# Patient Record
Sex: MALE | Race: BLACK OR AFRICAN AMERICAN | NOT HISPANIC OR LATINO | Employment: FULL TIME | URBAN - METROPOLITAN AREA
[De-identification: names, ages, dates, MRNs, and addresses within clinical notes are randomized per-mention and may not be internally consistent; named-entity substitution may affect disease eponyms.]

---

## 2017-03-29 ENCOUNTER — PATIENT MESSAGE (OUTPATIENT)
Dept: OPTOMETRY | Facility: CLINIC | Age: 42
End: 2017-03-29

## 2017-03-29 DIAGNOSIS — I15.0 RENOVASCULAR HYPERTENSION: ICD-10-CM

## 2017-03-29 DIAGNOSIS — R00.0 TACHYCARDIA: ICD-10-CM

## 2017-03-29 RX ORDER — INSULIN GLARGINE 100 [IU]/ML
45 INJECTION, SOLUTION SUBCUTANEOUS 2 TIMES DAILY
Qty: 45 SYRINGE | Refills: 9 | Status: SHIPPED | OUTPATIENT
Start: 2017-03-29 | End: 2017-06-06 | Stop reason: SDUPTHER

## 2017-03-29 RX ORDER — CARVEDILOL 25 MG/1
25 TABLET ORAL 2 TIMES DAILY WITH MEALS
Qty: 180 TABLET | Refills: 3 | Status: SHIPPED | OUTPATIENT
Start: 2017-03-29 | End: 2018-04-10 | Stop reason: SDUPTHER

## 2017-04-12 ENCOUNTER — HOSPITAL ENCOUNTER (EMERGENCY)
Facility: OTHER | Age: 42
Discharge: HOME | End: 2017-04-12
Attending: EMERGENCY MEDICINE
Payer: MEDICARE

## 2017-04-12 VITALS
SYSTOLIC BLOOD PRESSURE: 171 MMHG | WEIGHT: 313 LBS | RESPIRATION RATE: 20 BRPM | DIASTOLIC BLOOD PRESSURE: 97 MMHG | BODY MASS INDEX: 41.48 KG/M2 | HEIGHT: 73 IN | TEMPERATURE: 100 F | OXYGEN SATURATION: 98 %

## 2017-04-12 DIAGNOSIS — J02.0 STREP PHARYNGITIS: Primary | ICD-10-CM

## 2017-04-12 DIAGNOSIS — R50.9 FEVER: ICD-10-CM

## 2017-04-12 LAB
ALBUMIN SERPL BCP-MCNC: 3.5 G/DL
ALP SERPL-CCNC: 105 U/L
ALT SERPL W/O P-5'-P-CCNC: 13 U/L
ANION GAP SERPL CALC-SCNC: 14 MMOL/L
ANISOCYTOSIS BLD QL SMEAR: SLIGHT
AST SERPL-CCNC: 12 U/L
BACTERIA #/AREA URNS HPF: NORMAL /HPF
BASOPHILS # BLD AUTO: ABNORMAL K/UL
BASOPHILS NFR BLD: 0 %
BILIRUB SERPL-MCNC: 0.6 MG/DL
BILIRUB UR QL STRIP: NEGATIVE
BUN SERPL-MCNC: 40 MG/DL
CALCIUM SERPL-MCNC: 9.4 MG/DL
CHLORIDE SERPL-SCNC: 92 MMOL/L
CLARITY UR: CLEAR
CO2 SERPL-SCNC: 26 MMOL/L
COLOR UR: YELLOW
CREAT SERPL-MCNC: 8.7 MG/DL
DEPRECATED S PYO AG THROAT QL EIA: POSITIVE
DIFFERENTIAL METHOD: ABNORMAL
EOSINOPHIL # BLD AUTO: ABNORMAL K/UL
EOSINOPHIL NFR BLD: 0 %
ERYTHROCYTE [DISTWIDTH] IN BLOOD BY AUTOMATED COUNT: 13.9 %
EST. GFR  (AFRICAN AMERICAN): 8 ML/MIN/1.73 M^2
EST. GFR  (NON AFRICAN AMERICAN): 7 ML/MIN/1.73 M^2
FLUAV AG SPEC QL IA: NEGATIVE
FLUBV AG SPEC QL IA: NEGATIVE
GLUCOSE SERPL-MCNC: 201 MG/DL
GLUCOSE UR QL STRIP: ABNORMAL
HCT VFR BLD AUTO: 37.7 %
HGB BLD-MCNC: 12.6 G/DL
HGB UR QL STRIP: ABNORMAL
HYALINE CASTS #/AREA URNS LPF: 1 /LPF
INR PPP: 1
KETONES UR QL STRIP: NEGATIVE
LEUKOCYTE ESTERASE UR QL STRIP: NEGATIVE
LYMPHOCYTES # BLD AUTO: ABNORMAL K/UL
LYMPHOCYTES NFR BLD: 8 %
MCH RBC QN AUTO: 30.5 PG
MCHC RBC AUTO-ENTMCNC: 33.4 %
MCV RBC AUTO: 91 FL
MICROSCOPIC COMMENT: NORMAL
MONOCYTES # BLD AUTO: ABNORMAL K/UL
MONOCYTES NFR BLD: 10 %
NEUTROPHILS NFR BLD: 76 %
NEUTS BAND NFR BLD MANUAL: 6 %
NITRITE UR QL STRIP: NEGATIVE
PH UR STRIP: 8.5 [PH] (ref 5–8)
PLATELET # BLD AUTO: 226 K/UL
PLATELET BLD QL SMEAR: ABNORMAL
PMV BLD AUTO: 10 FL
POIKILOCYTOSIS BLD QL SMEAR: SLIGHT
POLYCHROMASIA BLD QL SMEAR: ABNORMAL
POTASSIUM SERPL-SCNC: 4.9 MMOL/L
PROT SERPL-MCNC: 8.1 G/DL
PROT UR QL STRIP: ABNORMAL
PROTHROMBIN TIME: 11.4 SEC
RBC # BLD AUTO: 4.13 M/UL
RBC #/AREA URNS HPF: 4 /HPF (ref 0–4)
SODIUM SERPL-SCNC: 132 MMOL/L
SP GR UR STRIP: 1.01 (ref 1–1.03)
SPECIMEN SOURCE: NORMAL
STOMATOCYTES BLD QL SMEAR: PRESENT
URN SPEC COLLECT METH UR: ABNORMAL
UROBILINOGEN UR STRIP-ACNC: NEGATIVE EU/DL
WBC # BLD AUTO: 18.9 K/UL
WBC #/AREA URNS HPF: 2 /HPF (ref 0–5)

## 2017-04-12 PROCEDURE — 80053 COMPREHEN METABOLIC PANEL: CPT

## 2017-04-12 PROCEDURE — 87400 INFLUENZA A/B EACH AG IA: CPT | Mod: 59

## 2017-04-12 PROCEDURE — 85007 BL SMEAR W/DIFF WBC COUNT: CPT

## 2017-04-12 PROCEDURE — 99283 EMERGENCY DEPT VISIT LOW MDM: CPT | Mod: 25

## 2017-04-12 PROCEDURE — 85027 COMPLETE CBC AUTOMATED: CPT

## 2017-04-12 PROCEDURE — 63600175 PHARM REV CODE 636 W HCPCS: Performed by: EMERGENCY MEDICINE

## 2017-04-12 PROCEDURE — 81000 URINALYSIS NONAUTO W/SCOPE: CPT

## 2017-04-12 PROCEDURE — 87880 STREP A ASSAY W/OPTIC: CPT

## 2017-04-12 PROCEDURE — 25000003 PHARM REV CODE 250: Performed by: EMERGENCY MEDICINE

## 2017-04-12 PROCEDURE — 85610 PROTHROMBIN TIME: CPT

## 2017-04-12 PROCEDURE — 96372 THER/PROPH/DIAG INJ SC/IM: CPT

## 2017-04-12 RX ORDER — ACETAMINOPHEN 500 MG
1000 TABLET ORAL
Status: COMPLETED | OUTPATIENT
Start: 2017-04-12 | End: 2017-04-12

## 2017-04-12 RX ADMIN — PENICILLIN G BENZATHINE 1.2 MILLION UNITS: 1200000 INJECTION, SUSPENSION INTRAMUSCULAR at 08:04

## 2017-04-12 RX ADMIN — ACETAMINOPHEN 1000 MG: 500 TABLET ORAL at 07:04

## 2017-04-12 NOTE — ED PROVIDER NOTES
Encounter Date: 4/12/2017    SCRIBE #1 NOTE: I, Adilene Galan, am scribing for, and in the presence of,  Dr. Dempsey. I have scribed the entire note.       History     Chief Complaint   Patient presents with    Generalized Body Aches     pt reports generalized body aches, N/V/D; abdominal pain/cramping; sore throat with non-productive cough that started yesterday;      Review of patient's allergies indicates:  No Known Allergies  HPI Comments: Time seen by provider: 7:08 AM    This is a 41 y.o. male who presents with complaint of generalized body aches. He reports onset of symptoms was 1 day ago. The patient notes prior to onset of symptoms he ate watermelon. He reports associated cough, congestion, sore throat, nausea, vomiting, diarrhea and back pain but denies any urinary symptoms, fever or chills. The patient states he had no episodes of vomiting today but had multiple episodes of diarrhea last night. He denies the use of any medication for the symptoms. The patient denies any sick contacts. Of note the patient receives dialysis. He reports he receives treatment Mon/Wed/Fri. The patient states he last Dialysis on Monday. He was supposed to have treatment today but he was instructed to come to the ED. The patient reports his nephrologist is Dr. Dubois.     The history is provided by the patient.     Past Medical History:   Diagnosis Date    Acute gouty arthropathy 8/12/2013    Chronic kidney disease, stage IV (severe) 1/13/2011    Diabetes mellitus type II     Dialysis patient     DM (diabetes mellitus) type II uncontrolled with renal manifestation 8/12/2013    ESRD on hemodialysis 8/12/2013    Hypertension 9/12/2012    Line sepsis 8/15/2013    Morbid obesity 9/12/2012     Past Surgical History:   Procedure Laterality Date    DIALYSIS FISTULA CREATION      KNEE ARTHROPLASTY      SHOULDER SURGERY      right    VASECTOMY       Family History   Problem Relation Age of Onset    Diabetes Mother      Hypertension Father     Kidney disease Paternal Uncle      dialysis    Kidney disease Paternal Uncle      dialysis    Eczema Sister     Melanoma Neg Hx     Psoriasis Neg Hx     Lupus Neg Hx     Acne Neg Hx      Social History   Substance Use Topics    Smoking status: Current Some Day Smoker     Types: Cigars    Smokeless tobacco: Never Used      Comment: once a month    Alcohol use No     Review of Systems   Constitutional: Negative for chills and fever.   HENT: Positive for congestion and sore throat.    Eyes: Negative for redness and visual disturbance.   Respiratory: Positive for cough. Negative for shortness of breath.    Cardiovascular: Negative for chest pain and palpitations.   Gastrointestinal: Positive for diarrhea, nausea and vomiting. Negative for abdominal pain.   Genitourinary: Negative for dysuria.   Musculoskeletal: Negative for back pain.   Skin: Negative for rash.   Neurological: Negative for weakness and headaches.   Psychiatric/Behavioral: Negative for confusion.       Physical Exam   Initial Vitals   BP Pulse Resp Temp SpO2   04/12/17 0703 -- 04/12/17 0703 04/12/17 0703 04/12/17 0703   171/97  20 101.1 °F (38.4 °C) 98 %     Physical Exam    Nursing note and vitals reviewed.  Constitutional: He appears well-developed and well-nourished. He is not diaphoretic.   HENT:   Head: Normocephalic and atraumatic.   Right Ear: External ear normal.   Left Ear: External ear normal.   Bilateral TM are clear and intact. Mildly swollen and erythematous turbinates. Mildly erythematous posterior oropharynx.    Eyes: Conjunctivae and EOM are normal.   Neck: Normal range of motion. Neck supple.   Cardiovascular: Normal rate, regular rhythm and normal heart sounds. Exam reveals no gallop and no friction rub.    No murmur heard.  Pulmonary/Chest: He has no wheezes. He has no rhonchi. He has no rales.   Abdominal: Soft. Bowel sounds are normal. There is no tenderness. There is no rebound and no guarding.    Musculoskeletal: Normal range of motion. He exhibits no edema or tenderness.   Dialysis shunt with palpable thrill to LUQ   Lymphadenopathy:     He has no cervical adenopathy.   Neurological: He is alert and oriented to person, place, and time. He has normal strength.   Skin: Skin is warm and dry. No rash noted.         ED Course   Procedures  Labs Reviewed   THROAT SCREEN, RAPID - Abnormal; Notable for the following:        Result Value    Rapid Strep A Screen Positive (*)     All other components within normal limits   CBC W/ AUTO DIFFERENTIAL - Abnormal; Notable for the following:     WBC 18.90 (*)     RBC 4.13 (*)     Hemoglobin 12.6 (*)     Hematocrit 37.7 (*)     Gran% 76.0 (*)     Lymph% 8.0 (*)     All other components within normal limits   COMPREHENSIVE METABOLIC PANEL - Abnormal; Notable for the following:     Sodium 132 (*)     Chloride 92 (*)     Glucose 201 (*)     BUN, Bld 40 (*)     Creatinine 8.7 (*)     eGFR if  8 (*)     eGFR if non  7 (*)     All other components within normal limits   URINALYSIS - Abnormal; Notable for the following:     Protein, UA 2+ (*)     Glucose, UA 2+ (*)     Occult Blood UA 2+ (*)     All other components within normal limits   PROTIME-INR   INFLUENZA A AND B ANTIGEN   URINALYSIS MICROSCOPIC          X-Rays:   Independently Interpreted Readings:   Chest X-Ray: PA and Lateral Chest X-ray Reading (8:10 AM): No infiltrate. No effusion. No pneumothorax.     Medical Decision Making:   Independently Interpreted Test(s):   I have ordered and independently interpreted X-rays - see prior notes.  Clinical Tests:   Lab Tests: Ordered and Reviewed  Radiological Study: Reviewed and Ordered  ED Management:  41-year-old male with generalized bodyaches.  Patient is febrile.  Based on my assessment appears to be most likely of viral syndrome/influenza.  Due to the patient's past medical history and multiple comorbidities we will get blood work to rule  out electrolyte abnormalities.  We'll get a flu and a strep.  We'll give Tylenol for the fever and reevaluate.  Patient otherwise looks extremely comfortable and in no distress.    8:05 AM strep is positive.  We'll give penicillin.  Still pending electrolytes.  Updated patient who is comfortable.    8:15 AM Paged [facility]    8:17 AM Case discussed with Dr. Lantigua of  [facility], states the patient can receive dialysis later this afternoon and follow up.    Discussed with the patient as well as his girlfriend is at bedside and they agree with the plan of care.    Additional MDM:   X-Rays: I have independently interpreted X-Ray(s) - see notes.          Scribe Attestation:   Scribe #1: I performed the above scribed service and the documentation accurately describes the services I performed. I attest to the accuracy of the note.    Attending Attestation:           Physician Attestation for Scribe:  Physician Attestation Statement for Scribe #1: I, Dr. Dempsey, reviewed documentation, as scribed by Adilene Galan in my presence, and it is both accurate and complete.                 ED Course     Clinical Impression:     1. Strep pharyngitis    2. Fever                Perfecto Dempsey, DO  04/12/17 3972

## 2017-04-12 NOTE — ED AVS SNAPSHOT
[facility]  [address]  [facility] [address]               Thai Santoyo JrAshanti   2017  7:06 AM   ED    Description:  Male : 1975   Department:  [facility]           Your Care was Coordinated By:     Provider Role From To    Perfecto Dempsey DO Attending Provider 17 0707 --      Reason for Visit     Generalized Body Aches           Diagnoses this Visit        Comments    Strep pharyngitis    -  Primary     Fever           ED Disposition     None           To Do List           Follow-up Information     Follow up with go to dialysis this afternoon. Dr Lantigua stated would be fine..      [facility]     [facility] Nurse Care Line -  Assistance  Unless otherwise directed by your provider, please contact [facility], our nurse care line that is available for  assistance.     Registered nurses in the [facility] provide: appointment scheduling, clinical advisement, health education, and other advisory services.  Call: [phone] (toll free)               Medications           Message regarding Medications     Verify the changes and/or additions to your medication regime listed below are the same as discussed with your clinician today.  If any of these changes or additions are incorrect, please notify your healthcare provider.        These medications were administered today        Dose Freq    acetaminophen tablet 1,000 mg 1,000 mg ED 1 Time    Sig: Take 2 tablets (1,000 mg total) by mouth ED 1 Time.    Class: Normal    Route: Oral    penicillin G benzathine (BICILLIN LA) injection 1.2 Million Units 1.2 Million Units ED 1 Time    Sig: Inject 2 mLs (1.2 Million Units total) into the muscle ED 1 Time.    Class: Normal    Route: Intramuscular           Verify that the below list of medications is an accurate representation of the medications you are currently taking.  If none reported, the list may be blank. If  "incorrect, please contact your healthcare provider. Carry this list with you in case of emergency.           Current Medications     amlodipine (NORVASC) 10 MG tablet Take 1 tablet (10 mg total) by mouth once daily.    blood sugar diagnostic Strp Use for BS testing three times daily.    carvedilol (COREG) 25 MG tablet Take 1 tablet (25 mg total) by mouth 2 (two) times daily with meals.    colchicine-probenecid 0.5-500 mg (CO-BENEMID) 0.5-500 mg Tab Take 1 tablet by mouth daily as needed (gout).    cyclobenzaprine (FLEXERIL) 10 MG tablet     furosemide (LASIX) 40 MG tablet Take 1 tablet (40 mg total) by mouth once daily.    glipiZIDE (GLUCOTROL) 5 MG TR24 TAKE ONE TABLET BY MOUTH ONCE DAILY WITH BREAKFAST    insulin aspart (NOVOLOG FLEXPEN) 100 unit/mL InPn pen Inject 40 Units into the skin 3 (three) times daily with meals.    LANTUS SOLOSTAR 100 unit/mL (3 mL) InPn pen Inject 45 Units into the skin 2 (two) times daily.    lisinopril (PRINIVIL,ZESTRIL) 40 MG tablet Take 1 tablet (40 mg total) by mouth once daily.    NOVOFINE 32 32 x 1/4 " Ndle     RENVELA 800 mg Tab     HYDROmorphone (DILAUDID) 4 MG tablet take 1 tablet by mouth every 6 hours if needed FOR 30 DAYS    lancets 30 gauge Misc 1 lancet by Misc.(Non-Drug; Combo Route) route 4 (four) times daily.    sildenafil (VIAGRA) 100 MG tablet Take 1 tablet (100 mg total) by mouth daily as needed for Erectile Dysfunction.    testosterone 4 mg/24 hr PT24 Place 1 patch (4 mg total) onto the skin once daily.    trazodone (DESYREL) 50 MG tablet Take 1 tablet (50 mg total) by mouth every evening.           Clinical Reference Information           Your Vitals Were     BP Temp Resp Height Weight SpO2    171/97 (BP Location: Right arm, Patient Position: Sitting) 100.5 °F (38.1 °C) (Oral) 20 6' 1" (1.854 m) 142 kg (313 lb) 98%    BMI                41.3 kg/m2          Allergies as of 4/12/2017     No Known Allergies      Immunizations Administered on Date of Encounter - " 4/12/2017     None      ED Micro, Lab, POCT     Start Ordered       Status Ordering Provider    04/12/17 0713 04/12/17 0712  CBC auto differential  STAT      Final result     04/12/17 0713 04/12/17 0712  Comprehensive metabolic panel  STAT      Final result     04/12/17 0713 04/12/17 0712  Protime-INR  STAT      Final result     04/12/17 0713 04/12/17 0712  Urinalysis  STAT      In process     04/12/17 0713 04/12/17 0712  Influenza antigen Nasopharyngeal Swab  Once      Final result     04/12/17 0713 04/12/17 0712  Rapid strep screen  STAT      Final result       ED Imaging Orders     Start Ordered       Status Ordering Provider    04/12/17 0713 04/12/17 0712  X-Ray Chest PA And Lateral  1 time imaging      Final result         Discharge Instructions       Use tylenol as needed for fever and body aches.    Discharge References/Attachments     PHARYNGITIS, STREP (CONFIRMED) (ENGLISH)       [facility] complies with applicable Federal civil rights laws and does not discriminate on the basis of race, color, national origin, age, disability, or sex.        Language Assistance Services     ATTENTION: Language assistance services are available, free of charge. Please call [phone].      ATENCIÓN: Si habla español, tiene a prather disposición servicios gratuitos de asistencia lingüística. Llame al [phone].     CHÚ Ý: N?u b?n nói Ti?ng Vi?t, có các d?ch v? h? tr? ngôn ng? mi?n phí dành cho b?n. G?i s? [phone].

## 2017-04-17 ENCOUNTER — TELEPHONE (OUTPATIENT)
Dept: ADMINISTRATIVE | Facility: HOSPITAL | Age: 42
End: 2017-04-17

## 2017-04-17 NOTE — TELEPHONE ENCOUNTER
Please see message below from [facility] nurse-     Thai Santoyo [id number] was seen in [facility] 4/12/17 Dx: Strep pharyngitis. Please assist with ED follow up with Dr. Mckee.   Thanks,  Faviola Benson, RN  RN Navigator  [facility]

## 2017-05-11 ENCOUNTER — OFFICE VISIT (OUTPATIENT)
Dept: INTERNAL MEDICINE | Facility: CLINIC | Age: 42
End: 2017-05-11
Payer: MEDICARE

## 2017-05-11 ENCOUNTER — TELEPHONE (OUTPATIENT)
Dept: INTERNAL MEDICINE | Facility: CLINIC | Age: 42
End: 2017-05-11

## 2017-05-11 ENCOUNTER — OFFICE VISIT (OUTPATIENT)
Dept: UROLOGY | Facility: CLINIC | Age: 42
End: 2017-05-11
Payer: MEDICARE

## 2017-05-11 VITALS
HEIGHT: 73 IN | SYSTOLIC BLOOD PRESSURE: 123 MMHG | WEIGHT: 308.44 LBS | BODY MASS INDEX: 40.88 KG/M2 | DIASTOLIC BLOOD PRESSURE: 82 MMHG | HEART RATE: 97 BPM

## 2017-05-11 VITALS
DIASTOLIC BLOOD PRESSURE: 83 MMHG | OXYGEN SATURATION: 96 % | SYSTOLIC BLOOD PRESSURE: 104 MMHG | BODY MASS INDEX: 40.82 KG/M2 | WEIGHT: 308 LBS | TEMPERATURE: 98 F | HEIGHT: 73 IN | HEART RATE: 99 BPM

## 2017-05-11 DIAGNOSIS — E66.8 HYPOGONADAL OBESITY: ICD-10-CM

## 2017-05-11 DIAGNOSIS — I15.0 RENOVASCULAR HYPERTENSION: ICD-10-CM

## 2017-05-11 DIAGNOSIS — N18.6 ANEMIA IN END-STAGE RENAL DISEASE: ICD-10-CM

## 2017-05-11 DIAGNOSIS — N52.9 ERECTILE DYSFUNCTION, UNSPECIFIED ERECTILE DYSFUNCTION TYPE: Primary | ICD-10-CM

## 2017-05-11 DIAGNOSIS — D63.1 ANEMIA IN END-STAGE RENAL DISEASE: ICD-10-CM

## 2017-05-11 DIAGNOSIS — E11.22 TYPE 2 DIABETES MELLITUS WITH CHRONIC KIDNEY DISEASE ON CHRONIC DIALYSIS, WITH LONG-TERM CURRENT USE OF INSULIN: ICD-10-CM

## 2017-05-11 DIAGNOSIS — N18.6 ESRD ON HEMODIALYSIS: ICD-10-CM

## 2017-05-11 DIAGNOSIS — Z00.00 ENCOUNTER FOR PREVENTIVE HEALTH EXAMINATION: Primary | ICD-10-CM

## 2017-05-11 DIAGNOSIS — Z99.2 TYPE 2 DIABETES MELLITUS WITH CHRONIC KIDNEY DISEASE ON CHRONIC DIALYSIS, WITH LONG-TERM CURRENT USE OF INSULIN: ICD-10-CM

## 2017-05-11 DIAGNOSIS — Z79.4 TYPE 2 DIABETES MELLITUS WITH CHRONIC KIDNEY DISEASE ON CHRONIC DIALYSIS, WITH LONG-TERM CURRENT USE OF INSULIN: ICD-10-CM

## 2017-05-11 DIAGNOSIS — E29.1 HYPOGONADISM IN MALE: ICD-10-CM

## 2017-05-11 DIAGNOSIS — N25.81 HYPERPARATHYROIDISM, SECONDARY RENAL: ICD-10-CM

## 2017-05-11 DIAGNOSIS — N18.6 TYPE 2 DIABETES MELLITUS WITH CHRONIC KIDNEY DISEASE ON CHRONIC DIALYSIS, WITH LONG-TERM CURRENT USE OF INSULIN: ICD-10-CM

## 2017-05-11 DIAGNOSIS — E66.01 MORBID OBESITY, UNSPECIFIED OBESITY TYPE: ICD-10-CM

## 2017-05-11 DIAGNOSIS — Z99.2 ESRD ON HEMODIALYSIS: ICD-10-CM

## 2017-05-11 DIAGNOSIS — Z13.5 SCREENING FOR GLAUCOMA: ICD-10-CM

## 2017-05-11 PROCEDURE — 99999 PR PBB SHADOW E&M-EST. PATIENT-LVL III: CPT | Mod: PBBFAC,,, | Performed by: UROLOGY

## 2017-05-11 PROCEDURE — 1160F RVW MEDS BY RX/DR IN RCRD: CPT | Mod: S$GLB,,, | Performed by: UROLOGY

## 2017-05-11 PROCEDURE — G0439 PPPS, SUBSEQ VISIT: HCPCS | Mod: S$GLB,,, | Performed by: NURSE PRACTITIONER

## 2017-05-11 PROCEDURE — 99499 UNLISTED E&M SERVICE: CPT | Mod: S$GLB,,, | Performed by: UROLOGY

## 2017-05-11 PROCEDURE — 99499 UNLISTED E&M SERVICE: CPT | Mod: S$GLB,,, | Performed by: NURSE PRACTITIONER

## 2017-05-11 PROCEDURE — 99204 OFFICE O/P NEW MOD 45 MIN: CPT | Mod: S$GLB,,, | Performed by: UROLOGY

## 2017-05-11 PROCEDURE — 99999 PR PBB SHADOW E&M-EST. PATIENT-LVL V: CPT | Mod: PBBFAC,,, | Performed by: NURSE PRACTITIONER

## 2017-05-11 RX ORDER — PAPAVERINE HYDROCHLORIDE 30 MG/ML
INJECTION INTRAMUSCULAR; INTRAVENOUS
Qty: 2 ML | Refills: 11 | Status: SHIPPED | OUTPATIENT
Start: 2017-05-11 | End: 2017-06-06 | Stop reason: SDUPTHER

## 2017-05-11 RX ORDER — PAPAVERINE HYDROCHLORIDE 30 MG/ML
INJECTION INTRAMUSCULAR; INTRAVENOUS
Qty: 10 ML | Refills: 11 | Status: SHIPPED | OUTPATIENT
Start: 2017-05-11 | End: 2017-05-11

## 2017-05-11 NOTE — MR AVS SNAPSHOT
[facility] 4th Floor  [address] [facility] [address]  Phone: [phone]                  Thai Santoyo JrAshanti   2017 10:15 AM   Office Visit    Description:  Male : 1975   Provider:  Grayson Gonzales Jr., MD   Department:  [facility] 4th [facility]           Reason for Visit     Erectile Dysfunction           Diagnoses this Visit        Comments    Erectile dysfunction, unspecified erectile dysfunction type    -  Primary     Hypogonadal obesity                To Do List           Future Appointments        Provider Department Dept Phone    2017 12:00 PM Karen Sarah NP [facility] [phone]    2017 1:00 PM Yamileth Chaudhari NP [facility] 4th Floor [phone]      Goals (5 Years of Data)     None       These Medications        Disp Refills Start End    papaverine 30 mg/mL injection 2 mL 11 2017     ADD: Phentolamine 10mg/ml  ADD: PGE1 100 mcg    Sig: Inject as directed into lateral aspect of penis    Pharmacy: [facility] [address] [place], [place] - [address] Ph #: [phone]         [facility]     [facility] Care Line -  Assistance  Unless otherwise directed by your provider, please contact [facility], our nurse care line that is available for  assistance.     Registered nurses in the [facility] provide: appointment scheduling, clinical advisement, health education, and other advisory services.  Call: [phone] (toll free)               Medications           Message regarding Medications     Verify the changes and/or additions to your medication regime listed below are the same as discussed with your clinician today.  If any of these changes or additions are incorrect, please notify your healthcare provider.        START taking these NEW medications        Refills    papaverine 30 mg/mL injection 11    Sig: ADD: Phentolamine 10mg/ml  ADD: PGE1 100 mcg    Sig: Inject  "as directed into lateral aspect of penis    Class: Print           Verify that the below list of medications is an accurate representation of the medications you are currently taking.  If none reported, the list may be blank. If incorrect, please contact your healthcare provider. Carry this list with you in case of emergency.           Current Medications     amlodipine (NORVASC) 10 MG tablet Take 1 tablet (10 mg total) by mouth once daily.    blood sugar diagnostic Strp Use for BS testing three times daily.    carvedilol (COREG) 25 MG tablet Take 1 tablet (25 mg total) by mouth 2 (two) times daily with meals.    cyclobenzaprine (FLEXERIL) 10 MG tablet     furosemide (LASIX) 40 MG tablet Take 1 tablet (40 mg total) by mouth once daily.    glipiZIDE (GLUCOTROL) 5 MG TR24 TAKE ONE TABLET BY MOUTH ONCE DAILY WITH BREAKFAST    HYDROmorphone (DILAUDID) 4 MG tablet take 1 tablet by mouth every 6 hours if needed FOR 30 DAYS    insulin aspart (NOVOLOG FLEXPEN) 100 unit/mL InPn pen Inject 40 Units into the skin 3 (three) times daily with meals.    LANTUS SOLOSTAR 100 unit/mL (3 mL) InPn pen Inject 45 Units into the skin 2 (two) times daily.    lisinopril (PRINIVIL,ZESTRIL) 40 MG tablet Take 1 tablet (40 mg total) by mouth once daily.    NOVOFINE 32 32 x 1/4 " Ndle     RENVELA 800 mg Tab     trazodone (DESYREL) 50 MG tablet Take 1 tablet (50 mg total) by mouth every evening.    lancets 30 gauge Misc 1 lancet by Misc.(Non-Drug; Combo Route) route 4 (four) times daily.    papaverine 30 mg/mL injection ADD: Phentolamine 10mg/ml  ADD: PGE1 100 mcg    Sig: Inject as directed into lateral aspect of penis    sildenafil (VIAGRA) 100 MG tablet Take 1 tablet (100 mg total) by mouth daily as needed for Erectile Dysfunction.    testosterone 4 mg/24 hr PT24 Place 1 patch (4 mg total) onto the skin once daily.           Clinical Reference Information           Your Vitals Were     BP Pulse Height Weight BMI    123/82 97 6' 1" (1.854 m) " 139.9 kg (308 lb 6.8 oz) 40.69 kg/m2      Blood Pressure          Most Recent Value    BP  123/82      Allergies as of 5/11/2017     No Known Allergies      Immunizations Administered on Date of Encounter - 5/11/2017     None      Orders Placed During Today's Visit     Future Labs/Procedures Expected by Expires    CBC with Auto Differential  5/11/2017 7/10/2018    Hepatic Function Panel  5/11/2017 7/10/2018    Lipid Panel  5/11/2017 7/10/2018    PSA  5/11/2017 7/10/2018    Testosterone  5/11/2017 7/10/2018      Maintenance Dialysis History     Start End Type Comments Center    7/17/2013  [place]  [facility]            Current Dialysis Center Information     [facility] [address] Phone #:  [phone]    Contact:  [place]  [address] Fax #:  [phone]            Smoking Cessation     If you would like to quit smoking:   You may be eligible for free services if you are a [place] resident and started smoking cigarettes before September 1, 1988.  Call the Smoking Cessation Trust ([facility]) toll free at ([phone] 020-3930 or [phone] 276-9473.   Call 1-800-QUIT-NOW if you do not meet the above criteria.   Contact us via email: [email or web address]   View our website for more information: [email or web address]        Language Assistance Services     ATTENTION: Language assistance services are available, free of charge. Please call [phone].      ATENCIÓN: Si habla español, tiene a prather disposición servicios gratuitos de asistencia lingüística. Llame al [phone].     CHÚ Ý: N?u b?n nói Ti?ng Vi?t, có các d?ch v? h? tr? ngôn ng? mi?n phí dành cho b?n. G?i s? [phone].         [facility] complies with applicable Federal civil rights laws and does not discriminate on the basis of race, color, national origin, age, disability, or sex.

## 2017-05-11 NOTE — MR AVS SNAPSHOT
[facility] - Internal Medicine  [address]  [facility] [address]  Phone: [phone]  Fax: [phone]                  Thai Santoyo Jr.   2017 12:00 PM   Office Visit    Description:  Male : 1975   Provider:  Karen Sarah NP   Department:  [facility] - Internal Medicine           Reason for Visit     Health Risk Assessment           Diagnoses this Visit        Comments    Encounter for preventive health examination    -  Primary     Type 2 diabetes mellitus with chronic kidney disease on chronic dialysis, with long-term current use of insulin         Screening for glaucoma         Renovascular hypertension         ESRD on hemodialysis         Hyperparathyroidism, secondary renal         Morbid obesity, unspecified obesity type                To Do List           Future Appointments        Provider Department Dept Phone    2017 12:00 PM LAB, [place] [phone]    2017 1:00 PM Debi Thacker OD [place] - Optometry [phone]    2017 8:20 AM Yamileth Chaudhari NP [facility] - [facility] [place] [phone]      Goals (5 Years of Data)     None      [facility]     [facility] Care [facility] -  Assistance  Unless otherwise directed by your provider, please contact [facility], our nurse care line that is available for  assistance.     Registered nurses in the [facility] provide: appointment scheduling, clinical advisement, health education, and other advisory services.  Call: [phone] (toll free)               Medications           Message regarding Medications     Verify the changes and/or additions to your medication regime listed below are the same as discussed with your clinician today.  If any of these changes or additions are incorrect, please notify your healthcare provider.             Verify that the below list of medications is an accurate representation of the medications you are currently taking.  If  "none reported, the list may be blank. If incorrect, please contact your healthcare provider. Carry this list with you in case of emergency.           Current Medications     amlodipine (NORVASC) 10 MG tablet Take 1 tablet (10 mg total) by mouth once daily.    blood sugar diagnostic Strp Use for BS testing three times daily.    carvedilol (COREG) 25 MG tablet Take 1 tablet (25 mg total) by mouth 2 (two) times daily with meals.    cyclobenzaprine (FLEXERIL) 10 MG tablet     furosemide (LASIX) 40 MG tablet Take 1 tablet (40 mg total) by mouth once daily.    glipiZIDE (GLUCOTROL) 5 MG TR24 TAKE ONE TABLET BY MOUTH ONCE DAILY WITH BREAKFAST    HYDROmorphone (DILAUDID) 4 MG tablet take 1 tablet by mouth every 6 hours if needed FOR 30 DAYS    insulin aspart (NOVOLOG FLEXPEN) 100 unit/mL InPn pen Inject 40 Units into the skin 3 (three) times daily with meals.    LANTUS SOLOSTAR 100 unit/mL (3 mL) InPn pen Inject 45 Units into the skin 2 (two) times daily.    lisinopril (PRINIVIL,ZESTRIL) 40 MG tablet Take 1 tablet (40 mg total) by mouth once daily.    NOVOFINE 32 32 x 1/4 " Ndle     RENVELA 800 mg Tab     trazodone (DESYREL) 50 MG tablet Take 1 tablet (50 mg total) by mouth every evening.    albiglutide 30 mg/0.5 mL PnIj Inject 30 mg into the skin every 7 days.    lancets 30 gauge Misc 1 lancet by Misc.(Non-Drug; Combo Route) route 4 (four) times daily.    papaverine 30 mg/mL injection ADD: Phentolamine 10mg/ml  ADD: PGE1 100 mcg    Sig: Inject as directed into lateral aspect of penis    sildenafil (VIAGRA) 100 MG tablet Take 1 tablet (100 mg total) by mouth daily as needed for Erectile Dysfunction.    testosterone 4 mg/24 hr PT24 Place 1 patch (4 mg total) onto the skin once daily.           Clinical Reference Information           Your Vitals Were     BP Pulse Temp Height Weight SpO2    104/83 99 98.1 °F (36.7 °C) (Oral) 6' 1" (1.854 m) 139.7 kg (307 lb 15.7 oz) 96%    BMI                40.63 kg/m2          Blood " Pressure          Most Recent Value    BP  104/83      Allergies as of 5/11/2017     No Known Allergies      Immunizations Administered on Date of Encounter - 5/11/2017     None      Orders Placed During Today's Visit      Normal Orders This Visit    Ambulatory consult to Diabetic Education     Ambulatory referral to Optometry     Future Labs/Procedures Expected by Expires    Hemoglobin A1c  5/11/2017 7/10/2018      Maintenance Dialysis History     Start End Type Comments Center    7/17/2013  [place]  [facility]            Current Dialysis Center Information     [facility] [address] [facility] [address] Phone #:  [phone]    Contact:  [place]  [address] Fax #:  [phone]            Instructions      Counseling and Referral of Other Preventative  (Italic type indicates deductible and co-insurance are waived)    Patient Name: Thai Santoyo  Today's Date: 5/11/2017      SERVICE LIMITATIONS RECOMMENDATION    Vaccines    · Pneumococcal (once after 65)    · Influenza (annually)    · Hepatitis B (if medium/high risk)    · Prevnar 13      Hepatitis B medium/high risk factors:       - End-stage renal disease       - Hemophiliacs who received Factor VII or         IX concentrates       - Clients of institutions for the mentally             retarded       - Persons who live in the same house as          a HepB carrier       - Homosexual men       - Illicit injectable drug abusers     Pneumococcal: Done, repeat after age 65     Influenza: Due in Fall, 2017     Hepatitis B: Please check and see if this was done at your dialysis center.  If not, let me know and I will arrange this for you.     Prevnar 13: N/A due at age 65    Prostate cancer screening (annually to age 75)     Prostate specific antigen (PSA) Shared decision making with Provider. Sometimes a co-pay may be required if the patient decides to have this test. The USPSTF no longer recommends prostate cancer screening  routinely in medicine: Ordered, please schedule    Colorectal cancer screening (to age 75)    · Fecal occult blood test (annual)  · Flexible sigmoidoscopy (5y)  · Screening colonoscopy (10y)  · Barium enema   N/A    Diabetes self-management training (no USPSTF recommendations)  Requires referral by treating physician for patient with diabetes or renal disease. 10 hours of initial DSMT sessions of no less than 30 minutes each in a continuous 12-month period. 2 hours of follow-up DSMT in subsequent years.  Referred to diabetes education.    Glaucoma screening (no USPSTF recommendation)  Diabetes mellitus, family history   , age 50 or over    American, age 65 or over  Ordered today, please schedule    Medical nutrition therapy for diabetes or renal disease (no recommended schedule)  Requires referral by treating physician for patient with diabetes or renal disease or kidney transplant within the past 3 years.  Can be provided in same year as diabetes self-management training (DSMT), and CMS recommends medical nutrition therapy take place after DSMT. Up to 3 hours for initial year and 2 hours in subsequent years.  Referred to diabetic education.    Cardiovascular screening blood tests (every 5 years)  · Fasting lipid panel  Order as a panel if possible  Ordered today, please schedule    Diabetes screening tests (at least every 3 years, Medicare covers annually or at 6-month intervals for prediabetic patients)  · Fasting blood sugar (FBS) or glucose tolerance test (GTT)  Patient must be diagnosed with one of the following:       - Hypertension       - Dyslipidemia       - Obesity (BMI 30kg/m2)       - Previous elevated impaired FBS or GTT       ... or any two of the following:       - Overweight (BMI 25 but <30)       - Family history of diabetes       - Age 65 or older       - History of gestational diabetes or birth of baby weighing more than 9 pounds  N/A    Abdominal aortic aneurysm screening  (once)  · Sonogram   Limited to patients who meet one of the following criteria:       - Men who are 65-75 years old and have smoked more than 100 cigarette in their lifetime       - Anyone with a family history of abdominal aortic aneurysm       - Anyone recommended for screening by the USPSTF  N/A    HIV screening (annually for increased risk patients)  · HIV-1 and HIV-2 by EIA, or CHANI, rapid antibody test or oral mucosa transudate  Patients must be at increased risk for HIV infection per USPSTF guidelines or pregnant. Tests covered annually for patient at increased risk or as requested by the patient. Pregnant patients may receive up to 3 tests during pregnancy.  Risks discussed, screening is not recommended, got tested in January.  Please get tested in 2 months as planned.    Smoking cessation counseling (up to 8 sessions per year)  Patients must be asymptomatic of tobacco-related conditions to receive as a preventative service.  Consider quitting.  Even infrequent cigar smoke can have negative impacts on your health    Subsequent annual wellness visit  At least 12 months since last AWV  Return in one year     The following information is provided to all patients.  This information is to help you find resources for any of the problems found today that may be affecting your health:                Living healthy guide: www.[facility].[place].[facility]      Understanding Diabetes: www.diabetes.org      Eating healthy: www.cdc.gov/healthyweight      CDC home safety checklist: www.cdc.gov/steadi/patient.html      Agency on Aging: www.goea.[place].gov      Alcoholics anonymous (AA): www.aa.org      Physical Activity: www.petra.nih.gov/gf5qqoa      Tobacco use: www.quitwithusla.org          Smoking Cessation     If you would like to quit smoking:   You may be eligible for free services if you are a [place] resident and started smoking cigarettes before September 1, 1988.  Call the Smoking Cessation Trust (SCT) toll free at  (727) 408-8900 or (349) 289-7580.   Call 1-800-QUIT-NOW if you do not meet the above criteria.   Contact us via email: [email or web address]   View our website for more information: [email or web address]        Language Assistance Services     ATTENTION: Language assistance services are available, free of charge. Please call [phone].      ATENCIÓN: Si habla español, tiene a prather disposición servicios gratuitos de asistencia lingüística. Llame al [phone].     CHÚ Ý: N?u b?n nói Ti?ng Vi?t, có các d?ch v? h? tr? ngôn ng? mi?n phí dành cho b?n. G?i s? [phone].         [facility] complies with applicable Federal civil rights laws and does not discriminate on the basis of race, color, national origin, age, disability, or sex.

## 2017-05-11 NOTE — PROGRESS NOTES
Subjective:       Patient ID: Thai Santoyo Jr. is a 41 y.o. male.    Chief Complaint: Erectile Dysfunction    HPI patient is here for erectile dysfunction.  He has diabetes and morbid obesity.  His records says that he was on testosterone past but he states he never used it.  He has a low T, difficulty obtaining and maintaining an erection.  He is voiding well    Past Medical History:   Diagnosis Date    Acute gouty arthropathy 8/12/2013    Chronic kidney disease, stage IV (severe) 1/13/2011    Diabetes mellitus type II     Dialysis patient     DM (diabetes mellitus) type II uncontrolled with renal manifestation 8/12/2013    ESRD on hemodialysis 8/12/2013    Hypertension 9/12/2012    Line sepsis 8/15/2013    Morbid obesity 9/12/2012       Past Surgical History:   Procedure Laterality Date    DIALYSIS FISTULA CREATION      KNEE ARTHROPLASTY      SHOULDER SURGERY      right    VASECTOMY         Family History   Problem Relation Age of Onset    Diabetes Mother     Hypertension Father     Kidney disease Paternal Uncle      dialysis    Kidney disease Paternal Uncle      dialysis    Eczema Sister     Melanoma Neg Hx     Psoriasis Neg Hx     Lupus Neg Hx     Acne Neg Hx        Social History     Social History    Marital status: Single     Spouse name: N/A    Number of children: N/A    Years of education: N/A     Occupational History    Unemployed Other     [employer] for 12 years     Social History Main Topics    Smoking status: Current Some Day Smoker     Types: Cigars    Smokeless tobacco: Never Used      Comment: once a month    Alcohol use No    Drug use: No    Sexual activity: Yes     Partners: Female     Birth control/ protection: None     Other Topics Concern    Not on file     Social History Narrative       Allergies:  Review of patient's allergies indicates no known allergies.    Medications:    Current Outpatient Prescriptions:     amlodipine (NORVASC) 10 MG tablet, Take 1 tablet (10  "mg total) by mouth once daily., Disp: 90 tablet, Rfl: 3    blood sugar diagnostic Strp, Use for BS testing three times daily., Disp: 200 strip, Rfl: 9    carvedilol (COREG) 25 MG tablet, Take 1 tablet (25 mg total) by mouth 2 (two) times daily with meals., Disp: 180 tablet, Rfl: 3    cyclobenzaprine (FLEXERIL) 10 MG tablet, , Disp: , Rfl:     furosemide (LASIX) 40 MG tablet, Take 1 tablet (40 mg total) by mouth once daily., Disp: 90 tablet, Rfl: 3    glipiZIDE (GLUCOTROL) 5 MG TR24, TAKE ONE TABLET BY MOUTH ONCE DAILY WITH BREAKFAST, Disp: 90 tablet, Rfl: 3    HYDROmorphone (DILAUDID) 4 MG tablet, take 1 tablet by mouth every 6 hours if needed FOR 30 DAYS, Disp: , Rfl: 0    insulin aspart (NOVOLOG FLEXPEN) 100 unit/mL InPn pen, Inject 40 Units into the skin 3 (three) times daily with meals., Disp: 15 mL, Rfl: 9    LANTUS SOLOSTAR 100 unit/mL (3 mL) InPn pen, Inject 45 Units into the skin 2 (two) times daily., Disp: 45 Syringe, Rfl: 9    lisinopril (PRINIVIL,ZESTRIL) 40 MG tablet, Take 1 tablet (40 mg total) by mouth once daily., Disp: 90 tablet, Rfl: 3    NOVOFINE 32 32 x 1/4 " Ndle, , Disp: , Rfl:     RENVELA 800 mg Tab, , Disp: , Rfl:     trazodone (DESYREL) 50 MG tablet, Take 1 tablet (50 mg total) by mouth every evening., Disp: 30 tablet, Rfl: 11    lancets 30 gauge Misc, 1 lancet by Misc.(Non-Drug; Combo Route) route 4 (four) times daily., Disp: 200 each, Rfl: 9    papaverine 30 mg/mL injection, ADD: Phentolamine 10mg/ml ADD: PGE1 100 mcg  Sig: Inject as directed into lateral aspect of penis, Disp: 2 mL, Rfl: 11    sildenafil (VIAGRA) 100 MG tablet, Take 1 tablet (100 mg total) by mouth daily as needed for Erectile Dysfunction., Disp: 6 tablet, Rfl: 9    testosterone 4 mg/24 hr PT24, Place 1 patch (4 mg total) onto the skin once daily., Disp: 90 patch, Rfl: 3    Review of Systems   Constitutional: Negative for activity change, appetite change, chills, diaphoresis, fatigue, fever and unexpected " weight change.   HENT: Negative for congestion, dental problem, hearing loss, mouth sores, postnasal drip, rhinorrhea, sinus pressure and trouble swallowing.    Eyes: Negative for pain, discharge and itching.   Respiratory: Negative for apnea, cough, choking, chest tightness, shortness of breath and wheezing.    Cardiovascular: Negative for chest pain, palpitations and leg swelling.   Gastrointestinal: Negative for abdominal distention, abdominal pain, anal bleeding, blood in stool, constipation, diarrhea, nausea, rectal pain and vomiting.   Endocrine: Negative for polydipsia and polyuria.   Genitourinary: Negative for decreased urine volume, difficulty urinating, discharge, dysuria, enuresis, flank pain, frequency, genital sores, hematuria, penile pain, penile swelling, scrotal swelling, testicular pain and urgency.   Musculoskeletal: Negative for arthralgias, back pain and myalgias.   Skin: Negative for color change, rash and wound.   Neurological: Negative for dizziness, syncope, speech difficulty, light-headedness and headaches.   Hematological: Negative for adenopathy. Does not bruise/bleed easily.   Psychiatric/Behavioral: Negative for behavioral problems, confusion, hallucinations and sleep disturbance.       Objective:      Physical Exam   Constitutional: He appears well-developed.   HENT:   Head: Normocephalic.   Cardiovascular: Normal rate.    Pulmonary/Chest: Effort normal.   Abdominal: Soft.   Neurological: He is alert.   Skin: Skin is warm.     Psychiatric: He has a normal mood and affect.       Assessment:       1. Erectile dysfunction, unspecified erectile dysfunction type    2. Hypogonadal obesity        Plan:       Thai was seen today for erectile dysfunction.    Diagnoses and all orders for this visit:    Erectile dysfunction, unspecified erectile dysfunction type    Hypogonadal obesity  -     Testosterone; Future  -     PSA; Future  -     CBC with Auto Differential; Future  -     Hepatic Function  Panel; Future  -     Lipid Panel; Future    Other orders  -     Discontinue: papaverine 30 mg/mL injection; ADD: Phentolamine 10mg/ml  ADD: PGE1 100 mcg    Sig: Inject as directed into lateral aspect of penis  -     papaverine 30 mg/mL injection; ADD: Phentolamine 10mg/ml  ADD: PGE1 100 mcg    Sig: Inject as directed into lateral aspect of penis        patient will see Yamileth Chaudhari NP for pep injections and follow-up of hypogonadism

## 2017-05-11 NOTE — PATIENT INSTRUCTIONS
Counseling and Referral of Other Preventative  (Italic type indicates deductible and co-insurance are waived)    Patient Name: Thai Santoyo  Today's Date: 5/11/2017      SERVICE LIMITATIONS RECOMMENDATION    Vaccines    · Pneumococcal (once after 65)    · Influenza (annually)    · Hepatitis B (if medium/high risk)    · Prevnar 13      Hepatitis B medium/high risk factors:       - End-stage renal disease       - Hemophiliacs who received Factor VII or         IX concentrates       - Clients of institutions for the mentally             retarded       - Persons who live in the same house as          a HepB carrier       - Homosexual men       - Illicit injectable drug abusers     Pneumococcal: Done, repeat after age 65     Influenza: Due in Fall, 2017     Hepatitis B: Please check and see if this was done at your dialysis center.  If not, let me know and I will arrange this for you.     Prevnar 13: N/A due at age 65    Prostate cancer screening (annually to age 75)     Prostate specific antigen (PSA) Shared decision making with Provider. Sometimes a co-pay may be required if the patient decides to have this test. The USPSTF no longer recommends prostate cancer screening routinely in medicine: Ordered, please schedule    Colorectal cancer screening (to age 75)    · Fecal occult blood test (annual)  · Flexible sigmoidoscopy (5y)  · Screening colonoscopy (10y)  · Barium enema   N/A    Diabetes self-management training (no USPSTF recommendations)  Requires referral by treating physician for patient with diabetes or renal disease. 10 hours of initial DSMT sessions of no less than 30 minutes each in a continuous 12-month period. 2 hours of follow-up DSMT in subsequent years.  Referred to diabetes education.    Glaucoma screening (no USPSTF recommendation)  Diabetes mellitus, family history   , age 50 or over    American, age 65 or over  Ordered today, please schedule    Medical nutrition therapy for  diabetes or renal disease (no recommended schedule)  Requires referral by treating physician for patient with diabetes or renal disease or kidney transplant within the past 3 years.  Can be provided in same year as diabetes self-management training (DSMT), and CMS recommends medical nutrition therapy take place after DSMT. Up to 3 hours for initial year and 2 hours in subsequent years.  Referred to diabetic education.    Cardiovascular screening blood tests (every 5 years)  · Fasting lipid panel  Order as a panel if possible  Ordered today, please schedule    Diabetes screening tests (at least every 3 years, Medicare covers annually or at 6-month intervals for prediabetic patients)  · Fasting blood sugar (FBS) or glucose tolerance test (GTT)  Patient must be diagnosed with one of the following:       - Hypertension       - Dyslipidemia       - Obesity (BMI 30kg/m2)       - Previous elevated impaired FBS or GTT       ... or any two of the following:       - Overweight (BMI 25 but <30)       - Family history of diabetes       - Age 65 or older       - History of gestational diabetes or birth of baby weighing more than 9 pounds  N/A    Abdominal aortic aneurysm screening (once)  · Sonogram   Limited to patients who meet one of the following criteria:       - Men who are 65-75 years old and have smoked more than 100 cigarette in their lifetime       - Anyone with a family history of abdominal aortic aneurysm       - Anyone recommended for screening by the USPSTF  N/A    HIV screening (annually for increased risk patients)  · HIV-1 and HIV-2 by EIA, or CHANI, rapid antibody test or oral mucosa transudate  Patients must be at increased risk for HIV infection per USPSTF guidelines or pregnant. Tests covered annually for patient at increased risk or as requested by the patient. Pregnant patients may receive up to 3 tests during pregnancy.  Risks discussed, screening is not recommended, got tested in January.  Please get  tested in 2 months as planned.    Smoking cessation counseling (up to 8 sessions per year)  Patients must be asymptomatic of tobacco-related conditions to receive as a preventative service.  Consider quitting.  Even infrequent cigar smoke can have negative impacts on your health    Subsequent annual wellness visit  At least 12 months since last AWV  Return in one year     The following information is provided to all patients.  This information is to help you find resources for any of the problems found today that may be affecting your health:                Living healthy guide: www.[facility].[place].[facility]      Understanding Diabetes: www.diabetes.org      Eating healthy: www.cdc.gov/healthyweight      CDC home safety checklist: www.cdc.gov/steadi/patient.html      Agency on Aging: www.goea.[place].[facility]      Alcoholics anonymous (AA): www.aa.org      Physical Activity: www.petra.nih.gov/qk6aqsn      Tobacco use: www.quitwithusla.org

## 2017-05-11 NOTE — TELEPHONE ENCOUNTER
----- Message from Isac Chaudhari sent at 5/11/2017  3:05 PM CDT -----  Contact: [facility] [phone]  Called and stated that the Rx for testosterone 4 mg/24 hr PT24, requires a PA, advice    Thanks

## 2017-05-12 ENCOUNTER — TELEPHONE (OUTPATIENT)
Dept: INTERNAL MEDICINE | Facility: CLINIC | Age: 42
End: 2017-05-12

## 2017-05-12 NOTE — TELEPHONE ENCOUNTER
----- Message from Earlene Torres sent at 5/12/2017  3:39 PM CDT -----  Contact: Pt  Pt is calling to speak with nurse in regards to prior authorization for a medication.  Pt can be reached at [phone].    Thank you

## 2017-05-14 NOTE — PROGRESS NOTES
"Thai Santoyo presented for a  Medicare AWV and comprehensive Health Risk Assessment today. The following components were reviewed and updated:    · Medical history  · Family History  · Social history  · Allergies and Current Medications  · Health Risk Assessment  · Health Maintenance  · Care Team     ** See Completed Assessments for Annual Wellness Visit within the encounter summary.**       The following assessments were completed:  · Living Situation  · CAGE  · Depression Screening  · Timed Get Up and Go  · Whisper Test  · Cognitive Function Screening  · Nutrition Screening  · ADL Screening  · PAQ Screening    Vitals:    05/11/17 1141   BP: 104/83   Pulse: 99   Temp: 98.1 °F (36.7 °C)   TempSrc: Oral   SpO2: 96%   Weight: (!) 139.7 kg (307 lb 15.7 oz)   Height: 6' 1" (1.854 m)     Body mass index is 40.63 kg/(m^2).  Physical Exam   Constitutional: He is oriented to person, place, and time. He appears well-developed. No distress.   obese   HENT:   Head: Atraumatic.   Eyes: No scleral icterus.   Neck: Neck supple.   Cardiovascular: Normal rate, regular rhythm and normal heart sounds.    Pulmonary/Chest: Effort normal and breath sounds normal. No respiratory distress.   Abdominal: Soft. Bowel sounds are normal.   Musculoskeletal: He exhibits no edema.   Neurological: He is alert and oriented to person, place, and time.   Skin: Skin is warm and dry. He is not diaphoretic.   Psychiatric: He has a normal mood and affect. His behavior is normal.   Nursing note and vitals reviewed.        Diagnoses and health risks identified today and associated recommendations/orders:    1. Encounter for preventive health examination  - Screenings performed, as noted above. Personal preventative testing needs reviewed.     2. Type 2 diabetes mellitus with chronic kidney disease on chronic dialysis, with long-term current use of insulin  - Hemoglobin A1c; Future  - Ambulatory consult to Diabetic Education    3. Screening for glaucoma  - " "Ambulatory referral to Optometry    4. Renovascular hypertension  -  Well controlled, followed by PCP    5. ESRD on hemodialysis  - Stable, followed by nephrology     6. Hyperparathyroidism, secondary renal  - Stable, followed by nephrology    7. Morbid obesity, unspecified obesity type  - Loosing weight. Encouraged to continue.  Explained that he is now on the cusp of "morbid" diagnosis, which he was pleased about.  He will continue with current diet and exercise plan as it has given good results, about 30 # loss in past year    8. Anemia in end-stage renal disease  - Stable, followed by PCP and nephrology      Provided Thai with a 5-10 year written screening schedule and personal prevention plan. Recommendations were developed using the USPSTF age appropriate recommendations. Education, counseling, and referrals were provided as needed. After Visit Summary printed and given to patient which includes a list of additional screenings\tests needed.    Return in about 1 year (around 5/11/2018) for your next annual wellness visit.    Karen Sarah NP  "

## 2017-05-15 ENCOUNTER — TELEPHONE (OUTPATIENT)
Dept: INTERNAL MEDICINE | Facility: CLINIC | Age: 42
End: 2017-05-15

## 2017-05-15 NOTE — TELEPHONE ENCOUNTER
Spoke to [employer]. Advised of labs that were requested. They will send an approval or denial via fax

## 2017-05-15 NOTE — TELEPHONE ENCOUNTER
----- Message from Soheilazheng Barksdale sent at 5/15/2017  8:57 AM CDT -----  Contact: Mary with [facility], [phone] ext [phone]  Mary called requesting status of prior auth for Androderm sent on Friday 05-12-17

## 2017-05-16 ENCOUNTER — PATIENT MESSAGE (OUTPATIENT)
Dept: OPTOMETRY | Facility: CLINIC | Age: 42
End: 2017-05-16

## 2017-05-16 ENCOUNTER — TELEPHONE (OUTPATIENT)
Dept: INTERNAL MEDICINE | Facility: CLINIC | Age: 42
End: 2017-05-16

## 2017-05-18 ENCOUNTER — PATIENT OUTREACH (OUTPATIENT)
Dept: ADMINISTRATIVE | Facility: HOSPITAL | Age: 42
End: 2017-05-18

## 2017-05-24 ENCOUNTER — TELEPHONE (OUTPATIENT)
Dept: DIABETES | Facility: CLINIC | Age: 42
End: 2017-05-24

## 2017-05-25 ENCOUNTER — PATIENT OUTREACH (OUTPATIENT)
Dept: ADMINISTRATIVE | Facility: HOSPITAL | Age: 42
End: 2017-05-25

## 2017-05-25 NOTE — PROGRESS NOTES
Spoke to patient. Says he missed recently scheduled lab appointment because he forgot. Lab appointment rescheduled for 5/26/17.

## 2017-05-26 ENCOUNTER — LAB VISIT (OUTPATIENT)
Dept: LAB | Facility: HOSPITAL | Age: 42
End: 2017-05-26
Attending: INTERNAL MEDICINE
Payer: MEDICARE

## 2017-05-26 DIAGNOSIS — E29.1 HYPOGONADISM IN MALE: ICD-10-CM

## 2017-05-26 LAB
CHOLEST/HDLC SERPL: 5.2 {RATIO}
COMPLEXED PSA SERPL-MCNC: 1.3 NG/ML
HDL/CHOLESTEROL RATIO: 19.4 %
HDLC SERPL-MCNC: 160 MG/DL
HDLC SERPL-MCNC: 31 MG/DL
LDLC SERPL CALC-MCNC: 82.8 MG/DL
NONHDLC SERPL-MCNC: 129 MG/DL
TRIGL SERPL-MCNC: 231 MG/DL

## 2017-05-26 PROCEDURE — 80061 LIPID PANEL: CPT

## 2017-05-26 PROCEDURE — 84402 ASSAY OF FREE TESTOSTERONE: CPT

## 2017-05-26 PROCEDURE — 83036 HEMOGLOBIN GLYCOSYLATED A1C: CPT

## 2017-05-26 PROCEDURE — 36415 COLL VENOUS BLD VENIPUNCTURE: CPT

## 2017-05-26 PROCEDURE — 84153 ASSAY OF PSA TOTAL: CPT

## 2017-05-27 ENCOUNTER — PATIENT MESSAGE (OUTPATIENT)
Dept: INTERNAL MEDICINE | Facility: CLINIC | Age: 42
End: 2017-05-27

## 2017-05-27 LAB
ESTIMATED AVG GLUCOSE: 169 MG/DL
HBA1C MFR BLD HPLC: 7.5 %

## 2017-06-06 ENCOUNTER — OFFICE VISIT (OUTPATIENT)
Dept: UROLOGY | Facility: CLINIC | Age: 42
End: 2017-06-06
Payer: MEDICARE

## 2017-06-06 VITALS
SYSTOLIC BLOOD PRESSURE: 128 MMHG | BODY MASS INDEX: 41.08 KG/M2 | WEIGHT: 310 LBS | RESPIRATION RATE: 16 BRPM | DIASTOLIC BLOOD PRESSURE: 72 MMHG | HEART RATE: 98 BPM | HEIGHT: 73 IN

## 2017-06-06 DIAGNOSIS — N52.9 ED (ERECTILE DYSFUNCTION) OF ORGANIC ORIGIN: Primary | ICD-10-CM

## 2017-06-06 PROCEDURE — 99499 UNLISTED E&M SERVICE: CPT | Mod: S$GLB,,, | Performed by: NURSE PRACTITIONER

## 2017-06-06 PROCEDURE — 99213 OFFICE O/P EST LOW 20 MIN: CPT | Mod: S$GLB,,, | Performed by: NURSE PRACTITIONER

## 2017-06-06 PROCEDURE — 99999 PR PBB SHADOW E&M-EST. PATIENT-LVL IV: CPT | Mod: PBBFAC,,, | Performed by: NURSE PRACTITIONER

## 2017-06-06 RX ORDER — BLOOD-GLUCOSE CONTROL, NORMAL
EACH MISCELLANEOUS
COMMUNITY
End: 2018-12-13

## 2017-06-06 RX ORDER — INSULIN GLARGINE 100 [IU]/ML
45 INJECTION, SOLUTION SUBCUTANEOUS 2 TIMES DAILY
COMMUNITY
End: 2018-04-11

## 2017-06-06 RX ORDER — INSULIN ASPART 100 [IU]/ML
40 INJECTION, SOLUTION INTRAVENOUS; SUBCUTANEOUS
COMMUNITY
End: 2018-04-02 | Stop reason: SDUPTHER

## 2017-06-06 RX ORDER — TRAZODONE HYDROCHLORIDE 50 MG/1
50 TABLET ORAL NIGHTLY
COMMUNITY
End: 2018-05-18

## 2017-06-06 RX ORDER — LISINOPRIL 40 MG/1
40 TABLET ORAL DAILY
COMMUNITY
End: 2017-09-30

## 2017-06-06 RX ORDER — SEVELAMER CARBONATE 800 MG/1
800 TABLET, FILM COATED ORAL
COMMUNITY
End: 2023-03-07

## 2017-06-06 RX ORDER — HYDROMORPHONE HYDROCHLORIDE 4 MG/1
4 TABLET ORAL EVERY 4 HOURS PRN
COMMUNITY
End: 2019-06-13

## 2017-06-06 NOTE — PROGRESS NOTES
Subjective:       Patient ID: Thai Santoyo Jr. is a 41 y.o. male.    Chief Complaint: Erectile Dysfunction (Pep teaching)    Thai Santoyo Jr. is a 41 y.o. Male with diabetes and ED and low T.  His HRT is being monitored by his PCP.  He was last seen in clinic with Dr. Gonzales 5/11/2017.    He reports ED > 1yr.  He reports occasional spontaneous erections but unable to maintain.  He does not get AM erections.  He has never tried any of the oral agents due to his past medical history.    He is here today for ICI education and 1st injection  He did bring in his own medication.                          PSA                      1.3                 05/26/2017                    Past Medical History:  8/12/2013: Acute gouty arthropathy  1/13/2011: Chronic kidney disease, stage IV (severe)  No date: Diabetes mellitus type II  No date: Dialysis patient  8/12/2013: DM (diabetes mellitus) type II uncontrolled wi*  8/12/2013: ESRD on hemodialysis  9/12/2012: Hypertension  8/15/2013: Line sepsis  9/12/2012: Morbid obesity    Past Surgical History:  No date: DIALYSIS FISTULA CREATION  No date: KNEE ARTHROPLASTY  No date: SHOULDER SURGERY      Comment: right  No date: VASECTOMY    Review of patient's family history indicates:    Diabetes                       Mother                    Kidney disease                 Mother                      Comment: on dialysis    Hypertension                   Father                    Kidney disease                 Paternal Uncle              Comment: dialysis    Kidney disease                 Paternal Uncle              Comment: dialysis    Eczema                         Sister                    No Known Problems              Son                       No Known Problems              Sister                    No Known Problems              Son                       No Known Problems              Son                       Melanoma                       Neg Hx                    Psoriasis                       Neg Hx                    Lupus                          Neg Hx                    Acne                           Neg Hx                      Social History    Marital status: Single              Spouse name:                       Years of education:                 Number of children:               Occupational History  Occupation          Employer            Comment               Unemployed          OTHER               [employer] for 12 years    Social History Main Topics    Smoking status: Current Some Day Smoker                                                      Packs/day: 0.00      Years: 0.00           Types: Cigars    Smokeless tobacco: Never Used                        Comment: once a month    Alcohol use: No              Drug use: No              Sexual activity: Yes               Partners with: Female       Birth control/protection: None    Other Topics            Concern    None on file    Social History Narrative    None on file        Allergies:  Review of patient's allergies indicates no known allergies.    Medications:  Current Outpatient Prescriptions:   albiglutide 30 mg/0.5 mL PnIj, Inject 30 mg into the skin every 7 days., Disp: , Rfl:   amlodipine (NORVASC) 10 MG tablet, Take 1 tablet (10 mg total) by mouth once daily., Disp: 90 tablet, Rfl: 3  blood sugar diagnostic Strp, Use for BS testing three times daily., Disp: 200 strip, Rfl: 9  carvedilol (COREG) 25 MG tablet, Take 1 tablet (25 mg total) by mouth 2 (two) times daily with meals., Disp: 180 tablet, Rfl: 3  cyclobenzaprine (FLEXERIL) 10 MG tablet, , Disp: , Rfl:   furosemide (LASIX) 40 MG tablet, Take 1 tablet (40 mg total) by mouth once daily., Disp: 90 tablet, Rfl: 3  glipiZIDE (GLUCOTROL) 5 MG TR24, TAKE ONE TABLET BY MOUTH ONCE DAILY WITH BREAKFAST, Disp: 90 tablet, Rfl: 3        Review of Systems   Constitutional: Negative.  Negative for activity change, appetite change and fever.   HENT: Negative.  Negative for facial  swelling and trouble swallowing.    Eyes: Negative.    Respiratory: Negative.  Negative for shortness of breath.    Cardiovascular: Negative.  Negative for chest pain and palpitations.   Gastrointestinal: Negative for abdominal pain, constipation, diarrhea, nausea and vomiting.   Genitourinary: Positive for frequency and nocturia. Negative for difficulty urinating, dysuria, enuresis, flank pain, genital sores, hematuria, penile pain, scrotal swelling, testicular pain and urgency.        Denies issues urination.  FOS is good; nocturia 2x  He does dialysis 3 x week.     Musculoskeletal: Negative for back pain, gait problem and neck stiffness.   Skin: Negative.  Negative for wound.   Neurological: Negative for dizziness, tremors, seizures, syncope, speech difficulty, light-headedness and headaches.   Hematological: Does not bruise/bleed easily.   Psychiatric/Behavioral: Negative for confusion and hallucinations. The patient is not nervous/anxious.        Objective:      Physical Exam   Nursing note and vitals reviewed.  Constitutional: He is oriented to person, place, and time. Vital signs are normal. He appears well-developed and well-nourished. He is active and cooperative.  Non-toxic appearance. He does not have a sickly appearance.   HENT:   Head: Normocephalic and atraumatic.   Right Ear: External ear normal.   Left Ear: External ear normal.   Nose: Nose normal.   Mouth/Throat: Mucous membranes are normal.   Eyes: Conjunctivae and lids are normal. No scleral icterus.   Neck: Trachea normal, normal range of motion and full passive range of motion without pain. Neck supple. No JVD present. No tracheal deviation present.   Cardiovascular: Normal rate, regular rhythm, S1 normal and S2 normal.    Pulmonary/Chest: Effort normal and breath sounds normal. No respiratory distress. He exhibits no tenderness.   Abdominal: Soft. Normal appearance and bowel sounds are normal. There is no hepatosplenomegaly. There is no  tenderness. There is no rebound, no guarding and no CVA tenderness.   Musculoskeletal: Normal range of motion.   Neurological: He is alert and oriented to person, place, and time. He has normal strength.   Skin: Skin is warm, dry and intact.     Psychiatric: He has a normal mood and affect. His behavior is normal. Judgment and thought content normal.       Assessment:       1. ED (erectile dysfunction) of organic origin        Plan:         I spent 20 minutes with the patient of which more than half was spent in direct consultation with the patient in regards to our treatment and plan.    Education and recommendations of today's plan of care including home remedies.  We discussed ED and the contributing factors. We reviewed his personal factors that contribute to ED. Patient was educated on ED treatments. We discussed PDE-5 inhibitors, JOSE, Muse, and IPP.    He is here today for the Intracorporal injection/ICI education and first injection.  Educational materials were supplied.    ICI is an injectable medication that consists of three different medications to produce an erection. It is known as a Trimix Compound or PEP. The medication is a compound medication and is only mixed up at certain pharmacies known as a compound pharmacy. The medication has to be stored in the refrigerator. Improper storage decreases the effectiveness of the medication.     He was educated that it is an injection. With any injection there is risk for possible pain at the injection site as well as risk for an infection. Clean technique must be followed to help prevent infection. Proper needle disposable is necessary. He voices understanding.    The injection is best given when standing up and the penis is positioned perpendicular to the body. The urethral opening should be facing away from the body. Injection is always given on the lateral or side of the penis. Never give the injection to the top of the penis to avoid possible trauma to  arteries and veins. Never give the injection to the bottom of the penis to avoid trauma to the urethra. He should alternate the injection sites to avoid the build up of scar tissue due to multiple injections.    This medication can increase the risk of erections lasting longer than 4 hours. This is known as a priapism and is a medical emergency. This requires immediate medical attention. This is main reason we give the first dose in the office today. We start at low dose and see how well the patients reacts. We can increase the medication if needed depending on the reaction the patient receives today. We discussed the fine line between enough medication for penetration and too much leading to a priapism. He voices understanding.    He was unable to stay; will reschedule appt. Understands 1st dose to be given under medical supervision.    I

## 2017-06-06 NOTE — PROGRESS NOTES
Thai Santoyo Jr. is a 41 y.o. male         Past Medical History:   Diagnosis Date    Acute gouty arthropathy 8/12/2013    Chronic kidney disease, stage IV (severe) 1/13/2011    Diabetes mellitus type II     Dialysis patient     DM (diabetes mellitus) type II uncontrolled with renal manifestation 8/12/2013    ESRD on hemodialysis 8/12/2013    Hypertension 9/12/2012    Line sepsis 8/15/2013    Morbid obesity 9/12/2012       Past Surgical History:   Procedure Laterality Date    DIALYSIS FISTULA CREATION      KNEE ARTHROPLASTY      SHOULDER SURGERY      right    VASECTOMY         Family History   Problem Relation Age of Onset    Diabetes Mother     Kidney disease Mother      on dialysis    Hypertension Father     Kidney disease Paternal Uncle      dialysis    Kidney disease Paternal Uncle      dialysis    Eczema Sister     No Known Problems Son     No Known Problems Sister     No Known Problems Son     No Known Problems Son     Melanoma Neg Hx     Psoriasis Neg Hx     Lupus Neg Hx     Acne Neg Hx        Social History     Social History    Marital status: Single     Spouse name: N/A    Number of children: N/A    Years of education: N/A     Occupational History    Unemployed Other     [employer] for 12 years     Social History Main Topics    Smoking status: Current Some Day Smoker     Types: Cigars    Smokeless tobacco: Never Used      Comment: once a month    Alcohol use No    Drug use: No    Sexual activity: Yes     Partners: Female     Birth control/ protection: None     Other Topics Concern    Not on file     Social History Narrative    No narrative on file       Allergies:  Review of patient's allergies indicates no known allergies.    Medications:    Current Outpatient Prescriptions:     albiglutide 30 mg/0.5 mL PnIj, Inject 30 mg into the skin every 7 days., Disp: , Rfl:     amlodipine (NORVASC) 10 MG tablet, Take 1 tablet (10 mg total) by mouth once daily., Disp: 90 tablet,  Rfl: 3    blood sugar diagnostic Strp, Use for BS testing three times daily., Disp: 200 strip, Rfl: 9    carvedilol (COREG) 25 MG tablet, Take 1 tablet (25 mg total) by mouth 2 (two) times daily with meals., Disp: 180 tablet, Rfl: 3    cyclobenzaprine (FLEXERIL) 10 MG tablet, , Disp: , Rfl:     furosemide (LASIX) 40 MG tablet, Take 1 tablet (40 mg total) by mouth once daily., Disp: 90 tablet, Rfl: 3    glipiZIDE (GLUCOTROL) 5 MG TR24, TAKE ONE TABLET BY MOUTH ONCE DAILY WITH BREAKFAST, Disp: 90 tablet, Rfl: 3

## 2017-06-20 ENCOUNTER — OFFICE VISIT (OUTPATIENT)
Dept: UROLOGY | Facility: CLINIC | Age: 42
End: 2017-06-20
Payer: MEDICARE

## 2017-06-20 VITALS
HEART RATE: 91 BPM | SYSTOLIC BLOOD PRESSURE: 129 MMHG | WEIGHT: 310 LBS | BODY MASS INDEX: 41.08 KG/M2 | HEIGHT: 73 IN | DIASTOLIC BLOOD PRESSURE: 76 MMHG | RESPIRATION RATE: 16 BRPM

## 2017-06-20 DIAGNOSIS — N52.9 ED (ERECTILE DYSFUNCTION) OF ORGANIC ORIGIN: Primary | ICD-10-CM

## 2017-06-20 PROCEDURE — 54235 NJX CORPORA CAVERNOSA RX AGT: CPT | Mod: S$GLB,,, | Performed by: NURSE PRACTITIONER

## 2017-06-20 PROCEDURE — 99214 OFFICE O/P EST MOD 30 MIN: CPT | Mod: 25,S$GLB,, | Performed by: NURSE PRACTITIONER

## 2017-06-20 PROCEDURE — 99999 PR PBB SHADOW E&M-EST. PATIENT-LVL IV: CPT | Mod: PBBFAC,,, | Performed by: NURSE PRACTITIONER

## 2017-06-20 PROCEDURE — 99499 UNLISTED E&M SERVICE: CPT | Mod: S$GLB,,, | Performed by: NURSE PRACTITIONER

## 2017-06-20 RX ORDER — PAPAVERINE HYDROCHLORIDE 30 MG/ML
INJECTION INTRAMUSCULAR; INTRAVENOUS
Qty: 10 ML | Refills: 11 | Status: SHIPPED | OUTPATIENT
Start: 2017-06-20 | End: 2018-01-27

## 2017-06-20 NOTE — PATIENT INSTRUCTIONS
Understanding Erectile Dysfunction    Erectile dysfunction (ED) is a problem getting an erection firm enough or keeping it long enough for intercourse. The problem can happen to any man at any age. But health problems that can lead to ED become more common as a man ages. Up to half of men over age 40 experience ED at some point.  Causes of ED  ED can have many causes. Most are physical. Some are emotional issues. Often, a combination of causes is involved. Causes of ED may include:  · Medical conditions such as diabetes or depression  · Smoking tobacco or marijuana  · Drinking too much alcohol  · Side effects of medications  · Injury to nerves or blood vessels  · Emotional issues such as stress or relationship problems  ED can be treated  Prescription medications for ED are available. They help many men who try them. Depending upon the cause of the ED, though, medications may not be enough. In these cases, other treatment options are available. These include erectile aids and surgery. Your health care provider can tell you more about the treatment that is right for you. And new treatments for ED are being studied. No matter what the treatment you decide on, stay in touch with your doctor. If your symptoms persist, he or she may be able to adjust your current treatment or try something new.  Date Last Reviewed: 9/22/2014  © 6503-1121 [employer]. [address] [place] [address], [place] [address]. All rights reserved. This information is not intended as a substitute for professional medical care. Always follow your healthcare professional's instructions.        Penile Self-Injection: Notes and Precautions    Penile self-injection is a simple technique that may improve your sex life. Some men even find that self-injection leads to an increase in natural erections. If you have questions or concerns about self-injection or erectile dysfunction (ED), talk to your healthcare provider. The information on this sheet  will help you get the best results.  Notes about penile self-injection  · You may feel a mild burning during injection. This is okay. But if you feel pressure or severe pain, stop the injection. There may be a problem with the injection site.  · Only inject the medication on the side of your penis. It may not work if injected elsewhere.  · To prevent scarring, inject in a different spot each time.  · Dont use this treatment if you have a bleeding disorder or any risk of infection.  · Get medical help right away if your erection lasts longer than 3-4 hours.  Work with your healthcare provider  Ask how often you can safely repeat injections, as well as any other questions you have. You and your healthcare provider will talk about follow-up exams and how to get supplies. If the medication doesnt work or stops working over time, tell your healthcare provider.  Call your healthcare provider if you have:  · An erection that lasts longer than 3-4  hours  · Bleeding or bruising  · Severe pain  · Scarring or curvature of the penis   Date Last Reviewed: 9/18/2014  © 8679-5515 [employer]. [address] [place] [address]. All rights reserved. This information is not intended as a substitute for professional medical care. Always follow your healthcare professional's instructions.        Penile Self-Injection Procedure  Self-injection is a good option if you have erectile dysfunction (ED). You inject a tiny needle into your penis. This helps your penis get hard and stay that way long enough for sex. And sex and orgasm will feel as good as always. You may be nervous about doing self-injection at first. But with practice, it will get easier. Your health care provider will show you how to do self-injection the first time. The simple steps are outlined on this sheet.      Preparing for injection  · Wash your hands well with soap and water.  · Prepare the medication (if needed).  · Sit or  a  comfortable position in a warm, well-lit room. If you need to, sit or  front of a mirror.  · Find an injection site on one side of your penis, in a place with no visible veins. (Dont inject into the top, bottom, or head of the penis.)  · Clean the injection site with an alcohol swab. Grasp the head of your penis firmly with your thumb and forefinger (dont just pinch the skin). Stretch the penis so the skin on the shaft is taut.  Injecting the medication  · Rest your penis against your inner thigh and pull it gently toward your knee. Dont twist or rotate it. This way youll be sure to inject the medication into the spot you chose and cleaned before.  · Hold the syringe between your thumb and fingers, like youre holding a pen. Rest your forearm on your thigh for support.  · Insert the needle at a 90-degree angle (perpendicular) to the shaft. Do this quickly to reduce discomfort. (The needle should go in easily. If it doesnt, stop right away.)  · Move your thumb to the plunger. Press down to inject the medication, counting to 5.  · Remove the needle and dispose of it safely.  Gaining an erection  · Apply pressure to the injection site for a few minutes. This prevents swelling and bruising and helps spread the medication.   · Stand up. This may help your erection develop. Foreplay often helps, too.  · Your penis should become firm within 10 to 20 minutes. The erection will last long enough for sex, and maybe longer.  When to seek medical care  An erection that lasts longer than 3 to 4  hours  · Bleeding or bruising  · Severe pain  · Scarring or curvature of the penis   Date Last Reviewed: 9/17/2014  © 6404-9647 [employer]. [address] [place] [address], [place] [address]. All rights reserved. This information is not intended as a substitute for professional medical care. Always follow your healthcare professional's instructions.

## 2017-06-20 NOTE — PROGRESS NOTES
Subjective:       Patient ID: Thai Santoyo Jr. is a 41 y.o. male.    Chief Complaint: Erectile Dysfunction (Pep teaching)    Thai Santoyo Jr. is a 41 y.o. Male with diabetes and ED and low T.  His HRT is being monitored by his PCP.  He was seen in clinic with Dr. Gonzales 5/11/2017.    He reports ED > 1yr.  He reports occasional spontaneous erections but unable to maintain.  He does not get AM erections.  He has never tried any of the oral agents due to his past medical history.    He is here today for ICI education and 1st injection  He did bring in his own medication.  He has been drawing up and injecting himself with insulin for years.                        PSA                      1.3                 05/26/2017                    Past Medical History:  8/12/2013: Acute gouty arthropathy  1/13/2011: Chronic kidney disease, stage IV (severe)  No date: Diabetes mellitus type II  No date: Dialysis patient  8/12/2013: DM (diabetes mellitus) type II uncontrolled wi*  8/12/2013: ESRD on hemodialysis  9/12/2012: Hypertension  8/15/2013: Line sepsis  9/12/2012: Morbid obesity    Past Surgical History:  No date: DIALYSIS FISTULA CREATION  No date: KNEE ARTHROPLASTY  No date: SHOULDER SURGERY      Comment: right  No date: VASECTOMY    Review of patient's family history indicates:    Diabetes                       Mother                    Kidney disease                 Mother                      Comment: on dialysis    Hypertension                   Father                    Kidney disease                 Paternal Uncle              Comment: dialysis    Kidney disease                 Paternal Uncle              Comment: dialysis    Eczema                         Sister                    No Known Problems              Son                       No Known Problems              Sister                    No Known Problems              Son                       No Known Problems              Son                       Melanoma                        Neg Hx                    Psoriasis                      Neg Hx                    Lupus                          Neg Hx                    Acne                           Neg Hx                      Social History    Marital status: Single              Spouse name:                       Years of education:                 Number of children:               Occupational History  Occupation          Employer            Comment               Unemployed          OTHER               [employer] for 12 years    Social History Main Topics    Smoking status: Current Some Day Smoker                                                      Packs/day: 0.00      Years: 0.00           Types: Cigars    Smokeless tobacco: Never Used                        Comment: once a month    Alcohol use: No              Drug use: No              Sexual activity: Yes               Partners with: Female       Birth control/protection: None    Other Topics            Concern    None on file    Social History Narrative    None on file        Allergies:  Review of patient's allergies indicates no known allergies.    Medications:  Current Outpatient Prescriptions:   albiglutide 30 mg/0.5 mL PnIj, Inject 30 mg into the skin every 7 days., Disp: , Rfl:   amlodipine (NORVASC) 10 MG tablet, Take 1 tablet (10 mg total) by mouth once daily., Disp: 90 tablet, Rfl: 3  blood sugar diagnostic Strp, Use for BS testing three times daily., Disp: 200 strip, Rfl: 9  carvedilol (COREG) 25 MG tablet, Take 1 tablet (25 mg total) by mouth 2 (two) times daily with meals., Disp: 180 tablet, Rfl: 3  cyclobenzaprine (FLEXERIL) 10 MG tablet, , Disp: , Rfl:   furosemide (LASIX) 40 MG tablet, Take 1 tablet (40 mg total) by mouth once daily., Disp: 90 tablet, Rfl: 3  glipiZIDE (GLUCOTROL) 5 MG TR24, TAKE ONE TABLET BY MOUTH ONCE DAILY WITH BREAKFAST, Disp: 90 tablet, Rfl: 3        Review of Systems   Constitutional: Negative.  Negative for activity change,  appetite change and fever.   HENT: Negative.  Negative for facial swelling and trouble swallowing.    Eyes: Negative.    Respiratory: Negative.  Negative for shortness of breath.    Cardiovascular: Negative.  Negative for chest pain and palpitations.   Gastrointestinal: Negative for abdominal pain, constipation, diarrhea, nausea and vomiting.   Genitourinary: Positive for frequency. Negative for difficulty urinating, dysuria, enuresis, flank pain, genital sores, hematuria, nocturia, penile pain, scrotal swelling, testicular pain and urgency.        He ok with how he urinates.     Musculoskeletal: Negative for back pain, gait problem and neck stiffness.   Skin: Negative.  Negative for wound.   Neurological: Negative for dizziness, tremors, seizures, syncope, speech difficulty, light-headedness and headaches.   Hematological: Does not bruise/bleed easily.   Psychiatric/Behavioral: Negative for confusion and hallucinations. The patient is not nervous/anxious.         (+) stress         Objective:      Physical Exam   Nursing note and vitals reviewed.  Constitutional: He is oriented to person, place, and time. Vital signs are normal. He appears well-developed and well-nourished. He is active.  Non-toxic appearance. He does not have a sickly appearance.   HENT:   Head: Normocephalic and atraumatic.   Right Ear: External ear normal.   Left Ear: External ear normal.   Nose: Nose normal.   Mouth/Throat: Mucous membranes are normal.   Eyes: Conjunctivae and lids are normal. No scleral icterus.   Neck: Trachea normal, normal range of motion and full passive range of motion without pain. Neck supple. No JVD present. No tracheal deviation present.   Cardiovascular: Normal rate, regular rhythm, S1 normal and S2 normal.    Pulmonary/Chest: Effort normal and breath sounds normal. No respiratory distress. He exhibits no tenderness.   Abdominal: Soft. Normal appearance and bowel sounds are normal. There is no hepatosplenomegaly.  There is no tenderness. There is no rebound, no guarding and no CVA tenderness.   Genitourinary: Testes normal and penis normal. Right testis shows no swelling and no tenderness. Left testis shows no swelling and no tenderness. No hypospadias, penile erythema or penile tenderness. No discharge found.   Musculoskeletal: Normal range of motion.   Neurological: He is alert and oriented to person, place, and time. He has normal strength.   Skin: Skin is warm, dry and intact.     Psychiatric: He has a normal mood and affect. His behavior is normal. Judgment and thought content normal.       Assessment:       1. ED (erectile dysfunction) of organic origin        Plan:         I spent 35 minutes with the patient of which more than half was spent in direct consultation with the patient in regards to our treatment and plan.    Education and recommendations of today's plan of care including home remedies.  We discussed ED and the contributing factors. We reviewed his personal factors that contribute to ED. Patient was educated on ED treatments. We discussed PDE-5 inhibitors, JOSE, Muse, and IPP.    He is here today for the Intracorporal injection/ICI education and first injection.  Educational materials were supplied.    ICI is an injectable medication that consists of three different medications to produce an erection. It is known as a Trimix Compound or PEP. The medication is a compound medication and is only mixed up at certain pharmacies known as a compound pharmacy. The medication has to be stored in the refrigerator. Improper storage decreases the effectiveness of the medication.     He was educated that it is an injection. With any injection there is risk for possible pain at the injection site as well as risk for an infection. Clean technique must be followed to help prevent infection. Proper needle disposable is necessary. He voices understanding.    The injection is best given when standing up and the penis is  positioned perpendicular to the body. The urethral opening should be facing away from the body. Injection is always given on the lateral or side of the penis. Never give the injection to the top of the penis to avoid possible trauma to arteries and veins. Never give the injection to the bottom of the penis to avoid trauma to the urethra. He should alternate the injection sites to avoid the build up of scar tissue due to multiple injections.    This medication can increase the risk of erections lasting longer than 4 hours. This is known as a priapism and is a medical emergency. This requires immediate medical attention. This is main reason we give the first dose in the office today. We start at low dose and see how well the patients reacts. We can increase the medication if needed depending on the reaction the patient receives today. We discussed the fine line between enough medication for penetration and too much leading to a priapism. He voices understanding.    8:55ma He witnessed me draw up 10 units (0.10ml) and I injected him in the left lateral aspect of the penis. Within <10 minutes, he achieved an filling erection, but not quite firm enough for intercourse.  He denied any discomfort or pain. Detumescence was noted ~25 minutes.    He was instructed to increase the dosage at 15 units (0.15ml). If noticing a decrease in reaction he can increase the dosage by 5 units or 0.05ml. He may also refill the Rx.     If have any questions, please give me a call. Educational materials were given to patient and all questions were answered. He voiced understanding and left the office without a priapism.

## 2017-07-13 LAB — TESTOST FREE SERPL-MCNC: 13.5 PG/ML

## 2017-08-23 DIAGNOSIS — E11.9 TYPE 2 DIABETES MELLITUS WITHOUT COMPLICATION, UNSPECIFIED LONG TERM INSULIN USE STATUS: ICD-10-CM

## 2017-08-23 DIAGNOSIS — R00.0 TACHYCARDIA: Primary | ICD-10-CM

## 2017-08-31 ENCOUNTER — OFFICE VISIT (OUTPATIENT)
Dept: CARDIOLOGY | Facility: CLINIC | Age: 42
End: 2017-08-31
Payer: MEDICARE

## 2017-08-31 ENCOUNTER — HOSPITAL ENCOUNTER (OUTPATIENT)
Dept: CARDIOLOGY | Facility: CLINIC | Age: 42
Discharge: HOME | End: 2017-08-31
Payer: MEDICARE

## 2017-08-31 VITALS
SYSTOLIC BLOOD PRESSURE: 120 MMHG | HEIGHT: 73 IN | WEIGHT: 315 LBS | BODY MASS INDEX: 41.75 KG/M2 | HEART RATE: 106 BPM | DIASTOLIC BLOOD PRESSURE: 73 MMHG

## 2017-08-31 DIAGNOSIS — E11.59 HYPERTENSION ASSOCIATED WITH DIABETES: ICD-10-CM

## 2017-08-31 DIAGNOSIS — I15.0 RENOVASCULAR HYPERTENSION: Primary | ICD-10-CM

## 2017-08-31 DIAGNOSIS — Z99.2 TYPE 2 DIABETES MELLITUS WITH CHRONIC KIDNEY DISEASE ON CHRONIC DIALYSIS, WITH LONG-TERM CURRENT USE OF INSULIN: ICD-10-CM

## 2017-08-31 DIAGNOSIS — N18.6 TYPE 2 DIABETES MELLITUS WITH CHRONIC KIDNEY DISEASE ON CHRONIC DIALYSIS, WITH LONG-TERM CURRENT USE OF INSULIN: ICD-10-CM

## 2017-08-31 DIAGNOSIS — Z99.2 ESRD ON HEMODIALYSIS: ICD-10-CM

## 2017-08-31 DIAGNOSIS — E11.22 TYPE 2 DIABETES MELLITUS WITH CHRONIC KIDNEY DISEASE ON CHRONIC DIALYSIS, WITH LONG-TERM CURRENT USE OF INSULIN: ICD-10-CM

## 2017-08-31 DIAGNOSIS — I15.2 HYPERTENSION ASSOCIATED WITH DIABETES: ICD-10-CM

## 2017-08-31 DIAGNOSIS — E66.01 MORBID OBESITY DUE TO EXCESS CALORIES: ICD-10-CM

## 2017-08-31 DIAGNOSIS — N18.6 ESRD ON HEMODIALYSIS: ICD-10-CM

## 2017-08-31 DIAGNOSIS — Z79.4 TYPE 2 DIABETES MELLITUS WITH CHRONIC KIDNEY DISEASE ON CHRONIC DIALYSIS, WITH LONG-TERM CURRENT USE OF INSULIN: ICD-10-CM

## 2017-08-31 DIAGNOSIS — R00.0 TACHYCARDIA: ICD-10-CM

## 2017-08-31 PROCEDURE — 99999 PR PBB SHADOW E&M-EST. PATIENT-LVL IV: CPT | Mod: PBBFAC,GC,, | Performed by: HOSPITALIST

## 2017-08-31 PROCEDURE — 99213 OFFICE O/P EST LOW 20 MIN: CPT | Mod: S$GLB,,, | Performed by: INTERNAL MEDICINE

## 2017-08-31 PROCEDURE — 3045F PR MOST RECENT HEMOGLOBIN A1C LEVEL 7.0-9.0%: CPT | Mod: S$GLB,,, | Performed by: INTERNAL MEDICINE

## 2017-08-31 PROCEDURE — 4010F ACE/ARB THERAPY RXD/TAKEN: CPT | Mod: S$GLB,,, | Performed by: INTERNAL MEDICINE

## 2017-08-31 PROCEDURE — 3008F BODY MASS INDEX DOCD: CPT | Mod: S$GLB,,, | Performed by: INTERNAL MEDICINE

## 2017-08-31 PROCEDURE — 93000 ELECTROCARDIOGRAM COMPLETE: CPT | Mod: S$GLB,,, | Performed by: INTERNAL MEDICINE

## 2017-08-31 RX ORDER — DILTIAZEM HYDROCHLORIDE EXTENDED-RELEASE TABLETS 180 MG/1
180 TABLET, EXTENDED RELEASE ORAL DAILY
Qty: 30 TABLET | Refills: 11 | Status: SHIPPED | OUTPATIENT
Start: 2017-08-31 | End: 2017-09-30

## 2017-08-31 NOTE — PROGRESS NOTES
I have personally taken the history and examined this patient and agree with the fellow's note as stated above.Prior SVT but current episodes with heart rates to the 120s.post dialysis or if he forgets to take his Carvedilol. Suspect sinus tachycardia. Concur with switch to Diltiazem.

## 2017-08-31 NOTE — PROGRESS NOTES
Subjective:    Patient ID:  Thai Santoyo Jr. is a 41 y.o. male who presents for evaluation of Tachycardia (8 weeks fu)      HPI  40 Y/O M with PMH significant for HTN, uncontrolled DMII, Morbid obesity, ESRD on dialysis MWF with history of tachycardia post HD who is here for evaluation of tachycardia. After dialysis while standing after 4 hours of sitting during HD. patient noticed when he stands up his heart rate is usually more rapid on the monitor 120 bpm. He denied symptoms. He also reported that his heart rate is very infrequently rapid at rest while at home when he counted it, this happens monthly. He denied Dizziness or synope, he reported snoring and day somnolence.   Patient was seen before in cardiology clinic for similar problem and his tachycardia was attributed to volume depletion after HD. No workup was required of performed at that time.       ROS  Constitutional: negative for chills, fevers and night sweats  Ears, nose, mouth, throat, and face: negative for nasal congestion, sore throat and tinnitus  Respiratory: negative for cough, dyspnea on exertion, pleurisy/chest pain, sputum and wheezing  Cardiovascular: See HPI  Gastrointestinal: negative  Genitourinary:negative for dysuria, frequency, hematuria, hesitancy and nocturia  Hematologic/lymphatic: negative for bleeding and easy bruising  Musculoskeletal:negative for muscle weakness and myalgias  Neurological: negative for dizziness, headaches, paresthesia and weakness  Behavioral/Psych: negative for anxiety and bad mood    Objective:    Physical Exam  General: Patient in no acute distress or discomfort  HEENT: No JVD, moist mucous membranes  Cardiac: S1S2 RRR no GMR  Chest: CTABL, no wheezing or rales  Abd:Soft NTND  Ext: No Edema No swelling  Neuro: A and O X 3, non focal    Vitals:    08/31/17 1341   BP: 120/73   Pulse: 106       Assessment:       1. Renovascular hypertension    2. Hypertension associated with diabetes    3. ESRD on hemodialysis     4. Morbid obesity due to excess calories    5. Type 2 diabetes mellitus with chronic kidney disease on chronic dialysis, with long-term current use of insulin         Plan:       1. Tachycardia - noted after dialysis, typically with standing, asymptomatic. This is a physiologic phenomena secondary to volume removal. No further work-up necessary. Will switch CCB to Diltiazem  SVT noted on old EKGs, with infrequent symptoms and no heart failure. Will treat with diltiazem.     2. Renovascular hypertension - uncontrolled on current medical regimen. Given DMII and elevated Microalbumin/Cr, the patient would benefit from ACEi.   -Resume Lisinopril - titrate to dose response  -Resume coreg to 25 mg   -Switch from amlodipine to Diltiazem that may help with tachycardia     3. Morbid obesity, unspecified obesity type  Mr. Santoyo has begun to lose weight with a diet/exercise program.   He has been encouraged to continue his efforts.          RTC RALPH Porras MD  Cardiology Fellow

## 2017-08-31 NOTE — PROGRESS NOTES
Subjective:    Patient ID:  Thai Santoyo Jr. is a 41 y.o. male who presents for evaluation of Tachycardia (8 weeks fu)      HPI  40 Y/O M with PMH significant for HTN, uncontrolled DMII, Morbid obesity, ESRD on dialysis MWF with history of tachycardia post HD who is here for evaluation of tachycardia. After dialysis while standing after 4 hours of sitting during HD. patient noticed when he stands up his heart rate is usually more rapid on the monitor 120 bpm. He denied symptoms. He also reported that his heart rate is very infrequently rapid at rest while at home when he counted it. He denied symptoms at that time.   Patient was seen before in cardiology clinic for similar problem and his tachycardia was attributed to volume depletion after HD. No workup was required of performed at that time.       ROS  Constitutional: negative for chills, fevers and night sweats  Ears, nose, mouth, throat, and face: negative for nasal congestion, sore throat and tinnitus  Respiratory: negative for cough, dyspnea on exertion, pleurisy/chest pain, sputum and wheezing  Cardiovascular: See HPI  Gastrointestinal: negative  Genitourinary:negative for dysuria, frequency, hematuria, hesitancy and nocturia  Hematologic/lymphatic: negative for bleeding and easy bruising  Musculoskeletal:negative for muscle weakness and myalgias  Neurological: negative for dizziness, headaches, paresthesia and weakness  Behavioral/Psych: negative for anxiety and bad mood    Objective:    Physical Exam  General: Patient in no acute distress or discomfort  HEENT: No JVD, moist mucous membranes  Cardiac: S1S2 RRR no GMR  Chest: CTABL, no wheezing or rales  Abd:Soft NTND  Ext: No Edema No swelling  Neuro: A and O X 3, non focal    Vitals:    08/31/17 1341   BP: 120/73   Pulse: 106       Assessment:       1. Renovascular hypertension    2. Hypertension associated with diabetes    3. ESRD on hemodialysis    4. Morbid obesity due to excess calories    5. Type 2  diabetes mellitus with chronic kidney disease on chronic dialysis, with long-term current use of insulin         Plan:       1. Tachycardia - noted after dialysis, typically with standing, asymptomatic. This is a normal phenomena secondary to volume removal. No further work-up necessary. If possible, slower volume removal.      2. Renovascular hypertension - uncontrolled on current medical regimen. Given DMII and elevated Microalbumin/Cr, the patient would benefit from ACEi.   -Resume Lisinopril - titrate to dose response  -Resume coreg to 25 mg   -Switch from amlodipine to Diltiazem that may help with tachycardia     3. Morbid obesity, unspecified obesity type  Mr. Santoyo has begun to lose weight with a diet/exercise program.   He has been encouraged to continue his efforts.

## 2017-09-11 DIAGNOSIS — Z79.4 TYPE 2 DIABETES MELLITUS WITH HYPERGLYCEMIA, WITH LONG-TERM CURRENT USE OF INSULIN: ICD-10-CM

## 2017-09-11 DIAGNOSIS — E11.65 TYPE 2 DIABETES MELLITUS WITH HYPERGLYCEMIA, WITH LONG-TERM CURRENT USE OF INSULIN: ICD-10-CM

## 2017-09-19 ENCOUNTER — PATIENT OUTREACH (OUTPATIENT)
Dept: ADMINISTRATIVE | Facility: HOSPITAL | Age: 42
End: 2017-09-19

## 2017-09-19 NOTE — PROGRESS NOTES
Contacted pt initially to ask about dm eye exam. Pt tells me he is no longer able to make appt with PCP today. I cancelled appt, per his request and rescheduled follow up for 9/28/17. I asked pt if he is still vomitting and he says that issue seems to have resolved. Pt says he thinks it was a virus as several family members in his home had the same issue.     Pt has not had dm eye exam and has no regular eye provider. Agreeable to scheduling with Dr. Cat. Appt scheduled for 9/28/17.

## 2017-09-29 ENCOUNTER — PATIENT MESSAGE (OUTPATIENT)
Dept: OPTOMETRY | Facility: CLINIC | Age: 42
End: 2017-09-29

## 2017-09-30 DIAGNOSIS — Z79.4 TYPE 2 DIABETES MELLITUS WITH HYPERGLYCEMIA, WITH LONG-TERM CURRENT USE OF INSULIN: ICD-10-CM

## 2017-09-30 DIAGNOSIS — I15.2 HYPERTENSION ASSOCIATED WITH DIABETES: ICD-10-CM

## 2017-09-30 DIAGNOSIS — E11.59 HYPERTENSION ASSOCIATED WITH DIABETES: ICD-10-CM

## 2017-09-30 DIAGNOSIS — E11.65 TYPE 2 DIABETES MELLITUS WITH HYPERGLYCEMIA, WITH LONG-TERM CURRENT USE OF INSULIN: ICD-10-CM

## 2017-09-30 RX ORDER — AMLODIPINE BESYLATE 10 MG/1
TABLET ORAL
Qty: 90 TABLET | Refills: 3 | Status: SHIPPED | OUTPATIENT
Start: 2017-09-30 | End: 2018-10-25 | Stop reason: SDUPTHER

## 2017-09-30 RX ORDER — FUROSEMIDE 40 MG/1
TABLET ORAL
Qty: 90 TABLET | Refills: 3 | Status: SHIPPED | OUTPATIENT
Start: 2017-09-30 | End: 2018-10-25 | Stop reason: SDUPTHER

## 2017-09-30 RX ORDER — LISINOPRIL 40 MG/1
TABLET ORAL
Qty: 90 TABLET | Refills: 3 | Status: SHIPPED | OUTPATIENT
Start: 2017-09-30 | End: 2018-10-25 | Stop reason: SDUPTHER

## 2017-09-30 RX ORDER — GLIPIZIDE 5 MG/1
TABLET, FILM COATED, EXTENDED RELEASE ORAL
Qty: 90 TABLET | Refills: 3 | Status: SHIPPED | OUTPATIENT
Start: 2017-09-30 | End: 2018-05-18 | Stop reason: SDUPTHER

## 2017-10-23 DIAGNOSIS — N18.6 END STAGE RENAL FAILURE ON DIALYSIS: Primary | ICD-10-CM

## 2017-10-23 DIAGNOSIS — Z99.2 END STAGE RENAL FAILURE ON DIALYSIS: Primary | ICD-10-CM

## 2017-10-24 ENCOUNTER — OFFICE VISIT (OUTPATIENT)
Dept: DERMATOLOGY | Facility: CLINIC | Age: 42
End: 2017-10-24
Payer: MEDICARE

## 2017-10-24 ENCOUNTER — HOSPITAL ENCOUNTER (OUTPATIENT)
Dept: VASCULAR SURGERY | Facility: CLINIC | Age: 42
Discharge: HOME | End: 2017-10-24
Payer: MEDICARE

## 2017-10-24 ENCOUNTER — OFFICE VISIT (OUTPATIENT)
Dept: VASCULAR SURGERY | Facility: CLINIC | Age: 42
End: 2017-10-24
Payer: MEDICARE

## 2017-10-24 VITALS
WEIGHT: 315 LBS | HEART RATE: 86 BPM | SYSTOLIC BLOOD PRESSURE: 145 MMHG | BODY MASS INDEX: 41.75 KG/M2 | HEIGHT: 73 IN | TEMPERATURE: 98 F | DIASTOLIC BLOOD PRESSURE: 77 MMHG

## 2017-10-24 DIAGNOSIS — N18.6 END STAGE RENAL FAILURE ON DIALYSIS: ICD-10-CM

## 2017-10-24 DIAGNOSIS — N18.6 ESRD ON HEMODIALYSIS: Primary | ICD-10-CM

## 2017-10-24 DIAGNOSIS — Z99.2 END STAGE RENAL FAILURE ON DIALYSIS: ICD-10-CM

## 2017-10-24 DIAGNOSIS — Z99.2 ESRD ON HEMODIALYSIS: Primary | ICD-10-CM

## 2017-10-24 DIAGNOSIS — L91.8 SKIN TAG: Primary | ICD-10-CM

## 2017-10-24 PROCEDURE — 99213 OFFICE O/P EST LOW 20 MIN: CPT | Mod: S$GLB,,, | Performed by: SURGERY

## 2017-10-24 PROCEDURE — 11200 RMVL SKIN TAGS UP TO&INC 15: CPT | Mod: S$GLB,,, | Performed by: NURSE PRACTITIONER

## 2017-10-24 PROCEDURE — 99999 PR PBB SHADOW E&M-EST. PATIENT-LVL III: CPT | Mod: PBBFAC,,, | Performed by: SURGERY

## 2017-10-24 PROCEDURE — 99499 UNLISTED E&M SERVICE: CPT | Mod: S$GLB,,, | Performed by: NURSE PRACTITIONER

## 2017-10-24 PROCEDURE — 99499 UNLISTED E&M SERVICE: CPT | Mod: S$GLB,,, | Performed by: SURGERY

## 2017-10-24 PROCEDURE — 99999 PR PBB SHADOW E&M-EST. PATIENT-LVL II: CPT | Mod: PBBFAC,,, | Performed by: NURSE PRACTITIONER

## 2017-10-24 RX ORDER — TESTOSTERONE 4 MG/D
PATCH TRANSDERMAL
Refills: 0 | COMMUNITY
Start: 2017-10-02 | End: 2018-06-06 | Stop reason: SDUPTHER

## 2017-10-24 NOTE — PROGRESS NOTES
Subjective:       Patient ID:  Thai Santoyo Jr. is a 41 y.o. male who presents for   Chief Complaint   Patient presents with    Skin Tags     HPI    Review of Systems     Objective:    Physical Exam       Diagram Legend     Erythematous scaling macule/papule c/w actinic keratosis       Vascular papule c/w angioma      Pigmented verrucoid papule/plaque c/w seborrheic keratosis      Yellow umbilicated papule c/w sebaceous hyperplasia      Irregularly shaped tan macule c/w lentigo     1-2 mm smooth white papules consistent with Milia      Movable subcutaneous cyst with punctum c/w epidermal inclusion cyst      Subcutaneous movable cyst c/w pilar cyst      Firm pink to brown papule c/w dermatofibroma      Pedunculated fleshy papule(s) c/w skin tag(s)      Evenly pigmented macule c/w junctional nevus     Mildly variegated pigmented, slightly irregular-bordered macule c/w mildly atypical nevus      Flesh colored to evenly pigmented papule c/w intradermal nevus       Pink pearly papule/plaque c/w basal cell carcinoma      Erythematous hyperkeratotic cursted plaque c/w SCC      Surgical scar with no sign of skin cancer recurrence      Open and closed comedones      Inflammatory papules and pustules      Verrucoid papule consistent consistent with wart     Erythematous eczematous patches and plaques     Dystrophic onycholytic nail with subungual debris c/w onychomycosis     Umbilicated papule    Erythematous-base heme-crusted tan verrucoid plaque consistent with inflamed seborrheic keratosis     Erythematous Silvery Scaling Plaque c/w Psoriasis     See annotation      Assessment / Plan:        There are no diagnoses linked to this encounter.         No Follow-up on file.

## 2017-10-24 NOTE — PROGRESS NOTES
Subjective:       Patient ID:  Thai Santoyo Jr. is a 41 y.o. male who presents for   Chief Complaint   Patient presents with    Skin Tags     Skin Tags  - Initial  Affected locations: neck  Duration: 10 years  Signs / symptoms: irritated  Severity: mild  Timing: intermittent  Aggravated by: friction and picking  Relieving factors/Treatments tried: nothing  Improvement on treatment: no relief        Review of Systems   Constitutional: Negative for fever and chills.   Skin: Negative for itching, rash, daily sunscreen use, activity-related sunscreen use and tendency to form keloidal scars.   Hematologic/Lymphatic: Bruises/bleeds easily (on coumadin).        Objective:    Physical Exam   Constitutional: He appears well-developed and well-nourished. He is obese.  No distress.   Neurological: He is alert and oriented to person, place, and time. He is not disoriented.   Psychiatric: He has a normal mood and affect.   Skin:   Areas Examined (abnormalities noted in diagram):   Head / Face Inspection Performed  Neck Inspection Performed  Chest / Axilla Inspection Performed              Diagram Legend     Erythematous scaling macule/papule c/w actinic keratosis       Vascular papule c/w angioma      Pigmented verrucoid papule/plaque c/w seborrheic keratosis      Yellow umbilicated papule c/w sebaceous hyperplasia      Irregularly shaped tan macule c/w lentigo     1-2 mm smooth white papules consistent with Milia      Movable subcutaneous cyst with punctum c/w epidermal inclusion cyst      Subcutaneous movable cyst c/w pilar cyst      Firm pink to brown papule c/w dermatofibroma      Pedunculated fleshy papule(s) c/w skin tag(s)      Evenly pigmented macule c/w junctional nevus     Mildly variegated pigmented, slightly irregular-bordered macule c/w mildly atypical nevus      Flesh colored to evenly pigmented papule c/w intradermal nevus       Pink pearly papule/plaque c/w basal cell carcinoma      Erythematous hyperkeratotic  cursted plaque c/w SCC      Surgical scar with no sign of skin cancer recurrence      Open and closed comedones      Inflammatory papules and pustules      Verrucoid papule consistent consistent with wart     Erythematous eczematous patches and plaques     Dystrophic onycholytic nail with subungual debris c/w onychomycosis     Umbilicated papule    Erythematous-base heme-crusted tan verrucoid plaque consistent with inflamed seborrheic keratosis     Erythematous Silvery Scaling Plaque c/w Psoriasis     See annotation      Assessment / Plan:        Skin tag    Verbal consent obtained. 10 lesions removed with scissor snip removal after anesthesia with 1% lidocaine with epinephrine. Hemostasis achieved with aluminum chloride and hyfrecation. No complications.           Return if symptoms worsen or fail to improve.

## 2017-10-24 NOTE — PATIENT INSTRUCTIONS

## 2017-10-24 NOTE — PROGRESS NOTES
See my prior note; review of systems, family history and social history are   unchanged.    HISTORY OF PRESENT ILLNESS:  A 41-year-old male, status post:  1.  Angioplasty, left AV fistula, 12/30/2013.  2.  Transposition of left brachiocephalic fistula, 11/04/2013.  3.  Original left brachiocephalic AV fistula creation, 09/16/2013.    I have not seen him in over two years.  His nephrologist at , Dr. Jang, is   seeing him regarding his pseudoaneurysms.  He has had no history of bleeding or   other issues.    PAST MEDICAL HISTORY:  Morbid obesity.    PHYSICAL EXAMINATION:  VITAL SIGNS:  See nursing note.  EXTREMITIES:  Left arm shows his brachiocephalic fistula with three moderate   sized aneurysms, approximately 1.5 cm each.  There is alopecia of the second   one, but the skin does not appear particularly thinned.  There is no ulceration   or scabbing whatsoever.  There is an excellent thrill proximally.    IMAGING:  Duplex exam shows no stenosis and a very robust flow volume of 4.7   liters a minute.    ASSESSMENT:  Moderate pseudoaneurysms, left AV fistula.  Given that the only   moderate size of these and lack of skin thinning or ulceration, I would not   recommend any surgical intervention.    I have advised him not to have the second pseudoaneurysm accessed at its apex   where there are skin pigmentation changes.    PLAN:  Follow up in six months with surveillance duplex of the fistula, sooner   if clinically indicated.      KIKO  dd: 10/24/2017 12:13:35 (CDT)  td: 10/25/2017 04:15:05 (CDT)  Doc ID   #[id number]  Job ID #[id number]    CC:

## 2017-10-24 NOTE — LETTER
October 24, 2017      Manjit Mckee II, MD  [address]  [place] [address]           [facility] - Dermatology  [address]  [facility] [address]  Phone: [phone]  Fax: [phone]          Patient: Thai Santoyo Jr.   MR Number: [id number]   YOB: 1975   Date of Visit: 10/24/2017       Dear Dr. Manjit Mckee II:    Thank you for referring Thai Santoyo to me for evaluation. Attached you will find relevant portions of my assessment and plan of care.    If you have questions, please do not hesitate to call me. I look forward to following Thai Santoyo along with you.    Sincerely,    Johanna Castro, NP    Enclosure  CC:  No Recipients    If you would like to receive this communication electronically, please contact [email or web address] or [phone] to request more information on [employer] Link access.    For providers and/or their staff who would like to refer a patient to [facility], please contact us through our one-stop-shop provider referral line, [facility], at [phone].    If you feel you have received this communication in error or would no longer like to receive these types of communications, please e-mail [email or web address]

## 2017-12-21 ENCOUNTER — TELEPHONE (OUTPATIENT)
Dept: UROLOGY | Facility: CLINIC | Age: 42
End: 2017-12-21

## 2017-12-21 ENCOUNTER — OFFICE VISIT (OUTPATIENT)
Dept: UROLOGY | Facility: CLINIC | Age: 42
End: 2017-12-21
Payer: MEDICARE

## 2017-12-21 VITALS
DIASTOLIC BLOOD PRESSURE: 92 MMHG | BODY MASS INDEX: 41.75 KG/M2 | HEIGHT: 73 IN | SYSTOLIC BLOOD PRESSURE: 168 MMHG | HEART RATE: 87 BPM | WEIGHT: 315 LBS

## 2017-12-21 DIAGNOSIS — N46.01 INFERTILITY DUE TO AZOOSPERMIA: Primary | ICD-10-CM

## 2017-12-21 DIAGNOSIS — Z98.52 HISTORY OF VASECTOMY: ICD-10-CM

## 2017-12-21 DIAGNOSIS — N52.01 ERECTILE DYSFUNCTION DUE TO ARTERIAL INSUFFICIENCY: ICD-10-CM

## 2017-12-21 PROCEDURE — 99214 OFFICE O/P EST MOD 30 MIN: CPT | Mod: S$GLB,,, | Performed by: NURSE PRACTITIONER

## 2017-12-21 PROCEDURE — 99499 UNLISTED E&M SERVICE: CPT | Mod: S$GLB,,, | Performed by: NURSE PRACTITIONER

## 2017-12-21 PROCEDURE — 99999 PR PBB SHADOW E&M-EST. PATIENT-LVL V: CPT | Mod: PBBFAC,,, | Performed by: NURSE PRACTITIONER

## 2017-12-21 NOTE — TELEPHONE ENCOUNTER
Spoke with pt about scheduling appt for vas reversal consult. Pt request to call back next week to schedule appt. Contact info given.

## 2017-12-21 NOTE — PROGRESS NOTES
Subjective:       Patient ID: Thai Santoyo Jr. is a 42 y.o. male.    Chief Complaint: Erectile Dysfunction    Thai Santoyo Jr. is a 42 y.o. Male with diabetes and ED and low T.  His HRT is being monitored by his PCP.  He was seen in clinic with Dr. Gonzales 5/11/2017.    He reports ED > 1yr.  He reports occasional spontaneous erections but unable to maintain.  He does not get AM erections.  He has never tried any of the oral agents due to his past medical history.    He was last seen in clinic 06/20/2017 for ICI education and 1st injection  He states ICI working well for him.  He is here today to discuss having vas reversal since he recently started dating and wanting to have a baby with her.  His vasectomy was in 2005.    5/26/2017 Hgb A1c was 7.5                          PSA                      1.3                 05/26/2017                    Past Medical History:  8/12/2013: Acute gouty arthropathy  1/13/2011: Chronic kidney disease, stage IV (severe)  No date: Diabetes mellitus type II  No date: Dialysis patient  8/12/2013: DM (diabetes mellitus) type II uncontrolled wi*  8/12/2013: ESRD on hemodialysis  9/12/2012: Hypertension  8/15/2013: Line sepsis  9/12/2012: Morbid obesity    Past Surgical History:  No date: DIALYSIS FISTULA CREATION  No date: KNEE ARTHROPLASTY  No date: SHOULDER SURGERY      Comment: right  No date: VASECTOMY    Review of patient's family history indicates:    Diabetes                       Mother                    Kidney disease                 Mother                      Comment: on dialysis    Hypertension                   Father                    Kidney disease                 Paternal Uncle              Comment: dialysis    Kidney disease                 Paternal Uncle              Comment: dialysis    Eczema                         Sister                    No Known Problems              Son                       No Known Problems              Sister                    No Known  Problems              Son                       No Known Problems              Son                       Melanoma                       Neg Hx                    Psoriasis                      Neg Hx                    Lupus                          Neg Hx                    Acne                           Neg Hx                      Social History    Marital status: Single              Spouse name:                       Years of education:                 Number of children:               Occupational History  Occupation          Employer            Comment               Unemployed          OTHER               [employer] for 12 years    Social History Main Topics    Smoking status: Current Some Day Smoker                                                      Packs/day: 0.00      Years: 0.00           Types: Cigars    Smokeless tobacco: Never Used                        Comment: once a month    Alcohol use: No              Drug use: No              Sexual activity: Yes               Partners with: Female       Birth control/protection: None    Other Topics            Concern    None on file    Social History Narrative    None on file        Allergies:  Review of patient's allergies indicates no known allergies.    Medications:  Current Outpatient Prescriptions:   albiglutide 30 mg/0.5 mL PnIj, Inject 30 mg into the skin every 7 days., Disp: , Rfl:   amlodipine (NORVASC) 10 MG tablet, Take 1 tablet (10 mg total) by mouth once daily., Disp: 90 tablet, Rfl: 3  blood sugar diagnostic Strp, Use for BS testing three times daily., Disp: 200 strip, Rfl: 9  carvedilol (COREG) 25 MG tablet, Take 1 tablet (25 mg total) by mouth 2 (two) times daily with meals., Disp: 180 tablet, Rfl: 3  cyclobenzaprine (FLEXERIL) 10 MG tablet, , Disp: , Rfl:   furosemide (LASIX) 40 MG tablet, Take 1 tablet (40 mg total) by mouth once daily., Disp: 90 tablet, Rfl: 3  glipiZIDE (GLUCOTROL) 5 MG TR24, TAKE ONE TABLET BY MOUTH ONCE DAILY WITH  BREAKFAST, Disp: 90 tablet, Rfl: 3        Review of Systems   Constitutional: Negative.  Negative for activity change, appetite change and fever.   HENT: Negative.  Negative for facial swelling and trouble swallowing.    Eyes: Negative.    Respiratory: Negative.  Negative for shortness of breath.    Cardiovascular: Negative.  Negative for chest pain and palpitations.   Gastrointestinal: Negative for abdominal pain, constipation, diarrhea, nausea and vomiting.   Genitourinary: Negative for difficulty urinating, discharge, dysuria, enuresis, flank pain, frequency, genital sores, hematuria, nocturia, penile pain, penile swelling, scrotal swelling, testicular pain and urgency.        Ok with how he urinates.  Ok with ED with ICI     Musculoskeletal: Negative for back pain, gait problem and neck stiffness.   Skin: Negative.  Negative for wound.   Neurological: Negative for dizziness, tremors, seizures, syncope, speech difficulty, light-headedness and headaches.   Hematological: Does not bruise/bleed easily.   Psychiatric/Behavioral: Negative for confusion and hallucinations. The patient is not nervous/anxious.        Objective:      Physical Exam   Nursing note and vitals reviewed.  Constitutional: He is oriented to person, place, and time. Vital signs are normal. He appears well-developed and well-nourished. He is active and cooperative.  Non-toxic appearance. He does not have a sickly appearance.   HENT:   Head: Normocephalic and atraumatic.   Right Ear: External ear normal.   Left Ear: External ear normal.   Nose: Nose normal.   Mouth/Throat: Mucous membranes are normal.   Eyes: Conjunctivae and lids are normal. No scleral icterus.   Neck: Trachea normal, normal range of motion and full passive range of motion without pain. Neck supple. No JVD present. No tracheal deviation present.   Cardiovascular: Normal rate, regular rhythm, S1 normal and S2 normal.    Pulmonary/Chest: Effort normal and breath sounds normal. No  respiratory distress. He exhibits no tenderness.   Abdominal: Soft. Normal appearance and bowel sounds are normal. There is no hepatosplenomegaly. There is no tenderness. There is no rebound, no guarding and no CVA tenderness.   Genitourinary: Testes normal and penis normal.   Musculoskeletal: Normal range of motion.   Neurological: He is alert and oriented to person, place, and time. He has normal strength.   Skin: Skin is warm, dry and intact.     Psychiatric: He has a normal mood and affect. His behavior is normal. Judgment and thought content normal.       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 Erectile dysfunction due to arterial insufficiency    3. History of vasectomy        Plan:         I spent 25 minutes with the patient of which more than half was spent in direct consultation with the patient in regards to our treatment and plan.    Education and recommendations of today's plan of care including home remedies.  Discussed Vase Reversal;  Consult Dr. Dow   We discussed the procedure and I wrote down the name of procedures:  vasovasostomy and vasoepididymostomy as well as the indication for each.    We discussed that the testicle might be slightly more high riding after the procedure.    Discussed TESE as well.  Educational material given  Voiced appreciation

## 2017-12-21 NOTE — PATIENT INSTRUCTIONS
Vasectomy Reversal    Each of your testicles makes sperm (male reproductive cells). Sperm travel from the testicles to the penis through one of 2 tubes called the vas deferens. On the way, sperm mix with other fluids to form semen, which leaves the body during ejaculation. During a vasectomy, each vas deferens is cut, preventing sperm from leaving the body. This makes you sterile (unable to make a woman pregnant). A vasectomy can sometimes be reversed, restoring the flow of sperm out of the body.  How the procedure works  During a vasectomy reversal, the 2 cut ends of the vas deferens are stitched back together. With the sperm pathways restored, sperm can once again travel through the vas deferens and leave the body during ejaculation. You may then be able to father a child.  Preparing for the procedure  You will be given instructions to prepare for the vasectomy reversal. Tell your healthcare provider about any medicines you take, including aspirin. You may be asked to stop taking some or all of these. On the day of your procedure, bring clean cotton briefs or an athletic support with you.  During the procedure  Youll receive medicine to keep you pain free. You may be awake and relaxed during the procedure. Or, you may be completely asleep. Once the medicine takes effect:  · An incision is made in your scrotum.  · The cut ends of each vas deferens are lifted out and examined. A section of each cut end may be removed.  · The end closer to the testicles is cut until fluid flows freely. This fluid may be looked at under a microscope to see if sperm are present.  · The 2 cut ends are stitched together. If needed, the vas may be attached directly to the epididymis (tissue behind the testicle).  · When both of the vas deferens are reconnected, the incisions in the scrotum are sutured closed.  After the procedure  You may need to stay in the hospital for several hours. When its time to go home, have an adult family  member or friend drive you. Once youre home:  · Take medicine as directed to relieve any pain.  · To lessen the chance of swelling, stay off your feet as much as you can for the first day.  · Place an ice pack or bag of frozen peas (wrapped in a thin towel) on your scrotum for short amounts of time. This helps reduce swelling.  · Wear an athletic support or snug cotton briefs for extra support.  · Follow your doctors instructions for showering and bathing.  · Ask your doctor when its OK to have sex.  · Avoid heavy lifting or exercise for at least 2 weeks. Ask your doctor when you can return to work.  Possible risks and complications  · Risks associated with anesthesia  · Infection (symptoms include fever, chills, drainage from the incision site, and pain)  · Internal bleeding of the scrotum (symptoms include increasing pain, excessive swelling, a large black-and-blue area, or a growing lump)  · Failure of the procedure to restore fertility   Date Last Reviewed: 1/1/2017  © 7857-8162 [employer] [address] [place] [address], [place] [address]. All rights reserved. This information is not intended as a substitute for professional medical care. Always follow your healthcare professional's instructions.

## 2018-01-27 ENCOUNTER — HOSPITAL ENCOUNTER (EMERGENCY)
Facility: OTHER | Age: 43
Discharge: HOME | End: 2018-01-27
Attending: EMERGENCY MEDICINE
Payer: MEDICARE

## 2018-01-27 VITALS
RESPIRATION RATE: 20 BRPM | SYSTOLIC BLOOD PRESSURE: 197 MMHG | HEART RATE: 90 BPM | OXYGEN SATURATION: 100 % | WEIGHT: 315 LBS | BODY MASS INDEX: 41.75 KG/M2 | HEIGHT: 73 IN | DIASTOLIC BLOOD PRESSURE: 96 MMHG

## 2018-01-27 DIAGNOSIS — I47.10 SVT (SUPRAVENTRICULAR TACHYCARDIA): Primary | ICD-10-CM

## 2018-01-27 DIAGNOSIS — R00.2 PALPITATIONS: ICD-10-CM

## 2018-01-27 LAB
ALBUMIN SERPL BCP-MCNC: 3.7 G/DL
ALP SERPL-CCNC: 205 U/L
ALT SERPL W/O P-5'-P-CCNC: 21 U/L
ANION GAP SERPL CALC-SCNC: 13 MMOL/L
AST SERPL-CCNC: 20 U/L
BASOPHILS # BLD AUTO: 0.02 K/UL
BASOPHILS NFR BLD: 0.2 %
BILIRUB SERPL-MCNC: 0.3 MG/DL
BUN SERPL-MCNC: 25 MG/DL
CALCIUM SERPL-MCNC: 8.9 MG/DL
CHLORIDE SERPL-SCNC: 94 MMOL/L
CO2 SERPL-SCNC: 30 MMOL/L
CREAT SERPL-MCNC: 6 MG/DL
DIFFERENTIAL METHOD: ABNORMAL
EOSINOPHIL # BLD AUTO: 0.5 K/UL
EOSINOPHIL NFR BLD: 5.6 %
ERYTHROCYTE [DISTWIDTH] IN BLOOD BY AUTOMATED COUNT: 14 %
EST. GFR  (AFRICAN AMERICAN): 12 ML/MIN/1.73 M^2
EST. GFR  (NON AFRICAN AMERICAN): 11 ML/MIN/1.73 M^2
GLUCOSE SERPL-MCNC: 304 MG/DL
HCT VFR BLD AUTO: 37.6 %
HGB BLD-MCNC: 12.2 G/DL
LYMPHOCYTES # BLD AUTO: 1.3 K/UL
LYMPHOCYTES NFR BLD: 14.9 %
MCH RBC QN AUTO: 28.8 PG
MCHC RBC AUTO-ENTMCNC: 32.4 G/DL
MCV RBC AUTO: 89 FL
MONOCYTES # BLD AUTO: 1.2 K/UL
MONOCYTES NFR BLD: 13.4 %
NEUTROPHILS # BLD AUTO: 5.8 K/UL
NEUTROPHILS NFR BLD: 65.7 %
PLATELET # BLD AUTO: 275 K/UL
PMV BLD AUTO: 9.8 FL
POTASSIUM SERPL-SCNC: 4.4 MMOL/L
PROT SERPL-MCNC: 8.2 G/DL
RBC # BLD AUTO: 4.23 M/UL
SODIUM SERPL-SCNC: 137 MMOL/L
WBC # BLD AUTO: 8.81 K/UL

## 2018-01-27 PROCEDURE — 25000003 PHARM REV CODE 250: Performed by: EMERGENCY MEDICINE

## 2018-01-27 PROCEDURE — 99284 EMERGENCY DEPT VISIT MOD MDM: CPT | Mod: 25

## 2018-01-27 PROCEDURE — 85025 COMPLETE CBC W/AUTO DIFF WBC: CPT

## 2018-01-27 PROCEDURE — 63600175 PHARM REV CODE 636 W HCPCS: Performed by: EMERGENCY MEDICINE

## 2018-01-27 PROCEDURE — 93005 ELECTROCARDIOGRAM TRACING: CPT

## 2018-01-27 PROCEDURE — 93010 ELECTROCARDIOGRAM REPORT: CPT | Mod: 76,,, | Performed by: INTERNAL MEDICINE

## 2018-01-27 PROCEDURE — 96374 THER/PROPH/DIAG INJ IV PUSH: CPT

## 2018-01-27 PROCEDURE — 80053 COMPREHEN METABOLIC PANEL: CPT

## 2018-01-27 PROCEDURE — 92960 CARDIOVERSION ELECTRIC EXT: CPT

## 2018-01-27 PROCEDURE — 96375 TX/PRO/DX INJ NEW DRUG ADDON: CPT

## 2018-01-27 RX ORDER — ADENOSINE 3 MG/ML
12 INJECTION, SOLUTION INTRAVENOUS
Status: COMPLETED | OUTPATIENT
Start: 2018-01-27 | End: 2018-01-27

## 2018-01-27 RX ORDER — AMLODIPINE BESYLATE 5 MG/1
10 TABLET ORAL ONCE
Status: COMPLETED | OUTPATIENT
Start: 2018-01-27 | End: 2018-01-27

## 2018-01-27 RX ORDER — ONDANSETRON 2 MG/ML
4 INJECTION INTRAMUSCULAR; INTRAVENOUS
Status: COMPLETED | OUTPATIENT
Start: 2018-01-27 | End: 2018-01-27

## 2018-01-27 RX ORDER — ADENOSINE 3 MG/ML
6 INJECTION, SOLUTION INTRAVENOUS
Status: COMPLETED | OUTPATIENT
Start: 2018-01-27 | End: 2018-01-27

## 2018-01-27 RX ADMIN — SODIUM CHLORIDE 500 ML: 900 INJECTION, SOLUTION INTRAVENOUS at 07:01

## 2018-01-27 RX ADMIN — ADENOSINE 6 MG: 3 INJECTION, SOLUTION INTRAVENOUS at 07:01

## 2018-01-27 RX ADMIN — ADENOSINE 12 MG: 3 INJECTION, SOLUTION INTRAVENOUS at 07:01

## 2018-01-27 RX ADMIN — ONDANSETRON 4 MG: 2 INJECTION, SOLUTION INTRAMUSCULAR; INTRAVENOUS at 07:01

## 2018-01-27 RX ADMIN — AMLODIPINE BESYLATE 10 MG: 5 TABLET ORAL at 09:01

## 2018-01-27 NOTE — ED NOTES
The patient was given a 6mg adenosine IVP with Dr. Justin at bedside. The patient did not convert to NSR so he was then given 12mg adenosine IVP and then decreased his rate to 100bpm. He tolerated well. He continues to be on the cardiac monitor, denies chest pain and SOB, states that the nausea is gone. Will continue to monitor.

## 2018-01-27 NOTE — DISCHARGE INSTRUCTIONS
Follow up with Dr. Irby.    Please return to the ER if you have worsening symptoms, chest pain, difficulty breathing, fevers, altered mental status, dizziness, weakness, or any other concerns.

## 2018-01-27 NOTE — ED PROVIDER NOTES
Encounter Date: 1/27/2018    SCRIBE #1 NOTE: Jaimee AYALA am scribing for, and in the presence of, Dr. Justin.       History     Chief Complaint   Patient presents with    Tachycardia     States that his heart rate was 180 according to his phone this morning prior to taking his beta blocker.     Time seen by provider: 7:27 AM    This is a 42 y.o. male with HTN, ESRD on hemodialysis MWF, and type II diabetes who presents with complaint of palpitations this morning. Pt saw he had a high heart rate of 180bpm through a phone calvin and took a beta blocker without relief. He endorses nausea and vomiting, but notes this is a regular occurrence. He has no fever, chills, diaphoresis, leg swelling, chest pain, SOB, cough, dizziness, weakness, and lightheadedness. He denies any excessive water weight at the moment. Pt is typically tachycardic ~120bpm after dialysis. He underwent a full session yesterday. Pt was in SVT November 2016, which resolved spontaneously prior to intervention. He denies any recent changes in medication. Pt was at baseline last night at 11:30 PM. Nephrologist is Dr. Isidro Dubois at [facility].      The history is provided by the patient and a significant other.     Review of patient's allergies indicates:  No Known Allergies  Past Medical History:   Diagnosis Date    Acute gouty arthropathy 8/12/2013    Chronic kidney disease, stage IV (severe) 1/13/2011    Diabetes mellitus type II     Dialysis patient     DM (diabetes mellitus) type II uncontrolled with renal manifestation 8/12/2013    ESRD on hemodialysis 8/12/2013    MWF    Hypertension 9/12/2012    Line sepsis 8/15/2013    Morbid obesity 9/12/2012    SVT (supraventricular tachycardia)      Past Surgical History:   Procedure Laterality Date    DIALYSIS FISTULA CREATION      KNEE ARTHROPLASTY      SHOULDER SURGERY      right    VASECTOMY       Family History   Problem Relation Age of Onset    Diabetes Mother     Kidney disease  Mother      on dialysis    Hypertension Father     Kidney disease Paternal Uncle      dialysis    Kidney disease Paternal Uncle      dialysis    Eczema Sister     No Known Problems Son     No Known Problems Sister     No Known Problems Son     No Known Problems Son     Melanoma Neg Hx     Psoriasis Neg Hx     Lupus Neg Hx     Acne Neg Hx      Social History   Substance Use Topics    Smoking status: Current Some Day Smoker     Types: Cigars    Smokeless tobacco: Never Used      Comment: once a month    Alcohol use No     Review of Systems   Constitutional: Negative for chills, diaphoresis, fever and unexpected weight change.   HENT: Negative for congestion, rhinorrhea and sore throat.    Respiratory: Negative for cough and shortness of breath.    Cardiovascular: Positive for palpitations. Negative for chest pain and leg swelling.   Gastrointestinal: Positive for nausea and vomiting. Negative for abdominal pain and diarrhea.   Endocrine: Negative for polyuria.   Genitourinary: Negative for decreased urine volume and dysuria.   Musculoskeletal: Negative for back pain.   Skin: Negative for rash.   Allergic/Immunologic: Negative for immunocompromised state.   Neurological: Negative for dizziness, weakness and light-headedness.   Hematological: Does not bruise/bleed easily.   Psychiatric/Behavioral: Negative for confusion.       Physical Exam     Initial Vitals [01/27/18 0707]   BP Pulse Resp Temp SpO2   -- (!) 181 18 -- 96 %      MAP       --         Physical Exam    Nursing note and vitals reviewed.  Constitutional: He appears well-developed and well-nourished. He is not diaphoretic. He is Obese . No distress.   HENT:   Head: Normocephalic and atraumatic.   Right Ear: External ear normal.   Left Ear: External ear normal.   Eyes: Conjunctivae are normal. Right eye exhibits no discharge. Left eye exhibits no discharge.   Neck: Normal range of motion. Neck supple.   Cardiovascular: Regular rhythm, normal  heart sounds and intact distal pulses. Tachycardia present.    Pulses:       Dorsalis pedis pulses are 2+ on the right side, and 2+ on the left side.        Posterior tibial pulses are 2+ on the right side, and 2+ on the left side.   Pulmonary/Chest: Breath sounds normal. No respiratory distress. He has no wheezes. He has no rhonchi. He has no rales. He exhibits no tenderness.   Abdominal: Soft. Bowel sounds are normal. He exhibits no distension. There is no tenderness. There is no rebound and no guarding.   Musculoskeletal: Normal range of motion. He exhibits no edema or tenderness.   LUE AVF with palpable thrill.   Lymphadenopathy:     He has no cervical adenopathy.   Neurological: He is alert and oriented to person, place, and time. He has normal strength. No sensory deficit.   Skin: Skin is warm and dry. No rash noted. No erythema.   Psychiatric: He has a normal mood and affect. His behavior is normal. Judgment and thought content normal.         ED Course   Cardioversion  Date/Time: 1/27/2018 11:20 AM  Performed by: BHAVANI SINGH  Authorized by: BHAVANI SINGH   Consent Done: Not Needed  Patient sedated: no  Cardioversion basis: emergent  Pre-procedure rhythm: supraventricular tachycardia  Patient position: patient was placed in a supine position  Chest area: chest area exposed  Electrodes: pads  Electrodes placed: anterior-lateral  Number of attempts: 2  Cardioversion mode attempt one: 6 mg Adenosine.  Attempt 1 outcome: no change in rhythm  Cardioversion mode attempt two: 12 mg adenosine.  Attempt 2 outcome: conversion to normal sinus rhythm  Post-procedure rhythm: normal sinus rhythm  Complications: no complications  Patient tolerance: Patient tolerated the procedure well with no immediate complications  Complications: No  Specimens: No  Implants: No    Critical Care  Date/Time: 1/27/2018 10:25 AM  Performed by: BHAVANI SINGH  Authorized by: BHAVANI SINGH   Direct patient critical care time: 10  minutes  Additional history critical care time: 5 minutes  Ordering / reviewing critical care time: 10 minutes  Documentation critical care time: 5 minutes  Consulting other physicians critical care time: 5 minutes  Total critical care time (exclusive of procedural time) : 35 minutes  Critical care time was exclusive of separately billable procedures and treating other patients and teaching time.  Critical care was necessary to treat or prevent imminent or life-threatening deterioration of the following conditions: cardiac failure.  Critical care was time spent personally by me on the following activities: discussions with consultants, evaluation of patient's response to treatment, obtaining history from patient or surrogate, ordering and review of laboratory studies, pulse oximetry, review of old charts, re-evaluation of patient's condition, ordering and performing treatments and interventions, examination of patient and development of treatment plan with patient or surrogate.        Labs Reviewed   COMPREHENSIVE METABOLIC PANEL - Abnormal; Notable for the following:        Result Value    Chloride 94 (*)     CO2 30 (*)     Glucose 304 (*)     BUN, Bld 25 (*)     Creatinine 6.0 (*)     Alkaline Phosphatase 205 (*)     eGFR if  12 (*)     eGFR if non  11 (*)     All other components within normal limits   CBC W/ AUTO DIFFERENTIAL - Abnormal; Notable for the following:     RBC 4.23 (*)     Hemoglobin 12.2 (*)     Hematocrit 37.6 (*)     Mono # 1.2 (*)     Lymph% 14.9 (*)     All other components within normal limits     EKG Readings: (Independently Interpreted)   07:11 - SVT at a rate of 164. Normal axis. Normal intervals. No ST or ischemic changes.  08:32 - NSR at 96 bpm. Normal axis. Normal intervals. Septal Q waves. No ST or ischemic changes.           Medical Decision Making:   History:   Old Medical Records: I decided to obtain old medical records.  Old Records Summarized: records  from previous admission(s), other records and records from clinic visits.  Initial Assessment:   7:27AM:  Pt is a 41 y/o M who presents to ED with tachycardia and palpitations. Pt appears well, nontoxic. Pt does have a HR in the 160s, but stable BP and otherwise asymptomatic.  Based on EKG, pt appears to be in stable SVT. Will plan on medical cardioversion, will continue to follow and reassess.    Independently Interpreted Test(s):   I have ordered and independently interpreted EKG Reading(s) - see prior notes  Clinical Tests:   Lab Tests: Ordered and Reviewed  Medical Tests: Ordered and Reviewed  Other:   I have discussed this case with another health care provider.    8:10AM:  Pt doing well.  He did not initially respond or convert with 6 mg adenosine but did convert to NSR with 12 mg adenosine.  He remains stable with a HR in the 90s.  Awaiting labs at this time, will continue to follow.      10:00 AM: Pt's labs unremarkable.  He is slightly hypertensive, but did not take all his AM BP medications.  Pt does follow with Dr. Irby at [facility].  I discussed pt with cardiology, Dr. Aguillon, for Dr. Irby. Pt can be discharged home with f/u.    10:15AM:  I updated pt regarding results and my conversation with Dr. Aguillon. He remains in NSR.   He is agreeable and comfortable with plan for outpatient f/u.  I counseled pt regarding supportive care measures.  I have discussed with the pt ED return warnings and need for close PCP f/u.  Pt agreeable to plan and all questions answered.  I feel that pt is stable for discharge and management as an outpatient and no further intervention is needed at this time.  Pt is comfortable returning to the ED if needed.  Will DC home in stable condition.                Scribe Attestation:   Scribe #1: I performed the above scribed service and the documentation accurately describes the services I performed. I attest to the accuracy of the note.    Attending Attestation:           Physician  Attestation for Scribe:  Physician Attestation Statement for Scribe #1: I, Dr. Justin, reviewed documentation, as scribed by Jaimee Álvarez in my presence, and it is both accurate and complete.                 ED Course      Clinical Impression:     1. SVT (supraventricular tachycardia)    2. Palpitations                               Perla Justin MD  01/27/18 1126

## 2018-01-29 ENCOUNTER — TELEPHONE (OUTPATIENT)
Dept: INTERNAL MEDICINE | Facility: CLINIC | Age: 43
End: 2018-01-29

## 2018-01-29 NOTE — TELEPHONE ENCOUNTER
Please call  Santoyo.  I haven't seen him in over a year.  He needs to come for a visit regarding his diabetes.    Thanks.

## 2018-04-02 RX ORDER — INSULIN ASPART 100 [IU]/ML
40 INJECTION, SOLUTION INTRAVENOUS; SUBCUTANEOUS
Qty: 108 ML | Refills: 3 | Status: SHIPPED | OUTPATIENT
Start: 2018-04-02 | End: 2018-05-18 | Stop reason: SDUPTHER

## 2018-04-02 RX ORDER — INSULIN ASPART 100 [IU]/ML
40 INJECTION, SOLUTION INTRAVENOUS; SUBCUTANEOUS
Status: CANCELLED | OUTPATIENT
Start: 2018-04-02

## 2018-04-10 ENCOUNTER — TELEPHONE (OUTPATIENT)
Dept: INTERNAL MEDICINE | Facility: CLINIC | Age: 43
End: 2018-04-10

## 2018-04-10 DIAGNOSIS — R00.0 TACHYCARDIA: ICD-10-CM

## 2018-04-10 DIAGNOSIS — G47.00 INSOMNIA, UNSPECIFIED TYPE: ICD-10-CM

## 2018-04-10 DIAGNOSIS — I15.0 RENOVASCULAR HYPERTENSION: ICD-10-CM

## 2018-04-11 RX ORDER — INSULIN GLARGINE 100 [IU]/ML
45 INJECTION, SOLUTION SUBCUTANEOUS 2 TIMES DAILY
Qty: 81 ML | Refills: 3 | Status: SHIPPED | OUTPATIENT
Start: 2018-04-11 | End: 2018-04-24

## 2018-04-11 RX ORDER — CARVEDILOL 25 MG/1
TABLET ORAL
Qty: 180 TABLET | Refills: 3 | Status: SHIPPED | OUTPATIENT
Start: 2018-04-11 | End: 2019-04-29 | Stop reason: SDUPTHER

## 2018-04-11 RX ORDER — TRAZODONE HYDROCHLORIDE 50 MG/1
50 TABLET ORAL NIGHTLY PRN
Qty: 90 TABLET | Refills: 3 | Status: SHIPPED | OUTPATIENT
Start: 2018-04-11 | End: 2019-04-29 | Stop reason: SDUPTHER

## 2018-04-11 NOTE — TELEPHONE ENCOUNTER
----- Message from Sonam Davis sent at 4/11/2018 10:22 AM CDT -----  Contact: [facility]  Prior Authorization Needed    Medication: LANTUS SOLOSTAR U-100 INSULIN 100 unit/mL (3 mL) InPn pen    Pharmacy Info: [facility] - [place], [place] - [address]    Plan does not cover this medication. Please call plan at [phone] to initiate prior authorization or call/fax pharmacy to change medication. Patient ID#[id number] Group #[id number]    Please notify pharmacy when prior authorization has been approved.    Thank You

## 2018-04-11 NOTE — TELEPHONE ENCOUNTER
----- Message from Sonam Davis sent at 4/11/2018  7:56 AM CDT -----  Contact: [facility]  [phone]  Prior Authorization Needed    Medication: TANZEUM 30 mg/0.5 mL PnIj    Pharmacy Info: [facility] - [place], [place] - [facility] [address]    Insurance Plan: Medicare: Rx D Advance Rx G  MEMBER ID: [id number]  GROUP ID: [id number]  Insurance Contact: [phone]    Please notify pharmacy when prior authorization is approved.    Thank You

## 2018-04-13 ENCOUNTER — TELEPHONE (OUTPATIENT)
Dept: INTERNAL MEDICINE | Facility: CLINIC | Age: 43
End: 2018-04-13

## 2018-04-13 NOTE — TELEPHONE ENCOUNTER
"----- Message from Sonam Davis sent at 4/13/2018  8:47 AM CDT -----  Contact: [facility]  Prior Authorization Needed    Rx: TANZEUM 30 mg/0.5 mL PnIj    To submit the Pa:    1: Go to " key.covermymeds.com " and click "Enter a Key"    2. Enter the patient's last name and date of birth and the key.      KEY: [email or web address]    3. Complete the forms and click "send to Plan"    Please notify pharmacy when prior authorization has been approved.    Thank You    "

## 2018-04-24 ENCOUNTER — TELEPHONE (OUTPATIENT)
Dept: INTERNAL MEDICINE | Facility: CLINIC | Age: 43
End: 2018-04-24

## 2018-04-24 RX ORDER — INSULIN GLARGINE 100 [IU]/ML
45 INJECTION, SOLUTION SUBCUTANEOUS 2 TIMES DAILY
Qty: 81 ML | Refills: 3 | Status: SHIPPED | OUTPATIENT
Start: 2018-04-24 | End: 2018-06-18 | Stop reason: SDUPTHER

## 2018-04-24 NOTE — TELEPHONE ENCOUNTER
----- Message from Sonam Davis sent at 4/24/2018  9:03 AM CDT -----  Contact: [facility] [phone]  Prescription Alternative Needed:     The pharmacy needs alternative on the following RX:    LANTUS SOLOSTAR U-100 INSULIN 100 unit/mL (3 mL) InPn pen    Please change to Basaglar    Pharmacy: [facility] - [place], [place] - [facility] [address]    Thank You

## 2018-04-24 NOTE — TELEPHONE ENCOUNTER
----- Message from Sonam Davis sent at 4/24/2018  9:06 AM CDT -----  Contact: [facility] [phone]  Prior Authorization Needed    Medication: TANZEUM 30 mg/0.5 mL PnIj    Pharmacy Info: [facility] - [place], [place] - [facility] [address]       Plan does not cover this medication. Please call plan at [phone] to initiate prior authorization or call/fax pharmacy to change medication. Patient ID#[id number] Group #[id number]    Please notify pharmacy when prior authorization has been approved.    Thank You

## 2018-04-24 NOTE — TELEPHONE ENCOUNTER
Pt's insurance has denied Tanzeum inj  pen prior authorization. Formulary alternatives are: Byetta, Victoza, Basaglar, Levemir, Triseba.

## 2018-04-24 NOTE — TELEPHONE ENCOUNTER
Pt's insurance has denied Lantus Solostar pen prior authorization. Formulary alternatives are: Basaglar, Levemir, Triseba.

## 2018-04-24 NOTE — TELEPHONE ENCOUNTER
I changed the prescription.  Please inform the patient so that he can pick it up ASAP.    I sent it to our pharmacy on purpose.  He needs to pick it up here for insurance reasons, and I will not send it to another pharmacy.    D

## 2018-05-17 ENCOUNTER — HOSPITAL ENCOUNTER (EMERGENCY)
Facility: OTHER | Age: 43
Discharge: HOME | End: 2018-05-17
Attending: EMERGENCY MEDICINE
Payer: MEDICARE

## 2018-05-17 VITALS
WEIGHT: 315 LBS | SYSTOLIC BLOOD PRESSURE: 141 MMHG | BODY MASS INDEX: 41.75 KG/M2 | HEIGHT: 73 IN | OXYGEN SATURATION: 97 % | TEMPERATURE: 98 F | HEART RATE: 98 BPM | DIASTOLIC BLOOD PRESSURE: 74 MMHG | RESPIRATION RATE: 33 BRPM

## 2018-05-17 DIAGNOSIS — I47.10 SVT (SUPRAVENTRICULAR TACHYCARDIA): Primary | ICD-10-CM

## 2018-05-17 DIAGNOSIS — R00.0 TACHYCARDIA: ICD-10-CM

## 2018-05-17 LAB
ANION GAP SERPL CALC-SCNC: 16 MMOL/L
BASOPHILS # BLD AUTO: 0.05 K/UL
BASOPHILS NFR BLD: 0.5 %
BUN SERPL-MCNC: 43 MG/DL
CALCIUM SERPL-MCNC: 9.3 MG/DL
CHLORIDE SERPL-SCNC: 98 MMOL/L
CO2 SERPL-SCNC: 26 MMOL/L
CREAT SERPL-MCNC: 8.5 MG/DL
DIFFERENTIAL METHOD: ABNORMAL
EOSINOPHIL # BLD AUTO: 0.5 K/UL
EOSINOPHIL NFR BLD: 4.2 %
ERYTHROCYTE [DISTWIDTH] IN BLOOD BY AUTOMATED COUNT: 14.1 %
EST. GFR  (AFRICAN AMERICAN): 8 ML/MIN/1.73 M^2
EST. GFR  (NON AFRICAN AMERICAN): 7 ML/MIN/1.73 M^2
GLUCOSE SERPL-MCNC: 102 MG/DL
HCT VFR BLD AUTO: 37.6 %
HGB BLD-MCNC: 12.5 G/DL
LYMPHOCYTES # BLD AUTO: 2.2 K/UL
LYMPHOCYTES NFR BLD: 20.3 %
MCH RBC QN AUTO: 29.8 PG
MCHC RBC AUTO-ENTMCNC: 33.2 G/DL
MCV RBC AUTO: 90 FL
MONOCYTES # BLD AUTO: 1 K/UL
MONOCYTES NFR BLD: 9.1 %
NEUTROPHILS # BLD AUTO: 7.2 K/UL
NEUTROPHILS NFR BLD: 65.7 %
PLATELET # BLD AUTO: 258 K/UL
PMV BLD AUTO: 10 FL
POTASSIUM SERPL-SCNC: 3.9 MMOL/L
RBC # BLD AUTO: 4.19 M/UL
SODIUM SERPL-SCNC: 140 MMOL/L
WBC # BLD AUTO: 11.01 K/UL

## 2018-05-17 PROCEDURE — 93010 ELECTROCARDIOGRAM REPORT: CPT | Mod: 76,,, | Performed by: INTERNAL MEDICINE

## 2018-05-17 PROCEDURE — 93005 ELECTROCARDIOGRAM TRACING: CPT

## 2018-05-17 PROCEDURE — 99283 EMERGENCY DEPT VISIT LOW MDM: CPT | Mod: 25

## 2018-05-17 PROCEDURE — 96374 THER/PROPH/DIAG INJ IV PUSH: CPT

## 2018-05-17 PROCEDURE — 80048 BASIC METABOLIC PNL TOTAL CA: CPT

## 2018-05-17 PROCEDURE — 85025 COMPLETE CBC W/AUTO DIFF WBC: CPT

## 2018-05-17 PROCEDURE — 63600175 PHARM REV CODE 636 W HCPCS: Performed by: EMERGENCY MEDICINE

## 2018-05-17 RX ORDER — ADENOSINE 3 MG/ML
12 INJECTION, SOLUTION INTRAVENOUS
Status: COMPLETED | OUTPATIENT
Start: 2018-05-17 | End: 2018-05-17

## 2018-05-17 RX ORDER — ADENOSINE 3 MG/ML
INJECTION, SOLUTION INTRAVENOUS
Status: DISCONTINUED
Start: 2018-05-17 | End: 2018-05-17 | Stop reason: HOSPADM

## 2018-05-17 RX ADMIN — ADENOSINE 12 MG: 3 INJECTION, SOLUTION INTRAVENOUS at 06:05

## 2018-05-17 NOTE — ED NOTES
Patient Identifiers for Thai Santoyo Jr. checked and correct  LOC: The patient is awake, alert and aware of environment with an appropriate affect, the patient is oriented x 3 and speaking appropriate.  APPEARANCE: Patient resting comfortably and in no acute distress, patient is clean and well groomed, patient's clothing is properly fastened.  SKIN: The skin is warm and dry, patient has normal skin turgor and moist mucus membranes,no rashes or lesions.Skin Intact , No Breakdown Noted  Musculoskeletal :  Normal range of motion noted. Moves all extremeties well, No swelling or tenderness noted  RESPIRATORY: Airway is open and patent, respirations are spontaneous, patient has a normal effort and rate.Bilateral Lungs Sounds are clear  CARDIAC: Patient has a increase rate and rhythm, no periphreal edema noted, capillary refill < 3 seconds. Left upper arm access with palpable thrill  ABDOMEN: Soft and non tender to palpation, no distention noted.   PULSES: 2+  And symmetrical in all extremeties  NEUROLOGIC: PERRL,  facial expression is symmetrical, patient moving all extremities, normal sensation in all extremities when touched with a finger.The patient is awake, alert and cooperative with a calm affect, patient is aware of environment.    Will continue to monitor

## 2018-05-17 NOTE — ED PROVIDER NOTES
"Encounter Date: 5/17/2018    SCRIBE #1 NOTE: I, Rafael Dawson, am scribing for, and in the presence of, Dr. Dempsey.       History     Chief Complaint   Patient presents with    Tachycardia     Pt states he feels like his heart is racing. Hx of SVT.     Time seen by provider: 6:05 PM    This is a 42 y.o. Male, with history of SVT, HTN, DM, ESRD, and CKD, who presents with complaint of acute onset, constant palpitations that began approximately one hour ago. Patient states he awoke from his sleep and "felt my heart racing." He is currently experiencing dizziness in the ED. Patient is on dialysis, usually dialyzes on Mondays, Wednesdays, and Fridays, and last dialyzed yesterday. He has been compliant with carvedilol, last taken today. There have been no recent adjustments to his medications. His dialysis physician is Dr. Dubois. Patient states this is his third episode of SVT. He admits use of tobacco, but denies use of EtOH or illicit drugs. He has no additional complaints.         The history is provided by the patient.     Review of patient's allergies indicates:  No Known Allergies  Past Medical History:   Diagnosis Date    Acute gouty arthropathy 8/12/2013    Chronic kidney disease, stage IV (severe) 1/13/2011    Diabetes mellitus type II     Dialysis patient     DM (diabetes mellitus) type II uncontrolled with renal manifestation 8/12/2013    ESRD on hemodialysis 8/12/2013    MWF    Hypertension 9/12/2012    Line sepsis 8/15/2013    Morbid obesity 9/12/2012    SVT (supraventricular tachycardia)      Past Surgical History:   Procedure Laterality Date    DIALYSIS FISTULA CREATION      KNEE ARTHROPLASTY      SHOULDER SURGERY      right    VASECTOMY       Family History   Problem Relation Age of Onset    Diabetes Mother     Kidney disease Mother         on dialysis    Hypertension Father     Kidney disease Paternal Uncle         dialysis    Kidney disease Paternal Uncle         dialysis    " Eczema Sister     No Known Problems Son     No Known Problems Sister     No Known Problems Son     No Known Problems Son     Melanoma Neg Hx     Psoriasis Neg Hx     Lupus Neg Hx     Acne Neg Hx      Social History   Substance Use Topics    Smoking status: Current Some Day Smoker     Types: Cigars    Smokeless tobacco: Never Used      Comment: once a month    Alcohol use No     Review of Systems   Constitutional: Negative for chills and fever.   HENT: Negative for congestion, rhinorrhea and sore throat.    Respiratory: Negative for cough and shortness of breath.    Cardiovascular: Positive for palpitations. Negative for chest pain.   Gastrointestinal: Negative for abdominal pain, diarrhea, nausea and vomiting.   Endocrine: Negative for polyuria.   Genitourinary: Negative for decreased urine volume and dysuria.   Musculoskeletal: Negative for back pain.   Skin: Negative for rash.   Allergic/Immunologic: Negative for immunocompromised state.   Neurological: Positive for dizziness. Negative for weakness.   Hematological: Does not bruise/bleed easily.   Psychiatric/Behavioral: Negative for confusion.       Physical Exam     Initial Vitals [05/17/18 1759]   BP Pulse Resp Temp SpO2   (!) 115/59 (!) 165 20 97.9 °F (36.6 °C) 95 %      MAP       77.67         Physical Exam    Nursing note and vitals reviewed.  Constitutional: He appears well-developed and well-nourished. No distress.   HENT:   Head: Normocephalic and atraumatic.   Eyes: Conjunctivae and EOM are normal. Pupils are equal, round, and reactive to light.   Neck: Normal range of motion. Neck supple.   Cardiovascular: Regular rhythm, normal heart sounds and intact distal pulses. Tachycardia present.  Exam reveals no gallop and no friction rub.    No murmur heard.  Pulmonary/Chest: Effort normal and breath sounds normal. No respiratory distress.   Abdominal: Soft. Normal appearance and bowel sounds are normal. There is no tenderness. There is no rebound  and no guarding.   Musculoskeletal: Normal range of motion.   Neurological: He is alert and oriented to person, place, and time. He has normal strength. No cranial nerve deficit or sensory deficit.   Skin: Skin is warm and dry. No rash noted. No pallor.         ED Course   Critical Care  Date/Time: 5/17/2018 6:24 PM  Performed by: KATERINA NICKERSON.  Authorized by: KATERINA NICKERSON.   Direct patient critical care time: 20 minutes  Additional history critical care time: 5 minutes  Ordering / reviewing critical care time: 5 minutes  Documentation critical care time: 5 minutes  Total critical care time (exclusive of procedural time) : 35 minutes  Critical care time was exclusive of separately billable procedures and treating other patients.  Critical care was necessary to treat or prevent imminent or life-threatening deterioration of the following conditions: cardiac failure.  Critical care was time spent personally by me on the following activities: evaluation of patient's response to treatment, examination of patient, obtaining history from patient or surrogate, ordering and performing treatments and interventions, ordering and review of laboratory studies, re-evaluation of patient's condition, review of old charts and development of treatment plan with patient or surrogate.        Labs Reviewed   CBC W/ AUTO DIFFERENTIAL - Abnormal; Notable for the following:        Result Value    RBC 4.19 (*)     Hemoglobin 12.5 (*)     Hematocrit 37.6 (*)     All other components within normal limits   BASIC METABOLIC PANEL - Abnormal; Notable for the following:     BUN, Bld 43 (*)     Creatinine 8.5 (*)     eGFR if  8 (*)     eGFR if non  7 (*)     All other components within normal limits     EKG Readings: (Independently Interpreted)   Initial Reading: No STEMI.   Sinus tachycardia at a rate of 163. No P waves. No T wave abnormalities. Overall impression is SVT.   Other EKG Interpretations:  Normal sinus rhythm at a rate of 93. Normal axis. Normal intervals. No ST or ischemic changes.           Medical Decision Making:   Clinical Tests:   Lab Tests: Ordered and Reviewed  ED Management:  Mr. Santoyo is a 42-year-old male history of SVT complaining of palpitations and dizziness.  EKG is consistent with SVT.  After 12 mg of adenosine the patient converted to normal sinus rhythm.  He is a dialysis patient.  Will get blood work to rule out any electrolyte abnormalities.  We will keep on monitor and observed.    7:30 PM patient has been asymptomatic.  Labs reviewed and showed no acute abnormality.  He has dialysis tomorrow.  We will discharge the patient to follow up with dialysis as well as his nephrologist tomorrow.  Answered all questions he agrees plan of care.            Scribe Attestation:   Scribe #1: I performed the above scribed service and the documentation accurately describes the services I performed. I attest to the accuracy of the note.    Attending Attestation:           Physician Attestation for Scribe:  Physician Attestation Statement for Scribe #1: I, Dr. Dempsey, reviewed documentation, as scribed by Rafael Dawson  in my presence, and it is both accurate and complete.                    Clinical Impression:     1. SVT (supraventricular tachycardia)    2. Tachycardia                                 Perfecto Dempsey, DO  05/17/18 2009

## 2018-05-17 NOTE — ED NOTES
Defib pads placed on patient. Pt remains on cardiac monitor, continuous pulse oximetry and automatic blood pressure cuff cycling w/ alarms set,  alarms set and turned on for monitor and pulse ox, will continue to monitor. Dr. Dempsey remains at bedside.

## 2018-05-18 ENCOUNTER — LAB VISIT (OUTPATIENT)
Dept: LAB | Facility: HOSPITAL | Age: 43
End: 2018-05-18
Attending: INTERNAL MEDICINE
Payer: MEDICARE

## 2018-05-18 ENCOUNTER — TELEPHONE (OUTPATIENT)
Dept: INTERNAL MEDICINE | Facility: CLINIC | Age: 43
End: 2018-05-18

## 2018-05-18 ENCOUNTER — OFFICE VISIT (OUTPATIENT)
Dept: INTERNAL MEDICINE | Facility: CLINIC | Age: 43
End: 2018-05-18
Payer: MEDICARE

## 2018-05-18 ENCOUNTER — CLINICAL SUPPORT (OUTPATIENT)
Dept: OPTOMETRY | Facility: CLINIC | Age: 43
End: 2018-05-18
Attending: INTERNAL MEDICINE
Payer: MEDICARE

## 2018-05-18 VITALS
DIASTOLIC BLOOD PRESSURE: 76 MMHG | HEART RATE: 96 BPM | SYSTOLIC BLOOD PRESSURE: 132 MMHG | BODY MASS INDEX: 41.75 KG/M2 | WEIGHT: 315 LBS | HEIGHT: 73 IN

## 2018-05-18 DIAGNOSIS — I47.10 SVT (SUPRAVENTRICULAR TACHYCARDIA): ICD-10-CM

## 2018-05-18 DIAGNOSIS — E66.01 MORBID OBESITY WITH BMI OF 40.0-44.9, ADULT: ICD-10-CM

## 2018-05-18 DIAGNOSIS — Z79.4 TYPE 2 DIABETES MELLITUS WITH HYPERGLYCEMIA, WITH LONG-TERM CURRENT USE OF INSULIN: ICD-10-CM

## 2018-05-18 DIAGNOSIS — I15.2 HYPERTENSION ASSOCIATED WITH DIABETES: ICD-10-CM

## 2018-05-18 DIAGNOSIS — Z99.2 TYPE 2 DIABETES MELLITUS WITH CHRONIC KIDNEY DISEASE ON CHRONIC DIALYSIS, WITH LONG-TERM CURRENT USE OF INSULIN: ICD-10-CM

## 2018-05-18 DIAGNOSIS — N18.6 ESRD ON HEMODIALYSIS: ICD-10-CM

## 2018-05-18 DIAGNOSIS — E11.22 TYPE 2 DIABETES MELLITUS WITH CHRONIC KIDNEY DISEASE ON CHRONIC DIALYSIS, WITH LONG-TERM CURRENT USE OF INSULIN: ICD-10-CM

## 2018-05-18 DIAGNOSIS — Z99.2 ESRD ON HEMODIALYSIS: ICD-10-CM

## 2018-05-18 DIAGNOSIS — N18.6 ANEMIA IN END-STAGE RENAL DISEASE: ICD-10-CM

## 2018-05-18 DIAGNOSIS — E11.65 TYPE 2 DIABETES MELLITUS WITH HYPERGLYCEMIA, WITH LONG-TERM CURRENT USE OF INSULIN: ICD-10-CM

## 2018-05-18 DIAGNOSIS — N18.6 TYPE 2 DIABETES MELLITUS WITH CHRONIC KIDNEY DISEASE ON CHRONIC DIALYSIS, WITH LONG-TERM CURRENT USE OF INSULIN: Primary | ICD-10-CM

## 2018-05-18 DIAGNOSIS — N25.81 HYPERPARATHYROIDISM, SECONDARY RENAL: ICD-10-CM

## 2018-05-18 DIAGNOSIS — E11.59 HYPERTENSION ASSOCIATED WITH DIABETES: ICD-10-CM

## 2018-05-18 DIAGNOSIS — E11.22 TYPE 2 DIABETES MELLITUS WITH CHRONIC KIDNEY DISEASE ON CHRONIC DIALYSIS, WITH LONG-TERM CURRENT USE OF INSULIN: Primary | ICD-10-CM

## 2018-05-18 DIAGNOSIS — N18.6 TYPE 2 DIABETES MELLITUS WITH CHRONIC KIDNEY DISEASE ON CHRONIC DIALYSIS, WITH LONG-TERM CURRENT USE OF INSULIN: ICD-10-CM

## 2018-05-18 DIAGNOSIS — D63.1 ANEMIA IN END-STAGE RENAL DISEASE: ICD-10-CM

## 2018-05-18 DIAGNOSIS — Z79.4 TYPE 2 DIABETES MELLITUS WITH CHRONIC KIDNEY DISEASE ON CHRONIC DIALYSIS, WITH LONG-TERM CURRENT USE OF INSULIN: Primary | ICD-10-CM

## 2018-05-18 DIAGNOSIS — I15.0 RENOVASCULAR HYPERTENSION: ICD-10-CM

## 2018-05-18 DIAGNOSIS — Z79.4 TYPE 2 DIABETES MELLITUS WITH CHRONIC KIDNEY DISEASE ON CHRONIC DIALYSIS, WITH LONG-TERM CURRENT USE OF INSULIN: ICD-10-CM

## 2018-05-18 DIAGNOSIS — Z99.2 TYPE 2 DIABETES MELLITUS WITH CHRONIC KIDNEY DISEASE ON CHRONIC DIALYSIS, WITH LONG-TERM CURRENT USE OF INSULIN: Primary | ICD-10-CM

## 2018-05-18 LAB
CREAT UR-MCNC: 77 MG/DL
ESTIMATED AVG GLUCOSE: 200 MG/DL
HBA1C MFR BLD HPLC: 8.6 %
MICROALBUMIN UR DL<=1MG/L-MCNC: 367 UG/ML
MICROALBUMIN/CREATININE RATIO: 476.6 UG/MG
TSH SERPL DL<=0.005 MIU/L-ACNC: 1.78 UIU/ML

## 2018-05-18 PROCEDURE — 82043 UR ALBUMIN QUANTITATIVE: CPT

## 2018-05-18 PROCEDURE — 83036 HEMOGLOBIN GLYCOSYLATED A1C: CPT

## 2018-05-18 PROCEDURE — 3008F BODY MASS INDEX DOCD: CPT | Mod: CPTII,S$GLB,, | Performed by: INTERNAL MEDICINE

## 2018-05-18 PROCEDURE — 99999 PR PBB SHADOW E&M-EST. PATIENT-LVL III: CPT | Mod: PBBFAC,,, | Performed by: INTERNAL MEDICINE

## 2018-05-18 PROCEDURE — 84443 ASSAY THYROID STIM HORMONE: CPT

## 2018-05-18 PROCEDURE — 3045F PR MOST RECENT HEMOGLOBIN A1C LEVEL 7.0-9.0%: CPT | Mod: CPTII,S$GLB,, | Performed by: INTERNAL MEDICINE

## 2018-05-18 PROCEDURE — 99999 PR PBB SHADOW E&M-EST. PATIENT-LVL II: CPT | Mod: PBBFAC,,,

## 2018-05-18 PROCEDURE — 99214 OFFICE O/P EST MOD 30 MIN: CPT | Mod: S$GLB,,, | Performed by: INTERNAL MEDICINE

## 2018-05-18 PROCEDURE — 99499 UNLISTED E&M SERVICE: CPT | Mod: S$GLB,,, | Performed by: INTERNAL MEDICINE

## 2018-05-18 PROCEDURE — 92250 FUNDUS PHOTOGRAPHY W/I&R: CPT | Mod: S$GLB,,, | Performed by: OPHTHALMOLOGY

## 2018-05-18 PROCEDURE — 36415 COLL VENOUS BLD VENIPUNCTURE: CPT

## 2018-05-18 RX ORDER — GLIPIZIDE 10 MG/1
10 TABLET, FILM COATED, EXTENDED RELEASE ORAL
Qty: 90 TABLET | Refills: 3 | Status: SHIPPED | OUTPATIENT
Start: 2018-05-18 | End: 2024-02-01

## 2018-05-18 RX ORDER — INSULIN ASPART 100 [IU]/ML
40 INJECTION, SOLUTION INTRAVENOUS; SUBCUTANEOUS
Qty: 120 ML | Refills: 3 | Status: SHIPPED | OUTPATIENT
Start: 2018-05-18 | End: 2018-06-18 | Stop reason: SDUPTHER

## 2018-05-18 NOTE — TELEPHONE ENCOUNTER
I spoke to Mr. Santoyo about his high A1C.  He hasn't been consistent with his lantus until 2 months ago.  Misses his noon dose of novolog most days.  We discussed being more adherent and losing weight.  We discussed consequences of poor control.  We discussed increasing his dose of glipizide to 10 mg, which is one of the only remaining options, given his ESRD

## 2018-05-18 NOTE — PROGRESS NOTES
"Subjective:       Patient ID: Thai Santoyo Jr. is a 42 y.o. male.    Chief Complaint: Diabetes and Hypertension    HPI - Mr. Santoyo was in the ER last night for SVT; he responded to second dose of 6mg adenosine.  This happened in January also; saw cardiology but couldn't afford medication they gave him and he hasn't gone back.  He has been getting BS in the "high 100's".  a1c has been 7.5 at dialysis.  He's due for a lot of diabetic health maintenance, tdap and refills on some meds.  He is not a smoker.  Now working as "" at a high school. Takes dilaudid rarely for his knee pain; provided by external provider    PMH:  ESRD on dialysis  DM2, improving control  HTN, at goal  SVT x2 in 2018  Gout, quiescent  Morbid Obesity  Knee pain on chronic narcotics per outside physician     Meds:  Reviewed and reconciled in EPIC with patient during visit today.     Review of Systems   Constitutional: Negative for fever.   HENT: Negative for congestion.    Respiratory: Negative for shortness of breath.    Cardiovascular: Positive for palpitations. Negative for chest pain.   Gastrointestinal: Negative for abdominal pain.   Genitourinary: Negative for difficulty urinating.   Musculoskeletal: Positive for arthralgias.   Skin: Negative for rash.   Neurological: Negative for headaches.   Psychiatric/Behavioral: Negative for sleep disturbance.       Objective:      Physical Exam   Constitutional: He is oriented to person, place, and time. He appears well-developed and well-nourished. No distress.   Friendly, morbidly obese man.  In room with his son who is playing a video game   HENT:   Head: Normocephalic and atraumatic.   Cardiovascular: Normal rate, regular rhythm and normal heart sounds.  Exam reveals no gallop and no friction rub.    No murmur heard.  Pulmonary/Chest: No respiratory distress. He has no wheezes. He has no rales. He exhibits no tenderness.   Neurological: He is alert and oriented to person, place, and " time.   Skin: Skin is warm. He is not diaphoretic. No erythema.   Psychiatric: He has a normal mood and affect. Thought content normal.   Nursing note and vitals reviewed.      Assessment:       1. Type 2 diabetes mellitus with chronic kidney disease on chronic dialysis, with long-term current use of insulin    2. Renovascular hypertension    3. Morbid obesity with BMI of 40.0-44.9, adult    4. Hypertension associated with diabetes    5. Hyperparathyroidism, secondary renal    6. ESRD on hemodialysis    7. Anemia in end-stage renal disease    8. Type 2 diabetes mellitus with hyperglycemia, with long-term current use of insulin    9. SVT (supraventricular tachycardia)        Plan:       Thai was seen today for diabetes and hypertension.    Diagnoses and all orders for this visit:    Type 2 diabetes mellitus with chronic kidney disease on chronic dialysis, with long-term current use of insulin - control inadequate; f/u suboptimal.  Due for labwork  -     Hemoglobin A1c; Future  -     Diabetic Eye Screening Photo; Future  -     Microalbumin/creatinine urine ratio  -     TSH; Future  -     insulin aspart U-100 (NOVOLOG FLEXPEN U-100 INSULIN) 100 unit/mL InPn pen; Inject 40 Units into the skin 3 (three) times daily with meals.  -     albiglutide (TANZEUM) 30 mg/0.5 mL PnIj; Inject 30 mg into the skin once a week.    Renovascular hypertension - stable at goal today.  Stay the course    Morbid obesity with BMI of 40.0-44.9, adult    Hypertension associated with diabetes    Hyperparathyroidism, secondary renal - stable on treatment per dialysis unit    ESRD on hemodialysis    Anemia in end-stage renal disease - stable.  Last hgb was only mildly low    SVT (supraventricular tachycardia) - biggest concern today, unstable.  Sending him back to cardiology for evaluation.  Go to ER if this recurs.  -     Ambulatory consult to Cardiology    rtc 3 months, sooner patricia Mckee MD MPH  Staff Internist

## 2018-05-18 NOTE — PROGRESS NOTES
HPI     Diabetic Eye Exam    Additional comments: Photos           Comments   42 y.o. y/o here for screening for Diabetic Renopathy with non-dilated   fundus photos per Manjit Mckee Ii, MD         Last edited by Perla Mcintyre MA on 5/18/2018 11:20 AM. (History)            Assessment /Plan     For exam results, see Encounter Report.    Type 2 diabetes mellitus with chronic kidney disease on chronic dialysis, with long-term current use of insulin  -     Diabetic Eye Screening Photo

## 2018-05-18 NOTE — ED NOTES
RN assumed pt care, bedside report received. Pt VS stable, pt remains on Zoll monitor. Pt denies any complaints at this time.

## 2018-05-21 ENCOUNTER — INITIAL CONSULT (OUTPATIENT)
Dept: OPHTHALMOLOGY | Facility: CLINIC | Age: 43
End: 2018-05-21
Payer: MEDICARE

## 2018-05-21 ENCOUNTER — OFFICE VISIT (OUTPATIENT)
Dept: CARDIOLOGY | Facility: CLINIC | Age: 43
End: 2018-05-21
Payer: MEDICARE

## 2018-05-21 ENCOUNTER — CLINICAL SUPPORT (OUTPATIENT)
Dept: ELECTROPHYSIOLOGY | Facility: CLINIC | Age: 43
End: 2018-05-21
Attending: INTERNAL MEDICINE
Payer: MEDICARE

## 2018-05-21 VITALS
HEART RATE: 92 BPM | WEIGHT: 315 LBS | BODY MASS INDEX: 41.75 KG/M2 | HEIGHT: 73 IN | SYSTOLIC BLOOD PRESSURE: 143 MMHG | DIASTOLIC BLOOD PRESSURE: 82 MMHG

## 2018-05-21 DIAGNOSIS — N18.6 TYPE 2 DIABETES MELLITUS WITH CHRONIC KIDNEY DISEASE ON CHRONIC DIALYSIS, WITH LONG-TERM CURRENT USE OF INSULIN: Primary | ICD-10-CM

## 2018-05-21 DIAGNOSIS — Z79.4 TYPE 2 DIABETES MELLITUS WITH CHRONIC KIDNEY DISEASE ON CHRONIC DIALYSIS, WITH LONG-TERM CURRENT USE OF INSULIN: Primary | ICD-10-CM

## 2018-05-21 DIAGNOSIS — Z99.2 TYPE 2 DIABETES MELLITUS WITH CHRONIC KIDNEY DISEASE ON CHRONIC DIALYSIS, WITH LONG-TERM CURRENT USE OF INSULIN: Primary | ICD-10-CM

## 2018-05-21 DIAGNOSIS — I47.10 SUPRAVENTRICULAR TACHYCARDIA: Primary | ICD-10-CM

## 2018-05-21 DIAGNOSIS — E11.22 TYPE 2 DIABETES MELLITUS WITH CHRONIC KIDNEY DISEASE ON CHRONIC DIALYSIS, WITH LONG-TERM CURRENT USE OF INSULIN: Primary | ICD-10-CM

## 2018-05-21 DIAGNOSIS — I47.10 SUPRAVENTRICULAR TACHYCARDIA: ICD-10-CM

## 2018-05-21 DIAGNOSIS — E11.9 TYPE 2 DIABETES MELLITUS WITHOUT OPHTHALMIC MANIFESTATIONS: Primary | ICD-10-CM

## 2018-05-21 PROBLEM — E11.3291 MILD NONPROLIFERATIVE DIABETIC RETINOPATHY OF RIGHT EYE WITHOUT MACULAR EDEMA ASSOCIATED WITH TYPE 2 DIABETES MELLITUS: Status: ACTIVE | Noted: 2018-05-21

## 2018-05-21 PROCEDURE — 92014 COMPRE OPH EXAM EST PT 1/>: CPT | Mod: S$GLB,,, | Performed by: OPHTHALMOLOGY

## 2018-05-21 PROCEDURE — 99214 OFFICE O/P EST MOD 30 MIN: CPT | Mod: S$GLB,,, | Performed by: INTERNAL MEDICINE

## 2018-05-21 PROCEDURE — 3008F BODY MASS INDEX DOCD: CPT | Mod: CPTII,S$GLB,, | Performed by: INTERNAL MEDICINE

## 2018-05-21 PROCEDURE — 92134 CPTRZ OPH DX IMG PST SGM RTA: CPT | Mod: S$GLB,,, | Performed by: OPHTHALMOLOGY

## 2018-05-21 PROCEDURE — 99999 PR PBB SHADOW E&M-EST. PATIENT-LVL IV: CPT | Mod: PBBFAC,,, | Performed by: INTERNAL MEDICINE

## 2018-05-21 PROCEDURE — 99999 PR PBB SHADOW E&M-EST. PATIENT-LVL II: CPT | Mod: PBBFAC,,, | Performed by: OPHTHALMOLOGY

## 2018-05-21 PROCEDURE — 93268 ECG RECORD/REVIEW: CPT | Mod: S$GLB,,, | Performed by: INTERNAL MEDICINE

## 2018-05-21 NOTE — LETTER
May 21, 2018      Manjit Mckee II, MD  [address]  [place] [address]           [facility] - Cardiology  [address]  [facility] [address]  Phone: [phone]          Patient: Thai Santoyo Jr.   MR Number: [id number]   YOB: 1975   Date of Visit: 5/21/2018       Dear Dr. Manjit Mckee II:    Thank you for referring Thai Santoyo to me for evaluation. Attached you will find relevant portions of my assessment and plan of care.    If you have questions, please do not hesitate to call me. I look forward to following Thai Santoyo along with you.    Sincerely,    Sixto Osborne MD    Enclosure  CC:  No Recipients    If you would like to receive this communication electronically, please contact [email or web address] or [phone] to request more information on [facility] access.    For providers and/or their staff who would like to refer a patient to [facility], please contact us through our one-stop-shop provider referral line, [facility], at [phone].    If you feel you have received this communication in error or would no longer like to receive these types of communications, please e-mail [email or web address]

## 2018-05-21 NOTE — PROGRESS NOTES
HPI     41 Y/O M here today per Dr. Manjit Mckee II, MD for consult Type II DM   w/ Chronic Kidney Disease and on Chronic Dialysis. PT c/o blurred vision   after being on the dialysis machine. PT goes to dialysis 3 days per week   ([facility]).    Otherwise vision good.  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 stj     -eye pain  +headaches  -light flashes/floaters      Last edited by Roberto Cortes MD on 5/21/2018  5:36 PM. (History)        OCT   OD: Good quality. Normal foveal contour without IRF, SRF, or exudates.  OS: Good quality. Normal foveal contour without IRF, SRF, or exudates.   No comparison scan    Assessment /Plan     For exam results, see Encounter Report.    Type 2 diabetes mellitus without ophthalmic manifestations  -     OCT- Retina      Diabetic Retinopathy discussed in detail, all questions answered  Stressed importance of good BS/BP/Chol Control  RTC q 1 year with comprehensive eye doctor, sooner PRN    RTC retina PRN

## 2018-05-21 NOTE — PROGRESS NOTES
Cardiology Progress Note:    Patient ID:  Thai Santoyo Jr. is a 42 y.o. male referred by Manjit Mckee (Internal Medicine) for evaluation of SVT.      History of Present Illness:  Pt refers that he usually experiences tachycardia up to 100-120 after dialysis and that rapid heart rate progressively decreases while off dialysis. In contrast, he experienced two episodes of tachycardia with rates up to 160-170/min that required him to go to the ED and both occurred at night. The first episode (1/27/2018) was a few hours after dialysis whereas the second episode (5/17/2018) was one full day after dialysis. Both episodes resolved with IV adenosine.     TSH is normal. He drinks tea throughout the day; no coffee.    Past Medical History Includes:  ESRD on dialysis; DM2 on insulin; HTN; SVT x2 in 2018; Gout, quiescent; BMI 42.61; Knee pain on chronic narcotics per outside physician    Social History Includes:  .    Present Medical Therapy Includes:    Carvediol 25 mg bid    Full Medication List Includes:  Outpatient Encounter Prescriptions as of 5/21/2018   Medication Sig Dispense Refill    albiglutide (TANZEUM) 30 mg/0.5 mL PnIj Inject 30 mg into the skin once a week. 12 each 3    amlodipine (NORVASC) 10 MG tablet TAKE ONE TABLET BY MOUTH ONCE DAILY 90 tablet 3    ANDRODERM 4 mg/24 hr PT24   0    blood sugar diagnostic Strp Use for BS testing three times daily. 200 strip 9    carvedilol (COREG) 25 MG tablet TAKE ONE TABLET BY MOUTH TWICE DAILY WITH MEALS 180 tablet 3    cyclobenzaprine (FLEXERIL) 10 MG tablet       furosemide (LASIX) 40 MG tablet TAKE ONE TABLET BY MOUTH ONCE DAILY 90 tablet 3    glipiZIDE (GLUCOTROL) 10 MG TR24 Take 1 tablet (10 mg total) by mouth daily with breakfast. 90 tablet 3    HYDROmorphone (DILAUDID) 4 MG tablet Take 4 mg by mouth every 4 (four) hours as needed for Pain.      insulin aspart U-100 (NOVOLOG FLEXPEN U-100 INSULIN) 100 unit/mL InPn pen Inject 40 Units into the  skin 3 (three) times daily with meals. 120 mL 3    insulin glargine (BASAGLAR KWIKPEN U-100 INSULIN) 100 unit/mL (3 mL) InPn pen Inject 45 Units into the skin 2 (two) times daily. 81 mL 3    lancets 30 gauge Misc by Misc.(Non-Drug; Combo Route) route.      lisinopril (PRINIVIL,ZESTRIL) 40 MG tablet TAKE ONE TABLET BY MOUTH ONCE DAILY 90 tablet 3    sevelamer carbonate (RENVELA) 800 mg Tab Take 800 mg by mouth 3 (three) times daily with meals.      traZODone (DESYREL) 50 MG tablet Take 1 tablet (50 mg total) by mouth nightly as needed for Insomnia. 90 tablet 3     No facility-administered encounter medications on file as of 5/21/2018.        Review of Systems:  Review of Systems   Constitution: Negative for decreased appetite, weakness, malaise/fatigue, weight gain and weight loss.   HENT: Negative for ear discharge, hearing loss and nosebleeds.    Eyes: Negative for blurred vision, pain, photophobia and visual disturbance.   Cardiovascular: Positive for palpitations (see HPI). Negative for chest pain, claudication, cyanosis, dyspnea on exertion, irregular heartbeat, leg swelling, near-syncope, orthopnea, paroxysmal nocturnal dyspnea and syncope.   Respiratory: Negative for cough, hemoptysis, shortness of breath, sleep disturbances due to breathing, snoring, sputum production and wheezing.    Endocrine: Negative for cold intolerance and polydipsia.   Hematologic/Lymphatic: Negative for adenopathy and bleeding problem. Does not bruise/bleed easily.   Skin: Negative for poor wound healing, rash and suspicious lesions.   Musculoskeletal: Negative for arthritis, back pain, falls, gout, joint pain, muscle cramps, myalgias and neck pain.   Gastrointestinal: Negative for anorexia, change in bowel habit, constipation, diarrhea, excessive appetite, hematemesis, hematochezia, melena, nausea and vomiting.   Genitourinary: Negative for decreased libido, dysuria, flank pain and frequency.   Neurological: Negative for  "difficulty with concentration, excessive daytime sleepiness, dizziness, focal weakness, light-headedness, loss of balance, seizures and vertigo.   Psychiatric/Behavioral: Negative for altered mental status, memory loss and substance abuse. The patient is not nervous/anxious.    Allergic/Immunologic: Negative for HIV exposure and hives.       Physical Exam:  BP (!) 143/82 (BP Location: Left arm, Patient Position: Sitting, BP Method: Large (Automatic))   Pulse 92   Ht 6' 1" (1.854 m)   Wt (!) 147.1 kg (324 lb 4.8 oz)   BMI 42.79 kg/m²   Physical Exam   Constitutional: He is oriented to person, place, and time. He appears well-developed and well-nourished.   HENT:   Head: Normocephalic.   Right Ear: External ear normal.   Left Ear: External ear normal.   Nose: Nose normal.   Inspection of lips, teeth and gums normal   Eyes: EOM are normal. Pupils are equal, round, and reactive to light. No scleral icterus.   Neck: Normal range of motion. Neck supple. No JVD present. No tracheal deviation present. No thyromegaly present.   Cardiovascular: Normal rate, regular rhythm, S1 normal, S2 normal and intact distal pulses.  Exam reveals no gallop and no friction rub.    No murmur heard.  Pulses:       Carotid pulses are 2+ on the right side, and 2+ on the left side.       Radial pulses are 2+ on the right side, and 1+ on the left side.   Pulmonary/Chest: Effort normal and breath sounds normal.   Abdominal: Bowel sounds are normal. He exhibits no distension. There is no hepatosplenomegaly. There is no tenderness. There is no guarding.   Musculoskeletal: Normal range of motion. He exhibits no edema or tenderness.   Lymphadenopathy:   Palpation of neck and groin lymph nodes normal   Neurological: He is alert and oriented to person, place, and time. No cranial nerve deficit. He exhibits normal muscle tone. Coordination normal.   Skin: Skin is dry.   No ankle nor pretibial edema   Psychiatric: His behavior is normal. Judgment and " thought content normal.        Blood Tests:  Lab Results   Component Value Date    BNP 15 02/20/2015     05/17/2018    K 3.9 05/17/2018    CL 98 05/17/2018    CO2 26 05/17/2018    BUN 43 (H) 05/17/2018    CREATININE 8.5 (H) 05/17/2018     05/17/2018    HGBA1C 8.6 (H) 05/18/2018    MG 2.2 11/14/2016    AST 20 01/27/2018    ALT 21 01/27/2018    ALBUMIN 3.7 01/27/2018    ALBUMIN 4.2 12/15/2016    PROT 8.2 01/27/2018    BILITOT 0.3 01/27/2018    WBC 11.01 05/17/2018    HGB 12.5 (L) 05/17/2018    HCT 37.6 (L) 05/17/2018    MCV 90 05/17/2018     05/17/2018    INR 1.0 04/12/2017    PSA 1.3 05/26/2017    TSH 1.783 05/18/2018       Lab Results   Component Value Date    CHOL 160 05/26/2017    HDL 31 (L) 05/26/2017    TRIG 231 (H) 05/26/2017       Lab Results   Component Value Date    LDLCALC 82.8 05/26/2017       Urine Tests:  Lab Results   Component Value Date    COLORU Yellow 04/12/2017    APPEARANCEUA Clear 04/12/2017    PHUR 8.5 04/12/2017    SPECGRAV 1.015 04/12/2017    PROTEINUA 2+ (A) 04/12/2017    GLUCUA 2+ (A) 04/12/2017    KETONESU Negative 04/12/2017    BILIRUBINUA Negative 04/12/2017    OCCULTUA 2+ (A) 04/12/2017    NITRITE Negative 04/12/2017    UROBILINOGEN Negative 04/12/2017    UROBILINOGEN Normal 03/23/2012    LEUKOCYTESUR Negative 04/12/2017    CREATRANDUR 77.0 05/18/2018           ECG (17-MAY-2018 18:18:00)  Normal sinus rhythm  Normal ECG        ECG (17-MAY-2018 18:09:00)  Supraventricular tachycardia        I have reviewed the following:     Details / Date    []   Labs     []   Imaging     []   Cardiology Procedures     []   Other      Assessment and Plan:     1. Supraventricular tachycardia         Supraventricular tachycardia  I have asked the pt to avoid drinking large amounts of tea during the day and drink water instead  30 day cardiac event monitor  Continue carvedilol 25 mg bid    Consider referral to EP at the time of next evaluation    Follow-up in about 7 weeks (around  7/9/2018).        Sixto Osborne MD

## 2018-05-21 NOTE — ASSESSMENT & PLAN NOTE
I have asked the pt to avoid drinking large amounts of tea during the day and drink water instead  30 day cardiac event monitor  Continue carvedilol 25 mg bid    Consider referral to EP at the time of next evaluation

## 2018-06-07 ENCOUNTER — TELEPHONE (OUTPATIENT)
Dept: INTERNAL MEDICINE | Facility: CLINIC | Age: 43
End: 2018-06-07

## 2018-06-07 NOTE — TELEPHONE ENCOUNTER
Spoke to pharmacy here in the building they are checking with the prior auth person and will call me back with an update. Spoke to Jocelyn

## 2018-06-07 NOTE — TELEPHONE ENCOUNTER
----- Message from Cheyenne Hatch sent at 6/6/2018  2:37 PM CDT -----  Contact: self/[phone]  Patient called in regards needing to provide correct spelling for the medication albiglutide (TANZEUM). Patient is asking for a courtesy call. Thank you

## 2018-06-08 ENCOUNTER — TELEPHONE (OUTPATIENT)
Dept: INTERNAL MEDICINE | Facility: CLINIC | Age: 43
End: 2018-06-08

## 2018-06-08 DIAGNOSIS — Z99.2 TYPE 2 DIABETES MELLITUS WITH CHRONIC KIDNEY DISEASE ON CHRONIC DIALYSIS, WITH LONG-TERM CURRENT USE OF INSULIN: Primary | ICD-10-CM

## 2018-06-08 DIAGNOSIS — N18.6 TYPE 2 DIABETES MELLITUS WITH CHRONIC KIDNEY DISEASE ON CHRONIC DIALYSIS, WITH LONG-TERM CURRENT USE OF INSULIN: Primary | ICD-10-CM

## 2018-06-08 DIAGNOSIS — Z79.4 TYPE 2 DIABETES MELLITUS WITH CHRONIC KIDNEY DISEASE ON CHRONIC DIALYSIS, WITH LONG-TERM CURRENT USE OF INSULIN: Primary | ICD-10-CM

## 2018-06-08 DIAGNOSIS — E11.22 TYPE 2 DIABETES MELLITUS WITH CHRONIC KIDNEY DISEASE ON CHRONIC DIALYSIS, WITH LONG-TERM CURRENT USE OF INSULIN: Primary | ICD-10-CM

## 2018-06-08 NOTE — TELEPHONE ENCOUNTER
----- Message from Teressa Stern PharmD sent at 6/8/2018  7:43 AM CDT -----  Regarding: Trulicity Preferred  Patient's insurance will not approve PA for Tanzeum. Patient must try and fail Trulicity first. Through insurance, it will cost the patient $8.35 for a one month supply of Trulicity. Thanks!

## 2018-06-18 RX ORDER — ALLOPURINOL 100 MG/1
100 TABLET ORAL DAILY
Qty: 90 TABLET | Refills: 3 | Status: SHIPPED | OUTPATIENT
Start: 2018-06-18 | End: 2019-10-03

## 2018-06-18 RX ORDER — INSULIN GLARGINE 100 [IU]/ML
45 INJECTION, SOLUTION SUBCUTANEOUS 2 TIMES DAILY
Qty: 81 ML | Refills: 3 | Status: SHIPPED | OUTPATIENT
Start: 2018-06-18 | End: 2018-06-18 | Stop reason: SDUPTHER

## 2018-06-18 RX ORDER — INSULIN GLARGINE 100 [IU]/ML
45 INJECTION, SOLUTION SUBCUTANEOUS 2 TIMES DAILY
Qty: 81 ML | Refills: 3 | Status: SHIPPED | OUTPATIENT
Start: 2018-06-18 | End: 2019-10-03

## 2018-06-18 RX ORDER — INSULIN ASPART 100 [IU]/ML
40 INJECTION, SOLUTION INTRAVENOUS; SUBCUTANEOUS
Qty: 120 ML | Refills: 3 | Status: SHIPPED | OUTPATIENT
Start: 2018-06-18 | End: 2018-06-18 | Stop reason: SDUPTHER

## 2018-06-18 RX ORDER — ALLOPURINOL 100 MG/1
100 TABLET ORAL DAILY
Qty: 90 TABLET | Refills: 3 | Status: SHIPPED | OUTPATIENT
Start: 2018-06-18 | End: 2018-06-18 | Stop reason: SDUPTHER

## 2018-06-18 RX ORDER — INSULIN ASPART 100 [IU]/ML
40 INJECTION, SOLUTION INTRAVENOUS; SUBCUTANEOUS
Qty: 120 ML | Refills: 3 | Status: SHIPPED | OUTPATIENT
Start: 2018-06-18 | End: 2019-10-03

## 2018-06-18 NOTE — TELEPHONE ENCOUNTER
----- Message from Dalia Jacinto sent at 6/18/2018  9:31 AM CDT -----  Contact: self/ [phone]  Pt is calling to check on his refills that was sent over on the 7th and 8th of June. Pt. states every time he calls the pharmacy the medications is not there.  Please advise.  Thanks

## 2018-06-18 NOTE — TELEPHONE ENCOUNTER
Please call  Santoyo back.  I had sent the prescriptions to the primary care pharmacy b/c they would be cheaper there.  I re-routed the prescriptions to his [facility].    D

## 2018-07-09 ENCOUNTER — PATIENT MESSAGE (OUTPATIENT)
Dept: CARDIOLOGY | Facility: CLINIC | Age: 43
End: 2018-07-09

## 2018-07-10 ENCOUNTER — OFFICE VISIT (OUTPATIENT)
Dept: UROLOGY | Facility: CLINIC | Age: 43
End: 2018-07-10
Payer: MEDICARE

## 2018-07-10 ENCOUNTER — INITIAL CONSULT (OUTPATIENT)
Dept: ELECTROPHYSIOLOGY | Facility: CLINIC | Age: 43
End: 2018-07-10
Payer: MEDICARE

## 2018-07-10 ENCOUNTER — OFFICE VISIT (OUTPATIENT)
Dept: CARDIOLOGY | Facility: CLINIC | Age: 43
End: 2018-07-10
Payer: MEDICARE

## 2018-07-10 ENCOUNTER — HOSPITAL ENCOUNTER (OUTPATIENT)
Dept: CARDIOLOGY | Facility: CLINIC | Age: 43
Discharge: HOME | End: 2018-07-10
Payer: MEDICARE

## 2018-07-10 VITALS
WEIGHT: 315 LBS | DIASTOLIC BLOOD PRESSURE: 88 MMHG | BODY MASS INDEX: 41.75 KG/M2 | HEIGHT: 73 IN | HEART RATE: 78 BPM | BODY MASS INDEX: 41.75 KG/M2 | RESPIRATION RATE: 15 BRPM | SYSTOLIC BLOOD PRESSURE: 132 MMHG | DIASTOLIC BLOOD PRESSURE: 74 MMHG | WEIGHT: 315 LBS | HEART RATE: 93 BPM | HEIGHT: 73 IN | SYSTOLIC BLOOD PRESSURE: 134 MMHG

## 2018-07-10 VITALS
BODY MASS INDEX: 41.75 KG/M2 | WEIGHT: 315 LBS | SYSTOLIC BLOOD PRESSURE: 134 MMHG | HEART RATE: 95 BPM | HEIGHT: 73 IN | DIASTOLIC BLOOD PRESSURE: 65 MMHG

## 2018-07-10 DIAGNOSIS — Z99.2 ESRD ON HEMODIALYSIS: ICD-10-CM

## 2018-07-10 DIAGNOSIS — I47.10 SUPRAVENTRICULAR TACHYCARDIA: Primary | ICD-10-CM

## 2018-07-10 DIAGNOSIS — N52.1 TYPE 2 DIABETES MELLITUS WITH CIRCULATORY DISORDER CAUSING ERECTILE DYSFUNCTION: Primary | ICD-10-CM

## 2018-07-10 DIAGNOSIS — N18.6 TYPE 2 DIABETES MELLITUS WITH CHRONIC KIDNEY DISEASE ON CHRONIC DIALYSIS, WITH LONG-TERM CURRENT USE OF INSULIN: ICD-10-CM

## 2018-07-10 DIAGNOSIS — E11.22 TYPE 2 DIABETES MELLITUS WITH CHRONIC KIDNEY DISEASE ON CHRONIC DIALYSIS, WITH LONG-TERM CURRENT USE OF INSULIN: ICD-10-CM

## 2018-07-10 DIAGNOSIS — I47.20 VT (VENTRICULAR TACHYCARDIA): ICD-10-CM

## 2018-07-10 DIAGNOSIS — N18.6 ESRD ON HEMODIALYSIS: ICD-10-CM

## 2018-07-10 DIAGNOSIS — Z99.2 TYPE 2 DIABETES MELLITUS WITH CHRONIC KIDNEY DISEASE ON CHRONIC DIALYSIS, WITH LONG-TERM CURRENT USE OF INSULIN: ICD-10-CM

## 2018-07-10 DIAGNOSIS — Z79.4 TYPE 2 DIABETES MELLITUS WITH CHRONIC KIDNEY DISEASE ON CHRONIC DIALYSIS, WITH LONG-TERM CURRENT USE OF INSULIN: ICD-10-CM

## 2018-07-10 DIAGNOSIS — I15.0 RENOVASCULAR HYPERTENSION: ICD-10-CM

## 2018-07-10 DIAGNOSIS — E66.01 MORBID OBESITY WITH BMI OF 40.0-44.9, ADULT: ICD-10-CM

## 2018-07-10 DIAGNOSIS — E11.59 TYPE 2 DIABETES MELLITUS WITH CIRCULATORY DISORDER CAUSING ERECTILE DYSFUNCTION: Primary | ICD-10-CM

## 2018-07-10 PROCEDURE — 99214 OFFICE O/P EST MOD 30 MIN: CPT | Mod: S$GLB,,, | Performed by: NURSE PRACTITIONER

## 2018-07-10 PROCEDURE — 3008F BODY MASS INDEX DOCD: CPT | Mod: CPTII,S$GLB,, | Performed by: INTERNAL MEDICINE

## 2018-07-10 PROCEDURE — 99999 PR PBB SHADOW E&M-EST. PATIENT-LVL IV: CPT | Mod: PBBFAC,,, | Performed by: NURSE PRACTITIONER

## 2018-07-10 PROCEDURE — 99999 PR PBB SHADOW E&M-EST. PATIENT-LVL V: CPT | Mod: PBBFAC,,, | Performed by: INTERNAL MEDICINE

## 2018-07-10 PROCEDURE — 99212 OFFICE O/P EST SF 10 MIN: CPT | Mod: S$GLB,,, | Performed by: INTERNAL MEDICINE

## 2018-07-10 PROCEDURE — 3008F BODY MASS INDEX DOCD: CPT | Mod: CPTII,S$GLB,, | Performed by: NURSE PRACTITIONER

## 2018-07-10 PROCEDURE — 99999 PR PBB SHADOW E&M-EST. PATIENT-LVL IV: CPT | Mod: PBBFAC,,, | Performed by: INTERNAL MEDICINE

## 2018-07-10 PROCEDURE — 3045F PR MOST RECENT HEMOGLOBIN A1C LEVEL 7.0-9.0%: CPT | Mod: CPTII,S$GLB,, | Performed by: INTERNAL MEDICINE

## 2018-07-10 PROCEDURE — 3045F PR MOST RECENT HEMOGLOBIN A1C LEVEL 7.0-9.0%: CPT | Mod: CPTII,S$GLB,, | Performed by: NURSE PRACTITIONER

## 2018-07-10 PROCEDURE — 99205 OFFICE O/P NEW HI 60 MIN: CPT | Mod: S$GLB,,, | Performed by: INTERNAL MEDICINE

## 2018-07-10 PROCEDURE — 93000 ELECTROCARDIOGRAM COMPLETE: CPT | Mod: S$GLB,,, | Performed by: INTERNAL MEDICINE

## 2018-07-10 RX ORDER — PAPAVERINE HYDROCHLORIDE 30 MG/ML
INJECTION INTRAMUSCULAR; INTRAVENOUS
Qty: 10 ML | Refills: 11 | Status: SHIPPED | OUTPATIENT
Start: 2018-07-10 | End: 2019-06-13

## 2018-07-10 NOTE — PROGRESS NOTES
Subjective:    Patient ID:  Thai Santoyo Jr. is a 42 y.o. male who presents for evaluation of Supraventricular tachycardia    Referring Cardiologist: Sixto Osborne MD    HPI  I had the pleasure of seeing Mr. Natanael Jr in our electrophysiology clinic today in consultation for his SVT. As you are aware he is a pleasant 42 year-old man with hypertension, diabetes mellitus, and ESRD on hemodialysis. He has had recurrent SVT for years that prompt him to come to the ER for adenosine injection. He has been uptitrated on his carvedilol. He was rx diltiazem as well however could not afford it. He recently had another episode with tachycardia into the 160s. ECG was consistent with a narrow complex, short RP regular tachycardia. This was terminated with IV adenosine. An event monitor recently worn for 30 days noted no SVT. A stress echo from 2015 was normal.    My interpretation of today's in clinic ECG is sinus rhythm without pre-excitation.    Review of Systems   Constitution: Negative for weakness and malaise/fatigue.   HENT: Negative for congestion and sore throat.    Eyes: Negative for blurred vision and visual disturbance.   Cardiovascular: Positive for palpitations. Negative for chest pain, dyspnea on exertion, irregular heartbeat, near-syncope and syncope.   Respiratory: Negative for cough and shortness of breath.    Hematologic/Lymphatic: Negative for bleeding problem. Does not bruise/bleed easily.   Skin: Negative.    Musculoskeletal: Negative.    Gastrointestinal: Negative for bloating and abdominal pain.   Neurological: Negative for dizziness and light-headedness.        Objective:    Physical Exam   Constitutional: He is oriented to person, place, and time. He appears well-developed and well-nourished. No distress.   HENT:   Head: Normocephalic and atraumatic.   Eyes: Conjunctivae are normal. Right eye exhibits no discharge. Left eye exhibits no discharge.   Neck: Neck supple. No JVD present.    Cardiovascular: Normal rate and regular rhythm.  Exam reveals no gallop and no friction rub.    No murmur heard.  Pulmonary/Chest: Effort normal and breath sounds normal. No respiratory distress. He has no wheezes. He has no rales.   Abdominal: Soft. Bowel sounds are normal. He exhibits no distension. There is no tenderness. There is no rebound.   Musculoskeletal: He exhibits no edema.   Neurological: He is alert and oriented to person, place, and time.   Skin: Skin is warm and dry. He is not diaphoretic.   Psychiatric: He has a normal mood and affect. His behavior is normal. Judgment and thought content normal.         Assessment:       1. Supraventricular tachycardia    2. Renovascular hypertension    3. ESRD on hemodialysis    4. Type 2 diabetes mellitus with chronic kidney disease on chronic dialysis, with long-term current use of insulin    5. Morbid obesity with BMI of 40.0-44.9, adult         Plan:       In summary, Mr. Natanael Szymanski  is a pleasant 42 year-old man with hypertension, diabetes mellitus, and ESRD on hemodialysis presenting for recurrent symptomatic SVT despite carvedilol requiring multiple ER visits for adenosine injection. We discussed the etiology and pathophysiology of SVT such as his, namely AVNRT versus ORT. We discussed the treatment options including medical therapy with short acting diltiazem, which should be affordable, versus definitive therapy with EPS/ablation.    I spent about a half hour discussing the nature of EP study and ablation, including possible transseptal puncture. We discussed risks and benefits at length. Our discussion included, but was not limited to the risk of death, infection, bleeding, stroke, MI, cardiac perforation, vascular injury, embolism, cardiac tamponade, skin burns, and other organic injury including the possibility for need for surgery or pacemaker implantation. He understood and desires to proceed. All questions answered. Consent signed.    Plan  EPS/RFA  of SVT mechanism  ARGELIA for mapping  Anesthesia (light MAC) for sedation  Hold carvedilol for 5 days prior to the procedure  Will need to coordinate with his dialysis schedule    Thank you for allowing me to participate in the care of this patient. Please do not hesitate to call me with any questions or concerns.    Ayden Arana MD, PhD  Cardiac Electrophysiology

## 2018-07-10 NOTE — PROGRESS NOTES
Subjective:       Patient ID: Thai Santoyo Jr. is a 42 y.o. male.    Chief Complaint: ED of organic origin (talk about Pep)    Thai Santoyo Jr. is a 42 y.o. Male with diabetes and ED and low T.  His HRT is being monitored by his PCP.  He was seen in clinic with Dr. Gonzales 5/11/2017.    He reports ED > 1yr.  He reports occasional spontaneous erections but unable to maintain.  He does not get AM erections.  He has never tried any of the oral agents due to his past medical history.    He was seen in clinic 06/20/2017 for ICI education and 1st injection  He states ICI working well for him.  He was last seen in clinic 12/21/2017 to discuss having vas reversal since he recently started dating and wanting to have a baby with her.  His vasectomy was in 2005.    5/26/2017 Hgb A1c was 7.5    He his here today for pep refill and stronger dose.  25 units not working well for him.                          PSA                      1.3                 05/26/2017                    Past Medical History:  8/12/2013: Acute gouty arthropathy  1/13/2011: Chronic kidney disease, stage IV (severe)  No date: Diabetes mellitus type II  No date: Dialysis patient  8/12/2013: DM (diabetes mellitus) type II uncontrolled wi*  8/12/2013: ESRD on hemodialysis  9/12/2012: Hypertension  8/15/2013: Line sepsis  9/12/2012: Morbid obesity    Past Surgical History:  No date: DIALYSIS FISTULA CREATION  No date: KNEE ARTHROPLASTY  No date: SHOULDER SURGERY      Comment: right  No date: VASECTOMY    Review of patient's family history indicates:    Diabetes                       Mother                    Kidney disease                 Mother                      Comment: on dialysis    Hypertension                   Father                    Kidney disease                 Paternal Uncle              Comment: dialysis    Kidney disease                 Paternal Uncle              Comment: dialysis    Eczema                         Sister                    No  Known Problems              Son                       No Known Problems              Sister                    No Known Problems              Son                       No Known Problems              Son                       Melanoma                       Neg Hx                    Psoriasis                      Neg Hx                    Lupus                          Neg Hx                    Acne                           Neg Hx                      Social History    Marital status: Single              Spouse name:                       Years of education:                 Number of children:               Occupational History  Occupation          Employer            Comment               Unemployed          OTHER               [employer] for 12 years    Social History Main Topics    Smoking status: Current Some Day Smoker                                                      Packs/day: 0.00      Years: 0.00           Types: Cigars    Smokeless tobacco: Never Used                        Comment: once a month    Alcohol use: No              Drug use: No              Sexual activity: Yes               Partners with: Female       Birth control/protection: None    Other Topics            Concern    None on file    Social History Narrative    None on file        Allergies:  Review of patient's allergies indicates no known allergies.    Medications:  Current Outpatient Prescriptions:   albiglutide 30 mg/0.5 mL PnIj, Inject 30 mg into the skin every 7 days., Disp: , Rfl:   amlodipine (NORVASC) 10 MG tablet, Take 1 tablet (10 mg total) by mouth once daily., Disp: 90 tablet, Rfl: 3  blood sugar diagnostic Strp, Use for BS testing three times daily., Disp: 200 strip, Rfl: 9  carvedilol (COREG) 25 MG tablet, Take 1 tablet (25 mg total) by mouth 2 (two) times daily with meals., Disp: 180 tablet, Rfl: 3  cyclobenzaprine (FLEXERIL) 10 MG tablet, , Disp: , Rfl:   furosemide (LASIX) 40 MG tablet, Take 1 tablet (40 mg total) by  mouth once daily., Disp: 90 tablet, Rfl: 3  glipiZIDE (GLUCOTROL) 5 MG TR24, TAKE ONE TABLET BY MOUTH ONCE DAILY WITH BREAKFAST, Disp: 90 tablet, Rfl: 3        Review of Systems   Constitutional: Negative for activity change, appetite change, chills and fever.   HENT: Negative for facial swelling and trouble swallowing.    Eyes: Negative for visual disturbance.   Respiratory: Negative for chest tightness and shortness of breath.    Cardiovascular: Negative for chest pain and palpitations.   Gastrointestinal: Negative.  Negative for abdominal pain, constipation, diarrhea, nausea and vomiting.   Genitourinary: Negative for difficulty urinating, dysuria, flank pain, hematuria, penile pain, penile swelling, scrotal swelling and testicular pain.   Musculoskeletal: Negative for back pain, gait problem, myalgias and neck stiffness.   Skin: Negative for rash.   Neurological: Negative for dizziness and speech difficulty.   Hematological: Does not bruise/bleed easily.   Psychiatric/Behavioral: Negative for behavioral problems.       Objective:      Physical Exam   Nursing note and vitals reviewed.  Constitutional: He is oriented to person, place, and time. Vital signs are normal. He appears well-developed and well-nourished. He is active and cooperative.  Non-toxic appearance. He does not have a sickly appearance.   HENT:   Head: Normocephalic and atraumatic.   Right Ear: External ear normal.   Left Ear: External ear normal.   Nose: Nose normal.   Mouth/Throat: Mucous membranes are normal.   Eyes: Conjunctivae and lids are normal. No scleral icterus.   Neck: Trachea normal, normal range of motion and full passive range of motion without pain. Neck supple. No JVD present. No tracheal deviation present.   Cardiovascular: Normal rate, S1 normal and S2 normal.    Pulmonary/Chest: Effort normal. No respiratory distress. He exhibits no tenderness.   Abdominal: Soft. Normal appearance and bowel sounds are normal. There is no  hepatosplenomegaly. There is no tenderness. There is no CVA tenderness.   Genitourinary: Testes normal and penis normal. No penile tenderness.   Musculoskeletal: Normal range of motion.   Neurological: He is alert and oriented to person, place, and time. He has normal strength.   Skin: Skin is warm, dry and intact.     Psychiatric: He has a normal mood and affect. His behavior is normal. Judgment and thought content normal.       Assessment:       1. Type 2 diabetes mellitus with circulatory disorder causing erectile dysfunction        Plan:         I spent 25 minutes with the patient of which more than half was spent in direct consultation with the patient in regards to our treatment and plan.    Education and recommendations of today's plan of care including home remedies.  We discussed ED and contributory factors. Discussed ICI and dosing.  New Rx given.  RTC one year

## 2018-07-10 NOTE — LETTER
July 10, 2018      Sixto Osborne MD  [address]  [facility] [address]           [facility]  [address]  [facility] [address]  Phone: [phone]  Fax: [phone]          Patient: Thai Santoyo Jr.   MR Number: [id number]   YOB: 1975   Date of Visit: 7/10/2018       Dear Dr. Sixto Osborne:    Thank you for referring Thai Santoyo to me for evaluation. Attached you will find relevant portions of my assessment and plan of care.    If you have questions, please do not hesitate to call me. I look forward to following Thai Santoyo along with you.    Sincerely,    Ayden Arana MD    Enclosure  CC:  No Recipients    If you would like to receive this communication electronically, please contact [email or web address] or [phone] to request more information on [employer] access.    For providers and/or their staff who would like to refer a patient to [facility], please contact us through our one-stop-shop provider referral line, [facility], at [phone].    If you feel you have received this communication in error or would no longer like to receive these types of communications, please e-mail [email or web address]

## 2018-07-10 NOTE — PROGRESS NOTES
"Cardiology Progress Note:    Patient ID:  Thai Santyoo Jr. is a 42 y.o. male who presents for follow-up of SVT    History of Present Illness:   Pt presented to the ED on 7/6/2018 with SVT at a rate of 159/min with non-specific ST segment depression. He denied chest pain; he complained of diaphoresis and mild dyspnea. He responded to adenosine. He has had no other SVT episodes since then.      Cardiac event monitor (5/21/2018 to 6/20/2018) showed only NSR    Relevant information from my 5/21/2018 note:  "Pt refers that he usually experiences tachycardia up to 100-120 after dialysis and that rapid heart rate progressively decreases while off dialysis. In contrast, he experienced two episodes of tachycardia with rates up to 160-170/min that required him to go to the ED and both occurred at night. The first episode (1/27/2018) was a few hours after dialysis whereas the second episode (5/17/2018) was one full day after dialysis. Both episodes resolved with IV adenosine.    TSH is normal. He drinks tea throughout the day; no coffee."     Past Medical History Includes:  ESRD on dialysis; DM2 on insulin; HTN; SVT x2 in 2018; Gout, quiescent; BMI 42.61; Knee pain on chronic narcotics per outside physician     Social History Includes:  .     Present Medical Therapy Includes:    Carvediol 25 mg bid    Full Medication List Includes:  Outpatient Encounter Prescriptions as of 7/10/2018   Medication Sig Dispense Refill    allopurinol (ZYLOPRIM) 100 MG tablet Take 1 tablet (100 mg total) by mouth once daily. 90 tablet 3    amlodipine (NORVASC) 10 MG tablet TAKE ONE TABLET BY MOUTH ONCE DAILY 90 tablet 3    blood sugar diagnostic Strp Use for BS testing three times daily. 200 strip 9    carvedilol (COREG) 25 MG tablet TAKE ONE TABLET BY MOUTH TWICE DAILY WITH MEALS 180 tablet 3    cyclobenzaprine (FLEXERIL) 10 MG tablet       dulaglutide 1.5 mg/0.5 mL PnIj Inject 1.5 mg into the skin every 7 days. 6 mL 3    " furosemide (LASIX) 40 MG tablet TAKE ONE TABLET BY MOUTH ONCE DAILY 90 tablet 3    glipiZIDE (GLUCOTROL) 10 MG TR24 Take 1 tablet (10 mg total) by mouth daily with breakfast. 90 tablet 3    HYDROmorphone (DILAUDID) 4 MG tablet Take 4 mg by mouth every 4 (four) hours as needed for Pain.      insulin aspart U-100 (NOVOLOG FLEXPEN U-100 INSULIN) 100 unit/mL InPn pen Inject 40 Units into the skin 3 (three) times daily with meals. 120 mL 3    insulin glargine (BASAGLAR KWIKPEN U-100 INSULIN) 100 unit/mL (3 mL) InPn pen Inject 45 Units into the skin 2 (two) times daily. 81 mL 3    lancets 30 gauge Misc by Misc.(Non-Drug; Combo Route) route.      lisinopril (PRINIVIL,ZESTRIL) 40 MG tablet TAKE ONE TABLET BY MOUTH ONCE DAILY 90 tablet 3    OPW TEST CLAIM - DO NOT FILL Inject 0.75 tablets into the muscle every 7 days. OPW test claim. Do not fill. 1 mL 0    sevelamer carbonate (RENVELA) 800 mg Tab Take 800 mg by mouth 3 (three) times daily with meals.      testosterone 4 mg/24 hr PT24 Apply 1 patch (4 mg total) topically once daily. 90 patch 0    traZODone (DESYREL) 50 MG tablet Take 1 tablet (50 mg total) by mouth nightly as needed for Insomnia. 90 tablet 3     No facility-administered encounter medications on file as of 7/10/2018.        Review of Systems:  Review of Systems   Constitution: Negative for decreased appetite, weakness, malaise/fatigue, weight gain and weight loss.   HENT: Negative for ear discharge, hearing loss and nosebleeds.    Eyes: Negative for blurred vision, pain, photophobia and visual disturbance.   Cardiovascular: Positive for palpitations (see HPI). Negative for chest pain, claudication, cyanosis, dyspnea on exertion, irregular heartbeat, leg swelling, near-syncope, orthopnea, paroxysmal nocturnal dyspnea and syncope.   Respiratory: Negative for cough, hemoptysis, shortness of breath, sleep disturbances due to breathing, snoring, sputum production and wheezing.    Endocrine: Negative for  "cold intolerance and polydipsia.   Hematologic/Lymphatic: Negative for adenopathy and bleeding problem. Does not bruise/bleed easily.   Skin: Negative for poor wound healing, rash and suspicious lesions.   Musculoskeletal: Negative for arthritis, back pain, falls, gout, joint pain, muscle cramps, myalgias and neck pain.   Gastrointestinal: Negative for anorexia, change in bowel habit, constipation, diarrhea, excessive appetite, hematemesis, hematochezia, melena, nausea and vomiting.   Genitourinary: Negative for decreased libido, dysuria, flank pain and frequency.   Neurological: Negative for difficulty with concentration, excessive daytime sleepiness, dizziness, focal weakness, light-headedness, loss of balance, seizures and vertigo.   Psychiatric/Behavioral: Negative for altered mental status, memory loss and substance abuse. The patient is not nervous/anxious.    Allergic/Immunologic: Negative for HIV exposure and hives.       Physical Exam:  /65 (BP Location: Right arm, Patient Position: Sitting, BP Method: X-Large (Automatic))   Pulse 95   Ht 6' 1" (1.854 m)   Wt (!) 150 kg (330 lb 11 oz)   BMI 43.63 kg/m²   Physical Exam   Constitutional: He is oriented to person, place, and time. He appears well-developed and well-nourished.   HENT:   Head: Normocephalic.   Right Ear: External ear normal.   Left Ear: External ear normal.   Nose: Nose normal.   Inspection of lips, teeth and gums normal   Eyes: EOM are normal. Pupils are equal, round, and reactive to light. No scleral icterus.   Neck: Normal range of motion. Neck supple. No JVD present. No tracheal deviation present. No thyromegaly present.   Cardiovascular: Normal rate, regular rhythm, S1 normal, S2 normal and intact distal pulses.  Exam reveals no gallop and no friction rub.    No murmur heard.  Pulses:       Carotid pulses are 2+ on the right side, and 2+ on the left side.       Radial pulses are 2+ on the right side, and 1+ on the left side. "   Pulmonary/Chest: Effort normal and breath sounds normal.   Abdominal: Bowel sounds are normal. He exhibits no distension. There is no hepatosplenomegaly. There is no tenderness. There is no guarding.   Musculoskeletal: Normal range of motion. He exhibits no edema or tenderness.   Lymphadenopathy:   Palpation of neck and groin lymph nodes normal   Neurological: He is alert and oriented to person, place, and time. No cranial nerve deficit. He exhibits normal muscle tone. Coordination normal.   Skin: Skin is dry.   No ankle nor pretibial edema   Psychiatric: His behavior is normal. Judgment and thought content normal.        Blood Tests:  Lab Results   Component Value Date    BNP 15 02/20/2015     (L) 06/21/2018    K 4.4 06/21/2018    CL 98 (L) 06/21/2018    CO2 26 06/21/2018    BUN 35 (H) 06/21/2018    CREATININE 7.4 (H) 06/21/2018     (H) 06/21/2018    HGBA1C 8.6 (H) 05/18/2018    MG 2.2 11/14/2016    AST 23 06/21/2018    ALT 20 06/21/2018    ALBUMIN 3.8 06/21/2018    ALBUMIN 4.2 12/15/2016    PROT 8.3 06/21/2018    BILITOT 0.6 06/21/2018    WBC 11.60 06/21/2018    HGB 12.3 (L) 06/21/2018    HCT 37.2 (L) 06/21/2018    MCV 89 06/21/2018     06/21/2018    INR 1.0 04/12/2017    PSA 1.3 05/26/2017    TSH 1.783 05/18/2018       Lab Results   Component Value Date    CHOL 160 05/26/2017    HDL 31 (L) 05/26/2017    TRIG 231 (H) 05/26/2017       Lab Results   Component Value Date    LDLCALC 82.8 05/26/2017       Urine Tests:  Lab Results   Component Value Date    COLORU Yellow 04/12/2017    APPEARANCEUA Clear 04/12/2017    PHUR 8.5 04/12/2017    SPECGRAV 1.015 04/12/2017    PROTEINUA 2+ (A) 04/12/2017    GLUCUA 2+ (A) 04/12/2017    KETONESU Negative 04/12/2017    BILIRUBINUA Negative 04/12/2017    OCCULTUA 2+ (A) 04/12/2017    NITRITE Negative 04/12/2017    UROBILINOGEN Negative 04/12/2017    UROBILINOGEN Normal 03/23/2012    LEUKOCYTESUR Negative 04/12/2017    CREATRANDUR 77.0 05/18/2018          ECG  (17-MAY-2018 18:18:00)  Normal sinus rhythm  Normal ECG           ECG (17-MAY-2018 18:09:00)  Supraventricular tachycardia          Cardiac Event Monitor (05/21/2018)    CONCLUSIONS   Ordering Physician: Sixto Osborne MD  Indication: SVT  Activation Date: 5/21/2018  Deactivation Date: 6/20/2018    Event Details:  The patient's presenting rhythm was sinus rhythm with a rate range of 83-89 bpm. There were 2 patient activations without symptoms entered that corresponded with sinus rhythm/sinus tachycardia and a rate range of  bpm. There was 1 auto-trigger for   routine testing consistent with sinus rhythm and a rate range of 77-82 bpm.    Summary:  Sinus rhythm while the device was being worn.    This document has been electronically    SIGNED BY: Ayden Arana MD       I have reviewed the following:     Details / Date    []   Labs     []   Imaging     []   Cardiology Procedures     []   Other      Assessment and Plan:     1. Supraventricular tachycardia         Supraventricular tachycardia  Recurrent episode of SVT that required ED evaluation and was terminated with adenosine.  Cardiac event monitor recording was completed a few days prior to the recurrent episode of SVT.    Continue carvedilol 25 mg bid  Refer to EP for evaluation      Follow-up in about 3 months (around 10/10/2018) for Dr Ho.      Sixto Osborne MD

## 2018-07-10 NOTE — ASSESSMENT & PLAN NOTE
Recurrent episode of SVT that required ED evaluation and was terminated with adenosine.  Cardiac event monitor recording was completed a few days prior to the recurrent episode of SVT.    Continue carvedilol 25 mg bid  Refer to EP for evaluation

## 2018-07-11 ENCOUNTER — TELEPHONE (OUTPATIENT)
Dept: ELECTROPHYSIOLOGY | Facility: CLINIC | Age: 43
End: 2018-07-11

## 2018-07-11 DIAGNOSIS — I47.10 SUPRAVENTRICULAR TACHYCARDIA: Primary | ICD-10-CM

## 2018-07-11 DIAGNOSIS — I49.9 CARDIAC ARRHYTHMIA, UNSPECIFIED CARDIAC ARRHYTHMIA TYPE: ICD-10-CM

## 2018-07-12 DIAGNOSIS — I47.20 VT (VENTRICULAR TACHYCARDIA): Primary | ICD-10-CM

## 2018-07-25 NOTE — PROGRESS NOTES
ABLATION EDUCATION CHECKLIST  Medications:   · HOLD Coreg (carvedilol) 5 days prior to procedure. Last dose will be 8/10/2018.  · Take 1/2 dose of insulin the evening prior to the procedure  · HOLD Glipizide and Insulin on morning of procedure  · You may take your other usual morning medications with a sip of water      PRE-PROCEDURE TESTIN/10/2018 @ 7:30 AM  Pre-Procedure labs have been ordered for you at:  [facility]   · Be sure to arrive at your scheduled time. YOU DO NOT HAVE TO FAST FOR THIS LAB WORK!    DAY OF PROCEDURE:    2018 @ 8 AM  Report to Cardiology Waiting Room on 3rd floor of the Hospital    · Do not eat or drink anything after: 12 mn on the night before your procedure      Directions to the Cardiology Waiting Room  If you park in the Parking Garage:  Take elevators to the 2nd floor  Walk up ramp and turn right by Gold Elevators  Take elevator to the 3rd floor  Upon exiting the elevator, turn away from the clinic areas  Walk long clark around to front of hospital to area with windows overlooking [facility]  Check in at Reception Desk  OR  If family is dropping you off:  Have them drop you off at the front of the Hospital  (Near the ER, where all the flags are hung).  Take the E elevators to the 3rd floor.  Check in at the Reception Desk in the waiting room.    · You may be spending the night after your procedure.  · You will need someone to drive you home after your procedure.  · Your pain during your procedure will be managed by the anesthesia team.     Any need to reschedule or cancel procedures, or any questions regarding your procedures should be addressed directly with the Arrhythmia Department Nurses at the following phone number: [phone]

## 2018-08-10 ENCOUNTER — LAB VISIT (OUTPATIENT)
Dept: LAB | Facility: HOSPITAL | Age: 43
End: 2018-08-10
Attending: INTERNAL MEDICINE
Payer: MEDICARE

## 2018-08-10 DIAGNOSIS — I47.10 SUPRAVENTRICULAR TACHYCARDIA: ICD-10-CM

## 2018-08-10 DIAGNOSIS — I49.9 CARDIAC ARRHYTHMIA, UNSPECIFIED CARDIAC ARRHYTHMIA TYPE: ICD-10-CM

## 2018-08-10 LAB
ANION GAP SERPL CALC-SCNC: 13 MMOL/L
APTT BLDCRRT: 26.5 SEC
BASOPHILS # BLD AUTO: 0.03 K/UL
BASOPHILS NFR BLD: 0.4 %
BUN SERPL-MCNC: 33 MG/DL
CALCIUM SERPL-MCNC: 9.8 MG/DL
CHLORIDE SERPL-SCNC: 98 MMOL/L
CO2 SERPL-SCNC: 26 MMOL/L
CREAT SERPL-MCNC: 9.9 MG/DL
DIFFERENTIAL METHOD: ABNORMAL
EOSINOPHIL # BLD AUTO: 0.5 K/UL
EOSINOPHIL NFR BLD: 6.4 %
ERYTHROCYTE [DISTWIDTH] IN BLOOD BY AUTOMATED COUNT: 14.4 %
EST. GFR  (AFRICAN AMERICAN): 7 ML/MIN/1.73 M^2
EST. GFR  (NON AFRICAN AMERICAN): 6 ML/MIN/1.73 M^2
GLUCOSE SERPL-MCNC: 173 MG/DL
HCT VFR BLD AUTO: 34.8 %
HGB BLD-MCNC: 11.2 G/DL
INR PPP: 1
LYMPHOCYTES # BLD AUTO: 1.6 K/UL
LYMPHOCYTES NFR BLD: 19.5 %
MCH RBC QN AUTO: 29.6 PG
MCHC RBC AUTO-ENTMCNC: 32.2 G/DL
MCV RBC AUTO: 92 FL
MONOCYTES # BLD AUTO: 0.9 K/UL
MONOCYTES NFR BLD: 10.5 %
NEUTROPHILS # BLD AUTO: 5.2 K/UL
NEUTROPHILS NFR BLD: 63 %
PLATELET # BLD AUTO: 300 K/UL
PMV BLD AUTO: 9.8 FL
POTASSIUM SERPL-SCNC: 4.5 MMOL/L
PROTHROMBIN TIME: 10 SEC
RBC # BLD AUTO: 3.79 M/UL
SODIUM SERPL-SCNC: 137 MMOL/L
WBC # BLD AUTO: 8.17 K/UL

## 2018-08-10 PROCEDURE — 85730 THROMBOPLASTIN TIME PARTIAL: CPT

## 2018-08-10 PROCEDURE — 85610 PROTHROMBIN TIME: CPT

## 2018-08-10 PROCEDURE — 36415 COLL VENOUS BLD VENIPUNCTURE: CPT

## 2018-08-10 PROCEDURE — 85025 COMPLETE CBC W/AUTO DIFF WBC: CPT

## 2018-08-10 PROCEDURE — 80048 BASIC METABOLIC PNL TOTAL CA: CPT

## 2018-08-15 ENCOUNTER — TELEPHONE (OUTPATIENT)
Dept: ELECTROPHYSIOLOGY | Facility: CLINIC | Age: 43
End: 2018-08-15

## 2018-08-15 NOTE — TELEPHONE ENCOUNTER
Spoke to Mr. Andre.  He states he has thought about it and wants to cancel for tomorrow.     Pt preliminarily scheduled for Monday Sept 24th arrival time 0900 for procedure time of 1100.    Let Mr. Santoyo know I will call him within the next week to confirm everything.  He verbalizes understanding and appreciates call.

## 2018-08-15 NOTE — TELEPHONE ENCOUNTER
----- Message from Charan Greene MA sent at 8/15/2018 11:54 AM CDT -----  Contact: Pt called       ----- Message -----  From: Elisabet Campbell  Sent: 8/15/2018  11:51 AM  To: Sumit RIVERA Staff    Pt would like a call back regarding his surgery for tomorrow . Please call pt @ [phone]. Thank you.

## 2018-08-15 NOTE — TELEPHONE ENCOUNTER
Spoke to Mr. Andre    CONFIRMED procedure arrival time of 0800  Reiterated instructions including:  -Directions to check in desk  -NPO after midnight night prior to procedure  -High importance of HOLDING Coreg - pt was supposed to stop coreg 5 days prior to procedure, however he states he was not aware of that information.  Spoke to Dr. Arana and reviewed with him, ok to proceed with procedure tomorrow.  Let Mr. Santoyo know Dr. Arana is ok with proceeding, however he does want him to be aware it may make the procedure a little more difficult and a chance he will not induce SVT.  Mr. Santoyo verbalizes understanding of this and wishes to proceed with procedure for tomorrow.  Instructed Mr. Santoyo to not take Coreg tonight or tomorrow morning.    -Instructed Mr. Santoyo to take half of his night dose of long acting insulin (glargine) tonight and to hold insulin tomorrow morning     Pt will go to dialysis today as planned.     Pt verbalizes understanding of above and appreciates call.

## 2018-08-27 ENCOUNTER — TELEPHONE (OUTPATIENT)
Dept: ELECTROPHYSIOLOGY | Facility: CLINIC | Age: 43
End: 2018-08-27

## 2018-08-27 DIAGNOSIS — I47.10 SVT (SUPRAVENTRICULAR TACHYCARDIA): Primary | ICD-10-CM

## 2018-08-27 DIAGNOSIS — I49.9 CARDIAC ARRHYTHMIA, UNSPECIFIED CARDIAC ARRHYTHMIA TYPE: ICD-10-CM

## 2018-08-27 NOTE — TELEPHONE ENCOUNTER
Spoke to Mr. Santoyo to confirm procedure date of 10/5/18 with arrival time of 1000.    Labs confirmed on 10/2 at 0645.    Instructions reviewed with pt who verbalizes understanding and appreciates call.  Pt to have dialysis Saturday after procedure upon discharge.       Instructions also mailed.

## 2018-08-27 NOTE — PROGRESS NOTES
ABLATION EDUCATION CHECKLIST    Special Medication Instructions:   · HOLD Coreg 5 days prior to your procedure (last dose taken on Saturday 9/29)  · Only take HALF dose of your evening Insulin on night prior to your procedure (Thursday 10/4)    · HOLD Glipizide and Insulin on morning of your procedure      PRE-PROCEDURE TESTING:    Tuesday October 2, 2018 at 6:45am   Pre-Procedure labs have been ordered for you at:  [facility]   · Be sure to arrive at your scheduled time. YOU DO NOT HAVE TO FAST FOR THIS LAB WORK!    DAY OF PROCEDURE:    Friday October 5, 2018 at 10:00am  Report to Cardiology Waiting Room on 3rd floor of the Hospital    · Do not eat or drink anything after: 12 mn on the night before your procedure    Directions to the Cardiology Waiting Room  If you park in the Parking Garage:  Take elevators to the 2nd floor  Walk up ramp and turn right by Gold Elevators  Take elevator to the 3rd floor  Upon exiting the elevator, turn away from the clinic areas  Walk long clark around to front of hospital to area with windows overlooking [facility]  Check in at Reception Desk  OR  If family is dropping you off:  Have them drop you off at the front of the Hospital  (Near the ER, where all the flags are hung).  Take the E elevators to the 3rd floor.  Check in at the Reception Desk in the waiting room.      · You will be spending the night after your procedure.  · You will need someone to drive you home the day after your procedure.  · Your pain during your procedure will be managed by the anesthesia team.     Any need to reschedule or cancel procedures, or any questions regarding your procedures should be addressed directly with the Arrhythmia Department Nurses at the following phone number: [phone]

## 2018-09-27 NOTE — TELEPHONE ENCOUNTER
----- Message from Cheyenne Hatch sent at 9/27/2018  1:11 PM CDT -----  Contact: self/[phone]  Diabetic or Medical Supplies.  What supplies are needed: Diabetic Meter  What is the brand name of the supplies:   Is this a refill or new prescription: new rx    Who prescribed the supplies:  Dr Mckee  Pharmacy or company name, phone # and location:  [facility]  Comments:  Thank you

## 2018-10-02 ENCOUNTER — TELEPHONE (OUTPATIENT)
Dept: ELECTROPHYSIOLOGY | Facility: CLINIC | Age: 43
End: 2018-10-02

## 2018-10-02 ENCOUNTER — LAB VISIT (OUTPATIENT)
Dept: LAB | Facility: HOSPITAL | Age: 43
End: 2018-10-02
Attending: INTERNAL MEDICINE
Payer: MEDICARE

## 2018-10-02 DIAGNOSIS — I49.9 CARDIAC ARRHYTHMIA, UNSPECIFIED CARDIAC ARRHYTHMIA TYPE: ICD-10-CM

## 2018-10-02 DIAGNOSIS — I47.10 SVT (SUPRAVENTRICULAR TACHYCARDIA): ICD-10-CM

## 2018-10-02 LAB
ANION GAP SERPL CALC-SCNC: 10 MMOL/L
APTT BLDCRRT: 26.1 SEC
BASOPHILS # BLD AUTO: 0.04 K/UL
BASOPHILS NFR BLD: 0.5 %
BUN SERPL-MCNC: 56 MG/DL
CALCIUM SERPL-MCNC: 9.6 MG/DL
CHLORIDE SERPL-SCNC: 106 MMOL/L
CO2 SERPL-SCNC: 20 MMOL/L
CREAT SERPL-MCNC: 12 MG/DL
DIFFERENTIAL METHOD: ABNORMAL
EOSINOPHIL # BLD AUTO: 0.6 K/UL
EOSINOPHIL NFR BLD: 6.5 %
ERYTHROCYTE [DISTWIDTH] IN BLOOD BY AUTOMATED COUNT: 14.3 %
EST. GFR  (AFRICAN AMERICAN): 5 ML/MIN/1.73 M^2
EST. GFR  (NON AFRICAN AMERICAN): 5 ML/MIN/1.73 M^2
GLUCOSE SERPL-MCNC: 82 MG/DL
HCT VFR BLD AUTO: 32.8 %
HGB BLD-MCNC: 10.7 G/DL
INR PPP: 0.9
LYMPHOCYTES # BLD AUTO: 1.8 K/UL
LYMPHOCYTES NFR BLD: 21 %
MCH RBC QN AUTO: 30 PG
MCHC RBC AUTO-ENTMCNC: 32.6 G/DL
MCV RBC AUTO: 92 FL
MONOCYTES # BLD AUTO: 1 K/UL
MONOCYTES NFR BLD: 11.4 %
NEUTROPHILS # BLD AUTO: 5.1 K/UL
NEUTROPHILS NFR BLD: 60.4 %
PLATELET # BLD AUTO: 237 K/UL
PMV BLD AUTO: 9.9 FL
POTASSIUM SERPL-SCNC: 4.9 MMOL/L
PROTHROMBIN TIME: 9.7 SEC
RBC # BLD AUTO: 3.57 M/UL
SODIUM SERPL-SCNC: 136 MMOL/L
WBC # BLD AUTO: 8.41 K/UL

## 2018-10-02 PROCEDURE — 80048 BASIC METABOLIC PNL TOTAL CA: CPT

## 2018-10-02 PROCEDURE — 85025 COMPLETE CBC W/AUTO DIFF WBC: CPT

## 2018-10-02 PROCEDURE — 36415 COLL VENOUS BLD VENIPUNCTURE: CPT

## 2018-10-02 PROCEDURE — 85610 PROTHROMBIN TIME: CPT

## 2018-10-02 PROCEDURE — 85730 THROMBOPLASTIN TIME PARTIAL: CPT

## 2018-10-02 NOTE — TELEPHONE ENCOUNTER
Spoke with Mr. Santoyo due to slight decrease in H&H.    -Pt denies any blood in urine/stool   -Last dialyzed on Friday 9/28  -Reports he does receive heparin at dialysis      Let Mr. Santoyo know I will discuss with Dr. Arana and let him know if anything is needed. Pt verbalizes understanding and appreciates call.

## 2018-10-04 ENCOUNTER — TELEPHONE (OUTPATIENT)
Dept: ELECTROPHYSIOLOGY | Facility: CLINIC | Age: 43
End: 2018-10-04

## 2018-10-04 NOTE — TELEPHONE ENCOUNTER
Attempting to reach Mr. Santoyo regarding confirming procedure for tomorrow.     No answer x 2.  Message left for pt to return call to office.

## 2018-10-04 NOTE — TELEPHONE ENCOUNTER
Spoke to Mr. Santoyo    CONFIRMED procedure arrival time of 0530  Reiterated instructions including:  -Directions to check in desk  -NPO after midnight night prior to procedure  -High importance of HOLDING Glipizide & Insulin tomorrow morning   -Confirmed last dose of Coreg was on 9/29 as directed   -Dialysis planned Saturday morning after discharge      Pt verbalizes understanding of above and appreciates call.

## 2018-10-05 ENCOUNTER — HOSPITAL ENCOUNTER (OUTPATIENT)
Facility: HOSPITAL | Age: 43
Discharge: HOME | End: 2018-10-05
Attending: INTERNAL MEDICINE | Admitting: INTERNAL MEDICINE
Payer: MEDICARE

## 2018-10-05 ENCOUNTER — ANESTHESIA EVENT (OUTPATIENT)
Dept: MEDSURG UNIT | Facility: HOSPITAL | Age: 43
End: 2018-10-05
Payer: MEDICARE

## 2018-10-05 ENCOUNTER — ANESTHESIA (OUTPATIENT)
Dept: MEDSURG UNIT | Facility: HOSPITAL | Age: 43
End: 2018-10-05
Payer: MEDICARE

## 2018-10-05 ENCOUNTER — TELEPHONE (OUTPATIENT)
Dept: ELECTROPHYSIOLOGY | Facility: CLINIC | Age: 43
End: 2018-10-05

## 2018-10-05 VITALS
SYSTOLIC BLOOD PRESSURE: 149 MMHG | HEIGHT: 73 IN | RESPIRATION RATE: 18 BRPM | WEIGHT: 310 LBS | DIASTOLIC BLOOD PRESSURE: 70 MMHG | OXYGEN SATURATION: 97 % | TEMPERATURE: 96 F | BODY MASS INDEX: 41.08 KG/M2 | HEART RATE: 80 BPM

## 2018-10-05 DIAGNOSIS — I47.10 SUPRAVENTRICULAR TACHYCARDIA: ICD-10-CM

## 2018-10-05 DIAGNOSIS — I47.10 SVT (SUPRAVENTRICULAR TACHYCARDIA): Primary | ICD-10-CM

## 2018-10-05 DIAGNOSIS — I47.10 SUPRAVENTRICULAR TACHYCARDIA: Primary | ICD-10-CM

## 2018-10-05 DIAGNOSIS — Z98.890 H/O CARDIAC RADIOFREQUENCY ABLATION: ICD-10-CM

## 2018-10-05 LAB
POCT GLUCOSE: 156 MG/DL (ref 70–110)
POCT GLUCOSE: 162 MG/DL (ref 70–110)

## 2018-10-05 PROCEDURE — 37000009 HC ANESTHESIA EA ADD 15 MINS: Performed by: INTERNAL MEDICINE

## 2018-10-05 PROCEDURE — 93613 INTRACARDIAC EPHYS 3D MAPG: CPT | Mod: ,,, | Performed by: INTERNAL MEDICINE

## 2018-10-05 PROCEDURE — 93010 ELECTROCARDIOGRAM REPORT: CPT | Mod: 76,59,, | Performed by: INTERNAL MEDICINE

## 2018-10-05 PROCEDURE — 93621 COMP EP EVL L PAC&REC C SINS: CPT | Mod: 26,,, | Performed by: INTERNAL MEDICINE

## 2018-10-05 PROCEDURE — A4216 STERILE WATER/SALINE, 10 ML: HCPCS | Performed by: NURSE ANESTHETIST, CERTIFIED REGISTERED

## 2018-10-05 PROCEDURE — 25000003 PHARM REV CODE 250: Performed by: NURSE PRACTITIONER

## 2018-10-05 PROCEDURE — 25000003 PHARM REV CODE 250

## 2018-10-05 PROCEDURE — 93010 ELECTROCARDIOGRAM REPORT: CPT | Mod: 59,,, | Performed by: INTERNAL MEDICINE

## 2018-10-05 PROCEDURE — 93653 COMPRE EP EVAL TX SVT: CPT

## 2018-10-05 PROCEDURE — 82962 GLUCOSE BLOOD TEST: CPT

## 2018-10-05 PROCEDURE — D9220A PRA ANESTHESIA: Mod: CRNA,,, | Performed by: NURSE ANESTHETIST, CERTIFIED REGISTERED

## 2018-10-05 PROCEDURE — 25000003 PHARM REV CODE 250: Performed by: NURSE ANESTHETIST, CERTIFIED REGISTERED

## 2018-10-05 PROCEDURE — 93653 COMPRE EP EVAL TX SVT: CPT | Mod: ,,, | Performed by: INTERNAL MEDICINE

## 2018-10-05 PROCEDURE — 82962 GLUCOSE BLOOD TEST: CPT | Mod: 91 | Performed by: INTERNAL MEDICINE

## 2018-10-05 PROCEDURE — 93005 ELECTROCARDIOGRAM TRACING: CPT | Mod: 59

## 2018-10-05 PROCEDURE — 63600175 PHARM REV CODE 636 W HCPCS: Performed by: NURSE ANESTHETIST, CERTIFIED REGISTERED

## 2018-10-05 PROCEDURE — 37000008 HC ANESTHESIA 1ST 15 MINUTES: Performed by: INTERNAL MEDICINE

## 2018-10-05 PROCEDURE — 63600175 PHARM REV CODE 636 W HCPCS

## 2018-10-05 PROCEDURE — 93621 COMP EP EVL L PAC&REC C SINS: CPT

## 2018-10-05 PROCEDURE — D9220A PRA ANESTHESIA: Mod: ANES,,, | Performed by: ANESTHESIOLOGY

## 2018-10-05 RX ORDER — HYDROMORPHONE HYDROCHLORIDE 1 MG/ML
0.2 INJECTION, SOLUTION INTRAMUSCULAR; INTRAVENOUS; SUBCUTANEOUS EVERY 5 MIN PRN
Status: DISCONTINUED | OUTPATIENT
Start: 2018-10-05 | End: 2018-10-05 | Stop reason: HOSPADM

## 2018-10-05 RX ORDER — LORAZEPAM 2 MG/ML
0.25 INJECTION INTRAMUSCULAR ONCE AS NEEDED
Status: DISCONTINUED | OUTPATIENT
Start: 2018-10-05 | End: 2018-10-05 | Stop reason: HOSPADM

## 2018-10-05 RX ORDER — SODIUM CHLORIDE 9 MG/ML
INJECTION, SOLUTION INTRAVENOUS CONTINUOUS
Status: DISCONTINUED | OUTPATIENT
Start: 2018-10-05 | End: 2018-10-05

## 2018-10-05 RX ORDER — SODIUM CHLORIDE 0.9 % (FLUSH) 0.9 %
3 SYRINGE (ML) INJECTION
Status: DISCONTINUED | OUTPATIENT
Start: 2018-10-05 | End: 2018-10-05 | Stop reason: HOSPADM

## 2018-10-05 RX ORDER — PROPOFOL 10 MG/ML
VIAL (ML) INTRAVENOUS
Status: DISCONTINUED | OUTPATIENT
Start: 2018-10-05 | End: 2018-10-05

## 2018-10-05 RX ORDER — MIDAZOLAM HYDROCHLORIDE 1 MG/ML
INJECTION, SOLUTION INTRAMUSCULAR; INTRAVENOUS
Status: DISCONTINUED | OUTPATIENT
Start: 2018-10-05 | End: 2018-10-05

## 2018-10-05 RX ORDER — CARVEDILOL 25 MG/1
25 TABLET ORAL 2 TIMES DAILY WITH MEALS
Status: DISCONTINUED | OUTPATIENT
Start: 2018-10-05 | End: 2018-10-05 | Stop reason: HOSPADM

## 2018-10-05 RX ADMIN — SODIUM CHLORIDE: 0.9 INJECTION, SOLUTION INTRAVENOUS at 07:10

## 2018-10-05 RX ADMIN — MIDAZOLAM 2 MG: 1 INJECTION INTRAMUSCULAR; INTRAVENOUS at 07:10

## 2018-10-05 RX ADMIN — MIDAZOLAM 1 MG: 1 INJECTION INTRAMUSCULAR; INTRAVENOUS at 08:10

## 2018-10-05 RX ADMIN — CARVEDILOL 25 MG: 25 TABLET, FILM COATED ORAL at 11:10

## 2018-10-05 RX ADMIN — PROPOFOL 40 MG: 10 INJECTION, EMULSION INTRAVENOUS at 08:10

## 2018-10-05 RX ADMIN — PROPOFOL 30 MG: 10 INJECTION, EMULSION INTRAVENOUS at 09:10

## 2018-10-05 RX ADMIN — MIDAZOLAM 1 MG: 1 INJECTION INTRAMUSCULAR; INTRAVENOUS at 07:10

## 2018-10-05 RX ADMIN — DEXMEDETOMIDINE HYDROCHLORIDE 0.5 MCG/KG/HR: 100 INJECTION, SOLUTION, CONCENTRATE INTRAVENOUS at 09:10

## 2018-10-05 NOTE — H&P
[facility]  Cardiac Electrophysiology  History and Physical     Admission Date: 10/5/2018  Code Status: Prior   Attending Provider: Ayden Arana MD   Principal Problem:SVT (supraventricular tachycardia)    Subjective:     Chief Complaint:  SVT     HPI:  Mr. Santoyo is a 42 year-old man with recurrent SVT, hypertension, diabetes mellitus, and ESRD on hemodialysis. He has had SVT for years for which he has been treated with IV adenosine. He had also been uptitrated on his carvedilol. He was rx diltiazem as well however could not afford it. His last ED visit was 9/14/18 when he had another episode with tachycardia into the 150s. ECG at that time revealed no STEMI, SVT at 127  bpm. No ectopy with normal conduction.  He received large dose adenosine which he responded well to. Normal sinus rhythm status post chemical cardioversion.     CXR during ED visit (9/14/18) revealed lungs clear, with normal appearance of pulmonary vasculature and no pleural effusion or pneumothorax. Cardiac silhouette is normal in size with hilar and mediastinal contours unremarkable.    An event monitor from 5/2018 worn for 30 days noted no SVT. A stress echo from 2015 was normal.     My interpretation of today's ECG is sinus rhythm with a ventricular rate of 84 bpm. QTc 439 ms.     He presents today to [facility] for scheduled EP study with SVT RFA with Dr. Arana. He denies any chest pain, palpitations, SOB, dizziness, light headedness, weakness, syncope, or near syncopal episodes. He does state he has PALMER. He denies any bleeding, infections, rashes, or surgeries in the past 30 day. He was last dialized 10/3/18 and had heparin (Dr. Arana aware). The patient is currently taking carvedalol 25 mg BID, last dose was 9/28/18, amlodipine 10 mg daily, last dose was 10/4/18, all oral diabetic medications were held this AM. Patient took novolog 40 units and a half dose of his insuline glargine 10/4/18 PM.    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 associated with type 2 diabetes mellitus 5/21/2018    Morbid obesity 9/12/2012    SVT (supraventricular tachycardia)        Past Surgical History:   Procedure Laterality Date    ANGIOGRAM, UPPER EXTREMITY Left 12/30/2013    Performed by EVERTON Noble III, MD at [facility] CATH LAB    [id number] Left 9/16/2013    Performed by EVERTON Noble III, MD at [facility] OR [address] [place] [address]    DIALYSIS FISTULA CREATION      INSERTION-CENTRAL LINE Left 10/21/2013    Performed by EVERTON Noble III, MD at [facility] OR [address] [place] [address]    KNEE ARTHROPLASTY      LIGATION, AV FISTULA Left 9/16/2013    Performed by EVERTON Noble III, [facility] at [facility] OR [address] [place] [address]    SHOULDER SURGERY      right    TRANSPOSITION Left 11/4/2013    Performed by EVERTON Noble III, [facility] at [facility] [place] [address] [place] [address]    VASECTOMY         Review of patient's allergies indicates:  No Known Allergies    No current facility-administered medications on file prior to encounter.      Current Outpatient Medications on File Prior to Encounter   Medication Sig    allopurinol (ZYLOPRIM) 100 MG tablet Take 1 tablet (100 mg total) by mouth once daily.    amlodipine (NORVASC) 10 MG tablet TAKE ONE TABLET BY MOUTH ONCE DAILY    furosemide (LASIX) 40 MG tablet TAKE ONE TABLET BY MOUTH ONCE DAILY    glipiZIDE (GLUCOTROL) 10 MG TR24 Take 1 tablet (10 mg total) by mouth daily with breakfast.    insulin aspart U-100 (NOVOLOG FLEXPEN U-100 INSULIN) 100 unit/mL InPn pen Inject 40 Units into the skin 3 (three) times daily with meals.    insulin glargine (BASAGLAR  KWIKPEN U-100 INSULIN) 100 unit/mL (3 mL) InPn pen Inject 45 Units into the skin 2 (two) times daily.    lisinopril (PRINIVIL,ZESTRIL) 40 MG tablet TAKE ONE TABLET BY MOUTH ONCE DAILY    sevelamer carbonate (RENVELA) 800 mg Tab Take 800 mg by mouth 3 (three) times daily with meals.    traZODone (DESYREL) 50 MG tablet Take 1 tablet (50 mg total) by mouth nightly as needed for Insomnia.    blood sugar diagnostic Strp Use for BS testing three times daily.    carvedilol (COREG) 25 MG tablet TAKE ONE TABLET BY MOUTH TWICE DAILY WITH MEALS    cyclobenzaprine (FLEXERIL) 10 MG tablet     dulaglutide (TRULICITY) 1.5 mg/0.5 mL PnIj Inject 1.5 mg into the skin every 7 days.    HYDROmorphone (DILAUDID) 4 MG tablet Take 4 mg by mouth every 4 (four) hours as needed for Pain.    lancets 30 gauge Misc by Misc.(Non-Drug; Combo Route) route.    OPW TEST CLAIM - DO NOT FILL Inject 0.75 tablets into the muscle every 7 days. OPW test claim. Do not fill.    papaverine 30 mg/mL injection 20/60/2 DS  Add: Phentolamine 20 mg  Add: PGE1 200 mcg  Add: Atropine 0.2mg/ml    Sig:  Inject 10 units (0.10 mls) as directed     Family History     Problem Relation (Age of Onset)    Diabetes Mother    Eczema Sister    Heart disease Maternal Grandfather    Heart failure Mother    Hypertension Mother, Father    Kidney disease Mother, Paternal Uncle, Paternal Uncle    No Known Problems Son, Sister, Son, Son        Tobacco Use    Smoking status: Former Smoker     Types: Cigars    Smokeless tobacco: Never Used    Tobacco comment: rare   Substance and Sexual Activity    Alcohol use: No    Drug use: No    Sexual activity: Yes     Partners: Female     Birth control/protection: None     Review of Systems   Constitution: Negative for chills, fever, weakness and malaise/fatigue.   HENT: Negative for congestion.    Eyes: Negative for blurred vision.   Cardiovascular: Negative for chest pain, dyspnea on exertion, irregular heartbeat, leg swelling,  near-syncope, orthopnea, palpitations and syncope.   Respiratory: Negative for cough, shortness of breath and wheezing.         Positive for PALMER.   Endocrine: Negative for polyphagia.   Hematologic/Lymphatic: Negative for bleeding problem. Does not bruise/bleed easily.   Skin: Negative for itching and rash.   Musculoskeletal: Positive for joint pain and back pain (chronic). Negative for muscle cramps.   Gastrointestinal: Negative for bloating, abdominal pain, nausea and vomiting.   Genitourinary: Negative for dysuria.   Neurological: Negative for dizziness, focal weakness, headaches, light-headedness, loss of balance and numbness.   Psychiatric/Behavioral: Negative for altered mental status.     Objective:     Vital Signs (Most Recent):  Temp: 98.1 °F (36.7 °C) (10/05/18 0611)  Pulse: 86 (10/05/18 0611)  Resp: 18 (10/05/18 0611)  BP: (!) 173/89 (10/05/18 0611)  SpO2: 95 % (10/05/18 0611) Vital Signs (24h Range):  Temp:  [98.1 °F (36.7 °C)] 98.1 °F (36.7 °C)  Pulse:  [86] 86  Resp:  [18] 18  SpO2:  [95 %] 95 %  BP: (173)/(89) 173/89     Weight: (!) 140.6 kg (310 lb)  Body mass index is 40.9 kg/m².    SpO2: 95 %  O2 Device (Oxygen Therapy): room air    Physical Exam   Constitutional: He is oriented to person, place, and time. He appears well-developed and well-nourished. No distress. Obese.  HENT:   Head: Normocephalic and atraumatic.   Eyes: Conjunctivae are normal. Pupils are equal, round, and reactive to light. Right eye exhibits no discharge. Left eye exhibits no discharge.   Neck: Normal range of motion. Neck thick supple.   Cardiovascular: Normal rate, regular rhythm, normal heart sounds and intact distal pulses. Exam reveals no gallop and no friction rub. No murmur heard.  Pulmonary/Chest: Effort normal and breath sounds normal. No respiratory distress. He has no wheezes. He has no rales. He exhibits no tenderness.   Abdominal: Bowel sounds are normal. He exhibits no distension and no mass. There is no  tenderness. There is no rebound and no guarding.   Musculoskeletal: Normal range of motion. He exhibits no edema.   Neurological: He is alert and oriented to person, place, and time. No cranial nerve deficit. Coordination normal. No weakness.  Skin: Skin is warm and dry. No rash noted. He is not diaphoretic. No erythema.   Psychiatric: He has a normal mood and affect. His behavior is normal. Thought content normal.     Significant Labs: Labs from 10/2/18 reviewed. Discussed with Dr. Arana.    Significant Imaging: EKG: Sinus rhythm ventricular rate 84 bpm. QTc 439 ms.     Assessment and Plan:     * SVT (supraventricular tachycardia)    EP study with SVT RFA  Anesthesia for sedation.          Prior to procedure, extensive discussion with patient regarding risks and benefits of  ablation, and patient  would like to proceed.  Risks of procedure include but are not limited to the risk of infection, bleeding, stroke, paralysis,  MI, death, embolism, perforation requiring emergency draining or surgery, AV block, possibility for need for  pacemaker implantation.  Dr. Arana at bedside speaking with patient and girlfriend. The patient and girlfriend voice understanding and all questions have been answered. No further questions/concerns voiced at this time. Consents signed.    Adilene Jama NP  Cardiac Electrophysiology  [facility]    STAFF: Ayden Arana MD

## 2018-10-05 NOTE — PLAN OF CARE
Received report from AD Rios. Patient s/p RFA, AAOx3. VSS, no c/o pain or discomfort at this time, resp even and unlabored. Gauze/tegaderm dressing to zari groin is CDI. No active bleeding. No hematoma noted. Post procedure protocol reviewed with patient and patient's family. Understanding verbalized. Family members at bedside. Nurse call bell within reach. Will continue to monitor per post procedure protocol.

## 2018-10-05 NOTE — PLAN OF CARE
Problem: Cardiac Rhythm Management Device (Adult)  Goal: Signs and Symptoms of Listed Potential Problems Will be Absent, Minimized or Managed (Cardiac Rhythm Management Device)  Signs and symptoms of listed potential problems will be absent, minimized or managed by discharge/transition of care (reference Cardiac Rhythm Management Device (Adult) CPG).  Outcome: Ongoing (interventions implemented as appropriate)  Plan of care and safety prec initiated. Will continue to monitor.

## 2018-10-05 NOTE — HPI
Mr. Santoyo is a 42 year-old man with recurrent SVT, hypertension, diabetes mellitus, and ESRD on hemodialysis. He has had SVT for years for which he has been treated with IV adenosine. He had also been uptitrated on his carvedilol. He was rx diltiazem as well however could not afford it. His last ED visit was 9/14/18 when he had another episode with tachycardia into the 150s. ECG at that time revealed no STEMI, SVT at 127 bpm. No ectopy with normal conduction. He received large dose adenosine which he responded well to. Normal sinus rhythm status post chemical cardioversion.     CXR during ED visit (9/14/18) revealed lungs clear, with normal appearance of pulmonary vasculature and no pleural effusion or pneumothorax. Cardiac silhouette is normal in size with hilar and mediastinal contours unremarkable.    An event monitor recently worn for 30 days noted no SVT. A stress echo from 2015 was normal.     My interpretation of today's ECG is sinus rhythm with a ventricular rate of 84 bpm. QTc 439 ms.     He presents today to [facility] for scheduled SVT RFA with Dr. Arana. He denies any chest pain, palpitations, SOB, dizziness, light headedness, weakness, syncope, or near syncopal episodes. He does state he has PALMER. He denies any bleeding, infections, rashes, or surgeries in the past 30 day. He was last dialized on 10/3/18. The patient is currently taking carvedalol 25 mg BID, last dose was 9/28/18, amlodipine 10 mg daily, last dose was 10/4/18, all oral diabetic medications were held this AM. Patient took novolog 40 units and a half dose of his insuline glargine 10/4/18 PM.

## 2018-10-05 NOTE — NURSING TRANSFER
Nursing Transfer Note      10/5/2018     Transfer To: [facility]    Transfer via stretcher    Transfer with cardiac monitoring    Transported by RN    Medicines sent: no    Chart send with patient: Yes    Notified: girlfriend    Patient reassessed at: 10/5/18@1110hrs

## 2018-10-05 NOTE — TRANSFER OF CARE
"Anesthesia Transfer of Care Note    Patient: Thai Santoyo Jr.    Procedure(s) Performed: Procedure(s) (LRB):  ABLATION (N/A)    Patient location: PACU    Anesthesia Type: general    Transport from OR: Transported from OR on 2-3 L/min O2 by NC with adequate spontaneous ventilation    Post pain: adequate analgesia    Post assessment: no apparent anesthetic complications    Post vital signs: stable    Level of consciousness: awake, alert and oriented    Nausea/Vomiting: no nausea/vomiting    Complications: none    Transfer of care protocol was followed      Last vitals:   Visit Vitals  BP (!) 173/89 (BP Location: Right arm, Patient Position: Lying)   Pulse 86   Temp 36.7 °C (98.1 °F) (Oral)   Resp 18   Ht 6' 1" (1.854 m)   Wt (!) 140.6 kg (310 lb)   SpO2 95%   BMI 40.90 kg/m²     "

## 2018-10-05 NOTE — DISCHARGE SUMMARY
[facility]  Cardiac Electrophysiology  Discharge Summary      Patient Name: Thai Santoyo Jr.  MRN: [id number]  Admission Date: 10/5/2018  Hospital Length of Stay: 0 days  Discharge Date and Time:  10/05/2018 4:19 PM  Attending Physician: No att. providers found    Discharging Provider: Adilene Jama NP  Primary Care Physician: Manjit Mckee Ii, MD    HPI:   Mr. Santoyo is a 42 year-old man with recurrent SVT, hypertension, diabetes mellitus, and ESRD on hemodialysis. He has had SVT for years for which he has been treated with IV adenosine. He had also been uptitrated on his carvedilol. He was rx diltiazem as well however could not afford it. His last ED visit was 9/14/18 when he had another episode with tachycardia into the 150s. ECG at that time revealed no STEMI, SVT at 127  bpm. No ectopy with normal conduction.  He received large dose adenosine which he responded well to. Normal sinus rhythm status post chemical cardioversion.     CXR during ED visit (9/14/18) revealed lungs clear, with normal appearance of pulmonary vasculature and no pleural effusion or pneumothorax. Cardiac silhouette is normal in size with hilar and mediastinal contours unremarkable.    An event monitor recently worn for 30 days noted no SVT. A stress echo from 2015 was normal.     My interpretation of today's ECG is sinus rhythm with a ventricular rate of 84 bpm. QTc 439 ms.     He presents today to [facility] for scheduled SVT RFA with Dr. Arana. He denies any chest pain, palpitations, SOB, dizziness, light headedness, weakness, syncope, or near syncopal episodes. He does state he has PALMER. He denies any bleeding, infections, rashes, or surgeries in the past 30 day. He was last dialized on 10/3/18. The patient is currently taking carvedalol 25 mg BID, last dose was 9/28/18, amlodipine 10 mg daily, last dose was 10/4/18, all oral diabetic medications were held this AM. Patient took novolog 40 units and a half dose of his  insuline glargine 10/4/18 PM.    Procedure(s) (LRB):  ABLATION (N/A)     Indwelling Lines/Drains at time of discharge:  Lines/Drains/Airways          None          Hospital Course:  Patient underwent SVT ablation (see procedure note for details), tolerated procedure well with no acute complications. VSS. Bilateral groins with no bleeding or hematoma noted. Patient ambulating with no issues. Patient to continue all home medications. Follow up with Ayden Arana MD in 4 weeks. Per Dr. Arana, refer for sleep apnea work up. Discharge plans/instructions discussed with patient and wife who verbalized understanding and agreement of plans of care. No further questions or concerns voiced at this time.     Consults:    Anesthesia.    Significant Diagnostic Studies: Labs:   BMP: No results for input(s): GLU, NA, K, CL, CO2, BUN, CREATININE, CALCIUM, MG in the last 48 hours., CMP No results for input(s): NA, K, CL, CO2, GLU, BUN, CREATININE, CALCIUM, PROT, ALBUMIN, BILITOT, ALKPHOS, AST, ALT, ANIONGAP, ESTGFRAFRICA, EGFRNONAA in the last 48 hours., CBC No results for input(s): WBC, HGB, HCT, PLT in the last 48 hours. and INR   Lab Results   Component Value Date    INR 0.9 10/02/2018    INR 1.0 09/14/2018    INR 1.0 08/10/2018     Final Active Diagnoses:    Diagnosis Date Noted POA    PRINCIPAL PROBLEM:  SVT (supraventricular tachycardia) [I47.1] 10/05/2018 Yes      Problems Resolved During this Admission:     No new Assessment & Plan notes have been filed under this hospital service since the last note was generated.  Service: Arrhythmia    Discharged Condition: stable    Disposition: Home or Self Care    Follow Up:  Follow-up Information     Ayden Arana MD In 1 month.    Specialties:  Cardiology, Electrophysiology  Why:  Patient also needs a referral for sleep apnea work up.  Contact information:  [facility]  [facility] [address] [phone]                          Patient Instructions:      Diet Cardiac     No  driving until:   Order Comments: No driving or operating heavy machinery 24-48 hours post procedure.     Notify your health care provider if you experience any of the following:  temperature >100.4     Notify your health care provider if you experience any of the following:  persistent nausea and vomiting or diarrhea     Notify your health care provider if you experience any of the following:  severe uncontrolled pain     Notify your health care provider if you experience any of the following:  redness, tenderness, or signs of infection (pain, swelling, redness, odor or green/yellow discharge around incision site)     Notify your health care provider if you experience any of the following:  difficulty breathing or increased cough     Notify your health care provider if you experience any of the following:  severe persistent headache     Notify your health care provider if you experience any of the following:  worsening rash     Notify your health care provider if you experience any of the following:  persistent dizziness, light-headedness, or visual disturbances     Notify your health care provider if you experience any of the following:   Order Comments: For any concerning symptoms.     Notify your health care provider if you experience any of the following:  increased confusion or weakness     Remove dressing in 24 hours   Order Comments: You may shower in 24 hours. Avoid baths, swimming or hot tubs for 1 week     Other restrictions (specify):   Order Comments: Patient may be discharged when the following parameters are met:  -Vital signs stable.  -Bed rest complete.  -Patient able to ambulate without difficulty, tolerate PO intake, and void without difficulty.  -Patient is stable.     Weight bearing restrictions (specify):   Order Comments: Avoid bending straining or heavy lifting of anything greater than 5-10 pounds for 1 week.     Medications:  Reconciled Home Medications:      Medication List      CONTINUE  taking these medications    allopurinol 100 MG tablet  Commonly known as:  ZYLOPRIM  Take 1 tablet (100 mg total) by mouth once daily.     amLODIPine 10 MG tablet  Commonly known as:  NORVASC  TAKE ONE TABLET BY MOUTH ONCE DAILY     ANDRODERM 4 mg/24 hr Pt24  Generic drug:  testosterone  Apply 1 patch (4 mg total) topically once daily.     BASAGLAR KWIKPEN U-100 INSULIN 100 unit/mL (3 mL) Inpn pen  Generic drug:  insulin glargine  Inject 45 Units into the skin 2 (two) times daily.     blood sugar diagnostic Strp  Use for BS testing three times daily.     carvedilol 25 MG tablet  Commonly known as:  COREG  TAKE ONE TABLET BY MOUTH TWICE DAILY WITH MEALS     cyclobenzaprine 10 MG tablet  Commonly known as:  FLEXERIL     DILAUDID 4 MG tablet  Generic drug:  HYDROmorphone  Take 4 mg by mouth every 4 (four) hours as needed for Pain.     furosemide 40 MG tablet  Commonly known as:  LASIX  TAKE ONE TABLET BY MOUTH ONCE DAILY     glipiZIDE 10 MG Tr24  Commonly known as:  GLUCOTROL  Take 1 tablet (10 mg total) by mouth daily with breakfast.     lancets 30 gauge Misc  by Misc.(Non-Drug; Combo Route) route.     lisinopril 40 MG tablet  Commonly known as:  PRINIVIL,ZESTRIL  TAKE ONE TABLET BY MOUTH ONCE DAILY     NovoLOG Flexpen U-100 Insulin 100 unit/mL Inpn pen  Generic drug:  insulin aspart U-100  Inject 40 Units into the skin 3 (three) times daily with meals.     OPW TEST CLAIM - DO NOT FILL  Inject 0.75 tablets into the muscle every 7 days. OPW test claim. Do not fill.     oxyCODONE-acetaminophen 5-325 mg per tablet  Commonly known as:  PERCOCET  take one tablet by mouth every 4 hours as needed for pain     papaverine 30 mg/mL injection  20/60/2 DS  Add: Phentolamine 20 mg  Add: PGE1 200 mcg  Add: Atropine 0.2mg/ml    Sig:  Inject 10 units (0.10 mls) as directed     sevelamer carbonate 800 mg Tab  Commonly known as:  RENVELA  Take 800 mg by mouth 3 (three) times daily with meals.     traZODone 50 MG tablet  Commonly known  as:  DESYREL  Take 1 tablet (50 mg total) by mouth nightly as needed for Insomnia.     TRULICITY 1.5 mg/0.5 mL Pnij  Generic drug:  dulaglutide  Inject 1.5 mg into the skin every 7 days.          Plan:  -Continue all home medications.  -Follow up with Dr. Arana in 4 weeks.  -Referral for sleep apnea work up.    Time spent on the discharge of patient: 14 minutes    Adilene Jama NP  Cardiac Electrophysiology  [facility]

## 2018-10-05 NOTE — SUBJECTIVE & OBJECTIVE
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 associated with type 2 diabetes mellitus 5/21/2018    Morbid obesity 9/12/2012    SVT (supraventricular tachycardia)        Past Surgical History:   Procedure Laterality Date    ANGIOGRAM, UPPER EXTREMITY Left 12/30/2013    Performed by EVERTON Noble III, [facility] at [facility] CATH LAB    [id number] Left 9/16/2013    Performed by EVERTON Noble III, MD at [facility] OR [address] [place] [address]    DIALYSIS FISTULA CREATION      INSERTION-CENTRAL LINE Left 10/21/2013    Performed by EVERTON Noble III, MD at [facility] OR [address] [place] [address]    KNEE ARTHROPLASTY      LIGATION, AV FISTULA Left 9/16/2013    Performed by EVERTON Noble III, [facility] at [facility] OR [address] [place] [address]    SHOULDER SURGERY      right    TRANSPOSITION Left 11/4/2013    Performed by EVERTON Noble III, [facility] at [facility] OR [address] [place] [address]    VASECTOMY         Review of patient's allergies indicates:  No Known Allergies    No current facility-administered medications on file prior to encounter.      Current Outpatient Medications on File Prior to Encounter   Medication Sig    allopurinol (ZYLOPRIM) 100 MG tablet Take 1 tablet (100 mg total) by mouth once daily.    amlodipine (NORVASC) 10 MG tablet TAKE ONE TABLET BY MOUTH ONCE DAILY    furosemide (LASIX) 40 MG tablet TAKE ONE TABLET BY MOUTH ONCE DAILY    glipiZIDE (GLUCOTROL) 10 MG TR24 Take 1 tablet (10 mg total) by mouth daily with breakfast.    insulin aspart U-100 (NOVOLOG FLEXPEN U-100 INSULIN) 100 unit/mL InPn pen Inject 40 Units into the skin 3 (three) times daily with meals.    insulin  glargine (BASAGLAR KWIKPEN U-100 INSULIN) 100 unit/mL (3 mL) InPn pen Inject 45 Units into the skin 2 (two) times daily.    lisinopril (PRINIVIL,ZESTRIL) 40 MG tablet TAKE ONE TABLET BY MOUTH ONCE DAILY    sevelamer carbonate (RENVELA) 800 mg Tab Take 800 mg by mouth 3 (three) times daily with meals.    traZODone (DESYREL) 50 MG tablet Take 1 tablet (50 mg total) by mouth nightly as needed for Insomnia.    blood sugar diagnostic Strp Use for BS testing three times daily.    carvedilol (COREG) 25 MG tablet TAKE ONE TABLET BY MOUTH TWICE DAILY WITH MEALS    cyclobenzaprine (FLEXERIL) 10 MG tablet     dulaglutide (TRULICITY) 1.5 mg/0.5 mL PnIj Inject 1.5 mg into the skin every 7 days.    HYDROmorphone (DILAUDID) 4 MG tablet Take 4 mg by mouth every 4 (four) hours as needed for Pain.    lancets 30 gauge Misc by Misc.(Non-Drug; Combo Route) route.    OPW TEST CLAIM - DO NOT FILL Inject 0.75 tablets into the muscle every 7 days. OPW test claim. Do not fill.    papaverine 30 mg/mL injection 20/60/2 DS  Add: Phentolamine 20 mg  Add: PGE1 200 mcg  Add: Atropine 0.2mg/ml    Sig:  Inject 10 units (0.10 mls) as directed     Family History     Problem Relation (Age of Onset)    Diabetes Mother    Eczema Sister    Heart disease Maternal Grandfather    Heart failure Mother    Hypertension Mother, Father    Kidney disease Mother, Paternal Uncle, Paternal Uncle    No Known Problems Son, Sister, Son, Son        Tobacco Use    Smoking status: Former Smoker     Types: Cigars    Smokeless tobacco: Never Used    Tobacco comment: rare   Substance and Sexual Activity    Alcohol use: No    Drug use: No    Sexual activity: Yes     Partners: Female     Birth control/protection: None     Review of Systems   Constitution: Negative for chills, fever, weakness and malaise/fatigue.   HENT: Negative for congestion.    Eyes: Negative for blurred vision.   Cardiovascular: Negative for chest pain, dyspnea on exertion, irregular  heartbeat, leg swelling, near-syncope, orthopnea, palpitations and syncope.   Respiratory: Negative for cough, shortness of breath and wheezing.         Positive for PALMER.   Endocrine: Negative for polyphagia.   Hematologic/Lymphatic: Negative for bleeding problem. Does not bruise/bleed easily.   Skin: Negative for itching and rash.   Musculoskeletal: Positive for joint pain. Negative for back pain and muscle cramps.   Gastrointestinal: Negative for bloating, abdominal pain, nausea and vomiting.   Genitourinary: Negative for dysuria.   Neurological: Negative for dizziness, focal weakness, headaches, light-headedness, loss of balance and numbness.   Psychiatric/Behavioral: Negative for altered mental status.     Objective:     Vital Signs (Most Recent):  Temp: 98.1 °F (36.7 °C) (10/05/18 0611)  Pulse: 86 (10/05/18 0611)  Resp: 18 (10/05/18 0611)  BP: (!) 173/89 (10/05/18 0611)  SpO2: 95 % (10/05/18 0611) Vital Signs (24h Range):  Temp:  [98.1 °F (36.7 °C)] 98.1 °F (36.7 °C)  Pulse:  [86] 86  Resp:  [18] 18  SpO2:  [95 %] 95 %  BP: (173)/(89) 173/89       Weight: (!) 140.6 kg (310 lb)  Body mass index is 40.9 kg/m².    SpO2: 95 %  O2 Device (Oxygen Therapy): room air    Physical Exam   Constitutional: He is oriented to person, place, and time. He appears well-developed and well-nourished. No distress.   HENT:   Head: Normocephalic and atraumatic.   Eyes: Conjunctivae are normal. Pupils are equal, round, and reactive to light. Right eye exhibits no discharge. Left eye exhibits no discharge.   Neck: Normal range of motion. Neck supple.   Cardiovascular: Normal rate, regular rhythm, normal heart sounds and intact distal pulses. Exam reveals no gallop and no friction rub.   No murmur heard.  Pulmonary/Chest: Effort normal and breath sounds normal. No respiratory distress. He has no wheezes. He has no rales. He exhibits no tenderness.   Abdominal: Bowel sounds are normal. He exhibits no distension and no mass. There is no  tenderness. There is no rebound and no guarding.   Musculoskeletal: Normal range of motion. He exhibits no edema.   Neurological: He is alert and oriented to person, place, and time. No cranial nerve deficit. Coordination normal.   Skin: Skin is warm and dry. No rash noted. He is not diaphoretic. No erythema.   Psychiatric: He has a normal mood and affect. His behavior is normal. Thought content normal.       Significant Labs: Labs from 10/2/18 reviewed. Discussed with Dr. Arana.    Significant Imaging: EKG: Sinus rhythm ventricular rate 84 bpm. QTc 439 ms.

## 2018-10-05 NOTE — ANESTHESIA PREPROCEDURE EVALUATION
10/05/2018  Thai Santoyo Jr. is a 42 y.o., male.    Anesthesia Evaluation    I have reviewed the Patient Summary Reports.        Review of Systems  Anesthesia Hx:  No problems with previous Anesthesia    Social:  Non-Smoker    Hematology/Oncology:  Hematology Normal   Oncology Normal     EENT/Dental:EENT/Dental Normal   Cardiovascular:   Hypertension Dysrhythmias (SVT)    Pulmonary:  Pulmonary Normal    Renal/:   Chronic Renal Disease, ESRD    Hepatic/GI:  Hepatic/GI Normal    Musculoskeletal:  Musculoskeletal Normal    Neurological:  Neurology Normal    Endocrine:   Diabetes, type 2    Dermatological:  Skin Normal    Psych:  Psychiatric Normal           Physical Exam  General:  Well nourished    Airway/Jaw/Neck:  Airway Findings: Mouth Opening: Normal Tongue: Normal  General Airway Assessment: Adult  Mallampati: II  Improves to II with phonation.  TM Distance: Normal, at least 6 cm  Jaw/Neck Findings:  Neck ROM: Normal ROM      Dental:  Dental Findings: In tact   Chest/Lungs:  Chest/Lungs Findings: Clear to auscultation, Normal Respiratory Rate     Heart/Vascular:  Heart Findings: Rate: Normal  Rhythm: Regular Rhythm  Sounds: Normal             Anesthesia Plan  Type of Anesthesia, risks & benefits discussed:  Anesthesia Type:  general  Patient's Preference: General  Intra-op Monitoring Plan:   Intra-op Monitoring Plan Comments:   Post Op Pain Control Plan:   Post Op Pain Control Plan Comments:   Induction:   IV  Beta Blocker:  Patient is not currently on a Beta-Blocker (No further documentation required).       Informed Consent: Patient understands risks and agrees with Anesthesia plan.  Questions answered. Anesthesia consent signed with patient.  ASA Score: 4     Day of Surgery Review of History & Physical: I have interviewed and examined the patient. I have reviewed the patient's H&P dated:  There are no  significant changes.          Ready For Surgery From Anesthesia Perspective.

## 2018-10-05 NOTE — DISCHARGE INSTRUCTIONS
1. Do not strain or lift anything greater than 5 lb for 1 week.  2. Do not drive or operate any dangerous machinery for 24 hours.   3. Keep the dressing on, clean, and dry for 24 hours.   4. After 24 hours, the dressing may be removed and a shower is allowed.   5. Clean the area with mild soap and water and then cover it with a bandage.   6. Once the skin has healed, bathing in a tub or swimming is allowed.   7. Inspect the groin site daily and report to the physician any swelling at the site that   cannot be controlled with manual pressure for 10 minutes, unusual pain at the   access site or affected extremity, unusual swelling at the access site, or signs or   symptoms of infection such as redness, pain, or fever.   Call 911 if you have:   Bleeding from the puncture site that you cannot stop by doing the following:   Relax and lie down right away. Keep your leg flat and apply firm pressure to the site using your fingers and a gauze pad. Keep the pressure on for 20 minutes. Continue this until the bleeding stops. This may take awhile. When bleeding stops, cover the site with a sterile bandage and keep your leg still as much as possible.

## 2018-10-05 NOTE — ANESTHESIA RELEASE NOTE
"Anesthesia Release from PACU Note    Patient: Thai Santoyo Jr.    Procedure(s) Performed: Procedure(s) (LRB):  ABLATION (N/A)    Anesthesia type: GEN    Post pain: Adequate analgesia reported    Post assessment: no apparent anesthetic complications, tolerated procedure well and no evidence of recall    Post vital signs: BP (!) 145/88   Pulse 86   Temp 36.5 °C (97.7 °F) (Skin)   Resp 17   Ht 6' 1" (1.854 m)   Wt (!) 140.6 kg (310 lb)   SpO2 100%   BMI 40.90 kg/m²     Level of consciousness: awake, alert and oriented    Nausea/Vomiting: no nausea/no vomiting    Complications: none    Airway Patency: patent    Respiratory: unassisted, spontaneous ventilation,nc  Cardiovascular: stable and blood pressure at baseline    Hydration: euvolemic    "

## 2018-10-05 NOTE — TELEPHONE ENCOUNTER
----- Message from Arleth Jane RN sent at 10/5/2018  4:11 PM CDT -----      ----- Message -----  From: Charan Greene MA  Sent: 10/5/2018   4:04 PM  To: Arleth Jane RN     Please enter referral  ----- Message -----  From: Adilene Jama NP  Sent: 10/5/2018   4:00 PM  To: Sumit RIVERA Staff    Please refer for sleep study.    Thank you.

## 2018-10-05 NOTE — ANESTHESIA POSTPROCEDURE EVALUATION
"Anesthesia Post Evaluation    Patient: Thai Santoyo Jr.    Procedure(s) Performed: Procedure(s) (LRB):  ABLATION (N/A)    Final Anesthesia Type: general  Patient location during evaluation: PACU  Patient participation: Yes- Able to Participate  Level of consciousness: awake and alert and oriented  Post-procedure vital signs: reviewed and stable  Pain management: adequate  Airway patency: patent  PONV status at discharge: No PONV  Anesthetic complications: no      Cardiovascular status: stable  Respiratory status: unassisted, spontaneous ventilation, room air and nasal cannula  Hydration status: euvolemic  Follow-up not needed.        Visit Vitals  BP (!) 145/88   Pulse 86   Temp 36.5 °C (97.7 °F) (Skin)   Resp 17   Ht 6' 1" (1.854 m)   Wt (!) 140.6 kg (310 lb)   SpO2 100%   BMI 40.90 kg/m²       Pain/Miki Score: Pain Assessment Performed: Yes (10/5/2018  6:11 AM)  Presence of Pain: denies (10/5/2018  6:11 AM)        "

## 2018-10-05 NOTE — PROGRESS NOTES
Patient ambulating without difficulty, tolerating PO, VSS, with no complaints. Patient discharged per MD orders. Instructions given on medications, wound care, activity, signs of infection, when to call MD, and follow up appointments. Pt verbalized understanding.  Patient AAOx3, VSS, no c/o pain or discomfort at this time. Telemetry and PIV removed. Patient left unit via wheelchair with family.

## 2018-10-19 ENCOUNTER — PATIENT MESSAGE (OUTPATIENT)
Dept: UROLOGY | Facility: CLINIC | Age: 43
End: 2018-10-19

## 2018-10-25 DIAGNOSIS — E11.59 HYPERTENSION ASSOCIATED WITH DIABETES: ICD-10-CM

## 2018-10-25 DIAGNOSIS — Z79.4 TYPE 2 DIABETES MELLITUS WITH HYPERGLYCEMIA, WITH LONG-TERM CURRENT USE OF INSULIN: ICD-10-CM

## 2018-10-25 DIAGNOSIS — I15.2 HYPERTENSION ASSOCIATED WITH DIABETES: ICD-10-CM

## 2018-10-25 DIAGNOSIS — E11.65 TYPE 2 DIABETES MELLITUS WITH HYPERGLYCEMIA, WITH LONG-TERM CURRENT USE OF INSULIN: ICD-10-CM

## 2018-10-25 RX ORDER — AMLODIPINE BESYLATE 10 MG/1
TABLET ORAL
Qty: 90 TABLET | Refills: 3 | Status: SHIPPED | OUTPATIENT
Start: 2018-10-25 | End: 2021-03-31 | Stop reason: SDUPTHER

## 2018-10-25 RX ORDER — LISINOPRIL 40 MG/1
TABLET ORAL
Qty: 90 TABLET | Refills: 3 | Status: SHIPPED | OUTPATIENT
Start: 2018-10-25 | End: 2018-12-13

## 2018-10-25 RX ORDER — FUROSEMIDE 40 MG/1
TABLET ORAL
Qty: 90 TABLET | Refills: 3 | Status: SHIPPED | OUTPATIENT
Start: 2018-10-25 | End: 2020-05-01

## 2018-11-08 ENCOUNTER — PATIENT MESSAGE (OUTPATIENT)
Dept: INTERNAL MEDICINE | Facility: CLINIC | Age: 43
End: 2018-11-08

## 2018-11-08 DIAGNOSIS — Z79.4 TYPE 2 DIABETES MELLITUS WITH CHRONIC KIDNEY DISEASE ON CHRONIC DIALYSIS, WITH LONG-TERM CURRENT USE OF INSULIN: Primary | ICD-10-CM

## 2018-11-08 DIAGNOSIS — N18.6 TYPE 2 DIABETES MELLITUS WITH CHRONIC KIDNEY DISEASE ON CHRONIC DIALYSIS, WITH LONG-TERM CURRENT USE OF INSULIN: Primary | ICD-10-CM

## 2018-11-08 DIAGNOSIS — Z99.2 TYPE 2 DIABETES MELLITUS WITH CHRONIC KIDNEY DISEASE ON CHRONIC DIALYSIS, WITH LONG-TERM CURRENT USE OF INSULIN: Primary | ICD-10-CM

## 2018-11-08 DIAGNOSIS — E11.22 TYPE 2 DIABETES MELLITUS WITH CHRONIC KIDNEY DISEASE ON CHRONIC DIALYSIS, WITH LONG-TERM CURRENT USE OF INSULIN: Primary | ICD-10-CM

## 2018-11-08 NOTE — TELEPHONE ENCOUNTER
Please call  Natanael.    1. Help him schedule labs  2. Help him get an appointment to come see me.    Thanks.  D

## 2018-11-12 NOTE — PROGRESS NOTES
Cardiology Clinic Note  Reason for Visit: SVT    HPI:     Thai Santoyo Jr. is a 42 y.o. 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 He was last seen by Dr Osborne on 7/10/18.    Since he was last seen by Dr Osborne, he did present to the ED in 9/2018 with symptomatic SVT. In 10/2018, he underwent a slow pathway modification for paroxysmal typical AVNRT with Dr Arana. Since that time, he has not had a recurrence of palpitations or lightheadedness. He has no compliants at this time. He denies chest pain, dyspnea, edema, orthopnea, PND, syncope. No regular exercise, but he plans to get his son in the gym to exercise with him. His son plays football in middle school. 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     Past Medical History:  ESRD on dialysis; DM2 on insulin; HTN; SVT x2 in 2018; Gout, quiescent; BMI 42.61; Knee pain on chronic narcotics per outside physician  Social History:  .  Present Medical Therapy:  Carvediol 25 mg bid    Prior cardiovascular issues:  None    ROS:    Constitution: Negative for fever or chills.  HENT: Negative for  headaches.  Eyes: Negative for blurred vision.   Cardiovascular: See above  Pulmonary: Negative for SOB. Negative for cough.   Gastrointestinal: Negative for nausea/vomiting.   : Negative for dysuria.   Skin: Negative for rashes.  Neurological: Negative for focal weakness.  Psychological: Negative for depression.    Medications:     Current Outpatient Medications on File Prior to Visit   Medication Sig Dispense Refill    allopurinol (ZYLOPRIM) 100 MG tablet Take 1 tablet (100 mg total) by mouth once daily. 90 tablet 3    amLODIPine (NORVASC) 10 MG tablet TAKE ONE TABLET BY MOUTH ONCE DAILY 90 tablet 3    blood sugar diagnostic Strp Use for BS testing three times daily. 200 strip 9    carvedilol (COREG) 25 MG  "tablet TAKE ONE TABLET BY MOUTH TWICE DAILY WITH MEALS 180 tablet 3    cyclobenzaprine (FLEXERIL) 10 MG tablet       dulaglutide (TRULICITY) 1.5 mg/0.5 mL PnIj Inject 1.5 mg into the skin every 7 days. 6 mL 3    furosemide (LASIX) 40 MG tablet TAKE ONE TABLET BY MOUTH ONCE DAILY 90 tablet 3    glipiZIDE (GLUCOTROL) 10 MG TR24 Take 1 tablet (10 mg total) by mouth daily with breakfast. 90 tablet 3    HYDROmorphone (DILAUDID) 4 MG tablet Take 4 mg by mouth every 4 (four) hours as needed for Pain.      insulin aspart U-100 (NOVOLOG FLEXPEN U-100 INSULIN) 100 unit/mL InPn pen Inject 40 Units into the skin 3 (three) times daily with meals. 120 mL 3    insulin glargine (BASAGLAR KWIKPEN U-100 INSULIN) 100 unit/mL (3 mL) InPn pen Inject 45 Units into the skin 2 (two) times daily. 81 mL 3    lancets 30 gauge Misc by Misc.(Non-Drug; Combo Route) route.      lisinopril (PRINIVIL,ZESTRIL) 40 MG tablet TAKE ONE TABLET BY MOUTH ONCE DAILY 90 tablet 3    oxyCODONE-acetaminophen (PERCOCET) 5-325 mg per tablet take one tablet by mouth every 4 hours as needed for pain 25 tablet 0    papaverine 30 mg/mL injection 20/60/2 DS  Add: Phentolamine 20 mg  Add: PGE1 200 mcg  Add: Atropine 0.2mg/ml    Sig:  Inject 10 units (0.10 mls) as directed 10 mL 11    sevelamer carbonate (RENVELA) 800 mg Tab Take 800 mg by mouth 3 (three) times daily with meals.      testosterone (ANDRODERM) 4 mg/24 hr PT24 Apply 1 patch (4 mg total) topically once daily. 90 patch 0    traZODone (DESYREL) 50 MG tablet Take 1 tablet (50 mg total) by mouth nightly as needed for Insomnia. 90 tablet 3     No current facility-administered medications on file prior to visit.        Physical Exam:   BP (!) 140/90 (BP Location: Right arm, Patient Position: Sitting, BP Method: X-Large (Automatic))   Pulse 95   Ht 6' 1" (1.854 m)   Wt (!) 146.1 kg (322 lb 1.5 oz)   BMI 42.49 kg/m²      Constitutional: No apparent distress, conversant  HEENT: Sclera anicteric, " extraocular movements intact  Neck: No jugular venous distension, no carotid bruits  CV: Regular rate and rhythm, no murmurs rubs or gallops, normal S1/S2  Pulm: Clear to auscultation bilaterally  GI: Abdomen obese, soft, no palpable masses  Extremities: No lower extremity edema, warm with palpable pulses  Skin: No ecchymosis, erythema, or ulcers  Psych: Alert and oriented to person place location, appropriate affect  Neuro: No focal deficits    Labs:     Blood Tests:  Lab Results   Component Value Date    BNP 19 09/14/2018     10/02/2018    K 4.9 10/02/2018     10/02/2018    CO2 20 (L) 10/02/2018    BUN 56 (H) 10/02/2018    CREATININE 12.0 (H) 10/02/2018    GLU 82 10/02/2018    HGBA1C 8.6 (H) 05/18/2018    MG 2.2 11/14/2016    AST 27 09/14/2018    ALT 25 09/14/2018    ALBUMIN 4.1 09/14/2018    ALBUMIN 4.2 12/15/2016    PROT 8.0 09/14/2018    BILITOT 0.7 09/14/2018    WBC 8.41 10/02/2018    HGB 10.7 (L) 10/02/2018    HCT 32.8 (L) 10/02/2018    MCV 92 10/02/2018     10/02/2018    INR 0.9 10/02/2018    PSA 1.3 05/26/2017    TSH 1.783 05/18/2018       Lab Results   Component Value Date    CHOL 160 05/26/2017    HDL 31 (L) 05/26/2017    TRIG 231 (H) 05/26/2017       Lab Results   Component Value Date    LDLCALC 82.8 05/26/2017       Urine Tests:  Lab Results   Component Value Date    COLORU Yellow 04/12/2017    APPEARANCEUA Clear 04/12/2017    PHUR 8.5 04/12/2017    SPECGRAV 1.015 04/12/2017    PROTEINUA 2+ (A) 04/12/2017    GLUCUA 2+ (A) 04/12/2017    KETONESU Negative 04/12/2017    BILIRUBINUA Negative 04/12/2017    OCCULTUA 2+ (A) 04/12/2017    NITRITE Negative 04/12/2017    UROBILINOGEN Negative 04/12/2017    UROBILINOGEN Normal 03/23/2012    LEUKOCYTESUR Negative 04/12/2017    CREATRANDUR 77.0 05/18/2018       Imaging:     Echocardiogram  TTE 8/14/13  CONCLUSIONS     1 - Concentric remodeling.     2 - Normal left ventricular systolic function (EF 60-65%).     3 - Normal diastolic function.     4  - Normal right ventricular systolic function .     Stress testing  EDITH 2/26/15  PRE-TEST DATA   EKG: Resting electrocardiogram reveals normal sinus rhythm at a rate of 91 bpm.     TEST DESCRIPTION   The patient exercised for 5.5 minutes on a High Ramp protocol, corresponding to a functional capacity of 9 estimated METS, achieving a peak heart rate of 157 bpm, which is 86% of the age predicted maximum heart rate. The patient discontinued exercise   secondary to fatigue.     There were no significant electrocardiographic changes throughout the protocol suggesting ischemia.     EKG Conclusions:    1. The EKG portion of this study is negative for ischemia at a moderate workload, and peak heart rate of 157 bpm (86% of predicted).   2. Exercise capacity is average.   3. Blood pressure response to exercise was normal (Presenting BP: 152/95 Peak BP: 194/93).   4. No significant arrhythmias were present.   5. There were no symptoms of chest discomfort or significant dyspnea throughout the protocol.   6. The Duke treadmill score was 6 suggesting a low probability for future cardiovascular events.    CONCLUSIONS     1 - Concentric remodeling.     2 - Normal left ventricular systolic function (EF 60-65%).     3 - Normal left ventricular diastolic function.     4 - Normal right ventricular systolic function .     No evidence of stress induced myocardial ischemia.     Cath Lab  None    Other  EP Lab Procedure 10/5/18  Summary    1.) Typical AVNRT s/p slow pathway modification (likely elimination based on post-ablation EP study)  2.) No antegrade/retrograde conducting accessory pathway  3.) Upper limit of normal baseline HV interval.     Event monitor 5/21/18  CONCLUSIONS   Ordering Physician: Sixto Osborne MD  Indication: SVT  Activation Date: 5/21/2018  Deactivation Date: 6/20/2018    Event Details:  The patient's presenting rhythm was sinus rhythm with a rate range of 83-89 bpm. There were 2 patient activations without  symptoms entered that corresponded with sinus rhythm/sinus tachycardia and a rate range of  bpm. There was 1 auto-trigger for   routine testing consistent with sinus rhythm and a rate range of 77-82 bpm.    Summary:  Sinus rhythm while the device was being worn.    EKG:   10/5/18 - NSR, normal ECG    Assessment:     1. SVT (supraventricular tachycardia)    2. Renovascular hypertension        Plan:     SVT (supraventricular tachycardia)  Found to be typical AVNRT s/p slow pathway modification in 10/2018. No symptomatic recurrence since that time.   Remains on coreg BID    Renovascular hypertension  Recent values have been 120-130s in clinic but mildly elevated today at 140/90. Being managed by PCP and nephrology. Continue to monitor.  Current regimen include amlodipine 10mg daily, coreg 25mg BID, lisinopril 40mg daily, and lasix 40mg daily    Signed:  Juan Ho MD  Cardiology     11/13/2018 12:47 PM    Follow-up:     Future Appointments   Date Time Provider Department Center   11/13/2018  9:00 AM [facility]   11/13/2018 10:30 AM Roberto Ho III, MD [facility] CARDIO [facility]   11/14/2018  7:30 AM EKG, APPT [facility] EKG [facility]   11/14/2018  8:20 AM Ayden Arana MD [facility] AR[facility]TH [facility]   12/13/2018  8:20 AM Manjit Mckee II, MD [facility]

## 2018-11-13 ENCOUNTER — OFFICE VISIT (OUTPATIENT)
Dept: CARDIOLOGY | Facility: CLINIC | Age: 43
End: 2018-11-13
Payer: MEDICARE

## 2018-11-13 ENCOUNTER — PATIENT MESSAGE (OUTPATIENT)
Dept: INTERNAL MEDICINE | Facility: CLINIC | Age: 43
End: 2018-11-13

## 2018-11-13 ENCOUNTER — LAB VISIT (OUTPATIENT)
Dept: LAB | Facility: HOSPITAL | Age: 43
End: 2018-11-13
Attending: INTERNAL MEDICINE
Payer: MEDICARE

## 2018-11-13 VITALS
SYSTOLIC BLOOD PRESSURE: 140 MMHG | BODY MASS INDEX: 41.75 KG/M2 | HEIGHT: 73 IN | DIASTOLIC BLOOD PRESSURE: 90 MMHG | HEART RATE: 95 BPM | WEIGHT: 315 LBS

## 2018-11-13 DIAGNOSIS — Z79.4 TYPE 2 DIABETES MELLITUS WITH CHRONIC KIDNEY DISEASE ON CHRONIC DIALYSIS, WITH LONG-TERM CURRENT USE OF INSULIN: ICD-10-CM

## 2018-11-13 DIAGNOSIS — N18.6 TYPE 2 DIABETES MELLITUS WITH CHRONIC KIDNEY DISEASE ON CHRONIC DIALYSIS, WITH LONG-TERM CURRENT USE OF INSULIN: ICD-10-CM

## 2018-11-13 DIAGNOSIS — E11.22 TYPE 2 DIABETES MELLITUS WITH CHRONIC KIDNEY DISEASE ON CHRONIC DIALYSIS, WITH LONG-TERM CURRENT USE OF INSULIN: ICD-10-CM

## 2018-11-13 DIAGNOSIS — I15.0 RENOVASCULAR HYPERTENSION: ICD-10-CM

## 2018-11-13 DIAGNOSIS — Z99.2 TYPE 2 DIABETES MELLITUS WITH CHRONIC KIDNEY DISEASE ON CHRONIC DIALYSIS, WITH LONG-TERM CURRENT USE OF INSULIN: ICD-10-CM

## 2018-11-13 DIAGNOSIS — I47.10 SVT (SUPRAVENTRICULAR TACHYCARDIA): Primary | ICD-10-CM

## 2018-11-13 LAB
ALBUMIN SERPL BCP-MCNC: 3.4 G/DL
ALP SERPL-CCNC: 101 U/L
ALT SERPL W/O P-5'-P-CCNC: 22 U/L
ANION GAP SERPL CALC-SCNC: 11 MMOL/L
AST SERPL-CCNC: 23 U/L
BILIRUB SERPL-MCNC: 0.3 MG/DL
BUN SERPL-MCNC: 26 MG/DL
CALCIUM SERPL-MCNC: 9.8 MG/DL
CHLORIDE SERPL-SCNC: 100 MMOL/L
CO2 SERPL-SCNC: 29 MMOL/L
CREAT SERPL-MCNC: 7.9 MG/DL
EST. GFR  (AFRICAN AMERICAN): 8.9 ML/MIN/1.73 M^2
EST. GFR  (NON AFRICAN AMERICAN): 7.7 ML/MIN/1.73 M^2
ESTIMATED AVG GLUCOSE: 146 MG/DL
GLUCOSE SERPL-MCNC: 126 MG/DL
HBA1C MFR BLD HPLC: 6.7 %
POTASSIUM SERPL-SCNC: 4.2 MMOL/L
PROT SERPL-MCNC: 7.9 G/DL
SODIUM SERPL-SCNC: 140 MMOL/L

## 2018-11-13 PROCEDURE — 99999 PR PBB SHADOW E&M-EST. PATIENT-LVL III: CPT | Mod: PBBFAC,,, | Performed by: INTERNAL MEDICINE

## 2018-11-13 PROCEDURE — 3008F BODY MASS INDEX DOCD: CPT | Mod: CPTII,S$GLB,, | Performed by: INTERNAL MEDICINE

## 2018-11-13 PROCEDURE — 80053 COMPREHEN METABOLIC PANEL: CPT

## 2018-11-13 PROCEDURE — 83036 HEMOGLOBIN GLYCOSYLATED A1C: CPT

## 2018-11-13 PROCEDURE — 36415 COLL VENOUS BLD VENIPUNCTURE: CPT

## 2018-11-13 PROCEDURE — 99213 OFFICE O/P EST LOW 20 MIN: CPT | Mod: S$GLB,,, | Performed by: INTERNAL MEDICINE

## 2018-12-13 ENCOUNTER — LAB VISIT (OUTPATIENT)
Dept: LAB | Facility: HOSPITAL | Age: 43
End: 2018-12-13
Attending: INTERNAL MEDICINE
Payer: MEDICARE

## 2018-12-13 ENCOUNTER — OFFICE VISIT (OUTPATIENT)
Dept: INTERNAL MEDICINE | Facility: CLINIC | Age: 43
End: 2018-12-13
Payer: MEDICARE

## 2018-12-13 VITALS
BODY MASS INDEX: 41.75 KG/M2 | OXYGEN SATURATION: 97 % | DIASTOLIC BLOOD PRESSURE: 90 MMHG | WEIGHT: 315 LBS | SYSTOLIC BLOOD PRESSURE: 150 MMHG | HEIGHT: 73 IN | HEART RATE: 90 BPM

## 2018-12-13 DIAGNOSIS — M21.40 PES PLANUS, UNSPECIFIED LATERALITY: ICD-10-CM

## 2018-12-13 DIAGNOSIS — Z99.2 TYPE 2 DIABETES MELLITUS WITH CHRONIC KIDNEY DISEASE ON CHRONIC DIALYSIS, WITH LONG-TERM CURRENT USE OF INSULIN: ICD-10-CM

## 2018-12-13 DIAGNOSIS — I47.10 SVT (SUPRAVENTRICULAR TACHYCARDIA): ICD-10-CM

## 2018-12-13 DIAGNOSIS — M17.0 OSTEOARTHRITIS OF BOTH KNEES, UNSPECIFIED OSTEOARTHRITIS TYPE: ICD-10-CM

## 2018-12-13 DIAGNOSIS — E11.65 TYPE 2 DIABETES MELLITUS WITH HYPERGLYCEMIA, WITH LONG-TERM CURRENT USE OF INSULIN: ICD-10-CM

## 2018-12-13 DIAGNOSIS — N18.6 ESRD ON HEMODIALYSIS: ICD-10-CM

## 2018-12-13 DIAGNOSIS — E11.59 HYPERTENSION ASSOCIATED WITH DIABETES: Primary | ICD-10-CM

## 2018-12-13 DIAGNOSIS — Z79.4 TYPE 2 DIABETES MELLITUS WITH CHRONIC KIDNEY DISEASE ON CHRONIC DIALYSIS, WITH LONG-TERM CURRENT USE OF INSULIN: ICD-10-CM

## 2018-12-13 DIAGNOSIS — Z99.2 ESRD ON HEMODIALYSIS: ICD-10-CM

## 2018-12-13 DIAGNOSIS — Z79.4 TYPE 2 DIABETES MELLITUS WITH HYPERGLYCEMIA, WITH LONG-TERM CURRENT USE OF INSULIN: ICD-10-CM

## 2018-12-13 DIAGNOSIS — Z23 NEED FOR DIPHTHERIA-TETANUS-PERTUSSIS (TDAP) VACCINE: ICD-10-CM

## 2018-12-13 DIAGNOSIS — I15.2 HYPERTENSION ASSOCIATED WITH DIABETES: Primary | ICD-10-CM

## 2018-12-13 DIAGNOSIS — I15.0 RENOVASCULAR HYPERTENSION: ICD-10-CM

## 2018-12-13 DIAGNOSIS — N18.6 TYPE 2 DIABETES MELLITUS WITH CHRONIC KIDNEY DISEASE ON CHRONIC DIALYSIS, WITH LONG-TERM CURRENT USE OF INSULIN: ICD-10-CM

## 2018-12-13 DIAGNOSIS — E66.01 MORBID OBESITY WITH BMI OF 40.0-44.9, ADULT: ICD-10-CM

## 2018-12-13 DIAGNOSIS — E11.22 TYPE 2 DIABETES MELLITUS WITH CHRONIC KIDNEY DISEASE ON CHRONIC DIALYSIS, WITH LONG-TERM CURRENT USE OF INSULIN: ICD-10-CM

## 2018-12-13 LAB
CHOLEST SERPL-MCNC: 172 MG/DL
CHOLEST/HDLC SERPL: 6.4 {RATIO}
HDLC SERPL-MCNC: 27 MG/DL
HDLC SERPL: 15.7 %
LDLC SERPL CALC-MCNC: 100.8 MG/DL
NONHDLC SERPL-MCNC: 145 MG/DL
TRIGL SERPL-MCNC: 221 MG/DL

## 2018-12-13 PROCEDURE — 99999 PR PBB SHADOW E&M-EST. PATIENT-LVL III: CPT | Mod: PBBFAC,,, | Performed by: INTERNAL MEDICINE

## 2018-12-13 PROCEDURE — 99214 OFFICE O/P EST MOD 30 MIN: CPT | Mod: S$GLB,,, | Performed by: INTERNAL MEDICINE

## 2018-12-13 PROCEDURE — 80061 LIPID PANEL: CPT

## 2018-12-13 PROCEDURE — 36415 COLL VENOUS BLD VENIPUNCTURE: CPT

## 2018-12-13 PROCEDURE — 3008F BODY MASS INDEX DOCD: CPT | Mod: CPTII,S$GLB,, | Performed by: INTERNAL MEDICINE

## 2018-12-13 PROCEDURE — 3044F HG A1C LEVEL LT 7.0%: CPT | Mod: CPTII,S$GLB,, | Performed by: INTERNAL MEDICINE

## 2018-12-13 RX ORDER — OLMESARTAN MEDOXOMIL 40 MG/1
40 TABLET ORAL DAILY
Qty: 90 TABLET | Refills: 3 | Status: SHIPPED | OUTPATIENT
Start: 2018-12-13 | End: 2019-06-13

## 2018-12-13 RX ORDER — DEXTROSE 4 G
TABLET,CHEWABLE ORAL
Qty: 1 EACH | Refills: 0 | Status: SHIPPED | OUTPATIENT
Start: 2018-12-13 | End: 2019-06-04

## 2018-12-13 NOTE — PROGRESS NOTES
Subjective:       Patient ID: Thai Santoyo Jr. is a 42 y.o. male.    Chief Complaint: Follow-up    HPI - Feels well.  Getting engaged for the first time this holiday season - very excited.  DM2 has been much better on trulicity, with a1c at dialysis below 7 also.  He's taking his antihypertensives, but BP was still 164/94 at my retake.  Needs to come off lisinopril, so will switch to something more potent.  Needs tdap.  Has knee pains for which he sees an outside provider and gets intermittent dilaudid.  No other narcotics.  Stable on dialysis.  Weight is down about 8 lbs on trulicity.  Wants a new meter.  Does not smoke cigarettes.    PMH:  ESRD on dialysis  DM2, improving control  HTN, at goal  SVT x2 in 2018  Gout, quiescent  Morbid Obesity  Knee pain on chronic narcotics per outside physician     Meds:  Reviewed and reconciled in EPIC with patient during visit today.     Review of Systems   Constitutional: Negative for fever.   HENT: Negative for congestion.    Respiratory: Negative for shortness of breath.    Cardiovascular: Negative for chest pain.   Gastrointestinal: Negative for abdominal pain.   Genitourinary: Negative for difficulty urinating.   Musculoskeletal: Positive for arthralgias.   Skin: Negative for rash.   Neurological: Negative for headaches.   Psychiatric/Behavioral: Negative for sleep disturbance.       Objective:      Physical Exam   Constitutional: He is oriented to person, place, and time. He appears well-developed and well-nourished. No distress.   HENT:   Head: Normocephalic and atraumatic.   Cardiovascular: Normal rate, regular rhythm and normal heart sounds. Exam reveals no gallop and no friction rub.   No murmur heard.  Pulses:       Dorsalis pedis pulses are 2+ on the right side, and 2+ on the left side.   Pulmonary/Chest: No respiratory distress. He has no wheezes. He has no rales. He exhibits no tenderness.   Musculoskeletal:        Right foot: There is deformity (bilateral pes  planus). There is normal range of motion.        Left foot: There is deformity. There is normal range of motion.   Feet:   Right Foot:   Protective Sensation: 4 sites tested. 4 sites sensed.   Skin Integrity: Negative for ulcer, blister or skin breakdown.   Left Foot:   Protective Sensation: 4 sites tested. 4 sites sensed.   Skin Integrity: Negative for ulcer, blister or skin breakdown.   Neurological: He is alert and oriented to person, place, and time.   Skin: Skin is warm. He is not diaphoretic. No erythema.   Psychiatric: He has a normal mood and affect. Thought content normal.   Nursing note and vitals reviewed.      Assessment:       1. Hypertension associated with diabetes    2. Renovascular hypertension    3. Type 2 diabetes mellitus with chronic kidney disease on chronic dialysis, with long-term current use of insulin    4. ESRD on hemodialysis    5. Morbid obesity with BMI of 40.0-44.9, adult    6. Osteoarthritis of both knees, unspecified osteoarthritis type    7. Need for diphtheria-tetanus-pertussis (Tdap) vaccine    8. Pes planus, unspecified laterality        Plan:       Thai was seen today for follow-up.    Diagnoses and all orders for this visit:    Hypertension associated with diabetes - not at goal.  Changing off ACEI to benicar  -     olmesartan (BENICAR) 40 MG tablet; Take 1 tablet (40 mg total) by mouth once daily.    Renovascular hypertension  -     olmesartan (BENICAR) 40 MG tablet; Take 1 tablet (40 mg total) by mouth once daily.    Type 2 diabetes mellitus with chronic kidney disease on chronic dialysis, with long-term current use of insulin - at goal, stay the course  -     blood-glucose meter kit; To check BG 3 times daily, to use with insurance preferred meter    ESRD on hemodialysis - stable.    Morbid obesity with BMI of 40.0-44.9, adult - improving, but not fast enough.  Encouraged attention to this    Osteoarthritis of both knees, unspecified osteoarthritis type - unstable, seeing  ortho.  Discussed relationship of flat feet to this    Need for diphtheria-tetanus-pertussis (Tdap) vaccine    Pes planus, unspecified laterality    rtc prn, or in 3 months    G Anais Mckee MD MPH  Staff Internist

## 2018-12-14 ENCOUNTER — TELEPHONE (OUTPATIENT)
Dept: INTERNAL MEDICINE | Facility: CLINIC | Age: 43
End: 2018-12-14

## 2018-12-14 DIAGNOSIS — N18.6 TYPE 2 DIABETES MELLITUS WITH CHRONIC KIDNEY DISEASE ON CHRONIC DIALYSIS, WITH LONG-TERM CURRENT USE OF INSULIN: Primary | ICD-10-CM

## 2018-12-14 DIAGNOSIS — Z79.4 TYPE 2 DIABETES MELLITUS WITH CHRONIC KIDNEY DISEASE ON CHRONIC DIALYSIS, WITH LONG-TERM CURRENT USE OF INSULIN: Primary | ICD-10-CM

## 2018-12-14 DIAGNOSIS — E11.22 TYPE 2 DIABETES MELLITUS WITH CHRONIC KIDNEY DISEASE ON CHRONIC DIALYSIS, WITH LONG-TERM CURRENT USE OF INSULIN: Primary | ICD-10-CM

## 2018-12-14 DIAGNOSIS — Z99.2 TYPE 2 DIABETES MELLITUS WITH CHRONIC KIDNEY DISEASE ON CHRONIC DIALYSIS, WITH LONG-TERM CURRENT USE OF INSULIN: Primary | ICD-10-CM

## 2018-12-14 NOTE — TELEPHONE ENCOUNTER
----- Message from Belle Edouard, Shantell sent at 12/14/2018  9:21 AM CST -----  Regarding: Diabetic test strips to go to [facility]  Contact: [phone]  Patient must get diabetic test strips through Medicare Part B. [facility] pharmacies cannot bill part B. Patient is requesting to have script sent to [facility] at [address] [phone]. We gave him a free meter: Contour Next. Pleas send over the Contour Next test strips to [facility]. Let me know if you have questions.     Thanks!  Harley

## 2019-04-29 DIAGNOSIS — R00.0 TACHYCARDIA: ICD-10-CM

## 2019-04-29 DIAGNOSIS — G47.00 INSOMNIA, UNSPECIFIED TYPE: ICD-10-CM

## 2019-04-29 DIAGNOSIS — I15.0 RENOVASCULAR HYPERTENSION: ICD-10-CM

## 2019-04-29 RX ORDER — TRAZODONE HYDROCHLORIDE 50 MG/1
TABLET ORAL
Qty: 90 TABLET | Refills: 3 | Status: SHIPPED | OUTPATIENT
Start: 2019-04-29 | End: 2020-04-30

## 2019-04-29 RX ORDER — CARVEDILOL 25 MG/1
TABLET ORAL
Qty: 180 TABLET | Refills: 3 | Status: SHIPPED | OUTPATIENT
Start: 2019-04-29 | End: 2020-05-01

## 2019-06-04 DIAGNOSIS — E11.22 TYPE 2 DIABETES MELLITUS WITH CHRONIC KIDNEY DISEASE ON CHRONIC DIALYSIS, WITH LONG-TERM CURRENT USE OF INSULIN: ICD-10-CM

## 2019-06-04 DIAGNOSIS — Z99.2 TYPE 2 DIABETES MELLITUS WITH CHRONIC KIDNEY DISEASE ON CHRONIC DIALYSIS, WITH LONG-TERM CURRENT USE OF INSULIN: ICD-10-CM

## 2019-06-04 DIAGNOSIS — N18.6 TYPE 2 DIABETES MELLITUS WITH CHRONIC KIDNEY DISEASE ON CHRONIC DIALYSIS, WITH LONG-TERM CURRENT USE OF INSULIN: ICD-10-CM

## 2019-06-04 DIAGNOSIS — Z79.4 TYPE 2 DIABETES MELLITUS WITH CHRONIC KIDNEY DISEASE ON CHRONIC DIALYSIS, WITH LONG-TERM CURRENT USE OF INSULIN: ICD-10-CM

## 2019-06-04 RX ORDER — DEXTROSE 4 G
TABLET,CHEWABLE ORAL
Qty: 1 EACH | Refills: 0 | Status: SHIPPED | OUTPATIENT
Start: 2019-06-04 | End: 2021-04-19 | Stop reason: SDUPTHER

## 2019-06-10 DIAGNOSIS — N18.6 TYPE 2 DIABETES MELLITUS WITH CHRONIC KIDNEY DISEASE ON CHRONIC DIALYSIS, WITH LONG-TERM CURRENT USE OF INSULIN: ICD-10-CM

## 2019-06-10 DIAGNOSIS — E11.22 TYPE 2 DIABETES MELLITUS WITH CHRONIC KIDNEY DISEASE ON CHRONIC DIALYSIS, WITH LONG-TERM CURRENT USE OF INSULIN: ICD-10-CM

## 2019-06-10 DIAGNOSIS — Z99.2 TYPE 2 DIABETES MELLITUS WITH CHRONIC KIDNEY DISEASE ON CHRONIC DIALYSIS, WITH LONG-TERM CURRENT USE OF INSULIN: ICD-10-CM

## 2019-06-10 DIAGNOSIS — Z79.4 TYPE 2 DIABETES MELLITUS WITH CHRONIC KIDNEY DISEASE ON CHRONIC DIALYSIS, WITH LONG-TERM CURRENT USE OF INSULIN: ICD-10-CM

## 2019-06-13 ENCOUNTER — OFFICE VISIT (OUTPATIENT)
Dept: INTERNAL MEDICINE | Facility: CLINIC | Age: 44
End: 2019-06-13
Payer: MEDICARE

## 2019-06-13 ENCOUNTER — LAB VISIT (OUTPATIENT)
Dept: LAB | Facility: HOSPITAL | Age: 44
End: 2019-06-13
Attending: INTERNAL MEDICINE
Payer: MEDICARE

## 2019-06-13 VITALS
HEIGHT: 73 IN | WEIGHT: 315 LBS | OXYGEN SATURATION: 97 % | DIASTOLIC BLOOD PRESSURE: 100 MMHG | BODY MASS INDEX: 41.75 KG/M2 | HEART RATE: 101 BPM | SYSTOLIC BLOOD PRESSURE: 160 MMHG

## 2019-06-13 DIAGNOSIS — G47.9 SLEEP DISTURBANCE: ICD-10-CM

## 2019-06-13 DIAGNOSIS — Z99.2 TYPE 2 DIABETES MELLITUS WITH CHRONIC KIDNEY DISEASE ON CHRONIC DIALYSIS, WITH LONG-TERM CURRENT USE OF INSULIN: ICD-10-CM

## 2019-06-13 DIAGNOSIS — Z99.2 ESRD ON HEMODIALYSIS: ICD-10-CM

## 2019-06-13 DIAGNOSIS — N18.6 TYPE 2 DIABETES MELLITUS WITH CHRONIC KIDNEY DISEASE ON CHRONIC DIALYSIS, WITH LONG-TERM CURRENT USE OF INSULIN: ICD-10-CM

## 2019-06-13 DIAGNOSIS — E11.59 HYPERTENSION ASSOCIATED WITH DIABETES: Primary | ICD-10-CM

## 2019-06-13 DIAGNOSIS — Z79.4 TYPE 2 DIABETES MELLITUS WITH CHRONIC KIDNEY DISEASE ON CHRONIC DIALYSIS, WITH LONG-TERM CURRENT USE OF INSULIN: ICD-10-CM

## 2019-06-13 DIAGNOSIS — I15.2 HYPERTENSION ASSOCIATED WITH DIABETES: Primary | ICD-10-CM

## 2019-06-13 DIAGNOSIS — I15.0 RENOVASCULAR HYPERTENSION: ICD-10-CM

## 2019-06-13 DIAGNOSIS — N25.81 HYPERPARATHYROIDISM, SECONDARY RENAL: ICD-10-CM

## 2019-06-13 DIAGNOSIS — I47.10 SVT (SUPRAVENTRICULAR TACHYCARDIA): ICD-10-CM

## 2019-06-13 DIAGNOSIS — F51.12 INSUFFICIENT SLEEP SYNDROME: ICD-10-CM

## 2019-06-13 DIAGNOSIS — E66.01 MORBID OBESITY WITH BMI OF 40.0-44.9, ADULT: ICD-10-CM

## 2019-06-13 DIAGNOSIS — N18.6 ESRD ON HEMODIALYSIS: ICD-10-CM

## 2019-06-13 DIAGNOSIS — E11.22 TYPE 2 DIABETES MELLITUS WITH CHRONIC KIDNEY DISEASE ON CHRONIC DIALYSIS, WITH LONG-TERM CURRENT USE OF INSULIN: ICD-10-CM

## 2019-06-13 LAB
ALBUMIN SERPL BCP-MCNC: 3.8 G/DL (ref 3.5–5.2)
ALP SERPL-CCNC: 66 U/L (ref 55–135)
ALT SERPL W/O P-5'-P-CCNC: 20 U/L (ref 10–44)
ANION GAP SERPL CALC-SCNC: 13 MMOL/L (ref 8–16)
AST SERPL-CCNC: 19 U/L (ref 10–40)
BILIRUB SERPL-MCNC: 0.4 MG/DL (ref 0.1–1)
BUN SERPL-MCNC: 49 MG/DL (ref 6–20)
CALCIUM SERPL-MCNC: 11.6 MG/DL (ref 8.7–10.5)
CHLORIDE SERPL-SCNC: 99 MMOL/L (ref 95–110)
CHOLEST SERPL-MCNC: 234 MG/DL (ref 120–199)
CHOLEST/HDLC SERPL: 8.1 {RATIO} (ref 2–5)
CO2 SERPL-SCNC: 23 MMOL/L (ref 23–29)
CREAT SERPL-MCNC: 11.9 MG/DL (ref 0.5–1.4)
EST. GFR  (AFRICAN AMERICAN): 5 ML/MIN/1.73 M^2
EST. GFR  (NON AFRICAN AMERICAN): 5 ML/MIN/1.73 M^2
GLUCOSE SERPL-MCNC: 175 MG/DL (ref 70–110)
HDLC SERPL-MCNC: 29 MG/DL (ref 40–75)
HDLC SERPL: 12.4 % (ref 20–50)
LDLC SERPL CALC-MCNC: 128.8 MG/DL (ref 63–159)
NONHDLC SERPL-MCNC: 205 MG/DL
POTASSIUM SERPL-SCNC: 4.7 MMOL/L (ref 3.5–5.1)
PROT SERPL-MCNC: 8.4 G/DL (ref 6–8.4)
SODIUM SERPL-SCNC: 135 MMOL/L (ref 136–145)
TRIGL SERPL-MCNC: 381 MG/DL (ref 30–150)

## 2019-06-13 PROCEDURE — 99999 PR PBB SHADOW E&M-EST. PATIENT-LVL III: CPT | Mod: PBBFAC,,, | Performed by: INTERNAL MEDICINE

## 2019-06-13 PROCEDURE — 99999 PR PBB SHADOW E&M-EST. PATIENT-LVL III: ICD-10-PCS | Mod: PBBFAC,,, | Performed by: INTERNAL MEDICINE

## 2019-06-13 PROCEDURE — 80061 LIPID PANEL: CPT

## 2019-06-13 PROCEDURE — 80053 COMPREHEN METABOLIC PANEL: CPT

## 2019-06-13 PROCEDURE — 36415 COLL VENOUS BLD VENIPUNCTURE: CPT

## 2019-06-13 PROCEDURE — 3044F PR MOST RECENT HEMOGLOBIN A1C LEVEL <7.0%: ICD-10-PCS | Mod: CPTII,S$GLB,, | Performed by: INTERNAL MEDICINE

## 2019-06-13 PROCEDURE — 3008F PR BODY MASS INDEX (BMI) DOCUMENTED: ICD-10-PCS | Mod: CPTII,S$GLB,, | Performed by: INTERNAL MEDICINE

## 2019-06-13 PROCEDURE — 99499 UNLISTED E&M SERVICE: CPT | Mod: S$PBB,,, | Performed by: INTERNAL MEDICINE

## 2019-06-13 PROCEDURE — 83036 HEMOGLOBIN GLYCOSYLATED A1C: CPT

## 2019-06-13 PROCEDURE — 99499 RISK ADDL DX/OHS AUDIT: ICD-10-PCS | Mod: S$PBB,,, | Performed by: INTERNAL MEDICINE

## 2019-06-13 PROCEDURE — 99214 PR OFFICE/OUTPT VISIT, EST, LEVL IV, 30-39 MIN: ICD-10-PCS | Mod: S$GLB,,, | Performed by: INTERNAL MEDICINE

## 2019-06-13 PROCEDURE — 3044F HG A1C LEVEL LT 7.0%: CPT | Mod: CPTII,S$GLB,, | Performed by: INTERNAL MEDICINE

## 2019-06-13 PROCEDURE — 99214 OFFICE O/P EST MOD 30 MIN: CPT | Mod: S$GLB,,, | Performed by: INTERNAL MEDICINE

## 2019-06-13 PROCEDURE — 3008F BODY MASS INDEX DOCD: CPT | Mod: CPTII,S$GLB,, | Performed by: INTERNAL MEDICINE

## 2019-06-13 RX ORDER — OLMESARTAN MEDOXOMIL AND HYDROCHLOROTHIAZIDE 40/25 40; 25 MG/1; MG/1
1 TABLET ORAL DAILY
Qty: 90 TABLET | Refills: 3 | Status: SHIPPED | OUTPATIENT
Start: 2019-06-13 | End: 2020-07-14 | Stop reason: SDUPTHER

## 2019-06-13 NOTE — PROGRESS NOTES
"Subjective:       Patient ID: Thai Santoyo Jr. is a 43 y.o. male.    Chief Complaint: Follow-up (8 month f/u)    HPI - last 4 bp's elevated in chart, including today.  Says BP at dialysis is OK, though.  Last a1c was 6.5, which is terrific, given his history of poor control.  He's having "side effects" to trulicity - some nausea, diarrhea, cough.  Not sure I'd blame trulicity.  He wants to continue on it.  He's out of strips.  Not working on weight loss.  Newly engaged, with wedding coming up soon.  He's not a smoker    PMH:  ESRD on dialysis  DM2, at goal  HTN, poor control  SVT x2 in 2018  Gout, quiescent  Morbid Obesity  Knee pain now off narcotics; quiescent for the most part     Meds:  Reviewed and reconciled in EPIC with patient during visit today.     Review of Systems   Constitutional: Negative for fever.   HENT: Negative for congestion.    Respiratory: Positive for cough. Negative for shortness of breath.    Cardiovascular: Negative for chest pain.   Gastrointestinal: Positive for abdominal pain and diarrhea.   Genitourinary: Negative for difficulty urinating.   Musculoskeletal: Negative for arthralgias.   Skin: Negative for rash.   Neurological: Negative for headaches.   Psychiatric/Behavioral: Positive for sleep disturbance (snores loudly, stops breathing while asleep).       Objective:      Physical Exam   Constitutional: He is oriented to person, place, and time. He appears well-developed and well-nourished. No distress.   Very friendly, large man in no distress today   HENT:   Head: Normocephalic and atraumatic.   Cardiovascular: Normal rate, regular rhythm and normal heart sounds. Exam reveals no gallop and no friction rub.   No murmur heard.  Pulmonary/Chest: No respiratory distress. He has no wheezes. He has no rales. He exhibits no tenderness.   Neurological: He is alert and oriented to person, place, and time.   Skin: Skin is warm. He is not diaphoretic. No erythema.   Psychiatric: He has a normal " mood and affect. Thought content normal.   Nursing note and vitals reviewed.      Assessment:       1. Hypertension associated with diabetes    2. Renovascular hypertension    3. Morbid obesity with BMI of 40.0-44.9, adult    4. Hyperparathyroidism, secondary renal    5. SVT (supraventricular tachycardia)    6. ESRD on hemodialysis    7. Type 2 diabetes mellitus with chronic kidney disease on chronic dialysis, with long-term current use of insulin        Plan:       Thai was seen today for follow-up.    Diagnoses and all orders for this visit:    Hypertension associated with diabetes - not at gol, adding in hctz - he makes urine despite ESRD  -     olmesartan-hydrochlorothiazide (BENICAR HCT) 40-25 mg per tablet; Take 1 tablet by mouth once daily.    Renovascular hypertension  -     olmesartan-hydrochlorothiazide (BENICAR HCT) 40-25 mg per tablet; Take 1 tablet by mouth once daily.  -     Lipid panel; Future    Morbid obesity with BMI of 40.0-44.9, adult - encouraged weight loss  -     Home Sleep Studies; Future    Hyperparathyroidism, secondary renal - stable on treatment    SVT (supraventricular tachycardia) - stable, no recurrence    ESRD on hemodialysis - stable at dialysis tiw    Type 2 diabetes mellitus with chronic kidney disease on chronic dialysis, with long-term current use of insulin - at goal, doing labs.  Needs strips  -     Hemoglobin A1c; Future  -     Comprehensive metabolic panel; Future  -     Diabetic Eye Screening Photo; Future  -     blood sugar diagnostic Strp; Use one strip each time to check blood sugar three times daily.    Sleep disturbance - new complaint to me.  Ordering a sleep study  -     Home Sleep Studies; Future    Insufficient sleep syndrome   -     Home Sleep Studies; Future    rtc prn, or in 3 months    CHARLES Mckee MD MPH  Staff Internist

## 2019-06-14 ENCOUNTER — PATIENT MESSAGE (OUTPATIENT)
Dept: INTERNAL MEDICINE | Facility: CLINIC | Age: 44
End: 2019-06-14

## 2019-06-14 DIAGNOSIS — E11.69 MIXED HYPERLIPIDEMIA DUE TO TYPE 2 DIABETES MELLITUS: Primary | ICD-10-CM

## 2019-06-14 DIAGNOSIS — E78.2 MIXED HYPERLIPIDEMIA DUE TO TYPE 2 DIABETES MELLITUS: Primary | ICD-10-CM

## 2019-06-14 LAB
ESTIMATED AVG GLUCOSE: 163 MG/DL (ref 68–131)
HBA1C MFR BLD HPLC: 7.3 % (ref 4–5.6)

## 2019-06-14 RX ORDER — ROSUVASTATIN CALCIUM 10 MG/1
10 TABLET, COATED ORAL DAILY
Qty: 90 TABLET | Refills: 3 | Status: SHIPPED | OUTPATIENT
Start: 2019-06-14 | End: 2020-07-14 | Stop reason: SDUPTHER

## 2019-07-15 ENCOUNTER — PATIENT OUTREACH (OUTPATIENT)
Dept: ADMINISTRATIVE | Facility: OTHER | Age: 44
End: 2019-07-15

## 2019-07-17 ENCOUNTER — OFFICE VISIT (OUTPATIENT)
Dept: SLEEP MEDICINE | Facility: CLINIC | Age: 44
End: 2019-07-17
Payer: MEDICARE

## 2019-07-17 VITALS
HEART RATE: 105 BPM | SYSTOLIC BLOOD PRESSURE: 167 MMHG | BODY MASS INDEX: 41.75 KG/M2 | WEIGHT: 315 LBS | HEIGHT: 73 IN | DIASTOLIC BLOOD PRESSURE: 96 MMHG

## 2019-07-17 DIAGNOSIS — G47.30 SLEEP APNEA, UNSPECIFIED TYPE: Primary | ICD-10-CM

## 2019-07-17 DIAGNOSIS — E66.01 MORBID OBESITY WITH BMI OF 40.0-44.9, ADULT: ICD-10-CM

## 2019-07-17 PROCEDURE — 99999 PR PBB SHADOW E&M-EST. PATIENT-LVL IV: CPT | Mod: PBBFAC,,, | Performed by: NURSE PRACTITIONER

## 2019-07-17 PROCEDURE — 3008F BODY MASS INDEX DOCD: CPT | Mod: CPTII,S$GLB,, | Performed by: NURSE PRACTITIONER

## 2019-07-17 PROCEDURE — 3008F PR BODY MASS INDEX (BMI) DOCUMENTED: ICD-10-PCS | Mod: CPTII,S$GLB,, | Performed by: NURSE PRACTITIONER

## 2019-07-17 PROCEDURE — 99999 PR PBB SHADOW E&M-EST. PATIENT-LVL IV: ICD-10-PCS | Mod: PBBFAC,,, | Performed by: NURSE PRACTITIONER

## 2019-07-17 PROCEDURE — 99203 OFFICE O/P NEW LOW 30 MIN: CPT | Mod: S$GLB,,, | Performed by: NURSE PRACTITIONER

## 2019-07-17 PROCEDURE — 99203 PR OFFICE/OUTPT VISIT, NEW, LEVL III, 30-44 MIN: ICD-10-PCS | Mod: S$GLB,,, | Performed by: NURSE PRACTITIONER

## 2019-07-17 NOTE — PATIENT INSTRUCTIONS
Keon or Ena will contact you to schedule your sleep study. Their number is [phone] (ext 2). The [facility] Lab is located on 7th floor of the [facility].    If you have not been contacted regarding scheduling your sleep test after one week from today, please notify me via [facility] or by phone by calling [phone] (ext 1).     We will call you when the sleep study results are ready - if you have not heard from us by 2 weeks from the date of the study, please call [phone] (ext 1).    You are advised to abstain from driving should you feel sleepy or drowsy.

## 2019-07-17 NOTE — LETTER
July 17, 2019      Manjit Mckee II, MD  [address]  [facility] [address]           [facility] [place] [address] [facility] [address]  [address]  [facility] [address]  Phone: [phone]          Patient: Thai Santoyo Jr.   MR Number: [id number]   YOB: 1975   Date of Visit: 7/17/2019       Dear Dr. Manjit Mckee II:    Thank you for referring Thai Santoyo to me for evaluation. Attached you will find relevant portions of my assessment and plan of care.    If you have questions, please do not hesitate to call me. I look forward to following Thai Santoyo along with you.    Sincerely,    Wesly Box NP    Enclosure  CC:  No Recipients    If you would like to receive this communication electronically, please contact [email or web address] or [phone] to request more information on [employer] (power[employer]) Link access.    For providers and/or their staff who would like to refer a patient to [facility], please contact us through our one-stop-shop provider referral line, [facility] [place], at [phone].    If you feel you have received this communication in error or would no longer like to receive these types of communications, please e-mail [email or web address]

## 2019-07-17 NOTE — PROGRESS NOTES
"Thai Santoyo  was seen as a new patient at the request of Manjit Mckee II, MD for the evaluation of obstructive sleep apnea.    CHIEF COMPLAINT:  Snoring    07/17/2019 KIM Box NP: Initial HISTORY OF PRESENT ILLNESS: Thai Santoyo Jr. is a 43 y.o. male is here for sleep evaluation.       PCP ordered HST due to complaints of loud snoring and witnessed apneas  "I actually requested this evaluation"  For years have been told about disruptive snoring and witnessed apneas, but didn't believe it because he didn't hear himself  Now concerned because he has been waking up coughing and gasping for air     Denies symptoms of restless legs or kicking during sleep.    Occupation: school gabi, starts work at 8 am     [place] Sleepiness Scale score during initial sleep evaluation was 7.    SLEEP ROUTINE:    Bed partner: fiance   Time to bed: 11 pm  Sleep onset latency:  20 - 30 minutes   Disruptions or awakenings:  1 - 2, bathroom, not difficult to fall back asleep  Wakeup time:   5:30 am  Perceived sleep quality:  Fair            PAST MEDICAL HISTORY:    Active Ambulatory Problems     Diagnosis Date Noted    Morbid obesity with BMI of 40.0-44.9, adult 09/12/2012    ESRD on hemodialysis 08/12/2013    Type 2 diabetes mellitus with chronic kidney disease on chronic dialysis, with long-term current use of insulin 08/12/2013    Chronic gout 09/19/2013    Renovascular hypertension 12/28/2013    Anemia in end-stage renal disease 12/28/2013    Hypertension associated with diabetes 08/21/2014    Supraventricular tachycardia 02/20/2015    Osteoarthritis of both knees 12/03/2015    Electrolyte imbalance 11/13/2016    Hyperparathyroidism, secondary renal 05/11/2017    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 associated with type 2 diabetes mellitus 05/21/2018     Resolved Ambulatory Problems     Diagnosis Date Noted    Acute gouty arthropathy 03/23/2012    Chronic kidney disease, stage " IV (severe) 01/13/2011    Type II or unspecified type diabetes mellitus with renal manifestations, uncontrolled(250.42) 04/11/2012    Hypertension 09/12/2012    Sepsis(995.91) 08/12/2013    Infection, dialysis vascular access 08/12/2013    ESRD (end stage renal disease) 08/12/2013    HHNC (hyperglycemic hyperosmolar nonketotic coma) 08/12/2013    Acute gouty arthropathy 08/12/2013    Septicemia due to Staphylococcus epidermidis 08/13/2013    Hypokalemia 08/13/2013    Line sepsis 08/15/2013    Hand pain, right 08/20/2013    Wrist pain, right 08/20/2013    Fever 09/04/2013    ESRD on dialysis 09/16/2013    Anemia 10/08/2013    Sepsis 10/16/2013    Line sepsis 10/17/2013    Malfunction of arteriovenous dialysis fistula 10/29/2013    Central venous line infection 12/27/2013    Nausea and vomiting 12/28/2013    Sepsis 12/28/2013    SVT (supraventricular tachycardia) 10/05/2018     Past Medical History:   Diagnosis Date    Acute gouty arthropathy 8/12/2013    Arthritis     Chronic kidney disease, stage IV (severe) 1/13/2011    Diabetes mellitus type II     Dialysis patient     DM (diabetes mellitus) type II uncontrolled with renal manifestation 8/12/2013    ESRD on hemodialysis 8/12/2013    Hypertension 9/12/2012    Line sepsis 8/15/2013    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 associated with type 2 diabetes mellitus 5/21/2018    Morbid obesity 9/12/2012    SVT (supraventricular tachycardia)                 PAST SURGICAL HISTORY:    Past Surgical History:   Procedure Laterality Date    ABLATION N/A 10/5/2018    Performed by [facility]den [facility]ana MD at [facility] CATH LAB    ANGIOGRAM, UPPER EXTREMITY Left 12/30/2013    Performed by EVERTON Noble III, MD at [facility] CATH LAB    [id number]-[id number]-PN Left 9/16/2013    Performed by EVERTON Noble III, MD at [facility] OR 2ND FLR    DIALYSIS FISTULA CREATION      INSERTION-CENTRAL LINE Left 10/21/2013    Performed by EVERTON  JUAREZ Noble III, MD at [facility] OR 2ND FLR    KNEE ARTHROPLASTY      LIGATION, AV FISTULA Left 9/16/2013    Performed by EVERTON Noble III, MD at [facility] OR 2ND FLR    SHOULDER SURGERY      right    TRANSPOSITION Left 11/4/2013    Performed by EVERTON Noble III, MD at [facility] OR 2ND FLR    VASECTOMY           FAMILY HISTORY:                Family History   Problem Relation Age of Onset    Diabetes Mother     Kidney disease Mother         on dialysis    Hypertension Mother     Heart failure Mother     Hypertension Father     Kidney disease Paternal Uncle         dialysis    Kidney disease Paternal Uncle         dialysis    Eczema Sister     No Known Problems Son     No Known Problems Sister     No Known Problems Son     No Known Problems Son     Heart disease Maternal Grandfather     Melanoma Neg Hx     Psoriasis Neg Hx     Lupus Neg Hx     Acne Neg Hx        SOCIAL HISTORY:          Tobacco:   Social History     Tobacco Use   Smoking Status Former Smoker    Types: Cigars   Smokeless Tobacco Never Used   Tobacco Comment    rare       Alcohol use:    Social History     Substance and Sexual Activity   Alcohol Use No                 ALLERGIES:  Review of patient's allergies indicates:  No Known Allergies    CURRENT MEDICATIONS:    Current Outpatient Medications   Medication Sig Dispense Refill    allopurinol (ZYLOPRIM) 100 MG tablet Take 1 tablet (100 mg total) by mouth once daily. 90 tablet 3    amLODIPine (NORVASC) 10 MG tablet TAKE ONE TABLET BY MOUTH ONCE DAILY 90 tablet 3    blood sugar diagnostic Strp Use one strip each time to check blood sugar three times daily. 300 strip 4    blood-glucose meter Misc use as directed 1 each 0    carvedilol (COREG) 25 MG tablet TAKE 1 TABLET BY MOUTH TWICE DAILY WITH MEALS 180 tablet 3    cyclobenzaprine (FLEXERIL) 10 MG tablet       dulaglutide (TRULICITY) 1.5 mg/0.5 mL PnIj Inject 1.5 mg into the skin every 7 days. 18 mL 3    furosemide (LASIX) 40 MG  "tablet TAKE ONE TABLET BY MOUTH ONCE DAILY 90 tablet 3    glipiZIDE (GLUCOTROL) 10 MG TR24 Take 1 tablet (10 mg total) by mouth daily with breakfast. (Patient taking differently: Take 20 mg by mouth daily with breakfast. ) 90 tablet 3    insulin (BASAGLAR KWIKPEN U-100 INSULIN) glargine 100 units/mL (3mL) SubQ pen Inject 45 Units into the skin 2 (two) times daily. 81 mL 3    insulin aspart U-100 (NOVOLOG FLEXPEN U-100 INSULIN) 100 unit/mL (3 mL) InPn pen Inject 40 Units into the skin 3 (three) times daily with meals. 120 mL 3    olmesartan-hydrochlorothiazide (BENICAR HCT) 40-25 mg per tablet Take 1 tablet by mouth once daily. 90 tablet 3    rosuvastatin (CRESTOR) 10 MG tablet Take 1 tablet (10 mg total) by mouth once daily. 90 tablet 3    sevelamer carbonate (RENVELA) 800 mg Tab Take 800 mg by mouth 3 (three) times daily with meals.      testosterone (ANDRODERM) 4 mg/24 hr PT24 Apply 1 patch (4 mg total) topically once daily. 90 patch 3    traZODone (DESYREL) 50 MG tablet TAKE 1 TABLET BY MOUTH NIGHTLY AS NEEDED FOR INSOMNIA 90 tablet 3     No current facility-administered medications for this visit.                   REVIEW OF SYSTEMS:     Sleep related symptoms as per HPI.  CONST:Denies weight gain    HEENT: Denies sinus congestion  PULM: Denies dyspnea  CARD:  Denies palpitations   GI:  Denies acid reflux  : Denies polyuria  NEURO: Denies headaches  PSYCH: Denies mood disturbance  HEME: Denies anemia    Otherwise, a balance of 10 systems reviewed is negative.          PHYSICAL EXAM:  BP (!) 167/96   Pulse 105   Ht 6' 1" (1.854 m)   Wt (!) 148.3 kg (326 lb 15.1 oz)   BMI 43.13 kg/m²   GENERAL: Obese development, well groomed  HEENT:  Conjunctivae are non-erythematous; Pupils equal, round, and reactive to light; Nose is symmetrical; Nasal mucosa is pink and moist; Septum is midline; Inferior turbinates are normal; Nasal airflow is normal; Posterior pharynx is pink; Modified Mallampati: 18.5; " Posterior palate is normal; Tonsils +1; Uvula is normal and pink;Tongue is normal; Dentition is fair; No TMJ tenderness; Jaw opening and protrusion without click and without discomfort.  NECK: Supple. Neck circumference is IV inches. No thyromegaly. No palpable nodes.    SKIN: On face and neck: No abrasions, no rashes, no lesions.  No subcutaneous nodules are palpable.  RESPIRATORY: Chest is clear to auscultation.  Normal chest expansion and non-labored breathing at rest.  CARDIOVASCULAR: Normal S1, S2.  No murmurs, gallops or rubs. No carotid bruits bilaterally.  EXTREMITIES: No edema. No clubbing. No cyanosis. Station normal. Gait normal.        NEURO/PSYCH: Oriented to time, place and person. Normal attention span and concentration. Affect is full. Mood is normal.                                              ASSESSMENT:    Sleep apnea, unspecified. The patient symptomatically has snoring, air gasping, witnessed apneas, and unrefreshing sleep with findings of crowded oral airway and elevated body mass index. Medical co-morbidities: DM2, HTN, ESRD on HD, and obesity.  This warrants further investigation for possible obstructive sleep apnea.      Obesity, BMI > 40, discussed relationship between CONRAD and weight     PLAN:    Rather than HST, order PSG due to pt co-morbidities and body habitus. Diagnostic: Polysomnogram. The nature of this procedure and its indication was discussed with the patient. Patient will be contacted after sleep study is done.  RTC to discuss results.     Education: During our discussion today, we talked about the etiology of obstructive sleep apnea as well as the potential ramifications of untreated sleep apnea, which could include daytime sleepiness, hypertension, heart disease and/or stroke. We discussed potential treatment options, which could include weight loss, body positioning, continuous positive airway pressure (CPAP), OA, EPAP, or referral for surgical consideration.     Precautions:  The patient was advised to abstain from driving should they feel sleepy  or drowsy.     Thank you for allowing me the opportunity to participate in the care of your patient.

## 2019-08-01 ENCOUNTER — TELEPHONE (OUTPATIENT)
Dept: SLEEP MEDICINE | Facility: OTHER | Age: 44
End: 2019-08-01

## 2019-08-20 ENCOUNTER — HOSPITAL ENCOUNTER (OUTPATIENT)
Dept: SLEEP MEDICINE | Facility: OTHER | Age: 44
Discharge: HOME | End: 2019-08-20
Payer: MEDICARE

## 2019-08-20 DIAGNOSIS — G47.30 SLEEP APNEA, UNSPECIFIED TYPE: ICD-10-CM

## 2019-08-20 DIAGNOSIS — G47.33 OSA (OBSTRUCTIVE SLEEP APNEA): ICD-10-CM

## 2019-08-20 PROCEDURE — 95811 POLYSOM 6/>YRS CPAP 4/> PARM: CPT

## 2019-08-20 PROCEDURE — 95811 PR POLYSOMNOGRAPHY W/CPAP: ICD-10-PCS | Mod: 26,,, | Performed by: PSYCHIATRY & NEUROLOGY

## 2019-08-20 PROCEDURE — 95811 POLYSOM 6/>YRS CPAP 4/> PARM: CPT | Mod: 26,,, | Performed by: PSYCHIATRY & NEUROLOGY

## 2019-08-21 NOTE — PROGRESS NOTES
A Split night study was preformed on Thai Santoyo on the night of 8/20/19. The procedure was explained to the patient, all questions were asked and answered prior to the start of the study. The patient did meet Medicare split night criteria.    EKG: Appeared to have shown NSR with PVC's    Mask used: F&P Simplus full face medium    Cpap range: 6-46rxA0B, Cflex 3 Humidity used    Bipap range: 19/14 to 21/14 cmH2O, Biflex 3, humidity used    Sleep disordered breathing events appeared to been noted. PLM's appeared with arousals and position changes. Snoring noted to be moderate to loud. All sleep stages appeared to have been reached. Lowest spo2 noted appeared to have been 70%. Cpap pressure started at 6cmH2O for pressure comfort. The patient appeared to have tolerated cpap, bipap and the mask fairly well, even though he did pull the mask off during the night. An end of the night instruction sheet was giving to the patient upon leaving the lab. The patient's response to cpap, bipap at the end of the night was that it's something to get use to wearing.

## 2019-08-23 ENCOUNTER — PATIENT MESSAGE (OUTPATIENT)
Dept: SLEEP MEDICINE | Facility: CLINIC | Age: 44
End: 2019-08-23

## 2019-08-23 ENCOUNTER — TELEPHONE (OUTPATIENT)
Dept: SLEEP MEDICINE | Facility: CLINIC | Age: 44
End: 2019-08-23

## 2019-08-23 NOTE — TELEPHONE ENCOUNTER
08/20/2019 Split  lb. The overall AHI was 107.1 with an oxygen mark of 66.0%.   Effective control of sleep disordered breathing was achieved with Bilevel PAP at 21/14 cm of water. CPAP was not effective at high pressures, so patient was switched to Bilevel PAP.       Please notify pt that sleep study results available. Results consistent with severe CONRAD, he may proceed with f/u appt to discuss results in detail including treatment options, or proceed with recommended BPAP treatment set up. Please let me know what patient prefers to do.

## 2019-08-23 NOTE — PROCEDURES
"Dear Referring Provider,    The sleep study that you ordered is complete. You have ordered sleep LAB services to perform the sleep study for Thai Santoyo Jr..     Please find Sleep Study result in "Chart Review" under the "Media tab."     As the ordering provider, you are responsible for reviewing the results and implementing a treatment plan with your patient. If you need a Sleep Medicine provider to explain the sleep study findings and arrange treatment for the patient, please refer patient for consultation to our Sleep Clinic via [facility] with Ambulatory Consult Sleep.    To do that please place an order for an "Ambulatory Consult Sleep" - it will go to our clinic work queue for our Medical Assistant to contact the patient for an appointment.     For any questions, please contact our clinic staff at [phone] to talk to clinical staff.      "

## 2019-08-23 NOTE — TELEPHONE ENCOUNTER
Pt would like for you to order machine. I did let him know that the DME will contact him regarding cost(If any)and set up. I also made sure to tell him that she should f/u in clinic 31-90 days after set up.

## 2019-09-05 ENCOUNTER — TELEPHONE (OUTPATIENT)
Dept: INTERNAL MEDICINE | Facility: CLINIC | Age: 44
End: 2019-09-05

## 2019-09-05 ENCOUNTER — TELEPHONE (OUTPATIENT)
Dept: SLEEP MEDICINE | Facility: CLINIC | Age: 44
End: 2019-09-05

## 2019-09-05 NOTE — TELEPHONE ENCOUNTER
----- Message from Shirley Rodriguez sent at 9/5/2019 12:03 PM CDT -----  Contact: LUCIO WINSTON   Name of Who is Calling: LUCIO WINSTON       What is the request in detail: Patient is requesting a call back. He states that he was checking the status of his sleep machine and was advised that an order was not placed for the machine. He was advised that he needs a BiPAP machine.       Can the clinic reply by [facility]: No      What Number to Call Back if not [facility]:  [phone]

## 2019-09-05 NOTE — TELEPHONE ENCOUNTER
Pt called stating that he called to check on the status of his CPAP machine but was told the there was no order for a machine. Please advise.

## 2019-09-11 ENCOUNTER — TELEPHONE (OUTPATIENT)
Dept: INTERNAL MEDICINE | Facility: CLINIC | Age: 44
End: 2019-09-11

## 2019-09-11 DIAGNOSIS — E11.22 TYPE 2 DIABETES MELLITUS WITH CHRONIC KIDNEY DISEASE ON CHRONIC DIALYSIS, WITH LONG-TERM CURRENT USE OF INSULIN: Primary | ICD-10-CM

## 2019-09-11 DIAGNOSIS — Z99.2 TYPE 2 DIABETES MELLITUS WITH CHRONIC KIDNEY DISEASE ON CHRONIC DIALYSIS, WITH LONG-TERM CURRENT USE OF INSULIN: Primary | ICD-10-CM

## 2019-09-11 DIAGNOSIS — N18.6 TYPE 2 DIABETES MELLITUS WITH CHRONIC KIDNEY DISEASE ON CHRONIC DIALYSIS, WITH LONG-TERM CURRENT USE OF INSULIN: Primary | ICD-10-CM

## 2019-09-11 DIAGNOSIS — Z79.4 TYPE 2 DIABETES MELLITUS WITH CHRONIC KIDNEY DISEASE ON CHRONIC DIALYSIS, WITH LONG-TERM CURRENT USE OF INSULIN: Primary | ICD-10-CM

## 2019-09-11 RX ORDER — PEN NEEDLE, DIABETIC 30 GX3/16"
NEEDLE, DISPOSABLE MISCELLANEOUS
Qty: 400 EACH | Refills: 3 | Status: SHIPPED | OUTPATIENT
Start: 2019-09-11 | End: 2021-08-16

## 2019-09-11 NOTE — TELEPHONE ENCOUNTER
----- Message from Narinder Oconnor, PharmD sent at 9/11/2019 10:09 AM CDT -----  Hello,    Patient is requesting pen needles for insulin, if appropriate can provider please send in or call in a New RX to [facility] at  Primary care?    Thanks,  Narinder Oconnor PharmD.    Any questions or concern please feel free to call [phone]

## 2019-09-12 ENCOUNTER — TELEPHONE (OUTPATIENT)
Dept: SLEEP MEDICINE | Facility: CLINIC | Age: 44
End: 2019-09-12

## 2019-09-12 NOTE — TELEPHONE ENCOUNTER
----- Message from Karlaterri Miramontes sent at 9/12/2019  2:01 PM CDT -----  Contact: Pt   Name of Who is Calling: LUCIO WINSTON JR. [[id number]]    What is the request in detail:LUCIO WINSTON JR. [[id number]] is requesting a call back regarding his machine pt doesn't know if its a cpap or Bipap.......Please contact to further discuss and advise      Can the clinic reply by [facility]: No     What Number to Call Back [facility]:  [phone]

## 2019-09-13 DIAGNOSIS — G47.33 OBSTRUCTIVE SLEEP APNEA: Primary | ICD-10-CM

## 2019-09-13 NOTE — TELEPHONE ENCOUNTER
Call him to let him know, msg already sent to you I believe, I ord'd the bipap today, didn't see previous order placed.

## 2019-10-03 RX ORDER — INSULIN GLARGINE 100 [IU]/ML
45 INJECTION, SOLUTION SUBCUTANEOUS 2 TIMES DAILY
Qty: 90 ML | Refills: 3 | Status: SHIPPED | OUTPATIENT
Start: 2019-10-03 | End: 2021-03-31 | Stop reason: SDUPTHER

## 2019-10-03 RX ORDER — INSULIN ASPART 100 [IU]/ML
40 INJECTION, SOLUTION INTRAVENOUS; SUBCUTANEOUS
Qty: 120 ML | Refills: 3 | Status: SHIPPED | OUTPATIENT
Start: 2019-10-03 | End: 2020-04-30

## 2019-10-03 RX ORDER — ALLOPURINOL 100 MG/1
100 TABLET ORAL DAILY
Qty: 90 TABLET | Refills: 3 | Status: SHIPPED | OUTPATIENT
Start: 2019-10-03 | End: 2021-03-31 | Stop reason: SDUPTHER

## 2019-11-24 ENCOUNTER — PATIENT OUTREACH (OUTPATIENT)
Dept: ADMINISTRATIVE | Facility: OTHER | Age: 44
End: 2019-11-24

## 2019-11-24 DIAGNOSIS — Z99.2 TYPE 2 DIABETES MELLITUS WITH CHRONIC KIDNEY DISEASE ON CHRONIC DIALYSIS, WITH LONG-TERM CURRENT USE OF INSULIN: Primary | ICD-10-CM

## 2019-11-24 DIAGNOSIS — N18.6 TYPE 2 DIABETES MELLITUS WITH CHRONIC KIDNEY DISEASE ON CHRONIC DIALYSIS, WITH LONG-TERM CURRENT USE OF INSULIN: Primary | ICD-10-CM

## 2019-11-24 DIAGNOSIS — Z79.4 TYPE 2 DIABETES MELLITUS WITH CHRONIC KIDNEY DISEASE ON CHRONIC DIALYSIS, WITH LONG-TERM CURRENT USE OF INSULIN: Primary | ICD-10-CM

## 2019-11-24 DIAGNOSIS — E11.22 TYPE 2 DIABETES MELLITUS WITH CHRONIC KIDNEY DISEASE ON CHRONIC DIALYSIS, WITH LONG-TERM CURRENT USE OF INSULIN: Primary | ICD-10-CM

## 2019-12-13 ENCOUNTER — TELEPHONE (OUTPATIENT)
Dept: SLEEP MEDICINE | Facility: CLINIC | Age: 44
End: 2019-12-13

## 2020-01-15 ENCOUNTER — PATIENT MESSAGE (OUTPATIENT)
Dept: UROLOGY | Facility: CLINIC | Age: 45
End: 2020-01-15

## 2020-01-16 ENCOUNTER — TELEPHONE (OUTPATIENT)
Dept: UROLOGY | Facility: CLINIC | Age: 45
End: 2020-01-16

## 2020-01-17 NOTE — TELEPHONE ENCOUNTER
Called pt again. Left detailed voicemail informing pt he needed to reschedule his appt with .  would not be here on Monday 1/20/20. His appointment would have to be rescheduled. Please call back to reschedule his appointment. Contact info left on voicemail.

## 2020-01-27 ENCOUNTER — PATIENT MESSAGE (OUTPATIENT)
Dept: SLEEP MEDICINE | Facility: CLINIC | Age: 45
End: 2020-01-27

## 2020-01-30 ENCOUNTER — PATIENT OUTREACH (OUTPATIENT)
Dept: ADMINISTRATIVE | Facility: OTHER | Age: 45
End: 2020-01-30

## 2020-02-05 ENCOUNTER — TELEPHONE (OUTPATIENT)
Dept: SLEEP MEDICINE | Facility: CLINIC | Age: 45
End: 2020-02-05

## 2020-02-11 ENCOUNTER — PATIENT OUTREACH (OUTPATIENT)
Dept: ADMINISTRATIVE | Facility: OTHER | Age: 45
End: 2020-02-11

## 2020-02-11 NOTE — PROGRESS NOTES
Chart reviewed.   Immunizations: Triggered Imm Registry     Orders placed: n/a  Upcoming appts to satisfy LORIN topics: n/a

## 2020-02-12 ENCOUNTER — OFFICE VISIT (OUTPATIENT)
Dept: SLEEP MEDICINE | Facility: CLINIC | Age: 45
End: 2020-02-12
Payer: MEDICARE

## 2020-02-12 VITALS
BODY MASS INDEX: 41.75 KG/M2 | HEIGHT: 73 IN | HEART RATE: 86 BPM | DIASTOLIC BLOOD PRESSURE: 88 MMHG | WEIGHT: 315 LBS | SYSTOLIC BLOOD PRESSURE: 145 MMHG

## 2020-02-12 DIAGNOSIS — E66.01 SEVERE OBESITY (BMI >= 40): Primary | ICD-10-CM

## 2020-02-12 DIAGNOSIS — G47.33 OSA (OBSTRUCTIVE SLEEP APNEA): ICD-10-CM

## 2020-02-12 PROCEDURE — 99999 PR PBB SHADOW E&M-EST. PATIENT-LVL III: CPT | Mod: PBBFAC,,, | Performed by: NURSE PRACTITIONER

## 2020-02-12 PROCEDURE — 3008F BODY MASS INDEX DOCD: CPT | Mod: CPTII,S$GLB,, | Performed by: NURSE PRACTITIONER

## 2020-02-12 PROCEDURE — 99214 PR OFFICE/OUTPT VISIT, EST, LEVL IV, 30-39 MIN: ICD-10-PCS | Mod: S$GLB,,, | Performed by: NURSE PRACTITIONER

## 2020-02-12 PROCEDURE — 99499 UNLISTED E&M SERVICE: CPT | Mod: S$GLB,,, | Performed by: NURSE PRACTITIONER

## 2020-02-12 PROCEDURE — 99499 RISK ADDL DX/OHS AUDIT: ICD-10-PCS | Mod: S$GLB,,, | Performed by: NURSE PRACTITIONER

## 2020-02-12 PROCEDURE — 99999 PR PBB SHADOW E&M-EST. PATIENT-LVL III: ICD-10-PCS | Mod: PBBFAC,,, | Performed by: NURSE PRACTITIONER

## 2020-02-12 PROCEDURE — 3008F PR BODY MASS INDEX (BMI) DOCUMENTED: ICD-10-PCS | Mod: CPTII,S$GLB,, | Performed by: NURSE PRACTITIONER

## 2020-02-12 PROCEDURE — 99214 OFFICE O/P EST MOD 30 MIN: CPT | Mod: S$GLB,,, | Performed by: NURSE PRACTITIONER

## 2020-02-12 NOTE — PROGRESS NOTES
Patient Thai Santoyo Jr., MRN [id number], was dependent on dialysis (ICD10 Z99.2) at the time of this visit on 2/12/20. This addendum is made to the medical record on 02/12/2020.

## 2020-02-12 NOTE — LETTER
[facility] [address]  [address]  [facility] [address]  Phone: [phone]         February 12, 2020        RE:  Mr. Thai Santoyo Jr., CPAP use during dialysis treatment       To Whom It May Concern:    I am writing this letter on behalf of my patient Mr. Santoyo. Mr. Santoyo was diagnosed with severe obstructive sleep apnea August 2019. He has 107 non-breathing events per hour any time he is sleeping. It is recommended for him to use Bilevel PAP machine anytime he is sleeping to prevent upper airway closures and lack of oxygen.     It is my recommendation for the patient to use BPAP machine during dialysis treatment if the patient is sleeping. Please allow use of home BPAP machine in dialysis center.     For questions or concerns, please contact my clinic.           Sincerely,        WHITNEY Box NP   Sleep Medicine - [facility]  [address], [address]  [place] [address]

## 2020-02-12 NOTE — PROGRESS NOTES
"Thai Santoyo Jr. 44 y.o. year-old male returns for management of obstructive sleep apnea and BPAP equipment check.     Checked-in at 11:21 am for 20 min appt scheduled for 11 am.     Pt returns after set up of PAP machine on 10/08/2019 at [facility]. Pt sleep complaints of snoring, air gasping, witnessed apneas, and unrefreshing sleep  now resolved with PAP use. Denies nasal drying with FF mask. Reports mask leaks but mask has not been replaced since Oct 2019 set up. Reports oral drying. Unsure what humidifier is set on; on [employer] review humidifier is off with tube temp at 3; pt reports feeling hot with humidifier. Denies rainout.  Denies pressure intolerance or air hunger. Denies congestion. Denies aerophagia.     CPAP Interrogation: Did not bring machine    Current BPAP use less than prior due to having 2 jobs, one job is over night and some nights sleep is limited to 2 - 3 hours. Working extra to pay for wedding March 2020. This is not a permanent situation.   [employer] BPAP 21/14, 29 days used, > 4 hours: 60%, Predicted AHI: 5.1, Ave % Large Leak: 49.3%    Best 30 days: 10/08/2019 - 11/06/2019, > 4 hours: 83.3%, Predicted AHI: 3.5     On today's [place] Sleepiness Scale the patient scores a 6.     On chart review, Trazodone Rx but never taken. Had transient insomnia, now resolved.     08/20/2019 Split  lb. The overall AHI was 107.1 with an oxygen mark of 66.0%. Effective control of sleep disordered breathing was achieved with Bilevel PAP at 21/14 cm of water. CPAP was not effective at high pressures, so patient was switched to Bilevel PAP.     Review of Systems: Sleep related symptoms as per HPI. Otherwise, a balance of 10 systems reviewed is negative.    Physical Exam:   BP (!) 145/88   Pulse 86   Ht 6' 1" (1.854 m)   Wt (!) 148.3 kg (326 lb 15.1 oz)   BMI 43.13 kg/m²   GENERAL: Well groomed  Wt 326 lb (prior 326 lb)    Assessment:     CONRAD, severe by AHI. The patient symptomatically has snoring, air " gasping, witnessed apneas, and unrefreshing sleep resolved with BPAP. The patient is adherent on BPAP and experiencing symptomatic benefit. Medical co-morbidities: DM2, HTN, ESRD on HD, and obesity.  This warrants treatment for obstructive sleep apnea.       Obesity, BMI > 40, discussed relationship between CONRAD and weight     Plan:     Treatment:  -continue BPAP 21/14, increase mask-on time. Minimum 6 hours nightly, ultimately hourly use nightly recommended. Take PAP machine to work since able to sleep at night job.   -Remotely turned on humidifier set to 3, live demo how to adjust humidifier prn. Tube temp reduced to 1.   -Bring machine and mask to all appointments   -[facility]   -Provided letter for dialysis in an effort to be able to use BPAP machine during 4-hr treatment 3x/week (currently not allowed for reasons unknown to me).   -RTC 6 - 8 weeks for compliance. Pending efficacy/compliance, consider annual visits thereafter.     Education: During our discussion today, we talked about the etiology of obstructive sleep apnea as well as the potential ramifications of untreated sleep apnea, which could include daytime sleepiness, hypertension, heart disease and/or stroke. We discussed potential treatment options, which could include weight loss, body positioning, continuous positive airway pressure (CPAP), or referral for surgical consideration.     Behavior modification which includes losing weight, exercising, changing the sleep position, abstaining from alcohol, and avoiding certain medications    Precautions: The patient was advised to abstain from driving should they feel sleepy or drowsy

## 2020-02-19 ENCOUNTER — PATIENT OUTREACH (OUTPATIENT)
Dept: ADMINISTRATIVE | Facility: OTHER | Age: 45
End: 2020-02-19

## 2020-03-04 ENCOUNTER — TELEPHONE (OUTPATIENT)
Dept: SLEEP MEDICINE | Facility: CLINIC | Age: 45
End: 2020-03-04

## 2020-03-04 DIAGNOSIS — K11.7 XEROSTOMIA: ICD-10-CM

## 2020-03-04 DIAGNOSIS — G47.33 OSA (OBSTRUCTIVE SLEEP APNEA): Primary | ICD-10-CM

## 2020-03-04 NOTE — TELEPHONE ENCOUNTER
"----- Message from Cindy Glover sent at 3/4/2020  9:51 AM CST -----  Contact: LUCIO WINSTON JR. [[id number]]  Name of Who is Calling: LUCIO WINSTON JR. [[id number]]      What is the request in detail:   Patient called stating,  "he's still experiencing dry mouth after using his machine."    Please give a call back at your earliest convenience and further advise.     THANKS!    Can the clinic reply by [facility]: no      Call Back: LUCIO WINSTON JR. // # [phone]                                      "

## 2020-03-04 NOTE — TELEPHONE ENCOUNTER
Initially increasing humidifier to 3 improved oral drying, but now having oral drying again at humidifier 3, tried decreasing and increasing it but still had dryness.     Recommended keeping it at 4 - 5, until able to get recommended heated tubing/climate controlled tubing from [facility]. Pt calling [facility] to set up pick of heated tube. Advise to let me know should oral drying not improve with above plan.

## 2020-03-04 NOTE — TELEPHONE ENCOUNTER
Pt called and stated that he's still experiencing dry mouth after using his machine. Please advise.

## 2020-04-07 ENCOUNTER — TELEPHONE (OUTPATIENT)
Dept: SLEEP MEDICINE | Facility: CLINIC | Age: 45
End: 2020-04-07

## 2020-04-13 ENCOUNTER — PATIENT OUTREACH (OUTPATIENT)
Dept: ADMINISTRATIVE | Facility: OTHER | Age: 45
End: 2020-04-13

## 2020-04-30 ENCOUNTER — TELEPHONE (OUTPATIENT)
Dept: INTERNAL MEDICINE | Facility: CLINIC | Age: 45
End: 2020-04-30

## 2020-04-30 DIAGNOSIS — Z79.4 TYPE 2 DIABETES MELLITUS WITH CHRONIC KIDNEY DISEASE ON CHRONIC DIALYSIS, WITH LONG-TERM CURRENT USE OF INSULIN: Primary | ICD-10-CM

## 2020-04-30 DIAGNOSIS — Z99.2 TYPE 2 DIABETES MELLITUS WITH CHRONIC KIDNEY DISEASE ON CHRONIC DIALYSIS, WITH LONG-TERM CURRENT USE OF INSULIN: Primary | ICD-10-CM

## 2020-04-30 DIAGNOSIS — N18.6 TYPE 2 DIABETES MELLITUS WITH CHRONIC KIDNEY DISEASE ON CHRONIC DIALYSIS, WITH LONG-TERM CURRENT USE OF INSULIN: Primary | ICD-10-CM

## 2020-04-30 DIAGNOSIS — G47.00 INSOMNIA, UNSPECIFIED TYPE: ICD-10-CM

## 2020-04-30 DIAGNOSIS — E11.22 TYPE 2 DIABETES MELLITUS WITH CHRONIC KIDNEY DISEASE ON CHRONIC DIALYSIS, WITH LONG-TERM CURRENT USE OF INSULIN: Primary | ICD-10-CM

## 2020-04-30 RX ORDER — INSULIN LISPRO 100 [IU]/ML
40 INJECTION, SOLUTION INTRAVENOUS; SUBCUTANEOUS
Qty: 108 ML | Refills: 3 | Status: SHIPPED | OUTPATIENT
Start: 2020-04-30 | End: 2021-10-15 | Stop reason: SDUPTHER

## 2020-04-30 RX ORDER — TRAZODONE HYDROCHLORIDE 50 MG/1
TABLET ORAL
Qty: 90 TABLET | Refills: 3 | Status: SHIPPED | OUTPATIENT
Start: 2020-04-30 | End: 2021-06-15 | Stop reason: SDUPTHER

## 2020-05-01 DIAGNOSIS — R00.0 TACHYCARDIA: ICD-10-CM

## 2020-05-01 DIAGNOSIS — I15.2 HYPERTENSION ASSOCIATED WITH DIABETES: ICD-10-CM

## 2020-05-01 DIAGNOSIS — E11.59 HYPERTENSION ASSOCIATED WITH DIABETES: ICD-10-CM

## 2020-05-01 DIAGNOSIS — I15.0 RENOVASCULAR HYPERTENSION: ICD-10-CM

## 2020-05-01 RX ORDER — FUROSEMIDE 40 MG/1
TABLET ORAL
Qty: 90 TABLET | Refills: 3 | Status: SHIPPED | OUTPATIENT
Start: 2020-05-01 | End: 2021-06-15 | Stop reason: SDUPTHER

## 2020-05-04 RX ORDER — CARVEDILOL 25 MG/1
TABLET ORAL
Qty: 180 TABLET | Refills: 3 | Status: SHIPPED | OUTPATIENT
Start: 2020-05-04 | End: 2021-06-15 | Stop reason: SDUPTHER

## 2020-05-13 ENCOUNTER — TELEPHONE (OUTPATIENT)
Dept: SLEEP MEDICINE | Facility: CLINIC | Age: 45
End: 2020-05-13

## 2020-06-02 ENCOUNTER — OFFICE VISIT (OUTPATIENT)
Dept: INTERNAL MEDICINE | Facility: CLINIC | Age: 45
End: 2020-06-02
Payer: MEDICARE

## 2020-06-02 ENCOUNTER — LAB VISIT (OUTPATIENT)
Dept: LAB | Facility: HOSPITAL | Age: 45
End: 2020-06-02
Attending: INTERNAL MEDICINE
Payer: MEDICARE

## 2020-06-02 VITALS
WEIGHT: 315 LBS | BODY MASS INDEX: 41.75 KG/M2 | HEART RATE: 94 BPM | OXYGEN SATURATION: 97 % | HEIGHT: 73 IN | SYSTOLIC BLOOD PRESSURE: 130 MMHG | TEMPERATURE: 98 F | DIASTOLIC BLOOD PRESSURE: 80 MMHG

## 2020-06-02 DIAGNOSIS — E29.1 HYPOGONADISM IN MALE: ICD-10-CM

## 2020-06-02 DIAGNOSIS — I15.0 RENOVASCULAR HYPERTENSION: ICD-10-CM

## 2020-06-02 DIAGNOSIS — E11.59 HYPERTENSION ASSOCIATED WITH DIABETES: ICD-10-CM

## 2020-06-02 DIAGNOSIS — G47.33 OSA (OBSTRUCTIVE SLEEP APNEA): ICD-10-CM

## 2020-06-02 DIAGNOSIS — Z71.89 ADVICE GIVEN ABOUT COVID-19 VIRUS INFECTION: ICD-10-CM

## 2020-06-02 DIAGNOSIS — N18.6 TYPE 2 DIABETES MELLITUS WITH CHRONIC KIDNEY DISEASE ON CHRONIC DIALYSIS, WITH LONG-TERM CURRENT USE OF INSULIN: Primary | ICD-10-CM

## 2020-06-02 DIAGNOSIS — Z99.2 TYPE 2 DIABETES MELLITUS WITH CHRONIC KIDNEY DISEASE ON CHRONIC DIALYSIS, WITH LONG-TERM CURRENT USE OF INSULIN: Primary | ICD-10-CM

## 2020-06-02 DIAGNOSIS — Z79.4 TYPE 2 DIABETES MELLITUS WITH CHRONIC KIDNEY DISEASE ON CHRONIC DIALYSIS, WITH LONG-TERM CURRENT USE OF INSULIN: ICD-10-CM

## 2020-06-02 DIAGNOSIS — Z11.4 ENCOUNTER FOR SCREENING FOR HUMAN IMMUNODEFICIENCY VIRUS (HIV): ICD-10-CM

## 2020-06-02 DIAGNOSIS — N18.6 ESRD ON HEMODIALYSIS: ICD-10-CM

## 2020-06-02 DIAGNOSIS — I47.10 SVT (SUPRAVENTRICULAR TACHYCARDIA): ICD-10-CM

## 2020-06-02 DIAGNOSIS — Z99.2 ESRD ON HEMODIALYSIS: ICD-10-CM

## 2020-06-02 DIAGNOSIS — I15.2 HYPERTENSION ASSOCIATED WITH DIABETES: ICD-10-CM

## 2020-06-02 DIAGNOSIS — D63.1 ANEMIA IN END-STAGE RENAL DISEASE: ICD-10-CM

## 2020-06-02 DIAGNOSIS — N18.6 TYPE 2 DIABETES MELLITUS WITH CHRONIC KIDNEY DISEASE ON CHRONIC DIALYSIS, WITH LONG-TERM CURRENT USE OF INSULIN: ICD-10-CM

## 2020-06-02 DIAGNOSIS — N18.6 ANEMIA IN END-STAGE RENAL DISEASE: ICD-10-CM

## 2020-06-02 DIAGNOSIS — M17.0 OSTEOARTHRITIS OF BOTH KNEES, UNSPECIFIED OSTEOARTHRITIS TYPE: ICD-10-CM

## 2020-06-02 DIAGNOSIS — N25.81 HYPERPARATHYROIDISM, SECONDARY RENAL: ICD-10-CM

## 2020-06-02 DIAGNOSIS — Z99.2 TYPE 2 DIABETES MELLITUS WITH CHRONIC KIDNEY DISEASE ON CHRONIC DIALYSIS, WITH LONG-TERM CURRENT USE OF INSULIN: ICD-10-CM

## 2020-06-02 DIAGNOSIS — E11.22 TYPE 2 DIABETES MELLITUS WITH CHRONIC KIDNEY DISEASE ON CHRONIC DIALYSIS, WITH LONG-TERM CURRENT USE OF INSULIN: Primary | ICD-10-CM

## 2020-06-02 DIAGNOSIS — E11.22 TYPE 2 DIABETES MELLITUS WITH CHRONIC KIDNEY DISEASE ON CHRONIC DIALYSIS, WITH LONG-TERM CURRENT USE OF INSULIN: ICD-10-CM

## 2020-06-02 DIAGNOSIS — E66.01 MORBID OBESITY: ICD-10-CM

## 2020-06-02 DIAGNOSIS — Z79.4 TYPE 2 DIABETES MELLITUS WITH CHRONIC KIDNEY DISEASE ON CHRONIC DIALYSIS, WITH LONG-TERM CURRENT USE OF INSULIN: Primary | ICD-10-CM

## 2020-06-02 LAB
ALBUMIN SERPL BCP-MCNC: 4 G/DL (ref 3.5–5.2)
ALP SERPL-CCNC: 68 U/L (ref 55–135)
ALT SERPL W/O P-5'-P-CCNC: 23 U/L (ref 10–44)
ANION GAP SERPL CALC-SCNC: 12 MMOL/L (ref 8–16)
AST SERPL-CCNC: 21 U/L (ref 10–40)
BILIRUB SERPL-MCNC: 0.5 MG/DL (ref 0.1–1)
BUN SERPL-MCNC: 25 MG/DL (ref 6–20)
CALCIUM SERPL-MCNC: 9.8 MG/DL (ref 8.7–10.5)
CHLORIDE SERPL-SCNC: 96 MMOL/L (ref 95–110)
CHOLEST SERPL-MCNC: 158 MG/DL (ref 120–199)
CHOLEST/HDLC SERPL: 5.9 {RATIO} (ref 2–5)
CO2 SERPL-SCNC: 29 MMOL/L (ref 23–29)
CREAT SERPL-MCNC: 10 MG/DL (ref 0.5–1.4)
EST. GFR  (AFRICAN AMERICAN): 6.6 ML/MIN/1.73 M^2
EST. GFR  (NON AFRICAN AMERICAN): 5.7 ML/MIN/1.73 M^2
ESTIMATED AVG GLUCOSE: 137 MG/DL (ref 68–131)
GLUCOSE SERPL-MCNC: 120 MG/DL (ref 70–110)
HBA1C MFR BLD HPLC: 6.4 % (ref 4–5.6)
HDLC SERPL-MCNC: 27 MG/DL (ref 40–75)
HDLC SERPL: 17.1 % (ref 20–50)
LDLC SERPL CALC-MCNC: 92.4 MG/DL (ref 63–159)
NONHDLC SERPL-MCNC: 131 MG/DL
POTASSIUM SERPL-SCNC: 4.1 MMOL/L (ref 3.5–5.1)
PROT SERPL-MCNC: 8.4 G/DL (ref 6–8.4)
SARS-COV-2 IGG SERPLBLD QL IA.RAPID: POSITIVE
SODIUM SERPL-SCNC: 137 MMOL/L (ref 136–145)
TRIGL SERPL-MCNC: 193 MG/DL (ref 30–150)

## 2020-06-02 PROCEDURE — 3044F PR MOST RECENT HEMOGLOBIN A1C LEVEL <7.0%: ICD-10-PCS | Mod: CPTII,S$GLB,, | Performed by: INTERNAL MEDICINE

## 2020-06-02 PROCEDURE — 99999 PR PBB SHADOW E&M-EST. PATIENT-LVL IV: ICD-10-PCS | Mod: PBBFAC,,, | Performed by: INTERNAL MEDICINE

## 2020-06-02 PROCEDURE — 99214 OFFICE O/P EST MOD 30 MIN: CPT | Mod: S$GLB,,, | Performed by: INTERNAL MEDICINE

## 2020-06-02 PROCEDURE — 99499 UNLISTED E&M SERVICE: CPT | Mod: S$PBB,,, | Performed by: INTERNAL MEDICINE

## 2020-06-02 PROCEDURE — 3044F HG A1C LEVEL LT 7.0%: CPT | Mod: CPTII,S$GLB,, | Performed by: INTERNAL MEDICINE

## 2020-06-02 PROCEDURE — 86703 HIV-1/HIV-2 1 RESULT ANTBDY: CPT

## 2020-06-02 PROCEDURE — 80053 COMPREHEN METABOLIC PANEL: CPT

## 2020-06-02 PROCEDURE — 99499 RISK ADDL DX/OHS AUDIT: ICD-10-PCS | Mod: S$PBB,,, | Performed by: INTERNAL MEDICINE

## 2020-06-02 PROCEDURE — 36415 COLL VENOUS BLD VENIPUNCTURE: CPT

## 2020-06-02 PROCEDURE — 99214 PR OFFICE/OUTPT VISIT, EST, LEVL IV, 30-39 MIN: ICD-10-PCS | Mod: S$GLB,,, | Performed by: INTERNAL MEDICINE

## 2020-06-02 PROCEDURE — 86769 SARS-COV-2 COVID-19 ANTIBODY: CPT

## 2020-06-02 PROCEDURE — 84402 ASSAY OF FREE TESTOSTERONE: CPT

## 2020-06-02 PROCEDURE — 83036 HEMOGLOBIN GLYCOSYLATED A1C: CPT

## 2020-06-02 PROCEDURE — 3008F BODY MASS INDEX DOCD: CPT | Mod: CPTII,S$GLB,, | Performed by: INTERNAL MEDICINE

## 2020-06-02 PROCEDURE — 99999 PR PBB SHADOW E&M-EST. PATIENT-LVL IV: CPT | Mod: PBBFAC,,, | Performed by: INTERNAL MEDICINE

## 2020-06-02 PROCEDURE — 3008F PR BODY MASS INDEX (BMI) DOCUMENTED: ICD-10-PCS | Mod: CPTII,S$GLB,, | Performed by: INTERNAL MEDICINE

## 2020-06-02 PROCEDURE — 80061 LIPID PANEL: CPT

## 2020-06-02 NOTE — PROGRESS NOTES
Chief Complaint  Chief Complaint   Patient presents with    Annual Exam       HPI  Thai Santoyo Jr. is a 44 y.o. male with multiple medical diagnoses as listed in the medical history and problem list that presents for an annual visit.  Their last appointment with primary care was 6/13/19.      Mr. Santoyo recently underwent a sleep study and received a BPAP machine. He reports better sleep, feeling more rested and no longer experiencing heart racing in the mornings. He continues to struggle with the mask, occasionally waking up with the mask in his hand. He also reports severe dry mouth with wearing the mask. Struggling with mask on face, but wakes up. He uses the BPAP every night. He has been otherwise well.    All medications reviewed in Epic.    PMHx:    ESRD on dialysis. MWF, stable.    DM2. Blood sugars around 120-180, checking every day. Has not had an eye check, needs to reschedule the appointment after it was canceled due to the pandemic. He has never seen a podiatrist. He occasionally skips glipizide when he skips breakfast.    HTN. 130/80 today and notes that it is under control when they check at the dialysis clinic. He does not check at home.    SVT x2 in 2018. S/p ablation. Stable.    Gout. He had a flare up 1 week ago bilaterally in the feet. The flare up lasted 3 days. No flare ups in the past year prior to the latest flare.    Morbid Obesity. He has been trying to lose weight but he reports the weight jumping up and down. He currently has difficulty exercising due fitness center closures and crowded munoz. He is trying to decrease his weight to 250-270 lbs. He is planning to lose weight and has been changing his diet to less meat and more vegetables. He is interested in learning more about bariatric surgery.     Knee pain. Quiescent for the most part. He follows with [facility] (Dr. Torres) where he receives collagen injections every 6 months. He has started to wear a brace for exercise  "this past year. The pain has been stable since last visit.    Hypogonadism. He has been treating with a testosterone patch but he is unhappy with this modality. He has not worn a patch in the past 2-3 weeks. He is interested in testosterone injections.    ALLERGIES AND MEDICATIONS: updated and reviewed.  Review of patient's allergies indicates:  No Known Allergies  Current Outpatient Medications   Medication Sig Dispense Refill    allopurinoL (ZYLOPRIM) 100 MG tablet Take 1 tablet (100 mg total) by mouth once daily. 90 tablet 3    amLODIPine (NORVASC) 10 MG tablet TAKE ONE TABLET BY MOUTH ONCE DAILY 90 tablet 3    blood sugar diagnostic Strp Use one strip each time to check blood sugar three times daily. 300 strip 4    blood-glucose meter Misc use as directed 1 each 0    carvediloL (COREG) 25 MG tablet TAKE 1 TABLET BY MOUTH TWICE DAILY WITH MEALS 180 tablet 3    dulaglutide (TRULICITY) 1.5 mg/0.5 mL PnIj Inject 1.5 mg into the skin every 7 days. 18 mL 3    furosemide (LASIX) 40 MG tablet TAKE ONE TABLET BY MOUTH ONCE DAILY 90 tablet 3    glipiZIDE (GLUCOTROL) 10 MG TR24 Take 1 tablet (10 mg total) by mouth daily with breakfast. (Patient taking differently: Take 20 mg by mouth daily with breakfast. ) 90 tablet 3    insulin (BASAGLAR KWIKPEN U-100 INSULIN) glargine 100 units/mL (3mL) SubQ pen Inject 45 Units into the skin 2 (two) times daily. 90 mL 3    insulin lispro 100 unit/mL pen Inject 40 Units into the skin 3 (three) times daily before meals. 108 mL 3    olmesartan-hydrochlorothiazide (BENICAR HCT) 40-25 mg per tablet Take 1 tablet by mouth once daily. 90 tablet 3    pen needle, diabetic 32 gauge x 5/32" Ndle Use as directed for insulin injection up to three times daily 400 each 3    rosuvastatin (CRESTOR) 10 MG tablet Take 1 tablet (10 mg total) by mouth once daily. 90 tablet 3    sevelamer carbonate (RENVELA) 800 mg Tab Take 800 mg by mouth 3 (three) times daily with meals.      testosterone " "(ANDRODERM) 4 mg/24 hr PT24 Apply 1 patch (4 mg total) topically once daily. 90 patch 3    traZODone (DESYREL) 50 MG tablet TAKE 1 TABLET BY MOUTH NIGHTLY AS NEEDED FOR INSOMNIA 90 tablet 3     No current facility-administered medications for this visit.      ROS  Review of Systems   Constitutional: Negative for activity change, appetite change, fatigue and fever.   HENT: Negative for sore throat.    Eyes: Negative for pain, redness and visual disturbance.   Respiratory: Negative for apnea, cough, chest tightness and shortness of breath.    Cardiovascular: Negative for chest pain, palpitations and leg swelling.   Gastrointestinal: Negative for abdominal pain, constipation, diarrhea, nausea and vomiting.   Genitourinary: Negative for difficulty urinating.   Musculoskeletal: Positive for joint swelling. Negative for arthralgias, back pain and myalgias.   Skin: Negative for rash.   Neurological: Negative for dizziness, syncope, weakness, numbness and headaches.   Psychiatric/Behavioral: Negative for dysphoric mood and sleep disturbance (snores loudly, stops breathing while asleep). The patient is not nervous/anxious.      Physical Exam  Vitals:    06/02/20 0828   BP: 130/80   BP Location: Left arm   Patient Position: Sitting   BP Method: Large (Manual)   Pulse: 94   Temp: 98.4 °F (36.9 °C)   SpO2: 97%   Weight: (!) 146.2 kg (322 lb 5 oz)   Height: 6' 1" (1.854 m)    Body mass index is 42.52 kg/m².  Weight: (!) 146.2 kg (322 lb 5 oz)   Height: 6' 1" (185.4 cm)   Physical Exam   Constitutional: He is oriented to person, place, and time. He appears well-developed and well-nourished. No distress.   Pleasant, middle aged man   HENT:   Head: Normocephalic and atraumatic.   Eyes: Conjunctivae are normal.   Cardiovascular: Normal rate, regular rhythm, S1 normal and S2 normal. Exam reveals no gallop, no distant heart sounds and no friction rub.   No murmur heard.  Pulses:       Radial pulses are 2+ on the right side, and 0 on " the left side.        Dorsalis pedis pulses are 1+ on the right side, and 1+ on the left side.        Posterior tibial pulses are 1+ on the right side, and 1+ on the left side.   Dialysis access left arm.   Pulmonary/Chest: Effort normal. No stridor. No respiratory distress. He has no decreased breath sounds. He has no wheezes. He has no rhonchi. He has no rales.   Abdominal: Soft. Normal appearance.   Musculoskeletal:        Right foot: There is deformity. There is normal range of motion.        Left foot: There is deformity. There is normal range of motion.   Feet:   Right Foot:   Protective Sensation: 4 sites tested. 4 sites sensed.   Skin Integrity: Negative for ulcer, blister, skin breakdown, erythema, warmth, callus or dry skin.   Left Foot:   Protective Sensation: 4 sites tested. 4 sites sensed.   Skin Integrity: Negative for ulcer, blister, skin breakdown, erythema, warmth, callus or dry skin.   Neurological: He is alert and oriented to person, place, and time.   Skin: Skin is warm and dry. Capillary refill takes less than 2 seconds. He is not diaphoretic.   Psychiatric: He has a normal mood and affect.   Nursing note and vitals reviewed.    Assessment and Plan:    Mr. Santoyo is a 44 year old man here for an annual visit with T2DM and CKD on dialysis.    Type 2 diabetes mellitus with chronic kidney disease on chronic dialysis, with long-term current use of insulin  -Blood sugars run 120-180 at home, asymptomatic and compliant with medications  -Lipid panel; Future; Expected date: 06/02/2020  -Diabetic Eye Screening Photo; Future  -Hemoglobin A1C; Future; Expected date: 06/02/2020    Hypertension associated with diabetes  -BP is 130/80, at goal  -Continue current medication regimen  -Comprehensive metabolic panel; Future; Expected date: 06/02/2020    Hyperparathyroidism, secondary renal  -Calcium last year was at 11.6, asymptomatic  -Taking sevelamer  -Comprehensive metabolic panel; Future; Expected date:  06/02/2020    SVT (supraventricular tachycardia)  -Ablation in 2018, no recurrence of symptoms    ESRD on hemodialysis  -He is making some urine, stable since last visit and attending dialysis clinic regularly MWF  -Comprehensive metabolic panel; Future; Expected date: 06/02/2020  -HIV 1/2 Ag/Ab (4th Gen); Future; Expected date: 06/02/2020    Renovascular hypertension  -BP is 130/80, at goal  -Continue current medication regimen    Anemia in end-stage renal disease  -Asymptomatic  -Continue monitoring    Osteoarthritis of both knees, unspecified osteoarthritis type  -Stable since last visit, receiving biannual collagen injections  -Follows with [facility]    CNORAD (obstructive sleep apnea)  -Improved with regular use of BPAP  -He reports dry mouth from mask use, following with sleep medicine  -Continue to monitor for compliance and side effects    Hypogonadism in male  -Unhappy and not compliant with testosterone patch  -He is interested in testosterone injections  -Testosterone, free; Future; Expected date: 06/02/2020    Encounter for screening for human immunodeficiency virus (HIV)   -HIV 1/2 Ag/Ab (4th Gen); Future; Expected date: 06/02/2020    Morbid obesity  -He has had difficulty losing weight with diet and exercise  -He is planning on decreasing weight to 250-270 lbs in order to be considered for renal transplant  -Ambulatory referral/consult to Bariatric Surgery; Future; Expected date: 06/09/2020    Advice Given About Covid-19 Virus Infection  -COVID-19 (SARS CoV-2) IgG Antibody; Future; Expected date: 06/02/2020    I hereby acknowledge that I am relying upon documentation authored by a medical student working under my supervision and further I hereby attest that I have verified the student documentation or findings by personally re-performing the physical exam and medical decision making activities of the Evaluation and Management service to be billed.  Manjit Mckee Ii    rtc prn, or in 3  months    G Anais Mckee MD MPH  Staff Internist

## 2020-06-03 ENCOUNTER — TELEPHONE (OUTPATIENT)
Dept: INTERNAL MEDICINE | Facility: CLINIC | Age: 45
End: 2020-06-03

## 2020-06-03 ENCOUNTER — PATIENT MESSAGE (OUTPATIENT)
Dept: INTERNAL MEDICINE | Facility: CLINIC | Age: 45
End: 2020-06-03

## 2020-06-03 LAB — HIV 1+2 AB+HIV1 P24 AG SERPL QL IA: NEGATIVE

## 2020-06-04 LAB — TESTOST FREE SERPL-MCNC: 8.8 PG/ML (ref 5.1–41.5)

## 2020-06-18 ENCOUNTER — DOCUMENTATION ONLY (OUTPATIENT)
Dept: BARIATRICS | Facility: CLINIC | Age: 45
End: 2020-06-18

## 2020-06-18 NOTE — PROGRESS NOTES
Bariatric Surgery Online Course Form Submission  Someone has submitted a Bariatric Surgery Online Course Form Submission on this page.  Date: 06- 21:38:16  Patient's Name: Thai Santoyo Jr.  YOB: 1975 Email: [email or web address]  Phone: [phone]   Patient Address: [address] [place] [address]  Preferred Surgical Location: [facility]   I certify that I am 18 years of age or older: Yes   Confirmation Code: [id number]  Verification of Bariatric Seminar: yes  Insurance Information  Insurance or Self Pay? Insurance - Fill out fields below  Insurance Company Name:   Type of Coverage/Coverage Plan (i.e. PPO, HMO):   Group Name:   Subscriber Name:   Member Name (patient's name):   Member ID/Policy #:   Plan Effective Date:   Card Issuance date:

## 2020-06-25 ENCOUNTER — PATIENT OUTREACH (OUTPATIENT)
Dept: ADMINISTRATIVE | Facility: HOSPITAL | Age: 45
End: 2020-06-25

## 2020-06-25 NOTE — PROGRESS NOTES
Health Maintenance Due   Topic Date Due    TETANUS VACCINE  12/15/1995    Eye Exam  05/21/2019     Portal message sent to patient regarding overdue eye exam.  Chart review completed.

## 2020-07-07 ENCOUNTER — PATIENT OUTREACH (OUTPATIENT)
Dept: ADMINISTRATIVE | Facility: OTHER | Age: 45
End: 2020-07-07

## 2020-07-08 NOTE — PROGRESS NOTES
Chart reviewed.   Immunizations: updated  Orders placed: n/a  Upcoming appts to satisfy LORIN topics: Optometry 7/09

## 2020-07-14 DIAGNOSIS — E11.69 MIXED HYPERLIPIDEMIA DUE TO TYPE 2 DIABETES MELLITUS: ICD-10-CM

## 2020-07-14 DIAGNOSIS — I15.0 RENOVASCULAR HYPERTENSION: ICD-10-CM

## 2020-07-14 DIAGNOSIS — E78.2 MIXED HYPERLIPIDEMIA DUE TO TYPE 2 DIABETES MELLITUS: ICD-10-CM

## 2020-07-14 DIAGNOSIS — E11.59 HYPERTENSION ASSOCIATED WITH DIABETES: ICD-10-CM

## 2020-07-14 DIAGNOSIS — I15.2 HYPERTENSION ASSOCIATED WITH DIABETES: ICD-10-CM

## 2020-07-14 RX ORDER — ROSUVASTATIN CALCIUM 10 MG/1
10 TABLET, COATED ORAL DAILY
Qty: 90 TABLET | Refills: 3 | Status: SHIPPED | OUTPATIENT
Start: 2020-07-14 | End: 2022-11-10 | Stop reason: SDUPTHER

## 2020-07-14 RX ORDER — OLMESARTAN MEDOXOMIL AND HYDROCHLOROTHIAZIDE 40/25 40; 25 MG/1; MG/1
1 TABLET ORAL DAILY
Qty: 90 TABLET | Refills: 3 | Status: SHIPPED | OUTPATIENT
Start: 2020-07-14 | End: 2021-03-31 | Stop reason: SDUPTHER

## 2020-07-27 ENCOUNTER — PATIENT OUTREACH (OUTPATIENT)
Dept: ADMINISTRATIVE | Facility: OTHER | Age: 45
End: 2020-07-27

## 2020-08-12 ENCOUNTER — TELEPHONE (OUTPATIENT)
Dept: SLEEP MEDICINE | Facility: CLINIC | Age: 45
End: 2020-08-12

## 2020-08-12 NOTE — TELEPHONE ENCOUNTER
----- Message from Susan Reyna sent at 8/12/2020  4:22 PM CDT -----  Regarding: Call Back  Name of Who is Calling : LUCIO WINSTON JR. [[id number]]    Patient is requesting a call from staff in regards to machine still giving him symptoms of dry mouth would like to speak with staff as soon as possible   .....Please contact to further discuss and advise.    Can the clinic reply by [facility] : No    What Number to Call Back :  [phone]

## 2020-08-13 NOTE — TELEPHONE ENCOUNTER
Please advise pt to directly contact [facility] so he can schedule an appointment to meet with a respiratory therapist who can help him either optimize fit of his current mask or get an entirely different mask altogether. On review of his CPAP information, it looks like he is having a lot of mask leaks which can cause extreme dry mouth.

## 2020-08-14 ENCOUNTER — TELEPHONE (OUTPATIENT)
Dept: SLEEP MEDICINE | Facility: CLINIC | Age: 45
End: 2020-08-14

## 2020-08-14 NOTE — TELEPHONE ENCOUNTER
I called pt to advise him to contact [facility] directly so he can schedule an appointment to meet with a respiratory therapist who can help him either optimize fit of his current mask or get an entirely different mask altogether. On review of his CPAP information, it looks like he is having a lot of mask leaks which can cause extreme dry mouth. Pt stated that he will call [facility] to do so.

## 2020-08-27 ENCOUNTER — PATIENT OUTREACH (OUTPATIENT)
Dept: ADMINISTRATIVE | Facility: HOSPITAL | Age: 45
End: 2020-08-27

## 2020-08-27 DIAGNOSIS — E11.3291 MILD NONPROLIFERATIVE DIABETIC RETINOPATHY OF RIGHT EYE WITHOUT MACULAR EDEMA ASSOCIATED WITH TYPE 2 DIABETES MELLITUS: Primary | ICD-10-CM

## 2020-08-27 NOTE — PROGRESS NOTES
Health Maintenance Due   Topic Date Due    TETANUS VACCINE  12/15/1995    Eye Exam  05/21/2019     Referral placed for optometry.  Portal message has been sent to patient regarding overdue diabetic eye exam.  Chart review completed.

## 2020-09-08 ENCOUNTER — OFFICE VISIT (OUTPATIENT)
Dept: URGENT CARE | Facility: CLINIC | Age: 45
End: 2020-09-08
Payer: MEDICARE

## 2020-09-08 VITALS
BODY MASS INDEX: 41.99 KG/M2 | OXYGEN SATURATION: 96 % | WEIGHT: 310 LBS | HEIGHT: 72 IN | SYSTOLIC BLOOD PRESSURE: 154 MMHG | HEART RATE: 94 BPM | RESPIRATION RATE: 18 BRPM | DIASTOLIC BLOOD PRESSURE: 88 MMHG | TEMPERATURE: 99 F

## 2020-09-08 DIAGNOSIS — I15.2 HYPERTENSION ASSOCIATED WITH DIABETES: ICD-10-CM

## 2020-09-08 DIAGNOSIS — E11.59 HYPERTENSION ASSOCIATED WITH DIABETES: ICD-10-CM

## 2020-09-08 DIAGNOSIS — H10.33 ACUTE CONJUNCTIVITIS OF BOTH EYES, UNSPECIFIED ACUTE CONJUNCTIVITIS TYPE: Primary | ICD-10-CM

## 2020-09-08 PROCEDURE — 99213 OFFICE O/P EST LOW 20 MIN: CPT | Mod: S$GLB,,, | Performed by: NURSE PRACTITIONER

## 2020-09-08 PROCEDURE — 99213 PR OFFICE/OUTPT VISIT, EST, LEVL III, 20-29 MIN: ICD-10-PCS | Mod: S$GLB,,, | Performed by: NURSE PRACTITIONER

## 2020-09-08 RX ORDER — MOXIFLOXACIN 5 MG/ML
1 SOLUTION/ DROPS OPHTHALMIC 3 TIMES DAILY
Qty: 3 ML | Refills: 0 | Status: SHIPPED | OUTPATIENT
Start: 2020-09-08 | End: 2020-09-23

## 2020-09-08 NOTE — PROGRESS NOTES
Subjective:       Patient ID: Thai Santoyo Jr. is a 44 y.o. male.    Vitals:  height is 6' (1.829 m) and weight is 140.6 kg (310 lb) (abnormal). His temperature is 98.8 °F (37.1 °C). His blood pressure is 154/88 (abnormal) and his pulse is 94. His respiration is 18 and oxygen saturation is 96%.     Chief Complaint: Eye Problem and Conjunctivitis    Patient presents to clinic with B/L eye redness/discharge/itching that started today.     Eye Problem   Both eyes are affected.This is a new problem. The current episode started today. The problem occurs constantly. The problem has been gradually worsening. There was no injury mechanism. The pain is at a severity of 4/10. The pain is mild. There is no known exposure to pink eye. He does not wear contacts. Associated symptoms include an eye discharge, eye redness and itching. Pertinent negatives include no blurred vision, double vision, fever, nausea, photophobia or vomiting. He has tried eye drops for the symptoms. The treatment provided no relief.   Conjunctivitis  Pertinent negatives include no chills, congestion, fever, headaches, nausea, rash or vomiting.       Constitution: Negative for chills and fever.   HENT: Negative for congestion and sinus pain.    Eyes: Positive for eye discharge, eye itching and eye redness. Negative for eye trauma, foreign body in eye, eye pain, photophobia, vision loss, double vision, blurred vision and eyelid swelling.   Gastrointestinal: Negative for nausea and vomiting.   Skin: Negative for rash.   Allergic/Immunologic: Negative for seasonal allergies and itching.   Neurological: Negative for headaches.       Objective:      Physical Exam   Constitutional: He is oriented to person, place, and time. He appears well-developed.   HENT:   Head: Normocephalic and atraumatic.   Ears:   Right Ear: External ear normal.   Left Ear: External ear normal.   Nose: Nose normal.   Mouth/Throat: Oropharynx is clear and moist.   Eyes: Pupils are equal,  round, and reactive to light. EOM and lids are normal. Right eye exhibits discharge. Left eye exhibits discharge. Right conjunctiva is injected. Left conjunctiva is injected. extraocular movement intact  Neck: Trachea normal, full passive range of motion without pain and phonation normal. Neck supple.   Musculoskeletal: Normal range of motion.   Neurological: He is alert and oriented to person, place, and time.   Skin: Skin is warm, dry and intact. Psychiatric: His speech is normal and behavior is normal. Judgment and thought content normal.   Nursing note and vitals reviewed.        Assessment:       1. Acute conjunctivitis of both eyes, unspecified acute conjunctivitis type    2. Hypertension associated with diabetes        Plan:         Acute conjunctivitis of both eyes, unspecified acute conjunctivitis type  -     moxifloxacin (VIGAMOX) 0.5 % ophthalmic solution; Place 1 drop into both eyes 3 (three) times daily. for 7 days  Dispense: 3 mL; Refill: 0    Hypertension associated with diabetes          Patient Instructions     Conjunctivitis, Nonspecific    The membrane that covers the white part of your eye (the conjunctiva) is inflamed. Inflammation happens when your body responds to an injury, allergic reaction, infection, or illness. Symptoms of inflammation in the eye may include redness, irritation, itching, swelling, or burning. These symptoms should go away within the next 24 hours. Conjunctivitis may be related to a particle that was in your eye. If so, it may wash out with your tears or irrigation treatment. Being exposed to liquid chemicals or fumes may also cause this reaction.   Home care  · Apply a cold pack (ice in a plastic bag, wrapped in a towel) over the eye for 20 minutes at a time. This will reduce pain.  · Artificial tears may be prescribed to reduce irritation or redness.  These should be used 3 to 4 times a day.  · You may use acetaminophen or ibuprofen to control pain, unless another  medicine was prescribed.(Note: If you have chronic liver or kidney disease, or if you have ever had a stomach ulcer or gastrointestinal bleeding, talk with your healthcare provider before using these medicines.)  Follow-up care  Follow up with your healthcare provider, or as advised.  When to seek medical advice  Call your healthcare provider right away if any of these occur:  · Increased eyelid swelling  · Increased eye pain  · Increased redness or drainage from the eye  · Increased blurry vision or increased sensitivity to light  · Failure of normal vision to return within 24 to 48 hours  Date Last Reviewed: 6/14/2015  © 2260-1752 [employer]. [address] [place] [address]. All rights reserved. This information is not intended as a substitute for professional medical care. Always follow your healthcare professional's instructions.

## 2020-09-08 NOTE — PATIENT INSTRUCTIONS
Conjunctivitis, Nonspecific    The membrane that covers the white part of your eye (the conjunctiva) is inflamed. Inflammation happens when your body responds to an injury, allergic reaction, infection, or illness. Symptoms of inflammation in the eye may include redness, irritation, itching, swelling, or burning. These symptoms should go away within the next 24 hours. Conjunctivitis may be related to a particle that was in your eye. If so, it may wash out with your tears or irrigation treatment. Being exposed to liquid chemicals or fumes may also cause this reaction.   Home care  · Apply a cold pack (ice in a plastic bag, wrapped in a towel) over the eye for 20 minutes at a time. This will reduce pain.  · Artificial tears may be prescribed to reduce irritation or redness.  These should be used 3 to 4 times a day.  · You may use acetaminophen or ibuprofen to control pain, unless another medicine was prescribed.(Note: If you have chronic liver or kidney disease, or if you have ever had a stomach ulcer or gastrointestinal bleeding, talk with your healthcare provider before using these medicines.)  Follow-up care  Follow up with your healthcare provider, or as advised.  When to seek medical advice  Call your healthcare provider right away if any of these occur:  · Increased eyelid swelling  · Increased eye pain  · Increased redness or drainage from the eye  · Increased blurry vision or increased sensitivity to light  · Failure of normal vision to return within 24 to 48 hours  Date Last Reviewed: 6/14/2015  © 5928-1103 [employer]. [address] [place] [address], [place] [address]. All rights reserved. This information is not intended as a substitute for professional medical care. Always follow your healthcare professional's instructions.

## 2020-09-08 NOTE — LETTER
September 8, 2020      [facility] [address] [address]  Phone: [phone]  Fax: [phone]       Patient: Thai Santoyo   YOB: 1975  Date of Visit: 09/08/2020    To Whom It May Concern:    Jani Santoyo  was at [facility] on 09/08/2020. He may return to work/school on 09/10/2020 with no restrictions. If you have any questions or concerns, or if I can be of further assistance, please do not hesitate to contact me.    Sincerely,    Samantha Tyson, TONY

## 2020-09-10 ENCOUNTER — OFFICE VISIT (OUTPATIENT)
Dept: INTERNAL MEDICINE | Facility: CLINIC | Age: 45
End: 2020-09-10
Payer: MEDICARE

## 2020-09-10 VITALS
BODY MASS INDEX: 42.66 KG/M2 | HEIGHT: 72 IN | OXYGEN SATURATION: 98 % | HEART RATE: 88 BPM | DIASTOLIC BLOOD PRESSURE: 82 MMHG | WEIGHT: 315 LBS | SYSTOLIC BLOOD PRESSURE: 140 MMHG

## 2020-09-10 DIAGNOSIS — Z99.2 ESRD ON HEMODIALYSIS: ICD-10-CM

## 2020-09-10 DIAGNOSIS — N18.6 ESRD ON HEMODIALYSIS: ICD-10-CM

## 2020-09-10 DIAGNOSIS — R53.83 FATIGUE, UNSPECIFIED TYPE: ICD-10-CM

## 2020-09-10 DIAGNOSIS — E11.59 HYPERTENSION ASSOCIATED WITH DIABETES: ICD-10-CM

## 2020-09-10 DIAGNOSIS — G47.33 OSA (OBSTRUCTIVE SLEEP APNEA): ICD-10-CM

## 2020-09-10 DIAGNOSIS — Z79.4 TYPE 2 DIABETES MELLITUS WITH CHRONIC KIDNEY DISEASE ON CHRONIC DIALYSIS, WITH LONG-TERM CURRENT USE OF INSULIN: Primary | ICD-10-CM

## 2020-09-10 DIAGNOSIS — N18.6 TYPE 2 DIABETES MELLITUS WITH CHRONIC KIDNEY DISEASE ON CHRONIC DIALYSIS, WITH LONG-TERM CURRENT USE OF INSULIN: Primary | ICD-10-CM

## 2020-09-10 DIAGNOSIS — E66.01 MORBID OBESITY WITH BMI OF 40.0-44.9, ADULT: ICD-10-CM

## 2020-09-10 DIAGNOSIS — Z99.2 TYPE 2 DIABETES MELLITUS WITH CHRONIC KIDNEY DISEASE ON CHRONIC DIALYSIS, WITH LONG-TERM CURRENT USE OF INSULIN: Primary | ICD-10-CM

## 2020-09-10 DIAGNOSIS — E11.22 TYPE 2 DIABETES MELLITUS WITH CHRONIC KIDNEY DISEASE ON CHRONIC DIALYSIS, WITH LONG-TERM CURRENT USE OF INSULIN: Primary | ICD-10-CM

## 2020-09-10 DIAGNOSIS — I15.0 RENOVASCULAR HYPERTENSION: ICD-10-CM

## 2020-09-10 DIAGNOSIS — I15.2 HYPERTENSION ASSOCIATED WITH DIABETES: ICD-10-CM

## 2020-09-10 PROCEDURE — 99214 PR OFFICE/OUTPT VISIT, EST, LEVL IV, 30-39 MIN: ICD-10-PCS | Mod: S$GLB,,, | Performed by: INTERNAL MEDICINE

## 2020-09-10 PROCEDURE — 3051F PR MOST RECENT HEMOGLOBIN A1C LEVEL 7.0 - < 8.0%: ICD-10-PCS | Mod: CPTII,S$GLB,, | Performed by: INTERNAL MEDICINE

## 2020-09-10 PROCEDURE — 3008F PR BODY MASS INDEX (BMI) DOCUMENTED: ICD-10-PCS | Mod: CPTII,S$GLB,, | Performed by: INTERNAL MEDICINE

## 2020-09-10 PROCEDURE — 3008F BODY MASS INDEX DOCD: CPT | Mod: CPTII,S$GLB,, | Performed by: INTERNAL MEDICINE

## 2020-09-10 PROCEDURE — 99214 OFFICE O/P EST MOD 30 MIN: CPT | Mod: S$GLB,,, | Performed by: INTERNAL MEDICINE

## 2020-09-10 PROCEDURE — 99999 PR PBB SHADOW E&M-EST. PATIENT-LVL IV: ICD-10-PCS | Mod: PBBFAC,,, | Performed by: INTERNAL MEDICINE

## 2020-09-10 PROCEDURE — 3051F HG A1C>EQUAL 7.0%<8.0%: CPT | Mod: CPTII,S$GLB,, | Performed by: INTERNAL MEDICINE

## 2020-09-10 PROCEDURE — 99999 PR PBB SHADOW E&M-EST. PATIENT-LVL IV: CPT | Mod: PBBFAC,,, | Performed by: INTERNAL MEDICINE

## 2020-09-10 NOTE — PROGRESS NOTES
Subjective:   Patient ID: Thai Santoyo Jr. is a 44 y.o. male.  Chief Complaint:  Follow-up    Mr Santoyo is a 44 year old man presenting for 3 month follow up for chronic medical conditions.  He is doing well today, other than feeling more tired than usual. He is also having issues with his Trulicity.   Fatigue:  He has been having issues sleeping with his CPAP. He finds that his mouth gets dry with the CPAP. He wakes up every two hours to drink water because of his dry mouth and isn't sleeping through the night. He is working with the sleep specialists to rectify this.  He has a history of hypogonadism, not currently using his prescribed Tpatch.  Trulicity:   He says he gets nauseous and vomits the day after he takes trulicity. He also has a dry cough and heartburn that he attributes to Trulicity. His weight and A1C have been stable on Trulicity.  He would like to discuss his options.    PMH:  Morbid obesity - BMI 43.8  ESRD on dialysis MWF.  Anemia on CBC 2018.  Type 2 DM - he states his sugars have been under 200. A1C 6/2020 was 6.4% (down from 7.3% 1 year ago).  Currently taking glipizide 10mg, Trulicity and insulin glargine 45u2x daily and lispro 40u 3x daily. He had an eye exam 6/2020. Foot exam done today.   Hypertension - /82 today. Reasonable given that he is a dialysis patient. Takes carvedilol 25mg, furosemixe 40mg, and olmesartan-HCTZ 40-25mg daily.  Hyperlipidemia - rosuvastatin 10mg daily. Lipids done 6/2020: Total / TG / HDL / LDL - 158 / 193(H) / 27(L) / 92   CONRAD - uses CPAP nightly but has been having issues with it, as noted above.   SVT 2018 - s/p ablation. No palpitations or chest pains.  Gout - no recent flares. Does not take allopurinol daily. States he only takes it with flares. We discussed how this is a preventative medication that he needs to take daily. It can actually make flares worse.   Knee pain - follows with [facility] for injections. Last injection a year ago. He  "plans to followup with them soon, as his knees have been bothering him  Hyperparathyroidism - selevamer binder. Last Ca 6/2020 was 9.8.  Hypogonadism- Tpatch has been falling off. He is sweaty and hasn't found a spot that works well for him. He hasn't been using the patch for several months. His last freeT was within normal limits as of 6/2020.    HM:  Tetanus  Flu  A1C        Current Outpatient Medications:     allopurinoL (ZYLOPRIM) 100 MG tablet, Take 1 tablet (100 mg total) by mouth once daily., Disp: 90 tablet, Rfl: 3    amLODIPine (NORVASC) 10 MG tablet, TAKE ONE TABLET BY MOUTH ONCE DAILY, Disp: 90 tablet, Rfl: 3    blood sugar diagnostic Strp, Use one strip each time to check blood sugar three times daily., Disp: 300 strip, Rfl: 4    blood-glucose meter Misc, use as directed, Disp: 1 each, Rfl: 0    carvediloL (COREG) 25 MG tablet, TAKE 1 TABLET BY MOUTH TWICE DAILY WITH MEALS, Disp: 180 tablet, Rfl: 3    furosemide (LASIX) 40 MG tablet, TAKE ONE TABLET BY MOUTH ONCE DAILY, Disp: 90 tablet, Rfl: 3    glipiZIDE (GLUCOTROL) 10 MG TR24, Take 1 tablet (10 mg total) by mouth daily with breakfast. (Patient taking differently: Take 20 mg by mouth daily with breakfast. ), Disp: 90 tablet, Rfl: 3    insulin (BASAGLAR KWIKPEN U-100 INSULIN) glargine 100 units/mL (3mL) SubQ pen, Inject 45 Units into the skin 2 (two) times daily., Disp: 90 mL, Rfl: 3    insulin lispro 100 unit/mL pen, Inject 40 Units into the skin 3 (three) times daily before meals., Disp: 108 mL, Rfl: 3    moxifloxacin (VIGAMOX) 0.5 % ophthalmic solution, Place 1 drop into both eyes 3 (three) times daily. for 7 days, Disp: 3 mL, Rfl: 0    olmesartan-hydrochlorothiazide (BENICAR HCT) 40-25 mg per tablet, Take 1 tablet by mouth once daily., Disp: 90 tablet, Rfl: 3    pen needle, diabetic 32 gauge x 5/32" Ndle, Use as directed for insulin injection up to three times daily, Disp: 400 each, Rfl: 3    rosuvastatin (CRESTOR) 10 MG tablet, Take 1 " tablet (10 mg total) by mouth once daily., Disp: 90 tablet, Rfl: 3    sevelamer carbonate (RENVELA) 800 mg Tab, Take 800 mg by mouth 3 (three) times daily with meals., Disp: , Rfl:     testosterone (ANDRODERM) 4 mg/24 hr PT24, Apply 1 patch (4 mg total) topically once daily., Disp: 90 patch, Rfl: 3    traZODone (DESYREL) 50 MG tablet, TAKE 1 TABLET BY MOUTH NIGHTLY AS NEEDED FOR INSOMNIA, Disp: 90 tablet, Rfl: 3    dulaglutide (TRULICITY) 1.5 mg/0.5 mL pen injector, Inject 1.5 mg into the skin every 7 days., Disp: 6 mL, Rfl: 3    Review of Systems   Constitutional: Positive for fatigue. Negative for activity change, appetite change and fever.   HENT: Negative for congestion, hearing loss, rhinorrhea, sinus pain and trouble swallowing.    Eyes: Negative for visual disturbance.   Respiratory: Positive for cough. Negative for chest tightness and shortness of breath.    Cardiovascular: Negative for chest pain, palpitations and leg swelling.   Gastrointestinal: Positive for nausea and vomiting. Negative for abdominal distention, abdominal pain, blood in stool, constipation and diarrhea.   Endocrine: Positive for polydipsia. Negative for cold intolerance, heat intolerance, polyphagia and polyuria.   Genitourinary: Negative for difficulty urinating, dysuria, frequency and urgency.   Musculoskeletal: Negative for arthralgias, myalgias and neck pain.   Skin: Negative for rash.   Neurological: Negative for dizziness, tremors, syncope, weakness, light-headedness, numbness and headaches.   Psychiatric/Behavioral: Negative for agitation, behavioral problems and dysphoric mood. The patient is not nervous/anxious.      Objective:   BP (!) 140/82 (BP Location: Right arm, Patient Position: Sitting, BP Method: Large (Manual))   Pulse 88   Ht 6' (1.829 m)   Wt (!) 146.5 kg (322 lb 15.6 oz)   SpO2 98%   BMI 43.80 kg/m²     Physical Exam  Vitals signs and nursing note reviewed.   Constitutional:       Appearance: Normal  appearance.   HENT:      Head: Normocephalic and atraumatic.   Eyes:      General: Vision grossly intact.   Neck:      Musculoskeletal: Normal range of motion and neck supple. No muscular tenderness.      Vascular: No carotid bruit.      Trachea: Trachea normal.   Cardiovascular:      Rate and Rhythm: Normal rate and regular rhythm.      Pulses:           Dorsalis pedis pulses are 1+ on the right side and 1+ on the left side.        Posterior tibial pulses are 1+ on the right side and 1+ on the left side.      Heart sounds: Normal heart sounds. No murmur. No friction rub. No gallop.    Pulmonary:      Effort: Pulmonary effort is normal. No respiratory distress.      Breath sounds: Normal breath sounds. No rhonchi or rales.   Chest:      Chest wall: No tenderness.   Abdominal:      General: Bowel sounds are normal. There is no distension.      Palpations: Abdomen is soft. There is no mass.      Tenderness: There is no abdominal tenderness.   Musculoskeletal:      Right foot: Normal range of motion. No deformity or bunion.      Left foot: Normal range of motion. No deformity or bunion.   Feet:      Right foot:      Protective Sensation: 4 sites tested. 4 sites sensed.      Skin integrity: Skin integrity normal.      Toenail Condition: Right toenails are normal.      Left foot:      Protective Sensation: 4 sites tested. 4 sites sensed.      Skin integrity: Skin integrity normal.      Toenail Condition: Left toenails are normal.   Lymphadenopathy:      Cervical: No cervical adenopathy.   Skin:     General: Skin is warm.      Coloration: Skin is not jaundiced.      Findings: No bruising, erythema or rash.   Neurological:      General: No focal deficit present.      Mental Status: He is alert and oriented to person, place, and time.      Sensory: No sensory deficit.      Motor: No weakness.      Coordination: Coordination normal.      Gait: Gait normal.   Psychiatric:         Mood and Affect: Mood normal.          Behavior: Behavior normal.       Assessment:       ICD-10-CM ICD-9-CM   1. Type 2 diabetes mellitus with chronic kidney disease on chronic dialysis, with long-term current use of insulin  E11.22 250.40    N18.6 585.9    Z99.2 V45.11    Z79.4 V58.67   2. Morbid obesity with BMI of 40.0-44.9, adult  E66.01 278.01    Z68.41 V85.41   3. ESRD on hemodialysis  N18.6 585.6    Z99.2 V45.11   4. Renovascular hypertension  I15.0 405.91   5. Hypertension associated with diabetes  E11.59 250.80    I10 401.9   6. CONRAD (obstructive sleep apnea)  G47.33 327.23   7. Fatigue, unspecified type  R53.83 780.79     Plan:   Type 2 diabetes mellitus with chronic kidney disease on chronic dialysis, with long-term current use of insulin  -     dulaglutide (TRULICITY) 1.5 mg/0.5 mL pen injector; Inject 1.5 mg into the skin every 7 days.  Dispense: 6 mL; Refill: 3  -     Hemoglobin A1C; Future; Expected date: 09/10/2020  Patient decided he wanted to stick with Trulicity, despite the nausea and vomiting. He will give it another try.  We talked about how we can certainly switch to another drug in the same class, or another class of drug altogether if his nausea and vomiting continue to be a problem for him.  Will recheck his A1C today. It was 6.4% when it was last checked 6/2020.   Foot exam was normal today. Eye exam was done 3 months ago, normal.   Continue with current regimen - glipizide 10mg, Trulicity and insulin glargine 45u2x daily and lispro 40u 3x daily.    Morbid obesity with BMI of 40.0-44.9, adult  Lifestyle modifications encouraged.    Renovascular hypertension  Hypertension associated with diabetes  -     Comprehensive metabolic panel; Future; Expected date: 09/10/2020  /82 today. This is reasonable for him.  Continue on carvedilol 25mg, furosemide 40mg, and olmesartan-HCTZ 40-25mg daily.    Fatigue, unspecified type  CONRAD (obstructive sleep apnea)  ESRD on hemodialysis  -     Testosterone; Future; Expected date:  09/10/2020  -     CBC auto differential; Future; Expected date: 09/10/2020  Fatigue is expected in a patient with ESRD on dialysis.  However, patient feels his fatigue has worsened in the last month.  Had anemia on labs 2018. Recheck CBC today for anemia as source of fatigue.  His fatigue may be caused by low testosterone.  He has a history of hypogonadism. His last Testosterone level in June was normal but will recheck Testosterone today to see whether he needs to go back on T supplementation. Patches didn't work for him. If totalT is low, will consider injections.  Working with sleep specialist to get his CPAP sorted. Untreated CONRAD will contribute to his fatigue aswell.     Follow up in 3 months, or sooner as needed.    Columba Juarez, MS4  [employer]  09/10/2020   12:43 PM    I hereby acknowledge that I am relying upon documentation authored by a medical student working under my supervision and further I hereby attest that I have verified the student documentation or findings by personally re-performing the physical exam and medical decision making activities of the Evaluation and Management service to be billed.  Manjit Mckee Ii    rtc 3 months    G Anais Mckee MD MPH  Staff Internist

## 2020-09-11 ENCOUNTER — PATIENT MESSAGE (OUTPATIENT)
Dept: INTERNAL MEDICINE | Facility: CLINIC | Age: 45
End: 2020-09-11

## 2020-09-11 DIAGNOSIS — E29.1 HYPOGONADISM IN MALE: Primary | ICD-10-CM

## 2020-09-11 RX ORDER — TESTOSTERONE CYPIONATE 1000 MG/10ML
100 INJECTION, SOLUTION INTRAMUSCULAR
Qty: 10 ML | Refills: 3 | Status: SHIPPED | OUTPATIENT
Start: 2020-09-11 | End: 2021-03-31 | Stop reason: SDUPTHER

## 2020-09-11 NOTE — TELEPHONE ENCOUNTER
Please reach out to Ashanti Natanael and help him learn how to give himself an IM injection of testosterone every 2 weeks.    Thanks.

## 2020-09-15 ENCOUNTER — CLINICAL SUPPORT (OUTPATIENT)
Dept: INTERNAL MEDICINE | Facility: CLINIC | Age: 45
End: 2020-09-15
Payer: MEDICARE

## 2020-09-15 NOTE — PROGRESS NOTES
After obtaining consent, and per orders of Dr. Mckee, injection of testosterone cypionate (DEPOTESTOTERONE CYPIONATE) 100 mg/mL injection was  given by Rosangela Huynh LPN.  Patient instructed to remain in clinic for 20 minutes afterwards, and to report any adverse reaction to me immediately.  Pt received verbal, hands on, and written education on how to self-give medication. Pt verbalized understanding and demonstrated how to self inject.

## 2021-03-31 DIAGNOSIS — E29.1 HYPOGONADISM IN MALE: ICD-10-CM

## 2021-03-31 DIAGNOSIS — I15.0 RENOVASCULAR HYPERTENSION: ICD-10-CM

## 2021-03-31 DIAGNOSIS — E11.65 TYPE 2 DIABETES MELLITUS WITH HYPERGLYCEMIA, WITH LONG-TERM CURRENT USE OF INSULIN: ICD-10-CM

## 2021-03-31 DIAGNOSIS — I15.2 HYPERTENSION ASSOCIATED WITH DIABETES: ICD-10-CM

## 2021-03-31 DIAGNOSIS — E11.59 HYPERTENSION ASSOCIATED WITH DIABETES: ICD-10-CM

## 2021-03-31 DIAGNOSIS — Z79.4 TYPE 2 DIABETES MELLITUS WITH HYPERGLYCEMIA, WITH LONG-TERM CURRENT USE OF INSULIN: ICD-10-CM

## 2021-03-31 RX ORDER — ALLOPURINOL 100 MG/1
100 TABLET ORAL DAILY
Qty: 90 TABLET | Refills: 3 | Status: SHIPPED | OUTPATIENT
Start: 2021-03-31 | End: 2023-09-07

## 2021-03-31 RX ORDER — OLMESARTAN MEDOXOMIL AND HYDROCHLOROTHIAZIDE 40/25 40; 25 MG/1; MG/1
1 TABLET ORAL DAILY
Qty: 90 TABLET | Refills: 3 | Status: SHIPPED | OUTPATIENT
Start: 2021-03-31 | End: 2022-06-30 | Stop reason: SDUPTHER

## 2021-03-31 RX ORDER — AMLODIPINE BESYLATE 10 MG/1
TABLET ORAL
Qty: 90 TABLET | Refills: 3 | Status: SHIPPED | OUTPATIENT
Start: 2021-03-31 | End: 2022-06-09 | Stop reason: SDUPTHER

## 2021-03-31 RX ORDER — INSULIN GLARGINE 100 [IU]/ML
45 INJECTION, SOLUTION SUBCUTANEOUS 2 TIMES DAILY
Qty: 90 ML | Refills: 3 | Status: SHIPPED | OUTPATIENT
Start: 2021-03-31 | End: 2021-09-17 | Stop reason: SDUPTHER

## 2021-03-31 RX ORDER — TESTOSTERONE CYPIONATE 1000 MG/10ML
100 INJECTION, SOLUTION INTRAMUSCULAR
Qty: 10 ML | Refills: 3 | Status: SHIPPED | OUTPATIENT
Start: 2021-03-31 | End: 2022-09-30 | Stop reason: SDUPTHER

## 2021-04-16 ENCOUNTER — TELEPHONE (OUTPATIENT)
Dept: SLEEP MEDICINE | Facility: CLINIC | Age: 46
End: 2021-04-16

## 2021-04-19 ENCOUNTER — PATIENT OUTREACH (OUTPATIENT)
Dept: ADMINISTRATIVE | Facility: OTHER | Age: 46
End: 2021-04-19

## 2021-04-19 DIAGNOSIS — Z79.4 TYPE 2 DIABETES MELLITUS WITH CHRONIC KIDNEY DISEASE ON CHRONIC DIALYSIS, WITH LONG-TERM CURRENT USE OF INSULIN: ICD-10-CM

## 2021-04-19 DIAGNOSIS — N18.6 TYPE 2 DIABETES MELLITUS WITH CHRONIC KIDNEY DISEASE ON CHRONIC DIALYSIS, WITH LONG-TERM CURRENT USE OF INSULIN: ICD-10-CM

## 2021-04-19 DIAGNOSIS — E11.22 TYPE 2 DIABETES MELLITUS WITH CHRONIC KIDNEY DISEASE ON CHRONIC DIALYSIS, WITH LONG-TERM CURRENT USE OF INSULIN: ICD-10-CM

## 2021-04-19 DIAGNOSIS — Z99.2 TYPE 2 DIABETES MELLITUS WITH CHRONIC KIDNEY DISEASE ON CHRONIC DIALYSIS, WITH LONG-TERM CURRENT USE OF INSULIN: ICD-10-CM

## 2021-04-19 RX ORDER — LANCETS
300 EACH MISCELLANEOUS 3 TIMES DAILY
Qty: 300 EACH | Refills: 4 | Status: SHIPPED | OUTPATIENT
Start: 2021-04-19

## 2021-04-19 RX ORDER — DEXTROSE 4 G
TABLET,CHEWABLE ORAL
Qty: 1 EACH | Refills: 0 | Status: SHIPPED | OUTPATIENT
Start: 2021-04-19 | End: 2024-03-14

## 2021-04-21 ENCOUNTER — TELEPHONE (OUTPATIENT)
Dept: SLEEP MEDICINE | Facility: CLINIC | Age: 46
End: 2021-04-21

## 2021-04-21 ENCOUNTER — TELEPHONE (OUTPATIENT)
Dept: INTERNAL MEDICINE | Facility: CLINIC | Age: 46
End: 2021-04-21

## 2021-06-15 DIAGNOSIS — R00.0 TACHYCARDIA: ICD-10-CM

## 2021-06-15 DIAGNOSIS — I15.2 HYPERTENSION ASSOCIATED WITH DIABETES: ICD-10-CM

## 2021-06-15 DIAGNOSIS — G47.00 INSOMNIA, UNSPECIFIED TYPE: ICD-10-CM

## 2021-06-15 DIAGNOSIS — E11.59 HYPERTENSION ASSOCIATED WITH DIABETES: ICD-10-CM

## 2021-06-15 DIAGNOSIS — I15.0 RENOVASCULAR HYPERTENSION: ICD-10-CM

## 2021-06-15 RX ORDER — FUROSEMIDE 40 MG/1
TABLET ORAL
Qty: 90 TABLET | Refills: 3 | Status: SHIPPED | OUTPATIENT
Start: 2021-06-15 | End: 2022-11-10 | Stop reason: SDUPTHER

## 2021-06-15 RX ORDER — TRAZODONE HYDROCHLORIDE 50 MG/1
TABLET ORAL
Qty: 90 TABLET | Refills: 3 | Status: SHIPPED | OUTPATIENT
Start: 2021-06-15 | End: 2023-09-07

## 2021-06-15 RX ORDER — CARVEDILOL 25 MG/1
TABLET ORAL
Qty: 180 TABLET | Refills: 3 | Status: SHIPPED | OUTPATIENT
Start: 2021-06-15 | End: 2022-09-06 | Stop reason: SDUPTHER

## 2021-07-01 ENCOUNTER — PATIENT OUTREACH (OUTPATIENT)
Dept: ADMINISTRATIVE | Facility: OTHER | Age: 46
End: 2021-07-01

## 2021-07-01 DIAGNOSIS — E11.22 TYPE 2 DIABETES MELLITUS WITH CHRONIC KIDNEY DISEASE ON CHRONIC DIALYSIS, WITH LONG-TERM CURRENT USE OF INSULIN: Primary | ICD-10-CM

## 2021-07-01 DIAGNOSIS — N18.6 TYPE 2 DIABETES MELLITUS WITH CHRONIC KIDNEY DISEASE ON CHRONIC DIALYSIS, WITH LONG-TERM CURRENT USE OF INSULIN: Primary | ICD-10-CM

## 2021-07-01 DIAGNOSIS — Z79.4 TYPE 2 DIABETES MELLITUS WITH CHRONIC KIDNEY DISEASE ON CHRONIC DIALYSIS, WITH LONG-TERM CURRENT USE OF INSULIN: Primary | ICD-10-CM

## 2021-07-01 DIAGNOSIS — Z99.2 TYPE 2 DIABETES MELLITUS WITH CHRONIC KIDNEY DISEASE ON CHRONIC DIALYSIS, WITH LONG-TERM CURRENT USE OF INSULIN: Primary | ICD-10-CM

## 2021-07-02 ENCOUNTER — TELEPHONE (OUTPATIENT)
Dept: SLEEP MEDICINE | Facility: CLINIC | Age: 46
End: 2021-07-02

## 2021-07-06 ENCOUNTER — OFFICE VISIT (OUTPATIENT)
Dept: SLEEP MEDICINE | Facility: CLINIC | Age: 46
End: 2021-07-06
Payer: MEDICARE

## 2021-07-06 ENCOUNTER — PATIENT MESSAGE (OUTPATIENT)
Dept: SLEEP MEDICINE | Facility: CLINIC | Age: 46
End: 2021-07-06

## 2021-07-06 VITALS
DIASTOLIC BLOOD PRESSURE: 93 MMHG | WEIGHT: 311.06 LBS | HEART RATE: 93 BPM | BODY MASS INDEX: 42.13 KG/M2 | SYSTOLIC BLOOD PRESSURE: 143 MMHG | HEIGHT: 72 IN

## 2021-07-06 DIAGNOSIS — I15.2 HYPERTENSION ASSOCIATED WITH DIABETES: ICD-10-CM

## 2021-07-06 DIAGNOSIS — E11.59 HYPERTENSION ASSOCIATED WITH DIABETES: ICD-10-CM

## 2021-07-06 DIAGNOSIS — G47.33 OBSTRUCTIVE SLEEP APNEA: Primary | ICD-10-CM

## 2021-07-06 PROCEDURE — 99499 UNLISTED E&M SERVICE: CPT | Mod: S$GLB,,, | Performed by: NURSE PRACTITIONER

## 2021-07-06 PROCEDURE — 3008F PR BODY MASS INDEX (BMI) DOCUMENTED: ICD-10-PCS | Mod: CPTII,S$GLB,, | Performed by: NURSE PRACTITIONER

## 2021-07-06 PROCEDURE — 1126F AMNT PAIN NOTED NONE PRSNT: CPT | Mod: S$GLB,,, | Performed by: NURSE PRACTITIONER

## 2021-07-06 PROCEDURE — 1126F PR PAIN SEVERITY QUANTIFIED, NO PAIN PRESENT: ICD-10-PCS | Mod: S$GLB,,, | Performed by: NURSE PRACTITIONER

## 2021-07-06 PROCEDURE — 99999 PR PBB SHADOW E&M-EST. PATIENT-LVL IV: ICD-10-PCS | Mod: PBBFAC,,, | Performed by: NURSE PRACTITIONER

## 2021-07-06 PROCEDURE — 3051F PR MOST RECENT HEMOGLOBIN A1C LEVEL 7.0 - < 8.0%: ICD-10-PCS | Mod: CPTII,S$GLB,, | Performed by: NURSE PRACTITIONER

## 2021-07-06 PROCEDURE — 99999 PR PBB SHADOW E&M-EST. PATIENT-LVL IV: CPT | Mod: PBBFAC,,, | Performed by: NURSE PRACTITIONER

## 2021-07-06 PROCEDURE — 99214 OFFICE O/P EST MOD 30 MIN: CPT | Mod: S$GLB,,, | Performed by: NURSE PRACTITIONER

## 2021-07-06 PROCEDURE — 3008F BODY MASS INDEX DOCD: CPT | Mod: CPTII,S$GLB,, | Performed by: NURSE PRACTITIONER

## 2021-07-06 PROCEDURE — 3051F HG A1C>EQUAL 7.0%<8.0%: CPT | Mod: CPTII,S$GLB,, | Performed by: NURSE PRACTITIONER

## 2021-07-06 PROCEDURE — 99499 RISK ADDL DX/OHS AUDIT: ICD-10-PCS | Mod: S$GLB,,, | Performed by: NURSE PRACTITIONER

## 2021-07-06 PROCEDURE — 99214 PR OFFICE/OUTPT VISIT, EST, LEVL IV, 30-39 MIN: ICD-10-PCS | Mod: S$GLB,,, | Performed by: NURSE PRACTITIONER

## 2021-07-08 ENCOUNTER — TELEPHONE (OUTPATIENT)
Dept: PHARMACY | Facility: AMBULARY SURGERY CENTER | Age: 46
End: 2021-07-08

## 2021-08-03 ENCOUNTER — PATIENT MESSAGE (OUTPATIENT)
Dept: ADMINISTRATIVE | Facility: HOSPITAL | Age: 46
End: 2021-08-03

## 2021-08-16 ENCOUNTER — TELEPHONE (OUTPATIENT)
Dept: PHARMACY | Facility: AMBULARY SURGERY CENTER | Age: 46
End: 2021-08-16

## 2021-08-16 RX ORDER — PEN NEEDLE, DIABETIC 30 GX3/16"
NEEDLE, DISPOSABLE MISCELLANEOUS
Qty: 100 EACH | Refills: 3 | Status: SHIPPED | OUTPATIENT
Start: 2021-08-16 | End: 2022-09-30

## 2021-09-16 ENCOUNTER — LAB VISIT (OUTPATIENT)
Dept: LAB | Facility: HOSPITAL | Age: 46
End: 2021-09-16
Attending: INTERNAL MEDICINE
Payer: MEDICARE

## 2021-09-16 ENCOUNTER — OFFICE VISIT (OUTPATIENT)
Dept: INTERNAL MEDICINE | Facility: CLINIC | Age: 46
End: 2021-09-16
Payer: MEDICARE

## 2021-09-16 VITALS
DIASTOLIC BLOOD PRESSURE: 76 MMHG | SYSTOLIC BLOOD PRESSURE: 124 MMHG | WEIGHT: 309.31 LBS | HEART RATE: 83 BPM | OXYGEN SATURATION: 98 % | BODY MASS INDEX: 41.89 KG/M2 | HEIGHT: 72 IN

## 2021-09-16 DIAGNOSIS — Z99.2 ESRD ON HEMODIALYSIS: ICD-10-CM

## 2021-09-16 DIAGNOSIS — E11.59 HYPERTENSION ASSOCIATED WITH DIABETES: ICD-10-CM

## 2021-09-16 DIAGNOSIS — Z79.4 TYPE 2 DIABETES MELLITUS WITH CHRONIC KIDNEY DISEASE ON CHRONIC DIALYSIS, WITH LONG-TERM CURRENT USE OF INSULIN: Primary | ICD-10-CM

## 2021-09-16 DIAGNOSIS — M17.0 OSTEOARTHRITIS OF BOTH KNEES, UNSPECIFIED OSTEOARTHRITIS TYPE: ICD-10-CM

## 2021-09-16 DIAGNOSIS — I15.2 HYPERTENSION ASSOCIATED WITH DIABETES: ICD-10-CM

## 2021-09-16 DIAGNOSIS — Z99.2 TYPE 2 DIABETES MELLITUS WITH CHRONIC KIDNEY DISEASE ON CHRONIC DIALYSIS, WITH LONG-TERM CURRENT USE OF INSULIN: ICD-10-CM

## 2021-09-16 DIAGNOSIS — E66.01 MORBID OBESITY WITH BMI OF 40.0-44.9, ADULT: ICD-10-CM

## 2021-09-16 DIAGNOSIS — E11.22 TYPE 2 DIABETES MELLITUS WITH CHRONIC KIDNEY DISEASE ON CHRONIC DIALYSIS, WITH LONG-TERM CURRENT USE OF INSULIN: Primary | ICD-10-CM

## 2021-09-16 DIAGNOSIS — N18.6 TYPE 2 DIABETES MELLITUS WITH CHRONIC KIDNEY DISEASE ON CHRONIC DIALYSIS, WITH LONG-TERM CURRENT USE OF INSULIN: ICD-10-CM

## 2021-09-16 DIAGNOSIS — N25.81 SECONDARY HYPERPARATHYROIDISM OF RENAL ORIGIN: ICD-10-CM

## 2021-09-16 DIAGNOSIS — N18.6 ESRD ON HEMODIALYSIS: ICD-10-CM

## 2021-09-16 DIAGNOSIS — I15.0 RENOVASCULAR HYPERTENSION: ICD-10-CM

## 2021-09-16 DIAGNOSIS — N18.6 TYPE 2 DIABETES MELLITUS WITH CHRONIC KIDNEY DISEASE ON CHRONIC DIALYSIS, WITH LONG-TERM CURRENT USE OF INSULIN: Primary | ICD-10-CM

## 2021-09-16 DIAGNOSIS — Z99.2 TYPE 2 DIABETES MELLITUS WITH CHRONIC KIDNEY DISEASE ON CHRONIC DIALYSIS, WITH LONG-TERM CURRENT USE OF INSULIN: Primary | ICD-10-CM

## 2021-09-16 DIAGNOSIS — E11.22 TYPE 2 DIABETES MELLITUS WITH CHRONIC KIDNEY DISEASE ON CHRONIC DIALYSIS, WITH LONG-TERM CURRENT USE OF INSULIN: ICD-10-CM

## 2021-09-16 DIAGNOSIS — Z79.4 TYPE 2 DIABETES MELLITUS WITH CHRONIC KIDNEY DISEASE ON CHRONIC DIALYSIS, WITH LONG-TERM CURRENT USE OF INSULIN: ICD-10-CM

## 2021-09-16 LAB
ALBUMIN SERPL BCP-MCNC: 3.4 G/DL (ref 3.5–5.2)
ALP SERPL-CCNC: 99 U/L (ref 55–135)
ALT SERPL W/O P-5'-P-CCNC: 13 U/L (ref 10–44)
ANION GAP SERPL CALC-SCNC: 13 MMOL/L (ref 8–16)
AST SERPL-CCNC: 12 U/L (ref 10–40)
BILIRUB SERPL-MCNC: 0.4 MG/DL (ref 0.1–1)
BUN SERPL-MCNC: 57 MG/DL (ref 6–20)
CALCIUM SERPL-MCNC: 10.4 MG/DL (ref 8.7–10.5)
CHLORIDE SERPL-SCNC: 94 MMOL/L (ref 95–110)
CHOLEST SERPL-MCNC: 207 MG/DL (ref 120–199)
CHOLEST/HDLC SERPL: 8 {RATIO} (ref 2–5)
CO2 SERPL-SCNC: 27 MMOL/L (ref 23–29)
CREAT SERPL-MCNC: 12.8 MG/DL (ref 0.5–1.4)
EST. GFR  (AFRICAN AMERICAN): 4.9 ML/MIN/1.73 M^2
EST. GFR  (NON AFRICAN AMERICAN): 4.2 ML/MIN/1.73 M^2
ESTIMATED AVG GLUCOSE: 217 MG/DL (ref 68–131)
GLUCOSE SERPL-MCNC: 134 MG/DL (ref 70–110)
HBA1C MFR BLD: 9.2 % (ref 4–5.6)
HDLC SERPL-MCNC: 26 MG/DL (ref 40–75)
HDLC SERPL: 12.6 % (ref 20–50)
LDLC SERPL CALC-MCNC: 141.6 MG/DL (ref 63–159)
NONHDLC SERPL-MCNC: 181 MG/DL
POTASSIUM SERPL-SCNC: 4.5 MMOL/L (ref 3.5–5.1)
PROT SERPL-MCNC: 7.9 G/DL (ref 6–8.4)
SODIUM SERPL-SCNC: 134 MMOL/L (ref 136–145)
TRIGL SERPL-MCNC: 197 MG/DL (ref 30–150)

## 2021-09-16 PROCEDURE — 83036 HEMOGLOBIN GLYCOSYLATED A1C: CPT | Performed by: INTERNAL MEDICINE

## 2021-09-16 PROCEDURE — 3008F PR BODY MASS INDEX (BMI) DOCUMENTED: ICD-10-PCS | Mod: CPTII,S$GLB,, | Performed by: INTERNAL MEDICINE

## 2021-09-16 PROCEDURE — 3078F DIAST BP <80 MM HG: CPT | Mod: CPTII,S$GLB,, | Performed by: INTERNAL MEDICINE

## 2021-09-16 PROCEDURE — 1160F PR REVIEW ALL MEDS BY PRESCRIBER/CLIN PHARMACIST DOCUMENTED: ICD-10-PCS | Mod: CPTII,S$GLB,, | Performed by: INTERNAL MEDICINE

## 2021-09-16 PROCEDURE — 3008F BODY MASS INDEX DOCD: CPT | Mod: CPTII,S$GLB,, | Performed by: INTERNAL MEDICINE

## 2021-09-16 PROCEDURE — 99999 PR PBB SHADOW E&M-EST. PATIENT-LVL V: ICD-10-PCS | Mod: PBBFAC,,, | Performed by: INTERNAL MEDICINE

## 2021-09-16 PROCEDURE — 99499 RISK ADDL DX/OHS AUDIT: ICD-10-PCS | Mod: S$GLB,,, | Performed by: INTERNAL MEDICINE

## 2021-09-16 PROCEDURE — 1159F PR MEDICATION LIST DOCUMENTED IN MEDICAL RECORD: ICD-10-PCS | Mod: CPTII,S$GLB,, | Performed by: INTERNAL MEDICINE

## 2021-09-16 PROCEDURE — 80061 LIPID PANEL: CPT | Performed by: INTERNAL MEDICINE

## 2021-09-16 PROCEDURE — 36415 COLL VENOUS BLD VENIPUNCTURE: CPT | Performed by: INTERNAL MEDICINE

## 2021-09-16 PROCEDURE — 3074F PR MOST RECENT SYSTOLIC BLOOD PRESSURE < 130 MM HG: ICD-10-PCS | Mod: CPTII,S$GLB,, | Performed by: INTERNAL MEDICINE

## 2021-09-16 PROCEDURE — 3074F SYST BP LT 130 MM HG: CPT | Mod: CPTII,S$GLB,, | Performed by: INTERNAL MEDICINE

## 2021-09-16 PROCEDURE — 99214 PR OFFICE/OUTPT VISIT, EST, LEVL IV, 30-39 MIN: ICD-10-PCS | Mod: S$GLB,,, | Performed by: INTERNAL MEDICINE

## 2021-09-16 PROCEDURE — 80053 COMPREHEN METABOLIC PANEL: CPT | Performed by: INTERNAL MEDICINE

## 2021-09-16 PROCEDURE — 99214 OFFICE O/P EST MOD 30 MIN: CPT | Mod: S$GLB,,, | Performed by: INTERNAL MEDICINE

## 2021-09-16 PROCEDURE — 99499 UNLISTED E&M SERVICE: CPT | Mod: S$GLB,,, | Performed by: INTERNAL MEDICINE

## 2021-09-16 PROCEDURE — 3078F PR MOST RECENT DIASTOLIC BLOOD PRESSURE < 80 MM HG: ICD-10-PCS | Mod: CPTII,S$GLB,, | Performed by: INTERNAL MEDICINE

## 2021-09-16 PROCEDURE — 1160F RVW MEDS BY RX/DR IN RCRD: CPT | Mod: CPTII,S$GLB,, | Performed by: INTERNAL MEDICINE

## 2021-09-16 PROCEDURE — 1159F MED LIST DOCD IN RCRD: CPT | Mod: CPTII,S$GLB,, | Performed by: INTERNAL MEDICINE

## 2021-09-16 PROCEDURE — 99999 PR PBB SHADOW E&M-EST. PATIENT-LVL V: CPT | Mod: PBBFAC,,, | Performed by: INTERNAL MEDICINE

## 2021-09-17 ENCOUNTER — PATIENT MESSAGE (OUTPATIENT)
Dept: INTERNAL MEDICINE | Facility: CLINIC | Age: 46
End: 2021-09-17

## 2021-09-17 DIAGNOSIS — E11.22 TYPE 2 DIABETES MELLITUS WITH CHRONIC KIDNEY DISEASE ON CHRONIC DIALYSIS, WITH LONG-TERM CURRENT USE OF INSULIN: Primary | ICD-10-CM

## 2021-09-17 DIAGNOSIS — Z99.2 TYPE 2 DIABETES MELLITUS WITH CHRONIC KIDNEY DISEASE ON CHRONIC DIALYSIS, WITH LONG-TERM CURRENT USE OF INSULIN: Primary | ICD-10-CM

## 2021-09-17 DIAGNOSIS — Z79.4 TYPE 2 DIABETES MELLITUS WITH CHRONIC KIDNEY DISEASE ON CHRONIC DIALYSIS, WITH LONG-TERM CURRENT USE OF INSULIN: Primary | ICD-10-CM

## 2021-09-17 DIAGNOSIS — N18.6 TYPE 2 DIABETES MELLITUS WITH CHRONIC KIDNEY DISEASE ON CHRONIC DIALYSIS, WITH LONG-TERM CURRENT USE OF INSULIN: Primary | ICD-10-CM

## 2021-09-17 RX ORDER — INSULIN GLARGINE 100 [IU]/ML
50 INJECTION, SOLUTION SUBCUTANEOUS 2 TIMES DAILY
Qty: 90 ML | Refills: 3 | Status: SHIPPED | OUTPATIENT
Start: 2021-09-17 | End: 2022-09-30 | Stop reason: SDUPTHER

## 2021-09-21 ENCOUNTER — TELEPHONE (OUTPATIENT)
Dept: INTERNAL MEDICINE | Facility: CLINIC | Age: 46
End: 2021-09-21

## 2021-10-08 ENCOUNTER — TELEPHONE (OUTPATIENT)
Dept: INTERNAL MEDICINE | Facility: CLINIC | Age: 46
End: 2021-10-08

## 2021-10-12 ENCOUNTER — PATIENT OUTREACH (OUTPATIENT)
Dept: ADMINISTRATIVE | Facility: OTHER | Age: 46
End: 2021-10-12

## 2021-10-13 ENCOUNTER — PATIENT MESSAGE (OUTPATIENT)
Dept: UROLOGY | Facility: CLINIC | Age: 46
End: 2021-10-13
Payer: MEDICARE

## 2021-10-15 DIAGNOSIS — Z99.2 TYPE 2 DIABETES MELLITUS WITH CHRONIC KIDNEY DISEASE ON CHRONIC DIALYSIS, WITH LONG-TERM CURRENT USE OF INSULIN: ICD-10-CM

## 2021-10-15 DIAGNOSIS — N18.6 TYPE 2 DIABETES MELLITUS WITH CHRONIC KIDNEY DISEASE ON CHRONIC DIALYSIS, WITH LONG-TERM CURRENT USE OF INSULIN: ICD-10-CM

## 2021-10-15 DIAGNOSIS — Z79.4 TYPE 2 DIABETES MELLITUS WITH CHRONIC KIDNEY DISEASE ON CHRONIC DIALYSIS, WITH LONG-TERM CURRENT USE OF INSULIN: ICD-10-CM

## 2021-10-15 DIAGNOSIS — E11.22 TYPE 2 DIABETES MELLITUS WITH CHRONIC KIDNEY DISEASE ON CHRONIC DIALYSIS, WITH LONG-TERM CURRENT USE OF INSULIN: ICD-10-CM

## 2021-10-15 RX ORDER — INSULIN LISPRO 100 [IU]/ML
45 INJECTION, SOLUTION INTRAVENOUS; SUBCUTANEOUS
Qty: 105 ML | Refills: 3 | Status: SHIPPED | OUTPATIENT
Start: 2021-10-15 | End: 2022-11-10 | Stop reason: SDUPTHER

## 2021-10-18 ENCOUNTER — PATIENT MESSAGE (OUTPATIENT)
Dept: ADMINISTRATIVE | Facility: HOSPITAL | Age: 46
End: 2021-10-18
Payer: MEDICARE

## 2021-11-17 ENCOUNTER — PATIENT OUTREACH (OUTPATIENT)
Dept: ADMINISTRATIVE | Facility: OTHER | Age: 46
End: 2021-11-17
Payer: MEDICARE

## 2021-11-22 ENCOUNTER — OFFICE VISIT (OUTPATIENT)
Dept: UROLOGY | Facility: CLINIC | Age: 46
End: 2021-11-22
Payer: MEDICARE

## 2021-11-22 VITALS
HEIGHT: 72 IN | WEIGHT: 305.56 LBS | DIASTOLIC BLOOD PRESSURE: 79 MMHG | BODY MASS INDEX: 41.39 KG/M2 | SYSTOLIC BLOOD PRESSURE: 124 MMHG | HEART RATE: 86 BPM

## 2021-11-22 DIAGNOSIS — N52.01 ERECTILE DYSFUNCTION DUE TO ARTERIAL INSUFFICIENCY: ICD-10-CM

## 2021-11-22 DIAGNOSIS — N50.9 DISORDER OF MALE GENITAL ORGANS, UNSPECIFIED: Primary | ICD-10-CM

## 2021-11-22 PROCEDURE — 99499 RISK ADDL DX/OHS AUDIT: ICD-10-PCS | Mod: S$GLB,,, | Performed by: UROLOGY

## 2021-11-22 PROCEDURE — 99499 UNLISTED E&M SERVICE: CPT | Mod: S$GLB,,, | Performed by: UROLOGY

## 2021-11-22 PROCEDURE — 99999 PR PBB SHADOW E&M-EST. PATIENT-LVL IV: CPT | Mod: PBBFAC,,, | Performed by: UROLOGY

## 2021-11-22 PROCEDURE — 99205 PR OFFICE/OUTPT VISIT, NEW, LEVL V, 60-74 MIN: ICD-10-PCS | Mod: S$GLB,,, | Performed by: UROLOGY

## 2021-11-22 PROCEDURE — 99999 PR PBB SHADOW E&M-EST. PATIENT-LVL IV: ICD-10-PCS | Mod: PBBFAC,,, | Performed by: UROLOGY

## 2021-11-22 PROCEDURE — 99205 OFFICE O/P NEW HI 60 MIN: CPT | Mod: S$GLB,,, | Performed by: UROLOGY

## 2021-11-22 RX ORDER — PAPAVERINE HYDROCHLORIDE 30 MG/ML
INJECTION INTRAMUSCULAR; INTRAVENOUS
Qty: 5 ML | Refills: 0 | Status: SHIPPED | OUTPATIENT
Start: 2021-11-22 | End: 2023-03-07

## 2022-01-04 ENCOUNTER — PATIENT MESSAGE (OUTPATIENT)
Dept: INTERNAL MEDICINE | Facility: CLINIC | Age: 47
End: 2022-01-04
Payer: MEDICARE

## 2022-01-07 ENCOUNTER — PATIENT MESSAGE (OUTPATIENT)
Dept: INTERNAL MEDICINE | Facility: CLINIC | Age: 47
End: 2022-01-07
Payer: MEDICARE

## 2022-01-07 DIAGNOSIS — N18.6 TYPE 2 DIABETES MELLITUS WITH CHRONIC KIDNEY DISEASE ON CHRONIC DIALYSIS, WITH LONG-TERM CURRENT USE OF INSULIN: Primary | ICD-10-CM

## 2022-01-07 DIAGNOSIS — Z79.4 TYPE 2 DIABETES MELLITUS WITH CHRONIC KIDNEY DISEASE ON CHRONIC DIALYSIS, WITH LONG-TERM CURRENT USE OF INSULIN: Primary | ICD-10-CM

## 2022-01-07 DIAGNOSIS — Z99.2 TYPE 2 DIABETES MELLITUS WITH CHRONIC KIDNEY DISEASE ON CHRONIC DIALYSIS, WITH LONG-TERM CURRENT USE OF INSULIN: Primary | ICD-10-CM

## 2022-01-07 DIAGNOSIS — E11.22 TYPE 2 DIABETES MELLITUS WITH CHRONIC KIDNEY DISEASE ON CHRONIC DIALYSIS, WITH LONG-TERM CURRENT USE OF INSULIN: Primary | ICD-10-CM

## 2022-01-11 DIAGNOSIS — N18.6 TYPE 2 DIABETES MELLITUS WITH CHRONIC KIDNEY DISEASE ON CHRONIC DIALYSIS, WITH LONG-TERM CURRENT USE OF INSULIN: ICD-10-CM

## 2022-01-11 DIAGNOSIS — E11.22 TYPE 2 DIABETES MELLITUS WITH CHRONIC KIDNEY DISEASE ON CHRONIC DIALYSIS, WITH LONG-TERM CURRENT USE OF INSULIN: ICD-10-CM

## 2022-01-11 DIAGNOSIS — Z79.4 TYPE 2 DIABETES MELLITUS WITH CHRONIC KIDNEY DISEASE ON CHRONIC DIALYSIS, WITH LONG-TERM CURRENT USE OF INSULIN: ICD-10-CM

## 2022-01-11 DIAGNOSIS — Z99.2 TYPE 2 DIABETES MELLITUS WITH CHRONIC KIDNEY DISEASE ON CHRONIC DIALYSIS, WITH LONG-TERM CURRENT USE OF INSULIN: ICD-10-CM

## 2022-01-11 RX ORDER — CYCLOBENZAPRINE HCL 10 MG
10 TABLET ORAL 3 TIMES DAILY
Qty: 90 TABLET | Refills: 0 | Status: SHIPPED | OUTPATIENT
Start: 2022-01-11 | End: 2024-03-09

## 2022-01-11 NOTE — TELEPHONE ENCOUNTER
Care Due:                  Date            Visit Type   Department     Provider  --------------------------------------------------------------------------------                                             [facility] INTERNAL  Last Visit: 09-      None         MARILYN Mckee                                           [facility] INTERNAL  Next Visit: 03-      None         MARILYN Mckee                                                            Last  Test          Frequency    Reason                     Performed    Due Date  --------------------------------------------------------------------------------    CBC.........  12 months..  allopurinoL..............  09-   09-    HBA1C.......  6 months...  dulaglutide, insulin.....  09-   03-    Uric Acid...  12 months..  allopurinoL..............  Not Found    Overdue    Powered by [employer]. Reference number: [id number].   1/11/2022 10:03:57 AM CST

## 2022-01-11 NOTE — TELEPHONE ENCOUNTER
No new care gaps identified.  Powered by [employer]. Reference number: [id number].   1/11/2022 10:05:00 AM CST

## 2022-01-19 ENCOUNTER — LAB VISIT (OUTPATIENT)
Dept: LAB | Facility: HOSPITAL | Age: 47
End: 2022-01-19
Attending: UROLOGY
Payer: MEDICARE

## 2022-01-19 ENCOUNTER — TELEPHONE (OUTPATIENT)
Dept: UROLOGY | Facility: CLINIC | Age: 47
End: 2022-01-19
Payer: MEDICARE

## 2022-01-19 DIAGNOSIS — E11.22 TYPE 2 DIABETES MELLITUS WITH CHRONIC KIDNEY DISEASE ON CHRONIC DIALYSIS, WITH LONG-TERM CURRENT USE OF INSULIN: ICD-10-CM

## 2022-01-19 DIAGNOSIS — N50.9 DISORDER OF MALE GENITAL ORGANS, UNSPECIFIED: Primary | ICD-10-CM

## 2022-01-19 DIAGNOSIS — Z99.2 TYPE 2 DIABETES MELLITUS WITH CHRONIC KIDNEY DISEASE ON CHRONIC DIALYSIS, WITH LONG-TERM CURRENT USE OF INSULIN: ICD-10-CM

## 2022-01-19 DIAGNOSIS — N50.9 DISORDER OF MALE GENITAL ORGANS, UNSPECIFIED: ICD-10-CM

## 2022-01-19 DIAGNOSIS — Z79.4 TYPE 2 DIABETES MELLITUS WITH CHRONIC KIDNEY DISEASE ON CHRONIC DIALYSIS, WITH LONG-TERM CURRENT USE OF INSULIN: ICD-10-CM

## 2022-01-19 DIAGNOSIS — N18.6 TYPE 2 DIABETES MELLITUS WITH CHRONIC KIDNEY DISEASE ON CHRONIC DIALYSIS, WITH LONG-TERM CURRENT USE OF INSULIN: ICD-10-CM

## 2022-01-19 LAB
ESTIMATED AVG GLUCOSE: 186 MG/DL (ref 68–131)
ESTRADIOL SERPL-MCNC: 36 PG/ML (ref 11–44)
FSH SERPL-ACNC: 3.36 MIU/ML (ref 0.95–11.95)
HBA1C MFR BLD: 8.1 % (ref 4–5.6)
LH SERPL-ACNC: 3.2 MIU/ML (ref 0.6–12.1)
TESTOST SERPL-MCNC: 273 NG/DL (ref 304–1227)

## 2022-01-19 PROCEDURE — 82670 ASSAY OF TOTAL ESTRADIOL: CPT | Performed by: UROLOGY

## 2022-01-19 PROCEDURE — 83002 ASSAY OF GONADOTROPIN (LH): CPT | Performed by: UROLOGY

## 2022-01-19 PROCEDURE — 36415 COLL VENOUS BLD VENIPUNCTURE: CPT | Performed by: UROLOGY

## 2022-01-19 PROCEDURE — 83001 ASSAY OF GONADOTROPIN (FSH): CPT | Performed by: UROLOGY

## 2022-01-19 PROCEDURE — 84403 ASSAY OF TOTAL TESTOSTERONE: CPT | Performed by: UROLOGY

## 2022-01-19 PROCEDURE — 83036 HEMOGLOBIN GLYCOSYLATED A1C: CPT | Performed by: INTERNAL MEDICINE

## 2022-01-19 RX ORDER — CLOMIPHENE CITRATE 50 MG/1
TABLET ORAL
Qty: 15 TABLET | Refills: 5 | Status: SHIPPED | OUTPATIENT
Start: 2022-01-19 | End: 2023-03-07

## 2022-01-21 ENCOUNTER — PATIENT MESSAGE (OUTPATIENT)
Dept: INTERNAL MEDICINE | Facility: CLINIC | Age: 47
End: 2022-01-21
Payer: MEDICARE

## 2022-01-21 DIAGNOSIS — E11.22 TYPE 2 DIABETES MELLITUS WITH CHRONIC KIDNEY DISEASE ON CHRONIC DIALYSIS, WITH LONG-TERM CURRENT USE OF INSULIN: ICD-10-CM

## 2022-01-21 DIAGNOSIS — Z99.2 TYPE 2 DIABETES MELLITUS WITH CHRONIC KIDNEY DISEASE ON CHRONIC DIALYSIS, WITH LONG-TERM CURRENT USE OF INSULIN: ICD-10-CM

## 2022-01-21 DIAGNOSIS — N18.6 TYPE 2 DIABETES MELLITUS WITH CHRONIC KIDNEY DISEASE ON CHRONIC DIALYSIS, WITH LONG-TERM CURRENT USE OF INSULIN: ICD-10-CM

## 2022-01-21 DIAGNOSIS — Z79.4 TYPE 2 DIABETES MELLITUS WITH CHRONIC KIDNEY DISEASE ON CHRONIC DIALYSIS, WITH LONG-TERM CURRENT USE OF INSULIN: ICD-10-CM

## 2022-01-21 NOTE — TELEPHONE ENCOUNTER
Please call Mr. Santoyo.  His diabetes control was better this time, but his sugars (A1C) was still above goal.  I recommend that we go up on his trulicity (dulaglutide) to 3mg every week.  I sent that to the primary care pharmacy.    Thanks.  D

## 2022-04-11 ENCOUNTER — PATIENT OUTREACH (OUTPATIENT)
Dept: ADMINISTRATIVE | Facility: OTHER | Age: 47
End: 2022-04-11
Payer: MEDICARE

## 2022-04-11 DIAGNOSIS — E11.9 TYPE 2 DIABETES MELLITUS WITHOUT COMPLICATION, UNSPECIFIED WHETHER LONG TERM INSULIN USE: Primary | ICD-10-CM

## 2022-04-11 NOTE — PROGRESS NOTES
Care Everywhere: updated  Immunization: updated  Health Maintenance: updated  Media Review: review for outside eye exam report   Legacy Review:   DIS:  Order placed: diabetic eye screening photo   Upcoming appts:  EFAX:  Task Tickets:  Referrals:

## 2022-04-12 ENCOUNTER — OFFICE VISIT (OUTPATIENT)
Dept: SLEEP MEDICINE | Facility: CLINIC | Age: 47
End: 2022-04-12
Payer: MEDICARE

## 2022-04-12 VITALS
WEIGHT: 306.44 LBS | BODY MASS INDEX: 41.51 KG/M2 | DIASTOLIC BLOOD PRESSURE: 80 MMHG | HEIGHT: 72 IN | SYSTOLIC BLOOD PRESSURE: 143 MMHG

## 2022-04-12 DIAGNOSIS — G47.33 OSA (OBSTRUCTIVE SLEEP APNEA): Primary | ICD-10-CM

## 2022-04-12 DIAGNOSIS — Z99.2 ESRD ON HEMODIALYSIS: ICD-10-CM

## 2022-04-12 DIAGNOSIS — N18.6 ESRD ON HEMODIALYSIS: ICD-10-CM

## 2022-04-12 PROCEDURE — 99214 OFFICE O/P EST MOD 30 MIN: CPT | Mod: S$PBB,,, | Performed by: NURSE PRACTITIONER

## 2022-04-12 PROCEDURE — 99214 PR OFFICE/OUTPT VISIT, EST, LEVL IV, 30-39 MIN: ICD-10-PCS | Mod: S$PBB,,, | Performed by: NURSE PRACTITIONER

## 2022-04-12 PROCEDURE — 99999 PR PBB SHADOW E&M-EST. PATIENT-LVL III: CPT | Mod: PBBFAC,,, | Performed by: NURSE PRACTITIONER

## 2022-04-12 PROCEDURE — 99999 PR PBB SHADOW E&M-EST. PATIENT-LVL III: ICD-10-PCS | Mod: PBBFAC,,, | Performed by: NURSE PRACTITIONER

## 2022-04-12 PROCEDURE — 99213 OFFICE O/P EST LOW 20 MIN: CPT | Mod: PBBFAC | Performed by: NURSE PRACTITIONER

## 2022-04-12 NOTE — PROGRESS NOTES
Cc: CONRAD mgt    Using bipap qhs.  +oraldrying despite heated hose but has mask leaks. Needs new filters/supplies.Using FFM. Snoring resolved and sleep remains more consolidated. Uses therapy even if naps  Interrogation 30d 8.3h/n. AHI 2.6,  21/14cm, 40-80% mask fit        08/20/2019 Split  lb. The overall AHI was 107.1 with an oxygen mark of 66.0%. Effective control of sleep disordered breathing was achieved with Bilevel PAP at 21/14 cm of water. CPAP was not effective at high pressures, so patient was switched to Bilevel PAP.     Physical Exam:   BP (!) 143/80 (BP Location: Right arm, Patient Position: Sitting, BP Method: Large (Automatic))   Ht 6' (1.829 m)   Wt (!) 139 kg (306 lb 7 oz)   BMI 41.56 kg/m²   GENERAL: Well groomed    SH: 5-8thbehavioral special     Assessment:   CONRAD, severe. He remains adherent on BPAP, benefits from therapy, AHI<5   Medical co-morbidities: DM2, HTN, ESRD on HD, and morbid obesity    Plan:   -continue BPAP 21/14. [facility] DME supplies    -Discussed control of CONRAD  See pcp/nephrology re  HTN/DM mgt/continue meds

## 2022-04-19 ENCOUNTER — PATIENT MESSAGE (OUTPATIENT)
Dept: ADMINISTRATIVE | Facility: HOSPITAL | Age: 47
End: 2022-04-19
Payer: MEDICARE

## 2022-04-19 ENCOUNTER — PATIENT OUTREACH (OUTPATIENT)
Dept: ADMINISTRATIVE | Facility: HOSPITAL | Age: 47
End: 2022-04-19
Payer: MEDICARE

## 2022-04-19 ENCOUNTER — TELEPHONE (OUTPATIENT)
Dept: PHARMACY | Facility: AMBULARY SURGERY CENTER | Age: 47
End: 2022-04-19
Payer: MEDICARE

## 2022-04-19 DIAGNOSIS — E11.3291 MILD NONPROLIFERATIVE DIABETIC RETINOPATHY OF RIGHT EYE WITHOUT MACULAR EDEMA ASSOCIATED WITH TYPE 2 DIABETES MELLITUS: Primary | ICD-10-CM

## 2022-04-19 NOTE — PROGRESS NOTES
Health Maintenance Due   Topic Date Due    TETANUS VACCINE  Never done    Eye Exam  05/21/2019    Hemoglobin A1c  04/19/2022     Triggered LINKS. Updated Care Everywhere. Referral has been placed for optometry. Portal message has been sent asking pt to schedule eye exam. Chart review completed.

## 2022-05-02 ENCOUNTER — PATIENT MESSAGE (OUTPATIENT)
Dept: INTERNAL MEDICINE | Facility: CLINIC | Age: 47
End: 2022-05-02
Payer: MEDICARE

## 2022-05-02 DIAGNOSIS — E11.22 TYPE 2 DIABETES MELLITUS WITH CHRONIC KIDNEY DISEASE ON CHRONIC DIALYSIS, WITH LONG-TERM CURRENT USE OF INSULIN: Primary | ICD-10-CM

## 2022-05-02 DIAGNOSIS — Z79.4 TYPE 2 DIABETES MELLITUS WITH CHRONIC KIDNEY DISEASE ON CHRONIC DIALYSIS, WITH LONG-TERM CURRENT USE OF INSULIN: Primary | ICD-10-CM

## 2022-05-02 DIAGNOSIS — Z99.2 TYPE 2 DIABETES MELLITUS WITH CHRONIC KIDNEY DISEASE ON CHRONIC DIALYSIS, WITH LONG-TERM CURRENT USE OF INSULIN: Primary | ICD-10-CM

## 2022-05-02 DIAGNOSIS — N18.6 TYPE 2 DIABETES MELLITUS WITH CHRONIC KIDNEY DISEASE ON CHRONIC DIALYSIS, WITH LONG-TERM CURRENT USE OF INSULIN: Primary | ICD-10-CM

## 2022-06-09 DIAGNOSIS — E11.59 HYPERTENSION ASSOCIATED WITH DIABETES: ICD-10-CM

## 2022-06-09 DIAGNOSIS — I15.2 HYPERTENSION ASSOCIATED WITH DIABETES: ICD-10-CM

## 2022-06-09 DIAGNOSIS — E11.65 TYPE 2 DIABETES MELLITUS WITH HYPERGLYCEMIA, WITH LONG-TERM CURRENT USE OF INSULIN: ICD-10-CM

## 2022-06-09 DIAGNOSIS — Z79.4 TYPE 2 DIABETES MELLITUS WITH HYPERGLYCEMIA, WITH LONG-TERM CURRENT USE OF INSULIN: ICD-10-CM

## 2022-06-09 RX ORDER — AMLODIPINE BESYLATE 10 MG/1
TABLET ORAL
Qty: 90 TABLET | Refills: 3 | Status: SHIPPED | OUTPATIENT
Start: 2022-06-09 | End: 2023-05-03 | Stop reason: SDUPTHER

## 2022-06-09 NOTE — TELEPHONE ENCOUNTER
Care Due:                  Date            Visit Type   Department     Provider  --------------------------------------------------------------------------------                                EP -                              PRIMARY      [facility] INTERNAL  Last Visit: 09-      CARE ([facility])   MEDICINE       Manjit Mckee  Next Visit: None Scheduled  None         None Found                                                            Last  Test          Frequency    Reason                     Performed    Due Date  --------------------------------------------------------------------------------    CBC.........  12 months..  allopurinoL..............  09-   09-    HBA1C.......  6 months...  dulaglutide, insulin.....  01- 07-    Uric Acid...  12 months..  allopurinoL..............  Not Found    Overdue    [employer] Embedded Care Gaps. Reference number: [id number]. 6/09/2022   9:38:16 AM CDT

## 2022-06-17 ENCOUNTER — TELEPHONE (OUTPATIENT)
Dept: INTERNAL MEDICINE | Facility: CLINIC | Age: 47
End: 2022-06-17
Payer: MEDICARE

## 2022-06-17 DIAGNOSIS — E11.22 TYPE 2 DIABETES MELLITUS WITH CHRONIC KIDNEY DISEASE ON CHRONIC DIALYSIS, WITH LONG-TERM CURRENT USE OF INSULIN: ICD-10-CM

## 2022-06-17 DIAGNOSIS — Z79.4 TYPE 2 DIABETES MELLITUS WITH CHRONIC KIDNEY DISEASE ON CHRONIC DIALYSIS, WITH LONG-TERM CURRENT USE OF INSULIN: ICD-10-CM

## 2022-06-17 DIAGNOSIS — N18.6 TYPE 2 DIABETES MELLITUS WITH CHRONIC KIDNEY DISEASE ON CHRONIC DIALYSIS, WITH LONG-TERM CURRENT USE OF INSULIN: ICD-10-CM

## 2022-06-17 DIAGNOSIS — Z99.2 TYPE 2 DIABETES MELLITUS WITH CHRONIC KIDNEY DISEASE ON CHRONIC DIALYSIS, WITH LONG-TERM CURRENT USE OF INSULIN: ICD-10-CM

## 2022-06-17 NOTE — TELEPHONE ENCOUNTER
----- Message from Karin Landeros sent at 6/17/2022  9:43 AM CDT -----  Regarding: Questions AND CONCERNS  Contact: [phone]  Patient Thai is calling. Would like to speak directly with the office. Please call patient at [phone]    Thank You

## 2022-06-17 NOTE — TELEPHONE ENCOUNTER
Pt stated that he is having problems with his insurance at the moment and would like to know if you had any samples of trulicity,insulin lantus, and insulin humalog??    Héctor MCNAMARA

## 2022-06-22 ENCOUNTER — PATIENT MESSAGE (OUTPATIENT)
Dept: INTERNAL MEDICINE | Facility: CLINIC | Age: 47
End: 2022-06-22
Payer: MEDICARE

## 2022-06-22 DIAGNOSIS — Z79.4 TYPE 2 DIABETES MELLITUS WITH CHRONIC KIDNEY DISEASE ON CHRONIC DIALYSIS, WITH LONG-TERM CURRENT USE OF INSULIN: Primary | ICD-10-CM

## 2022-06-22 DIAGNOSIS — E11.22 TYPE 2 DIABETES MELLITUS WITH CHRONIC KIDNEY DISEASE ON CHRONIC DIALYSIS, WITH LONG-TERM CURRENT USE OF INSULIN: Primary | ICD-10-CM

## 2022-06-22 DIAGNOSIS — Z99.2 TYPE 2 DIABETES MELLITUS WITH CHRONIC KIDNEY DISEASE ON CHRONIC DIALYSIS, WITH LONG-TERM CURRENT USE OF INSULIN: Primary | ICD-10-CM

## 2022-06-22 DIAGNOSIS — N18.6 TYPE 2 DIABETES MELLITUS WITH CHRONIC KIDNEY DISEASE ON CHRONIC DIALYSIS, WITH LONG-TERM CURRENT USE OF INSULIN: Primary | ICD-10-CM

## 2022-06-30 DIAGNOSIS — I15.2 HYPERTENSION ASSOCIATED WITH DIABETES: ICD-10-CM

## 2022-06-30 DIAGNOSIS — E11.59 HYPERTENSION ASSOCIATED WITH DIABETES: ICD-10-CM

## 2022-06-30 DIAGNOSIS — I15.0 RENOVASCULAR HYPERTENSION: ICD-10-CM

## 2022-06-30 RX ORDER — OLMESARTAN MEDOXOMIL AND HYDROCHLOROTHIAZIDE 40/25 40; 25 MG/1; MG/1
1 TABLET ORAL DAILY
Qty: 90 TABLET | Refills: 3 | Status: SHIPPED | OUTPATIENT
Start: 2022-06-30 | End: 2023-08-22 | Stop reason: SDUPTHER

## 2022-06-30 NOTE — TELEPHONE ENCOUNTER
Care Due:                  Date            Visit Type   Department     Provider  --------------------------------------------------------------------------------                                EP -                              PRIMARY      [facility]  Last Visit: 09-      CARE ([facility])   MEDICINE       Manjit Mckee  Next Visit: None Scheduled  None         None Found                                                            Last  Test          Frequency    Reason                     Performed    Due Date  --------------------------------------------------------------------------------    Office Visit  12 months..  allopurinoL, dulaglutide,   09- 09-                             insulin, traZODone.......    CMP.........  12 months..  allopurinoL, insulin.....  09- 09-    [facility] Embedded Care Gaps. Reference number: [id number]. 6/30/2022   1:31:45 PM CDT

## 2022-07-18 ENCOUNTER — PATIENT MESSAGE (OUTPATIENT)
Dept: ADMINISTRATIVE | Facility: HOSPITAL | Age: 47
End: 2022-07-18
Payer: MEDICARE

## 2022-08-01 DIAGNOSIS — Z99.2 TYPE 2 DIABETES MELLITUS WITH CHRONIC KIDNEY DISEASE ON CHRONIC DIALYSIS, WITH LONG-TERM CURRENT USE OF INSULIN: ICD-10-CM

## 2022-08-01 DIAGNOSIS — E11.22 TYPE 2 DIABETES MELLITUS WITH CHRONIC KIDNEY DISEASE ON CHRONIC DIALYSIS, WITH LONG-TERM CURRENT USE OF INSULIN: ICD-10-CM

## 2022-08-01 DIAGNOSIS — Z79.4 TYPE 2 DIABETES MELLITUS WITH CHRONIC KIDNEY DISEASE ON CHRONIC DIALYSIS, WITH LONG-TERM CURRENT USE OF INSULIN: ICD-10-CM

## 2022-08-01 DIAGNOSIS — N18.6 TYPE 2 DIABETES MELLITUS WITH CHRONIC KIDNEY DISEASE ON CHRONIC DIALYSIS, WITH LONG-TERM CURRENT USE OF INSULIN: ICD-10-CM

## 2022-08-01 NOTE — TELEPHONE ENCOUNTER
No new care gaps identified.  [facility] Embedded Care Gaps. Reference number: [id number]. 8/01/2022   3:05:48 PM CDT

## 2022-08-02 RX ORDER — DULAGLUTIDE 1.5 MG/.5ML
1.5 INJECTION, SOLUTION SUBCUTANEOUS
Qty: 12 PEN | Refills: 3 | Status: SHIPPED | OUTPATIENT
Start: 2022-08-02 | End: 2023-08-15 | Stop reason: SDUPTHER

## 2022-09-06 DIAGNOSIS — R00.0 TACHYCARDIA: ICD-10-CM

## 2022-09-06 DIAGNOSIS — I15.0 RENOVASCULAR HYPERTENSION: ICD-10-CM

## 2022-09-06 RX ORDER — CARVEDILOL 25 MG/1
TABLET ORAL
Qty: 180 TABLET | Refills: 3 | Status: SHIPPED | OUTPATIENT
Start: 2022-09-06 | End: 2023-07-07 | Stop reason: SDUPTHER

## 2022-09-06 NOTE — TELEPHONE ENCOUNTER
Refill Routing Note   Medication(s) are not appropriate for processing by [facility] for the following reason(s):      - Required vitals are abnormal    [facility] action(s):  Defer Medication-related problems identified:   Requires labs  Requires appointment        Medication reconciliation completed: No     Appointments  past 12m or future 3m with PCP    Date Provider   Last Visit   9/16/2021 Manjit Mckee II, MD   Next Visit   Visit date not found Manjit Mckee II, MD   ED visits in past 90 days: 0        Note composed:2:25 PM 09/06/2022

## 2022-09-06 NOTE — TELEPHONE ENCOUNTER
Care Due:                  Date            Visit Type   Department     Provider  --------------------------------------------------------------------------------                                EP -                              PRIMARY      [facility] INTERNAL  Last Visit: 09-      CARE ([facility])   MEDICINE       Manjit Mckee  Next Visit: None Scheduled  None         None Found                                                            Last  Test          Frequency    Reason                     Performed    Due Date  --------------------------------------------------------------------------------    Office Visit  12 months..  allopurinoL, dulaglutide,   09- 09-                             insulin,                             olmesartan-hydrochlorothi                             azide, traZODone.........    CBC.........  12 months..  allopurinoL..............  09-   09-    CMP.........  12 months..  allopurinoL, insulin,      09- 09-                             olmesartan-hydrochlorothi                             azide....................    HBA1C.......  6 months...  dulaglutide, insulin.....  01- 07-    Uric Acid...  12 months..  allopurinoL..............  Not Found    Overdue    [employer] Embedded Care Gaps. Reference number: [id number]. 9/06/2022   12:31:19 PM CDT

## 2022-09-30 DIAGNOSIS — N18.6 TYPE 2 DIABETES MELLITUS WITH CHRONIC KIDNEY DISEASE ON CHRONIC DIALYSIS, WITH LONG-TERM CURRENT USE OF INSULIN: ICD-10-CM

## 2022-09-30 DIAGNOSIS — Z79.4 TYPE 2 DIABETES MELLITUS WITH CHRONIC KIDNEY DISEASE ON CHRONIC DIALYSIS, WITH LONG-TERM CURRENT USE OF INSULIN: ICD-10-CM

## 2022-09-30 DIAGNOSIS — E11.22 TYPE 2 DIABETES MELLITUS WITH CHRONIC KIDNEY DISEASE ON CHRONIC DIALYSIS, WITH LONG-TERM CURRENT USE OF INSULIN: ICD-10-CM

## 2022-09-30 DIAGNOSIS — E29.1 HYPOGONADISM IN MALE: ICD-10-CM

## 2022-09-30 DIAGNOSIS — Z99.2 TYPE 2 DIABETES MELLITUS WITH CHRONIC KIDNEY DISEASE ON CHRONIC DIALYSIS, WITH LONG-TERM CURRENT USE OF INSULIN: ICD-10-CM

## 2022-09-30 RX ORDER — INSULIN GLARGINE 100 [IU]/ML
50 INJECTION, SOLUTION SUBCUTANEOUS 2 TIMES DAILY
Qty: 90 ML | Refills: 3 | Status: SHIPPED | OUTPATIENT
Start: 2022-09-30 | End: 2023-08-15 | Stop reason: SDUPTHER

## 2022-09-30 RX ORDER — PEN NEEDLE, DIABETIC 30 GX3/16"
NEEDLE, DISPOSABLE MISCELLANEOUS
Qty: 100 EACH | Refills: 0 | Status: SHIPPED | OUTPATIENT
Start: 2022-09-30 | End: 2023-10-18 | Stop reason: SDUPTHER

## 2022-09-30 RX ORDER — TESTOSTERONE CYPIONATE 1000 MG/10ML
100 INJECTION, SOLUTION INTRAMUSCULAR
Qty: 10 ML | Refills: 3 | Status: SHIPPED | OUTPATIENT
Start: 2022-09-30 | End: 2023-08-22 | Stop reason: SDUPTHER

## 2022-09-30 NOTE — TELEPHONE ENCOUNTER
Care Due:                  Date            Visit Type   Department     Provider  --------------------------------------------------------------------------------                                EP -                              PRIMARY      [facility] INTERNAL  Last Visit: 09-      CARE ([facility])   MEDICINE       Manjit Mckee  Next Visit: None Scheduled  None         None Found                                                            Last  Test          Frequency    Reason                     Performed    Due Date  --------------------------------------------------------------------------------    CBC.........  12 months..  allopurinoL..............  Not Found    Overdue    [employer] Embedded Care Gaps. Reference number: [id number]. 9/30/2022   12:30:11 PM CDT

## 2022-11-10 DIAGNOSIS — Z99.2 TYPE 2 DIABETES MELLITUS WITH CHRONIC KIDNEY DISEASE ON CHRONIC DIALYSIS, WITH LONG-TERM CURRENT USE OF INSULIN: ICD-10-CM

## 2022-11-10 DIAGNOSIS — Z79.4 TYPE 2 DIABETES MELLITUS WITH CHRONIC KIDNEY DISEASE ON CHRONIC DIALYSIS, WITH LONG-TERM CURRENT USE OF INSULIN: ICD-10-CM

## 2022-11-10 DIAGNOSIS — E11.69 MIXED HYPERLIPIDEMIA DUE TO TYPE 2 DIABETES MELLITUS: ICD-10-CM

## 2022-11-10 DIAGNOSIS — E78.2 MIXED HYPERLIPIDEMIA DUE TO TYPE 2 DIABETES MELLITUS: ICD-10-CM

## 2022-11-10 DIAGNOSIS — I15.2 HYPERTENSION ASSOCIATED WITH DIABETES: ICD-10-CM

## 2022-11-10 DIAGNOSIS — E11.22 TYPE 2 DIABETES MELLITUS WITH CHRONIC KIDNEY DISEASE ON CHRONIC DIALYSIS, WITH LONG-TERM CURRENT USE OF INSULIN: ICD-10-CM

## 2022-11-10 DIAGNOSIS — N18.6 TYPE 2 DIABETES MELLITUS WITH CHRONIC KIDNEY DISEASE ON CHRONIC DIALYSIS, WITH LONG-TERM CURRENT USE OF INSULIN: ICD-10-CM

## 2022-11-10 DIAGNOSIS — E11.59 HYPERTENSION ASSOCIATED WITH DIABETES: ICD-10-CM

## 2022-11-10 RX ORDER — INSULIN LISPRO 100 [IU]/ML
45 INJECTION, SOLUTION INTRAVENOUS; SUBCUTANEOUS
Qty: 105 ML | Refills: 3 | Status: SHIPPED | OUTPATIENT
Start: 2022-11-10 | End: 2023-08-15 | Stop reason: SDUPTHER

## 2022-11-10 RX ORDER — ROSUVASTATIN CALCIUM 10 MG/1
10 TABLET, COATED ORAL DAILY
Qty: 90 TABLET | Refills: 3 | Status: SHIPPED | OUTPATIENT
Start: 2022-11-10 | End: 2023-12-19 | Stop reason: SDUPTHER

## 2022-11-10 RX ORDER — FUROSEMIDE 40 MG/1
40 TABLET ORAL DAILY
Qty: 90 TABLET | Refills: 3 | Status: SHIPPED | OUTPATIENT
Start: 2022-11-10 | End: 2023-08-15 | Stop reason: SDUPTHER

## 2022-11-10 NOTE — TELEPHONE ENCOUNTER
Refill Routing Note   Medication(s) are not appropriate for processing by [facility] for the following reason(s):      - Required laboratory values are outdated    [facility] action(s):  Defer Medication-related problems identified:   Requires labs  Requires appointment        Medication reconciliation completed: No     Appointments  past 12m or future 3m with PCP    Date Provider   Last Visit   9/16/2021 Manjit Mckee II, MD   Next Visit   Visit date not found Manjit Mckee II, MD   ED visits in past 90 days: 0        Note composed:3:08 PM 11/10/2022

## 2022-11-10 NOTE — TELEPHONE ENCOUNTER
Care Due:                  Date            Visit Type   Department     Provider  --------------------------------------------------------------------------------                                EP -                              PRIMARY      [facility] INTERNAL  Last Visit: 09-      CARE ([facility])   MEDICINE       Manjit Mckee  Next Visit: None Scheduled  None         None Found                                                            Last  Test          Frequency    Reason                     Performed    Due Date  --------------------------------------------------------------------------------    Office Visit  12 months..  allopurinoL, dulaglutide,   09- 09-                             insulin,                             olmesartan-hydrochlorothi                             azide, traZODone.........    CMP.........  12 months..  allopurinoL, insulin,      09- 09-                             olmesartan-hydrochlorothi                             azide....................    HBA1C.......  6 months...  dulaglutide, insulin.....  01- 07-    Uric Acid...  12 months..  allopurinoL..............  Not Found    Overdue    [employer] Embedded Care Gaps. Reference number: [id number]. 11/10/2022   10:32:08 AM CST

## 2022-12-23 RX ORDER — SUCROFERRIC OXYHYDROXIDE 500 MG/1
TABLET, CHEWABLE ORAL
Qty: 90 TABLET | Refills: 11 | OUTPATIENT
Start: 2022-12-23 | End: 2024-03-15 | Stop reason: SDUPTHER

## 2023-02-06 ENCOUNTER — PATIENT MESSAGE (OUTPATIENT)
Dept: UROLOGY | Facility: CLINIC | Age: 48
End: 2023-02-06
Payer: MEDICARE

## 2023-02-23 ENCOUNTER — PATIENT OUTREACH (OUTPATIENT)
Dept: ADMINISTRATIVE | Facility: HOSPITAL | Age: 48
End: 2023-02-23
Payer: MEDICARE

## 2023-02-23 ENCOUNTER — PATIENT MESSAGE (OUTPATIENT)
Dept: ADMINISTRATIVE | Facility: HOSPITAL | Age: 48
End: 2023-02-23
Payer: MEDICARE

## 2023-02-23 DIAGNOSIS — Z12.11 ENCOUNTER FOR COLORECTAL CANCER SCREENING: ICD-10-CM

## 2023-02-23 DIAGNOSIS — Z12.11 ENCOUNTER FOR FIT (FECAL IMMUNOCHEMICAL TEST) SCREENING: ICD-10-CM

## 2023-02-23 DIAGNOSIS — I15.2 HYPERTENSION ASSOCIATED WITH DIABETES: Primary | ICD-10-CM

## 2023-02-23 DIAGNOSIS — Z12.12 ENCOUNTER FOR COLORECTAL CANCER SCREENING: ICD-10-CM

## 2023-02-23 DIAGNOSIS — E11.59 HYPERTENSION ASSOCIATED WITH DIABETES: Primary | ICD-10-CM

## 2023-02-23 NOTE — PROGRESS NOTES
Health Maintenance Due   Topic Date Due    TETANUS VACCINE  Never done    Eye Exam  05/21/2019    COVID-19 Vaccine (4 - Booster for Moderna series) 02/13/2022    Influenza Vaccine (1) 09/01/2022    Foot Exam  09/16/2022    Pneumococcal Vaccines (Age 0-64) (3 - PCV) 11/15/2022    Colorectal Cancer Screening  Never done     Chart reviewed. Triggered LINKS. Updated Care Everywhere. Fit Kit, Lipid Panel, and Endo procedure scheduled ordered. Schedule Lipid and Hemoglobin on 03/07/23. Sent patient a portal message about Diabetic Eye Exam due.       Gio Seay CMA  Population Health Care Coordinator  Primary Care Team

## 2023-03-07 ENCOUNTER — OFFICE VISIT (OUTPATIENT)
Dept: INTERNAL MEDICINE | Facility: CLINIC | Age: 48
End: 2023-03-07
Payer: MEDICARE

## 2023-03-07 ENCOUNTER — LAB VISIT (OUTPATIENT)
Dept: LAB | Facility: HOSPITAL | Age: 48
End: 2023-03-07
Attending: INTERNAL MEDICINE
Payer: MEDICARE

## 2023-03-07 VITALS
BODY MASS INDEX: 40.55 KG/M2 | HEIGHT: 72 IN | DIASTOLIC BLOOD PRESSURE: 78 MMHG | OXYGEN SATURATION: 98 % | WEIGHT: 299.38 LBS | SYSTOLIC BLOOD PRESSURE: 137 MMHG | HEART RATE: 82 BPM

## 2023-03-07 DIAGNOSIS — I15.2 HYPERTENSION ASSOCIATED WITH DIABETES: ICD-10-CM

## 2023-03-07 DIAGNOSIS — E11.22 TYPE 2 DIABETES MELLITUS WITH CHRONIC KIDNEY DISEASE ON CHRONIC DIALYSIS, WITH LONG-TERM CURRENT USE OF INSULIN: ICD-10-CM

## 2023-03-07 DIAGNOSIS — Z99.2 TYPE 2 DIABETES MELLITUS WITH CHRONIC KIDNEY DISEASE ON CHRONIC DIALYSIS, WITH LONG-TERM CURRENT USE OF INSULIN: ICD-10-CM

## 2023-03-07 DIAGNOSIS — D63.1 ANEMIA IN END-STAGE RENAL DISEASE: ICD-10-CM

## 2023-03-07 DIAGNOSIS — N52.01 ERECTILE DYSFUNCTION DUE TO ARTERIAL INSUFFICIENCY: ICD-10-CM

## 2023-03-07 DIAGNOSIS — N18.6 TYPE 2 DIABETES MELLITUS WITH CHRONIC KIDNEY DISEASE ON CHRONIC DIALYSIS, WITH LONG-TERM CURRENT USE OF INSULIN: ICD-10-CM

## 2023-03-07 DIAGNOSIS — N18.6 ANEMIA IN END-STAGE RENAL DISEASE: ICD-10-CM

## 2023-03-07 DIAGNOSIS — Z79.4 TYPE 2 DIABETES MELLITUS WITH CHRONIC KIDNEY DISEASE ON CHRONIC DIALYSIS, WITH LONG-TERM CURRENT USE OF INSULIN: ICD-10-CM

## 2023-03-07 DIAGNOSIS — Z99.2 ESRD ON HEMODIALYSIS: ICD-10-CM

## 2023-03-07 DIAGNOSIS — E66.01 MORBID OBESITY WITH BMI OF 40.0-44.9, ADULT: ICD-10-CM

## 2023-03-07 DIAGNOSIS — Z00.00 ROUTINE HISTORY AND PHYSICAL EXAMINATION OF ADULT: Primary | ICD-10-CM

## 2023-03-07 DIAGNOSIS — E11.59 HYPERTENSION ASSOCIATED WITH DIABETES: ICD-10-CM

## 2023-03-07 DIAGNOSIS — N18.6 ESRD ON HEMODIALYSIS: ICD-10-CM

## 2023-03-07 LAB
CHOLEST SERPL-MCNC: 158 MG/DL (ref 120–199)
CHOLEST/HDLC SERPL: 5.3 {RATIO} (ref 2–5)
ESTIMATED AVG GLUCOSE: 252 MG/DL (ref 68–131)
HBA1C MFR BLD: 10.4 % (ref 4–5.6)
HDLC SERPL-MCNC: 30 MG/DL (ref 40–75)
HDLC SERPL: 19 % (ref 20–50)
LDLC SERPL CALC-MCNC: 106.8 MG/DL (ref 63–159)
NONHDLC SERPL-MCNC: 128 MG/DL
TRIGL SERPL-MCNC: 106 MG/DL (ref 30–150)

## 2023-03-07 PROCEDURE — 99396 PREV VISIT EST AGE 40-64: CPT | Mod: GZ,S$PBB,, | Performed by: INTERNAL MEDICINE

## 2023-03-07 PROCEDURE — 99999 PR PBB SHADOW E&M-EST. PATIENT-LVL IV: ICD-10-PCS | Mod: PBBFAC,,, | Performed by: INTERNAL MEDICINE

## 2023-03-07 PROCEDURE — 99214 OFFICE O/P EST MOD 30 MIN: CPT | Mod: PBBFAC | Performed by: INTERNAL MEDICINE

## 2023-03-07 PROCEDURE — 99999 PR PBB SHADOW E&M-EST. PATIENT-LVL IV: CPT | Mod: PBBFAC,,, | Performed by: INTERNAL MEDICINE

## 2023-03-07 PROCEDURE — 80061 LIPID PANEL: CPT | Performed by: INTERNAL MEDICINE

## 2023-03-07 PROCEDURE — 83036 HEMOGLOBIN GLYCOSYLATED A1C: CPT | Performed by: INTERNAL MEDICINE

## 2023-03-07 PROCEDURE — 99396 PR PREVENTIVE VISIT,EST,40-64: ICD-10-PCS | Mod: GZ,S$PBB,, | Performed by: INTERNAL MEDICINE

## 2023-03-07 NOTE — PROGRESS NOTES
Subjective:       Patient ID: Thai Santoyo Jr. is a 47 y.o. male.    Chief Complaint:   Annual Exam    HPI -   His last visit was me was in the September of 2021.  Today, he feels well.  He needs a lot of diabetic health maintenance.  He is a nonsmoker.  Works for [employer].  His son is going to college on a football scholarship.   Declines COVID booster.  Worried about mild swollen ankles.    PMH:  ESRD on dialysis  DM2, not at goal; infrequent f/u  HTN, better control  SVT x2 in 2018  Gout, quiescent  Morbid Obesity  Knee pain back on narcotics    Meds:  Reviewed and reconciled in EPIC with patient during visit today.     Review of Systems   Constitutional:  Negative for fever.   HENT:  Negative for congestion.    Respiratory:  Negative for shortness of breath.    Cardiovascular:  Negative for chest pain.   Gastrointestinal:  Negative for abdominal pain.   Genitourinary:  Negative for difficulty urinating.   Musculoskeletal:  Positive for arthralgias.   Skin:  Negative for rash.   Neurological:  Negative for headaches.   Psychiatric/Behavioral:  Negative for sleep disturbance.      Objective:      Physical Exam  Constitutional:       General: He is not in acute distress.     Appearance: He is well-developed. He is obese. He is not diaphoretic.   HENT:      Head: Normocephalic and atraumatic.   Cardiovascular:      Rate and Rhythm: Normal rate and regular rhythm.      Pulses:           Dorsalis pedis pulses are 2+ on the right side and 2+ on the left side.      Heart sounds: Normal heart sounds. No murmur heard.    No friction rub. No gallop.   Pulmonary:      Effort: No respiratory distress.      Breath sounds: No wheezing or rales.   Chest:      Chest wall: No tenderness.   Feet:      Right foot:      Protective Sensation: 4 sites tested.  4 sites sensed.      Skin integrity: No ulcer, blister or skin breakdown.      Toenail Condition: Right toenails are normal.      Left foot:      Protective Sensation: 4  sites tested.  4 sites sensed.      Skin integrity: No ulcer, blister or skin breakdown.      Toenail Condition: Left toenails are normal.   Skin:     General: Skin is warm.      Findings: No erythema.   Neurological:      Mental Status: He is alert and oriented to person, place, and time.   Psychiatric:         Thought Content: Thought content normal.       Assessment:       1. Routine history and physical examination of adult    2. Type 2 diabetes mellitus with chronic kidney disease on chronic dialysis, with long-term current use of insulin    3. Hypertension associated with diabetes    4. Morbid obesity with BMI of 40.0-44.9, adult    5. Anemia in end-stage renal disease    6. Erectile dysfunction due to arterial insufficiency    7. ESRD on hemodialysis          Plan:       Thai was seen today for annual exam.    Diagnoses and all orders for this visit:    Routine history and physical examination of adult -  he needs to come see me more often.  Updating his labs and health maintenance today.    Type 2 diabetes mellitus with chronic kidney disease on chronic dialysis, with long-term current use of insulin -   Uncertain control.  Time for a an eye photo and labs.  -     Diabetic Eye Screening Photo; Future    Hypertension associated with diabetes -   At goal.  Continue present care  -     COMPREHENSIVE METABOLIC PANEL; Future    Morbid obesity with BMI of 40.0-44.9, adult -  encouraged weight loss    Anemia in end-stage renal disease    Erectile dysfunction due to arterial insufficiency    ESRD on hemodialysis    Rtc 6 months    CHARLES Mckee MD MPH  Staff Internist

## 2023-03-08 ENCOUNTER — PATIENT MESSAGE (OUTPATIENT)
Dept: INTERNAL MEDICINE | Facility: CLINIC | Age: 48
End: 2023-03-08
Payer: MEDICARE

## 2023-03-10 ENCOUNTER — TELEPHONE (OUTPATIENT)
Dept: INTERNAL MEDICINE | Facility: CLINIC | Age: 48
End: 2023-03-10
Payer: MEDICARE

## 2023-03-10 NOTE — TELEPHONE ENCOUNTER
Please call  Natanael.  He did not respond to an email.    Tell him we were surprised to see the a1c back above 10. Ask if he has been taking his diabetes meds?  What can he do to bring this down?     I want him to co me back in 3 months.    Dr. MCMAHON

## 2023-03-10 NOTE — TELEPHONE ENCOUNTER
Pt scheduled for 3 mth f/u per orlin. A1C is elevated because pt stated he didn't take meds for a week. But he is back on them.

## 2023-04-21 ENCOUNTER — PATIENT MESSAGE (OUTPATIENT)
Dept: ADMINISTRATIVE | Facility: HOSPITAL | Age: 48
End: 2023-04-21
Payer: MEDICARE

## 2023-05-01 ENCOUNTER — TELEPHONE (OUTPATIENT)
Dept: SLEEP MEDICINE | Facility: CLINIC | Age: 48
End: 2023-05-01
Payer: MEDICARE

## 2023-05-01 NOTE — TELEPHONE ENCOUNTER
Staff reached out to patient contact to inform them of their upcoming appointment tomorrow. Staff was communicated by patient that they will be present for their appointment.

## 2023-05-03 DIAGNOSIS — Z79.4 TYPE 2 DIABETES MELLITUS WITH HYPERGLYCEMIA, WITH LONG-TERM CURRENT USE OF INSULIN: ICD-10-CM

## 2023-05-03 DIAGNOSIS — I15.2 HYPERTENSION ASSOCIATED WITH DIABETES: ICD-10-CM

## 2023-05-03 DIAGNOSIS — E11.59 HYPERTENSION ASSOCIATED WITH DIABETES: ICD-10-CM

## 2023-05-03 DIAGNOSIS — E11.65 TYPE 2 DIABETES MELLITUS WITH HYPERGLYCEMIA, WITH LONG-TERM CURRENT USE OF INSULIN: ICD-10-CM

## 2023-05-03 RX ORDER — ESCITALOPRAM OXALATE 5 MG/1
TABLET ORAL
Qty: 90 TABLET | Refills: 4 | Status: CANCELLED | OUTPATIENT
Start: 2023-05-03

## 2023-05-03 RX ORDER — AMLODIPINE BESYLATE 10 MG/1
TABLET ORAL
Qty: 90 TABLET | Refills: 3 | Status: SHIPPED | OUTPATIENT
Start: 2023-05-03 | End: 2024-02-23 | Stop reason: SDUPTHER

## 2023-05-03 NOTE — TELEPHONE ENCOUNTER
No care due was identified.  [facility] Embedded Care Due Messages. Reference number: [id number].   5/03/2023 2:23:27 PM CDT

## 2023-05-19 ENCOUNTER — TELEPHONE (OUTPATIENT)
Dept: SLEEP MEDICINE | Facility: CLINIC | Age: 48
End: 2023-05-19
Payer: MEDICARE

## 2023-05-19 NOTE — TELEPHONE ENCOUNTER
"Staff spoke to Patient, informing them that staff would like to reschedule an appointment for the patient, due to provider unavailability the day of patient's appointment.. Patient was successfully rescheduled. 6/1/23 1"30 PM Uyen Forrest    "

## 2023-05-30 ENCOUNTER — PATIENT OUTREACH (OUTPATIENT)
Dept: ADMINISTRATIVE | Facility: HOSPITAL | Age: 48
End: 2023-05-30
Payer: MEDICARE

## 2023-05-30 NOTE — PROGRESS NOTES
Health Maintenance Due   Topic Date Due    TETANUS VACCINE  Never done    Eye Exam  05/21/2019    COVID-19 Vaccine (4 - Moderna series) 02/13/2022    Pneumococcal Vaccines (Age 0-64) (3 - PCV) 11/15/2022    Colorectal Cancer Screening  Never done      Chart reviewed. Triggered LINKS. Updated Care Everywhere. Left voice message to ask patient about eye exam and if he has done it anywhere else other than [facility] and to schedule overdue health maintenance screenings.     Gio Seay CMA  Population Health Care Coordinator  Primary Care Team

## 2023-06-08 NOTE — TELEPHONE ENCOUNTER
Call placed to patient. Name and date of birth verified. Patient informed of the following:    Please notify T is low.  Start clomid.  Recheck T in 1 month.       Can plan for PERES in 3 months if he is cleared by the ASC for surgery.  -Dr. Dow    Patient lab appointment scheduled 2/21/22 at 7:10am. Patient verbalized understanding. Patient request for medication to be transferred to [facility] on [address]. Call placed to [facility] for transfer, spoke with MK to make transfer.   
Please notify T is low.  Start clomid.  Recheck T in 1 month.      Can plan for PERES in 3 months if he is cleared by the ASC for surgery.  
Detail Level: Detailed

## 2023-06-13 ENCOUNTER — OFFICE VISIT (OUTPATIENT)
Dept: SLEEP MEDICINE | Facility: CLINIC | Age: 48
End: 2023-06-13
Payer: MEDICARE

## 2023-06-13 VITALS
SYSTOLIC BLOOD PRESSURE: 160 MMHG | DIASTOLIC BLOOD PRESSURE: 80 MMHG | HEIGHT: 72 IN | BODY MASS INDEX: 40.61 KG/M2 | WEIGHT: 299.81 LBS | HEART RATE: 91 BPM

## 2023-06-13 DIAGNOSIS — G47.33 OSA (OBSTRUCTIVE SLEEP APNEA): Primary | ICD-10-CM

## 2023-06-13 PROCEDURE — 99214 PR OFFICE/OUTPT VISIT, EST, LEVL IV, 30-39 MIN: ICD-10-PCS | Mod: S$PBB,,, | Performed by: PHYSICIAN ASSISTANT

## 2023-06-13 PROCEDURE — 99999 PR PBB SHADOW E&M-EST. PATIENT-LVL IV: CPT | Mod: PBBFAC,,, | Performed by: PHYSICIAN ASSISTANT

## 2023-06-13 PROCEDURE — 99214 OFFICE O/P EST MOD 30 MIN: CPT | Mod: S$PBB,,, | Performed by: PHYSICIAN ASSISTANT

## 2023-06-13 PROCEDURE — 99214 OFFICE O/P EST MOD 30 MIN: CPT | Mod: PBBFAC | Performed by: PHYSICIAN ASSISTANT

## 2023-06-13 PROCEDURE — 99999 PR PBB SHADOW E&M-EST. PATIENT-LVL IV: ICD-10-PCS | Mod: PBBFAC,,, | Performed by: PHYSICIAN ASSISTANT

## 2023-06-13 NOTE — PROGRESS NOTES
Referred by No ref. provider found     ESTABLISHED PATIENT VISIT  New to me. Previously seen by TONY Levin.     Thai Santoyo Jr.  is a pleasant 47 y.o. male  with PMH significant for HTN, SVT, DM II, ESRD (on dialysis), hyperparathyroidism, NAZARIO, CONRAD on BiPAP.    Here today for : BiPAP supplies    PLAN last visit 4/12/22:   -continue BPAP 21/14  -register BiPAP for recall    Since last visit:   States he has registered his machine for recall replacement but is still waiting on a replacement. States he is still using his recalled BiPAP nightly as he feels he cannot sleep without the device.Did not bring machine with him to clinic today.  Reports mask interface and current pressure settings are comfortable. Denies complaints at this time.  Here today for BiPAP supply orders and for order for BiPAP pressure settings requested by Sachin.     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 5/21/2018    Morbid obesity 9/12/2012    SVT (supraventricular tachycardia)      Patient Active Problem List   Diagnosis    Morbid obesity with BMI of 40.0-44.9, adult    ESRD on hemodialysis    Type 2 diabetes mellitus with chronic kidney disease on chronic dialysis, with long-term current use of insulin    Chronic gout    Renovascular hypertension    Anemia in end-stage renal disease    Hypertension associated with  diabetes    Osteoarthritis of both knees    Electrolyte imbalance    Secondary hyperparathyroidism of renal origin    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 associated with type 2 diabetes mellitus    CONRAD (obstructive sleep apnea)    Erectile dysfunction due to arterial insufficiency       Current Outpatient Medications:     allopurinoL (ZYLOPRIM) 100 MG tablet, Take 1 tablet (100 mg total) by mouth once daily., Disp: 90 tablet, Rfl: 3    amLODIPine (NORVASC) 10 MG tablet, TAKE ONE TABLET BY MOUTH ONCE DAILY, Disp: 90 tablet, Rfl: 3    blood sugar diagnostic Strp, Use one strip each time to check blood sugar three times daily., Disp: 300 strip, Rfl: 4    carvediloL (COREG) 25 MG tablet, TAKE 1 TABLET BY MOUTH TWICE DAILY WITH MEALS, Disp: 180 tablet, Rfl: 3    cyclobenzaprine (FLEXERIL) 10 MG tablet, Take 1 tablet (10 mg total) by mouth 3 (three) times daily., Disp: 90 tablet, Rfl: 0    dulaglutide (TRULICITY) 1.5 mg/0.5 mL pen injector, Inject 1.5 mg into the skin every 7 days., Disp: 12 pen, Rfl: 3    furosemide (LASIX) 40 MG tablet, Take 1 tablet (40 mg total) by mouth once daily., Disp: 90 tablet, Rfl: 3    glipiZIDE (GLUCOTROL) 10 MG TR24, Take 1 tablet (10 mg total) by mouth daily with breakfast. (Patient taking differently: Take 20 mg by mouth daily with breakfast.), Disp: 90 tablet, Rfl: 3    HYDROmorphone (DILAUDID) 4 MG tablet, Take one tablet by mouth every 6 hours as needed., Disp: 120 tablet, Rfl: 0    insulin (LANTUS SOLOSTAR U-100 INSULIN) glargine 100 units/mL SubQ pen, Inject 50 Units into the skin 2 (two) times daily. (Patient taking differently: Inject 45 Units into the skin 2 (two) times daily.), Disp: 90 mL, Rfl: 3    insulin lispro (HUMALOG KWIKPEN INSULIN) 100 unit/mL pen, Inject 45 Units into the skin 3 (three) times daily before meals., Disp: 105 mL, Rfl: 3    lancets (ACCU-CHEK SOFTCLIX LANCETS) Misc, 300 each by Misc.(Non-Drug; Combo Route) route 3  "(three) times daily., Disp: 300 each, Rfl: 4    olmesartan-hydrochlorothiazide (BENICAR HCT) 40-25 mg per tablet, Take 1 tablet by mouth once daily., Disp: 90 tablet, Rfl: 3    pen needle, diabetic (BD ULTRA-FINE MAIKOL PEN NEEDLE) 32 gauge x 5/32" Ndle, To be used with insulin twice daily., Disp: 100 each, Rfl: 0    rosuvastatin (CRESTOR) 10 MG tablet, Take 1 tablet (10 mg total) by mouth once daily., Disp: 90 tablet, Rfl: 3    sucroferric oxyhydroxide (VELPHORO) 500 mg Chew, Chew 1 tablet with each meal three times daily, Disp: 90 tablet, Rfl: 11    syringe-needle,safety,disp unt 1 mL 27 gauge x 1/2" Syrg, Use every 2 weeks for testosterone injection, Disp: 100 Syringe, Rfl: 0    traZODone (DESYREL) 50 MG tablet, TAKE 1 TABLET BY MOUTH NIGHTLY AS NEEDED FOR INSOMNIA, Disp: 90 tablet, Rfl: 3    blood-glucose meter Misc, use as directed, Disp: 1 each, Rfl: 0    testosterone cypionate (DEPOTESTOTERONE CYPIONATE) 100 mg/mL injection, Inject 1 mL (100 mg total) into the muscle every 14 (fourteen) days., Disp: 10 mL, Rfl: 3       Vitals:    06/13/23 1443   BP: (!) 160/80   BP Location: Right arm  Comment: lower   Patient Position: Sitting   BP Method: Small (Automatic)   Pulse: 91   Weight: 136 kg (299 lb 13.2 oz)   Height: 6' (1.829 m)     Physical Exam:    GEN:   Well-appearing  Psych:  Appropriate affect, demonstrates insight  SKIN:  No rash on the face or bridge of the nose      LABS:   Lab Results   Component Value Date    CO2 29 03/07/2023       RECORDS REVIEWED PREVIOUSLY:    8/21/19 SPLIT: .1, mark 66%; CPAP 6-18cwp, initial improvement 16cwp, switched to BiPAP due to need for higher pressures; BiPAP 19/14-21/14, +21/14 sREM; recommended BiPAP 21/14cwp    ASSESSMENT    ESS Today: 4/24  Denies drowsy driving.     PROBLEM DESCRIPTION/ Sx on Presentation Interval Hx STATUS    CONRAD   + snoring, + gasping arousals, +witnessed apneas  Dx 2019 .1   Reports snoring resolved in CPAP, reports nightly usage " with great improvements in overall sx; did not bring BiPAP in for investigation today Likely controlled   Daytime Sx   + sleepiness when inactive   ESS 7/24 on intake (reviewed from 7/17/19) Reports improvement in sleepiness on CPAP improved   Insomnia   Trouble falling asleep: not usually   Maintenance:         waking frequently  Prior hypnotics:        Current hypnotics:    Reports much more consolidated sleep on CPAP improved       PLAN     -using and benefiting from BiPAP therapy  -reiterated unknown risks of continuing to use recalled BiPAP machine    [employer] has issued a voluntary recall of CPAPs/BIPAPs due to potentially dangerous particles or gas that could come from a foam insert.   The company recommends that you do not use your machine until it is replaced.   There is a bacterial filter that you can get from the equipment company that [employer] is suggesting you use if you want to continue using your current machine.   I cannot speak to the safety of this recommendation as I have not seen any data presented confirming this is an appropriate option.   You will need to complete a patient registration for a replacement machine, and the link is below.       PLEASE CALL [employer] DIRECTLY  at [phone] OR website is [employer]"update to check how your machine  will be handled and to register your machine.      For now, if you continue using your machine with an  INLINE ANTIBACTERIAL FILTER for CPAP/BPAP that you can either purchase through [facility] (call [phone]), or at  [employer] or at CPAP.com:         [employer]:https://www.[employer].com/Inline-[email or web address]-2-pack/dp/R302GE1BI5/ref=sr_1_5?dchild=1&keywords=antibacterial+cpap+filter&tvh=5509186684&sr=8-5      Https://www.cpap.com/search?query=in%20line%20filter    -BiPAP order with pressure settings faxed to [employer]  -BiPAP supplies ordered  -discussed CONRAD and BiPAP with patient in detail, including possible  complications of untreated CONRAD like heart attack/stroke  -advised on strict driving precautions; advised never to drive drowsy    Advised on plan of care. Answered all patient questions. Patient verbalized understanding and voiced agreement with plan of care.     RTC 12 months or as needed     The patient was given open opportunity to ask questions and/or express concerns about treatment plan.   All questions/concerns were discussed.     Two patient identifiers used prior to evaluation.

## 2023-07-07 DIAGNOSIS — I15.0 RENOVASCULAR HYPERTENSION: ICD-10-CM

## 2023-07-07 DIAGNOSIS — R00.0 TACHYCARDIA: ICD-10-CM

## 2023-07-07 RX ORDER — CARVEDILOL 25 MG/1
25 TABLET ORAL 2 TIMES DAILY WITH MEALS
Qty: 180 TABLET | Refills: 3 | Status: SHIPPED | OUTPATIENT
Start: 2023-07-07

## 2023-07-07 NOTE — TELEPHONE ENCOUNTER
Care Due:                  Date            Visit Type   Department     Provider  --------------------------------------------------------------------------------                                EP -                              PRIMARY      [facility]  Last Visit: 03-      CARE ([facility])   MARILYN Mckee                              EP -                              PRIMARY      [facility]  Next Visit: 09-      CARE ([facility])   MARILYN Mckee                                                            Last  Test          Frequency    Reason                     Performed    Due Date  --------------------------------------------------------------------------------    HBA1C.......  6 months...  dulaglutide, insulin.....  03- 09-    [facility] Embedded Care Due Messages. Reference number: [id number].   7/07/2023 2:37:20 PM CDT

## 2023-07-12 ENCOUNTER — PATIENT MESSAGE (OUTPATIENT)
Dept: SLEEP MEDICINE | Facility: CLINIC | Age: 48
End: 2023-07-12
Payer: MEDICARE

## 2023-07-21 RX ORDER — ALLOPURINOL 100 MG/1
100 TABLET ORAL DAILY
Qty: 90 TABLET | Refills: 3 | Status: SHIPPED | OUTPATIENT
Start: 2023-07-21

## 2023-07-21 NOTE — TELEPHONE ENCOUNTER
No care due was identified.  [facility] Embedded Care Due Messages. Reference number: [id number].   7/21/2023 9:55:28 AM CDT

## 2023-08-15 DIAGNOSIS — N18.6 TYPE 2 DIABETES MELLITUS WITH CHRONIC KIDNEY DISEASE ON CHRONIC DIALYSIS, WITH LONG-TERM CURRENT USE OF INSULIN: ICD-10-CM

## 2023-08-15 DIAGNOSIS — I15.2 HYPERTENSION ASSOCIATED WITH DIABETES: ICD-10-CM

## 2023-08-15 DIAGNOSIS — E11.22 TYPE 2 DIABETES MELLITUS WITH CHRONIC KIDNEY DISEASE ON CHRONIC DIALYSIS, WITH LONG-TERM CURRENT USE OF INSULIN: ICD-10-CM

## 2023-08-15 DIAGNOSIS — Z79.4 TYPE 2 DIABETES MELLITUS WITH CHRONIC KIDNEY DISEASE ON CHRONIC DIALYSIS, WITH LONG-TERM CURRENT USE OF INSULIN: ICD-10-CM

## 2023-08-15 DIAGNOSIS — Z99.2 TYPE 2 DIABETES MELLITUS WITH CHRONIC KIDNEY DISEASE ON CHRONIC DIALYSIS, WITH LONG-TERM CURRENT USE OF INSULIN: ICD-10-CM

## 2023-08-15 DIAGNOSIS — E11.59 HYPERTENSION ASSOCIATED WITH DIABETES: ICD-10-CM

## 2023-08-15 RX ORDER — FUROSEMIDE 40 MG/1
40 TABLET ORAL DAILY
Qty: 90 TABLET | Refills: 3 | Status: SHIPPED | OUTPATIENT
Start: 2023-08-15

## 2023-08-15 RX ORDER — INSULIN GLARGINE 100 [IU]/ML
50 INJECTION, SOLUTION SUBCUTANEOUS 2 TIMES DAILY
Qty: 90 ML | Refills: 0 | Status: SHIPPED | OUTPATIENT
Start: 2023-08-15 | End: 2023-09-07

## 2023-08-15 RX ORDER — INSULIN LISPRO 100 [IU]/ML
45 INJECTION, SOLUTION INTRAVENOUS; SUBCUTANEOUS
Qty: 105 ML | Refills: 0 | Status: SHIPPED | OUTPATIENT
Start: 2023-08-15 | End: 2023-10-17 | Stop reason: SDUPTHER

## 2023-08-15 RX ORDER — DULAGLUTIDE 1.5 MG/.5ML
1.5 INJECTION, SOLUTION SUBCUTANEOUS
Qty: 12 PEN | Refills: 0 | Status: SHIPPED | OUTPATIENT
Start: 2023-08-15 | End: 2023-10-17 | Stop reason: SDUPTHER

## 2023-08-15 NOTE — TELEPHONE ENCOUNTER
No care due was identified.  [facility] Embedded Care Due Messages. Reference number: [id number].   8/15/2023 8:09:55 AM CDT

## 2023-08-15 NOTE — TELEPHONE ENCOUNTER
Refill Decision Note   Thai Santoyo  is requesting a refill authorization.  Brief Assessment and Rationale for Refill:  Approve     Medication Therapy Plan:         Comments:     Note composed:10:58 AM 08/15/2023             Appointments     Last Visit   3/7/2023 Manjit Mckee II, MD   Next Visit   9/7/2023 Manjit Mckee II, MD

## 2023-08-15 NOTE — TELEPHONE ENCOUNTER
Refill Routing Note   Medication(s) are not appropriate for processing by [facility] for the following reason(s):      Required vitals abnormal    [facility] action(s):  Defer  Approve Care Due:  Labs due            Appointments  past 12m or future 3m with PCP    Date Provider   Last Visit   3/7/2023 Manjit Mckee II, MD   Next Visit   8/15/2023 Manjit Mckee II, MD   ED visits in past 90 days: 0        Note composed:12:45 PM 08/15/2023

## 2023-08-15 NOTE — TELEPHONE ENCOUNTER
Refill Routing Note   Medication(s) are not appropriate for processing by [facility] for the following reason(s):      Required labs outdated  Required labs abnormal  Required vitals abnormal    [facility] action(s):  Defer Care Due:  Labs due            Appointments  past 12m or future 3m with PCP    Date Provider   Last Visit   3/7/2023 Manjit Mckee II, MD   Next Visit   8/15/2023 Manjit Mckee II, MD   ED visits in past 90 days: 0        Note composed:11:16 AM 08/15/2023

## 2023-08-15 NOTE — TELEPHONE ENCOUNTER
Care Due:                  Date            Visit Type   Department     Provider  --------------------------------------------------------------------------------                                EP -                              PRIMARY      [facility] INTERNAL  Last Visit: 03-      CARE ([facility])   MARILYN Mckee                              EP -                              PRIMARY      [facility] INTERNAL  Next Visit: 09-      CARE ([facility])   MARILYN Mckee                                                            Last  Test          Frequency    Reason                     Performed    Due Date  --------------------------------------------------------------------------------    CBC.........  12 months..  allopurinoL..............  Not Found    Overdue    Uric Acid...  12 months..  allopurinoL..............  Not Found    Overdue    [employer] Embedded Care Due Messages. Reference number: [id number].   8/15/2023 8:08:53 AM CDT

## 2023-08-22 DIAGNOSIS — E29.1 HYPOGONADISM IN MALE: ICD-10-CM

## 2023-08-22 DIAGNOSIS — E11.59 HYPERTENSION ASSOCIATED WITH DIABETES: ICD-10-CM

## 2023-08-22 DIAGNOSIS — I15.2 HYPERTENSION ASSOCIATED WITH DIABETES: ICD-10-CM

## 2023-08-22 DIAGNOSIS — G47.00 INSOMNIA: ICD-10-CM

## 2023-08-22 DIAGNOSIS — I15.0 RENOVASCULAR HYPERTENSION: ICD-10-CM

## 2023-08-22 RX ORDER — ZOLPIDEM TARTRATE 10 MG/1
10 TABLET ORAL NIGHTLY PRN
Qty: 60 TABLET | Refills: 3 | Status: SHIPPED | OUTPATIENT
Start: 2023-08-22 | End: 2023-09-07

## 2023-08-22 NOTE — TELEPHONE ENCOUNTER
No care due was identified.  [facility] Embedded Care Due Messages. Reference number: [id number].   8/22/2023 3:47:27 PM CDT

## 2023-08-22 NOTE — TELEPHONE ENCOUNTER
No care due was identified.  [facility] Embedded Care Due Messages. Reference number: [id number].   8/22/2023 3:49:01 PM CDT

## 2023-08-23 RX ORDER — TESTOSTERONE CYPIONATE 200 MG/ML
100 INJECTION, SOLUTION INTRAMUSCULAR
Qty: 10 ML | Refills: 3 | Status: ON HOLD | OUTPATIENT
Start: 2023-08-23 | End: 2024-03-11 | Stop reason: HOSPADM

## 2023-08-23 RX ORDER — OLMESARTAN MEDOXOMIL AND HYDROCHLOROTHIAZIDE 40/25 40; 25 MG/1; MG/1
1 TABLET ORAL DAILY
Qty: 90 TABLET | Refills: 3 | Status: SHIPPED | OUTPATIENT
Start: 2023-08-23

## 2023-08-23 NOTE — TELEPHONE ENCOUNTER
Refill Routing Note   Medication(s) are not appropriate for processing by [facility] for the following reason(s):      Medication outside of protocol: testosterone  Required labs/vitals abnormal: benicar/hctz    [facility] action(s):  Defer  Route Care Due:  None identified          Appointments  past 12m or future 3m with PCP    Date Provider   Last Visit   3/7/2023 Manjit Mckee II, MD   Next Visit   8/22/2023 Manjit Mckee II, MD   ED visits in past 90 days: 0        Note composed:7:08 PM 08/22/2023

## 2023-08-24 ENCOUNTER — PATIENT MESSAGE (OUTPATIENT)
Dept: ADMINISTRATIVE | Facility: HOSPITAL | Age: 48
End: 2023-08-24
Payer: MEDICARE

## 2023-08-24 ENCOUNTER — PATIENT OUTREACH (OUTPATIENT)
Dept: ADMINISTRATIVE | Facility: HOSPITAL | Age: 48
End: 2023-08-24
Payer: MEDICARE

## 2023-08-24 NOTE — PROGRESS NOTES
Health Maintenance Due   Topic Date Due    TETANUS VACCINE  Never done    Eye Exam  05/21/2019    COVID-19 Vaccine (4 - Moderna series) 02/13/2022    Pneumococcal Vaccines (Age 0-64) (3 - PCV) 11/15/2022    Colorectal Cancer Screening  Never done    Hemoglobin A1c  06/07/2023      Chart reviewed. Triggered LINKS. Updated Care Everywhere. Spoke to patient, patient stated it was not a good time to go over overdue health maintenance screenings patient preferred for me to send it to patient portal. Patient portal message sent.     Gio Seay CMA  Population Health Care Coordinator  Primary Care Team

## 2023-08-29 ENCOUNTER — PATIENT MESSAGE (OUTPATIENT)
Dept: UROLOGY | Facility: CLINIC | Age: 48
End: 2023-08-29
Payer: MEDICARE

## 2023-09-07 ENCOUNTER — OFFICE VISIT (OUTPATIENT)
Dept: INTERNAL MEDICINE | Facility: CLINIC | Age: 48
End: 2023-09-07
Payer: MEDICARE

## 2023-09-07 ENCOUNTER — OFFICE VISIT (OUTPATIENT)
Dept: UROLOGY | Facility: CLINIC | Age: 48
End: 2023-09-07
Payer: MEDICARE

## 2023-09-07 ENCOUNTER — PATIENT MESSAGE (OUTPATIENT)
Dept: ADMINISTRATIVE | Facility: HOSPITAL | Age: 48
End: 2023-09-07
Payer: MEDICARE

## 2023-09-07 ENCOUNTER — LAB VISIT (OUTPATIENT)
Dept: LAB | Facility: HOSPITAL | Age: 48
End: 2023-09-07
Attending: INTERNAL MEDICINE
Payer: MEDICARE

## 2023-09-07 ENCOUNTER — PATIENT MESSAGE (OUTPATIENT)
Dept: INTERNAL MEDICINE | Facility: CLINIC | Age: 48
End: 2023-09-07

## 2023-09-07 VITALS
DIASTOLIC BLOOD PRESSURE: 98 MMHG | OXYGEN SATURATION: 93 % | HEART RATE: 92 BPM | HEIGHT: 72 IN | BODY MASS INDEX: 40.88 KG/M2 | SYSTOLIC BLOOD PRESSURE: 168 MMHG | WEIGHT: 301.81 LBS

## 2023-09-07 VITALS
DIASTOLIC BLOOD PRESSURE: 96 MMHG | HEART RATE: 87 BPM | HEIGHT: 72 IN | WEIGHT: 297.63 LBS | SYSTOLIC BLOOD PRESSURE: 173 MMHG | BODY MASS INDEX: 40.31 KG/M2

## 2023-09-07 DIAGNOSIS — Z79.4 TYPE 2 DIABETES MELLITUS WITH CHRONIC KIDNEY DISEASE ON CHRONIC DIALYSIS, WITH LONG-TERM CURRENT USE OF INSULIN: ICD-10-CM

## 2023-09-07 DIAGNOSIS — E29.1 HYPOGONADISM IN MALE: ICD-10-CM

## 2023-09-07 DIAGNOSIS — N18.6 ESRD ON HEMODIALYSIS: ICD-10-CM

## 2023-09-07 DIAGNOSIS — N18.6 END STAGE RENAL DISEASE: ICD-10-CM

## 2023-09-07 DIAGNOSIS — E29.1 3-OXO-5 ALPHA-STEROID DELTA 4-DEHYDROGENASE DEFICIENCY: Primary | ICD-10-CM

## 2023-09-07 DIAGNOSIS — Z99.2 TYPE 2 DIABETES MELLITUS WITH CHRONIC KIDNEY DISEASE ON CHRONIC DIALYSIS, WITH LONG-TERM CURRENT USE OF INSULIN: ICD-10-CM

## 2023-09-07 DIAGNOSIS — E66.01 MORBID OBESITY WITH BMI OF 40.0-44.9, ADULT: ICD-10-CM

## 2023-09-07 DIAGNOSIS — Z82.3 FAMILY HISTORY OF STROKE: ICD-10-CM

## 2023-09-07 DIAGNOSIS — Z99.2 ESRD ON HEMODIALYSIS: ICD-10-CM

## 2023-09-07 DIAGNOSIS — Z99.2 ENCOUNTER FOR EXTRACORPOREAL DIALYSIS: ICD-10-CM

## 2023-09-07 DIAGNOSIS — E66.01 MORBID OBESITY: ICD-10-CM

## 2023-09-07 DIAGNOSIS — E11.22 TYPE 2 DIABETES MELLITUS WITH CHRONIC KIDNEY DISEASE ON CHRONIC DIALYSIS, WITH LONG-TERM CURRENT USE OF INSULIN: ICD-10-CM

## 2023-09-07 DIAGNOSIS — Z79.4 ENCOUNTER FOR LONG-TERM (CURRENT) USE OF INSULIN: ICD-10-CM

## 2023-09-07 DIAGNOSIS — N18.6 TYPE 2 DIABETES MELLITUS WITH END-STAGE RENAL DISEASE: ICD-10-CM

## 2023-09-07 DIAGNOSIS — N18.6 TYPE 2 DIABETES MELLITUS WITH CHRONIC KIDNEY DISEASE ON CHRONIC DIALYSIS, WITH LONG-TERM CURRENT USE OF INSULIN: ICD-10-CM

## 2023-09-07 DIAGNOSIS — Z12.11 SCREENING FOR MALIGNANT NEOPLASM OF COLON: ICD-10-CM

## 2023-09-07 DIAGNOSIS — G47.33 OSA (OBSTRUCTIVE SLEEP APNEA): ICD-10-CM

## 2023-09-07 DIAGNOSIS — E11.22 TYPE 2 DIABETES MELLITUS WITH END-STAGE RENAL DISEASE: ICD-10-CM

## 2023-09-07 DIAGNOSIS — N25.81 SECONDARY HYPERPARATHYROIDISM OF RENAL ORIGIN: ICD-10-CM

## 2023-09-07 DIAGNOSIS — E11.59 TYPE 2 DIABETES MELLITUS WITH CIRCULATORY DISORDER CAUSING ERECTILE DYSFUNCTION: Primary | ICD-10-CM

## 2023-09-07 DIAGNOSIS — I15.0 SECONDARY RENOVASCULAR HYPERTENSION, MALIGNANT: ICD-10-CM

## 2023-09-07 DIAGNOSIS — I15.0 RENOVASCULAR HYPERTENSION: Primary | ICD-10-CM

## 2023-09-07 DIAGNOSIS — N52.1 TYPE 2 DIABETES MELLITUS WITH CIRCULATORY DISORDER CAUSING ERECTILE DYSFUNCTION: Primary | ICD-10-CM

## 2023-09-07 LAB
ALBUMIN SERPL BCP-MCNC: 4 G/DL (ref 3.5–5.2)
ALP SERPL-CCNC: 54 U/L (ref 55–135)
ALT SERPL W/O P-5'-P-CCNC: 16 U/L (ref 10–44)
ANION GAP SERPL CALC-SCNC: 8 MMOL/L (ref 8–16)
AST SERPL-CCNC: 23 U/L (ref 10–40)
BILIRUB SERPL-MCNC: 0.7 MG/DL (ref 0.1–1)
BUN SERPL-MCNC: 37 MG/DL (ref 6–20)
CALCIUM SERPL-MCNC: 9.6 MG/DL (ref 8.7–10.5)
CHLORIDE SERPL-SCNC: 96 MMOL/L (ref 95–110)
CO2 SERPL-SCNC: 31 MMOL/L (ref 23–29)
CREAT SERPL-MCNC: 10.1 MG/DL (ref 0.5–1.4)
EST. GFR  (NO RACE VARIABLE): 5.9 ML/MIN/1.73 M^2
ESTIMATED AVG GLUCOSE: 111 MG/DL (ref 68–131)
GLUCOSE SERPL-MCNC: 129 MG/DL (ref 70–110)
HBA1C MFR BLD: 5.5 % (ref 4.5–6.2)
POTASSIUM SERPL-SCNC: 3.7 MMOL/L (ref 3.5–5.1)
PROT SERPL-MCNC: 7.9 G/DL (ref 6–8.4)
SODIUM SERPL-SCNC: 135 MMOL/L (ref 136–145)

## 2023-09-07 PROCEDURE — 80053 COMPREHEN METABOLIC PANEL: CPT | Performed by: INTERNAL MEDICINE

## 2023-09-07 PROCEDURE — 99214 PR OFFICE/OUTPT VISIT, EST, LEVL IV, 30-39 MIN: ICD-10-PCS | Mod: S$PBB,,, | Performed by: INTERNAL MEDICINE

## 2023-09-07 PROCEDURE — 99999 PR PBB SHADOW E&M-EST. PATIENT-LVL V: CPT | Mod: PBBFAC,,, | Performed by: NURSE PRACTITIONER

## 2023-09-07 PROCEDURE — 99215 OFFICE O/P EST HI 40 MIN: CPT | Mod: PBBFAC | Performed by: INTERNAL MEDICINE

## 2023-09-07 PROCEDURE — 99214 PR OFFICE/OUTPT VISIT, EST, LEVL IV, 30-39 MIN: ICD-10-PCS | Mod: S$PBB,,, | Performed by: NURSE PRACTITIONER

## 2023-09-07 PROCEDURE — 99999 PR PBB SHADOW E&M-EST. PATIENT-LVL V: CPT | Mod: PBBFAC,,, | Performed by: INTERNAL MEDICINE

## 2023-09-07 PROCEDURE — 99999 PR PBB SHADOW E&M-EST. PATIENT-LVL V: ICD-10-PCS | Mod: PBBFAC,,, | Performed by: NURSE PRACTITIONER

## 2023-09-07 PROCEDURE — 83036 HEMOGLOBIN GLYCOSYLATED A1C: CPT | Performed by: INTERNAL MEDICINE

## 2023-09-07 PROCEDURE — 84403 ASSAY OF TOTAL TESTOSTERONE: CPT | Performed by: INTERNAL MEDICINE

## 2023-09-07 PROCEDURE — 99214 OFFICE O/P EST MOD 30 MIN: CPT | Mod: S$PBB,,, | Performed by: NURSE PRACTITIONER

## 2023-09-07 PROCEDURE — 99999 PR PBB SHADOW E&M-EST. PATIENT-LVL V: ICD-10-PCS | Mod: PBBFAC,,, | Performed by: INTERNAL MEDICINE

## 2023-09-07 PROCEDURE — 99214 OFFICE O/P EST MOD 30 MIN: CPT | Mod: S$PBB,,, | Performed by: INTERNAL MEDICINE

## 2023-09-07 PROCEDURE — 99215 OFFICE O/P EST HI 40 MIN: CPT | Mod: PBBFAC,27 | Performed by: NURSE PRACTITIONER

## 2023-09-07 RX ORDER — HYDRALAZINE HYDROCHLORIDE 25 MG/1
25 TABLET, FILM COATED ORAL EVERY 12 HOURS
Qty: 180 TABLET | Refills: 3 | Status: SHIPPED | OUTPATIENT
Start: 2023-09-07 | End: 2024-01-26 | Stop reason: SDUPTHER

## 2023-09-07 RX ORDER — PAPAVERINE HYDROCHLORIDE 30 MG/ML
INJECTION INTRAMUSCULAR; INTRAVENOUS
Qty: 10 ML | Refills: 12 | Status: ON HOLD | OUTPATIENT
Start: 2023-09-07 | End: 2024-03-11 | Stop reason: HOSPADM

## 2023-09-07 RX ORDER — INSULIN GLARGINE 100 [IU]/ML
45 INJECTION, SOLUTION SUBCUTANEOUS 2 TIMES DAILY
Qty: 90 ML | Refills: 0 | Status: ON HOLD
Start: 2023-09-07 | End: 2024-03-11

## 2023-09-07 NOTE — PATIENT INSTRUCTIONS
[facility]  [phone]    1. Wipe off top of medication vial with an alcohol prep.   2. With the vial held firmly on a flat surface, insert the syringe into the vial.  3. Now carefully  the vial with the needle in place and turn upside down.  4. You can then push in syringe (like you are giving a shot) to get all the air out of the syringe.  5. You can then pull the plunger of the syringe back down while keeping the needle inserted in the vial to get your desired results.   6. If having difficulty seeing the medication, rapidly pull back the syringe and then you will see the medication fill up.    You may never get all the tiny air bubbles out. It is ok.     He was instructed to keep the dosage at 30 units. If noticing a decrease in reaction he can increase the dosage by 5 units. He may also refill the Rx.   Refills were sent to [facility].

## 2023-09-07 NOTE — PROGRESS NOTES
Subjective:       Patient ID: Thai Santoyo Jr. is a 47 y.o. male.    Chief Complaint:   Follow-up    HPI - he feels well today.  Last visit was in March.  He asked for genetic testing because his mother is having frequent strokes.  He is due for some diabetic health maintenance and a colonoscopy since he has turned 47.  Taking all medications as prescribed.  He proudly shows me a picture of his son, who is a senior in high school, playing football.  He is not a smoker.    PMH:  ESRD on dialysis  DM2, not at goal; infrequent f/u  HTN, worsening  SVT x2 in 2018  Gout, quiescent  Morbid Obesity  Knee pain on narcotics     Meds:  Reviewed and reconciled in EPIC with patient during visit today.    Review of Systems   Constitutional:  Negative for fever.   HENT:  Negative for congestion.    Respiratory:  Negative for shortness of breath.    Cardiovascular:  Negative for chest pain.   Gastrointestinal:  Negative for abdominal pain.   Genitourinary:  Negative for difficulty urinating.   Musculoskeletal:  Positive for arthralgias.   Skin:  Negative for rash.   Neurological:  Negative for headaches.   Psychiatric/Behavioral:  Negative for sleep disturbance.        Objective:      Physical Exam  Constitutional:       Appearance: He is obese.   HENT:      Head: Normocephalic and atraumatic.   Pulmonary:      Effort: Pulmonary effort is normal.   Neurological:      General: No focal deficit present.      Mental Status: He is alert.   Psychiatric:         Mood and Affect: Mood normal.         Assessment:       1. Renovascular hypertension    2. Type 2 diabetes mellitus with chronic kidney disease on chronic dialysis, with long-term current use of insulin    3. ESRD on hemodialysis    4. CONRAD (obstructive sleep apnea)    5. Morbid obesity with BMI of 40.0-44.9, adult    6. Secondary hyperparathyroidism of renal origin    7. Family history of stroke    8. Screening for malignant neoplasm of colon    9. Hypogonadism in male         Plan:       Thai was seen today for follow-up.    Diagnoses and all orders for this visit:    Renovascular hypertension - still high on my retake.  Trial of hydralazine.  Needs more frequent f/u with me  -     COMPREHENSIVE METABOLIC PANEL; Future  -     hydrALAZINE (APRESOLINE) 25 MG tablet; Take 1 tablet (25 mg total) by mouth every 12 (twelve) hours.    Type 2 diabetes mellitus with chronic kidney disease on chronic dialysis, with long-term current use of insulin - has been improving.  Time for labs and eye photo  -     insulin (LANTUS SOLOSTAR U-100 INSULIN) glargine 100 units/mL SubQ pen; Inject 45 Units into the skin 2 (two) times a day.  -     Diabetic Eye Screening Photo; Future  -     Hemoglobin A1C; Future    ESRD on hemodialysis - stable, on m-w-f schedule.  continue    CONRAD (obstructive sleep apnea)    Morbid obesity with BMI of 40.0-44.9, adult    Secondary hyperparathyroidism of renal origin - stable on binders    Family history of stroke - in mother; new finding.  Pt requested this genotyping  -     Methylenetetrahydrofol Genotyping (C677T/P6918I); Future  -     Methylenetetrahydrofol Genotyping (C677T/G9665V)    Screening for malignant neoplasm of colon - now that he is >age 45, will do colo  -     Ambulatory referral/consult to Endo Procedure ; Future    Hypogonadism in male  -     Testosterone; Future    Rtc prn, or in a month for BP    G Anais Mckee MD MPH  Staff Internist

## 2023-09-07 NOTE — PROGRESS NOTES
CHIEF COMPLAINT:    Thai Santoyo Jr. is a 47 y.o. male presents today for ED.     HISTORY OF PRESENTING ILLINESS:    Thai Santoyo Jr. is a 47 y.o. Type 2 DIABETIC male with ESRD on HD. He is an established patient of our clinic with a history of infertility and ED.   He was last seen in clinic with Dr. Dow 11/21/2021 for infertility.     He is here today to restart ICI for his ED.   He had been using ~25 units of DS Trimix when he was last seen.   Reported no issues.     He still urinates. Frequently during the day due to diuretics twice a day.  Nocturia x 1.     03/07/2023 Hgb A1c  10.4    He is now living in [facility]  Son played football at [employer]; now at [employer]  [facility]        REVIEW OF SYSTEMS:  Review of Systems   Constitutional: Negative.  Negative for chills and fever.   Eyes:  Negative for double vision.   Respiratory:  Negative for cough and shortness of breath.    Cardiovascular:  Negative for chest pain.   Gastrointestinal:  Negative for abdominal pain, constipation, diarrhea, nausea and vomiting.   Genitourinary:  Positive for frequency. Negative for dysuria, flank pain and hematuria.        Daytime frequency  Nocturia x 1  HD 3 x week     Neurological:  Negative for dizziness and seizures.   Endo/Heme/Allergies:  Negative for polydipsia.         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 5/21/2018    Morbid obesity 9/12/2012    SVT (supraventricular tachycardia)        Past Surgical History:   Procedure Laterality Date     ABLATION N/A 10/5/2018    Procedure: ABLATION;  Surgeon: Ayden Arana MD;  Location: [facility] CATH LAB;  Service: Cardiology;  Laterality: N/A;  SVT, RFA, ARGELIA, MAC (light), TX, 3 Prep    DIALYSIS FISTULA CREATION      KNEE ARTHROPLASTY      SHOULDER SURGERY      right    VASECTOMY         Family History   Problem Relation Age of Onset    Diabetes Mother     Kidney disease Mother         on dialysis    Hypertension Mother     Heart failure Mother     Hypertension Father     Kidney disease Paternal Uncle         dialysis    Kidney disease Paternal Uncle         dialysis    Eczema Sister     No Known Problems Son     No Known Problems Sister     No Known Problems Son     No Known Problems Son     Heart disease Maternal Grandfather     Melanoma Neg Hx     Psoriasis Neg Hx     Lupus Neg Hx     Acne Neg Hx        Social History     Socioeconomic History    Marital status: Single   Occupational History    Occupation: Unemployed     Employer: OTHER     Comment: [employer] for 12 years   Tobacco Use    Smoking status: Former     Types: Cigars    Smokeless tobacco: Never    Tobacco comments:     rare   Substance and Sexual Activity    Alcohol use: No    Drug use: No    Sexual activity: Yes     Partners: Female     Birth control/protection: None       Allergies:  Patient has no known allergies.    Medications:    Current Outpatient Medications:     allopurinoL (ZYLOPRIM) 100 MG tablet, Take 1 tablet (100 mg total) by mouth once daily., Disp: 90 tablet, Rfl: 3    allopurinoL (ZYLOPRIM) 100 MG tablet, Take 1 tablet (100 mg total) by mouth once daily., Disp: 90 tablet, Rfl: 3    amLODIPine (NORVASC) 10 MG tablet, TAKE ONE TABLET BY MOUTH ONCE DAILY, Disp: 90 tablet, Rfl: 3    blood sugar diagnostic Strp, Use one strip each time to check blood sugar three times daily., Disp: 300 strip, Rfl: 4    carvediloL (COREG) 25 MG tablet, Take 1 tablet (25 mg total) by mouth 2 (two) times daily with meals., Disp: 180 tablet, Rfl: 3     "cyclobenzaprine (FLEXERIL) 10 MG tablet, Take 1 tablet (10 mg total) by mouth 3 (three) times daily., Disp: 90 tablet, Rfl: 0    dulaglutide (TRULICITY) 1.5 mg/0.5 mL pen injector, Inject 1.5 mg into the skin every 7 days., Disp: 12 pen , Rfl: 0    furosemide (LASIX) 40 MG tablet, Take 1 tablet (40 mg total) by mouth once daily., Disp: 90 tablet, Rfl: 3    glipiZIDE (GLUCOTROL) 10 MG TR24, Take 1 tablet (10 mg total) by mouth daily with breakfast. (Patient taking differently: Take 20 mg by mouth daily with breakfast.), Disp: 90 tablet, Rfl: 3    HYDROmorphone (DILAUDID) 4 MG tablet, Take one tablet by mouth every 6 hours as needed., Disp: 120 tablet, Rfl: 0    insulin (LANTUS SOLOSTAR U-100 INSULIN) glargine 100 units/mL SubQ pen, Inject 50 Units into the skin 2 (two) times daily., Disp: 90 mL, Rfl: 0    insulin lispro (HUMALOG KWIKPEN INSULIN) 100 unit/mL pen, Inject 45 Units into the skin 3 (three) times daily before meals., Disp: 105 mL, Rfl: 0    lancets (ACCU-CHEK SOFTCLIX LANCETS) Misc, 300 each by Misc.(Non-Drug; Combo Route) route 3 (three) times daily., Disp: 300 each, Rfl: 4    olmesartan-hydrochlorothiazide (BENICAR HCT) 40-25 mg per tablet, Take 1 tablet by mouth once daily., Disp: 90 tablet, Rfl: 3    pen needle, diabetic (BD ULTRA-FINE MAIKOL PEN NEEDLE) 32 gauge x 5/32" Ndle, To be used with insulin twice daily., Disp: 100 each, Rfl: 0    rosuvastatin (CRESTOR) 10 MG tablet, Take 1 tablet (10 mg total) by mouth once daily., Disp: 90 tablet, Rfl: 3    sucroferric oxyhydroxide (VELPHORO) 500 mg Chew, Chew 1 tablet with each meal three times daily, Disp: 90 tablet, Rfl: 11    syringe-needle,safety,disp unt 1 mL 27 gauge x 1/2" Syrg, Use every 2 weeks for testosterone injection, Disp: 100 Syringe, Rfl: 0    testosterone cypionate (DEPOTESTOTERONE CYPIONATE) 100 mg/mL injection, Inject 1 mL (100 mg total) into the muscle every 14 (fourteen) days., Disp: 6 mL, Rfl: 3    traZODone (DESYREL) 50 MG tablet, TAKE " 1 TABLET BY MOUTH NIGHTLY AS NEEDED FOR INSOMNIA, Disp: 90 tablet, Rfl: 3    zolpidem (AMBIEN) 10 mg Tab, Take 1 tablet (10 mg total) by mouth nightly as needed., Disp: 60 tablet, Rfl: 3    blood-glucose meter Misc, use as directed, Disp: 1 each, Rfl: 0    papaverine 30 mg/mL injection, Papaverine 60mg Add: Phentolamine 2 mg Add: PGE1 20 mcg (Double Strength)  Sig:  Inject 30 units as directed; titrate up by 5 units until desired results, Disp: 10 mL, Rfl: 12    PHYSICAL EXAMINATION:  Physical Exam  Vitals and nursing note reviewed.   Constitutional:       General: He is awake.      Appearance: Normal appearance.   HENT:      Head: Normocephalic.      Right Ear: External ear normal.      Left Ear: External ear normal.      Nose: Nose normal.   Cardiovascular:      Rate and Rhythm: Normal rate.   Pulmonary:      Effort: Pulmonary effort is normal. No respiratory distress.   Abdominal:      Tenderness: There is no abdominal tenderness. There is no right CVA tenderness or left CVA tenderness.   Genitourinary:     Penis: Normal.       Testes: Normal.   Musculoskeletal:         General: Normal range of motion.      Cervical back: Normal range of motion.   Skin:     General: Skin is warm and dry.   Neurological:      General: No focal deficit present.      Mental Status: He is alert and oriented to person, place, and time.   Psychiatric:         Mood and Affect: Mood normal.         Behavior: Behavior is cooperative.           LABS:          Lab Results   Component Value Date    PSA 1.3 05/26/2017       Lab Results   Component Value Date    CREATININE 9.0 (H) 03/07/2023    EGFRNORACEVR 6.8 (A) 03/07/2023             IMPRESSION:    Encounter Diagnoses   Name Primary?    Type 2 diabetes mellitus with circulatory disorder causing erectile dysfunction Yes    ESRD on hemodialysis     Morbid obesity          Assessment:       1. Type 2 diabetes mellitus with circulatory disorder causing erectile dysfunction    2. ESRD on  hemodialysis    3. Morbid obesity        Plan:         I spent 30 minutes with the patient of which more than half was spent in direct consultation with the patient in regards to our treatment and plan.  We addressed the office findings and recent labs.   Education and recommendations of today's plan of care including home remedies and needed follow up with PCP.   We discussed the chief complaint; reviewed the LUTS and the possible contributory factors.   Recommended lifestyle modifications with a proper, healthy DIABETIC diet, good hydration but during the day. Reducing bladder irritants.   Benefits of regular exercise and weight loss  He must improve his DM.   New Rx for Trimix DS sent to [facility]  Rtc for reteaching

## 2023-09-08 LAB — TESTOST SERPL-MCNC: 284 NG/DL (ref 264–916)

## 2023-10-17 DIAGNOSIS — N18.6 TYPE 2 DIABETES MELLITUS WITH CHRONIC KIDNEY DISEASE ON CHRONIC DIALYSIS, WITH LONG-TERM CURRENT USE OF INSULIN: ICD-10-CM

## 2023-10-17 DIAGNOSIS — Z79.4 TYPE 2 DIABETES MELLITUS WITH CHRONIC KIDNEY DISEASE ON CHRONIC DIALYSIS, WITH LONG-TERM CURRENT USE OF INSULIN: ICD-10-CM

## 2023-10-17 DIAGNOSIS — E11.22 TYPE 2 DIABETES MELLITUS WITH CHRONIC KIDNEY DISEASE ON CHRONIC DIALYSIS, WITH LONG-TERM CURRENT USE OF INSULIN: ICD-10-CM

## 2023-10-17 DIAGNOSIS — Z99.2 TYPE 2 DIABETES MELLITUS WITH CHRONIC KIDNEY DISEASE ON CHRONIC DIALYSIS, WITH LONG-TERM CURRENT USE OF INSULIN: ICD-10-CM

## 2023-10-17 NOTE — TELEPHONE ENCOUNTER
Care Due:                  Date            Visit Type   Department     Provider  --------------------------------------------------------------------------------                                EP -                              PRIMARY      [facility] INTERNAL  Last Visit: 09-      CARE ([facility])   MEDICINE       Manjit Mckee  Next Visit: None Scheduled  None         None Found                                                            Last  Test          Frequency    Reason                     Performed    Due Date  --------------------------------------------------------------------------------    CBC.........  12 months..  allopurinoL, hydrALAZINE.  Not Found    Overdue    Uric Acid...  12 months..  allopurinoL..............  Not Found    Overdue    [employer] Embedded Care Due Messages. Reference number: [id number].   10/17/2023 1:55:04 PM CDT

## 2023-10-18 RX ORDER — INSULIN LISPRO 100 [IU]/ML
45 INJECTION, SOLUTION INTRAVENOUS; SUBCUTANEOUS
Qty: 120 ML | Refills: 1 | Status: ON HOLD | OUTPATIENT
Start: 2023-10-18 | End: 2024-03-11

## 2023-10-18 RX ORDER — DULAGLUTIDE 1.5 MG/.5ML
1.5 INJECTION, SOLUTION SUBCUTANEOUS
Qty: 12 PEN | Refills: 1 | Status: SHIPPED | OUTPATIENT
Start: 2023-10-18 | End: 2024-02-23 | Stop reason: SDUPTHER

## 2023-10-18 NOTE — TELEPHONE ENCOUNTER
Provider Staff:  Action required for this patient     Please see care gap opportunities below in Care Due Message: CBC/uric acid outdated    Thanks!  [facility]     Appointments      Date Provider   Last Visit   9/7/2023 Manjit Mckee II, MD   Next Visit   10/18/2023 Manjit Mckee II, MD     Refill Decision Note   Thai Santoyo  is requesting a refill authorization.  Brief Assessment and Rationale for Refill:  Approve     Medication Therapy Plan: No dose adjustment recommended per renal fxn.      Pharmacist review requested: Yes   Extended chart review required: Yes   Comments:     Note composed:4:56 PM 10/18/2023

## 2023-10-18 NOTE — TELEPHONE ENCOUNTER
Refill Routing Note   Medication(s) are not appropriate for processing by [facility] for the following reason(s):      Required labs abnormal  EGFRNORACEVR 5.9 (A) 09/07/2023        CREATININE 10.1 (H) 09/07/2023       OR action(s):  Defer  Approve Care Due:  Labs due            Pharmacist review requested: Yes     Appointments  past 12m or future 3m with PCP    Date Provider   Last Visit   9/7/2023 Manjit Mckee II, MD   Next Visit   10/18/2023 Manjit Mckee II, MD   ED visits in past 90 days: 0        Note composed:2:44 PM 10/18/2023

## 2023-10-18 NOTE — TELEPHONE ENCOUNTER
No care due was identified.  [facility] Embedded Care Due Messages. Reference number: [id number].   10/18/2023 1:29:29 PM CDT

## 2023-10-19 RX ORDER — PEN NEEDLE, DIABETIC 30 GX3/16"
NEEDLE, DISPOSABLE MISCELLANEOUS
Qty: 200 EACH | Refills: 3 | Status: SHIPPED | OUTPATIENT
Start: 2023-10-19

## 2023-12-19 DIAGNOSIS — E11.69 MIXED HYPERLIPIDEMIA DUE TO TYPE 2 DIABETES MELLITUS: ICD-10-CM

## 2023-12-19 DIAGNOSIS — E78.2 MIXED HYPERLIPIDEMIA DUE TO TYPE 2 DIABETES MELLITUS: ICD-10-CM

## 2023-12-19 RX ORDER — ROSUVASTATIN CALCIUM 10 MG/1
10 TABLET, COATED ORAL DAILY
Qty: 90 TABLET | Refills: 0 | Status: SHIPPED | OUTPATIENT
Start: 2023-12-19 | End: 2024-02-23 | Stop reason: SDUPTHER

## 2023-12-19 RX ORDER — PAROXETINE HYDROCHLORIDE 20 MG/1
20 TABLET, FILM COATED ORAL DAILY
Qty: 30 TABLET | Refills: 0 | Status: CANCELLED | OUTPATIENT
Start: 2023-12-19

## 2023-12-19 NOTE — TELEPHONE ENCOUNTER
Care Due:                  Date            Visit Type   Department     Provider  --------------------------------------------------------------------------------                                EP -                              PRIMARY      [facility]  Last Visit: 09-      CARE ([facility])   MEDICINE       Manjit Mckee  Next Visit: None Scheduled  None         None Found                                                            Last  Test          Frequency    Reason                     Performed    Due Date  --------------------------------------------------------------------------------    CBC.........  12 months..  allopurinoL, hydrALAZINE.  Not Found    Overdue    HBA1C.......  6 months...  dulaglutide, insulin.....  09- 03-    Lipid Panel.  12 months..  rosuvastatin.............  03- 03-    Uric Acid...  12 months..  allopurinoL..............  Not Found    Overdue    [employer] Embedded Care Due Messages. Reference number: [id number].   12/19/2023 2:19:17 PM CST

## 2023-12-19 NOTE — TELEPHONE ENCOUNTER
Provider Staff:  Action required for this patient    Requires labs      Please see care gap opportunities below in Care Due Message.    Thanks!  [facility]     Appointments      Date Provider   Last Visit   9/7/2023 Manjit Mckee II, MD   Next Visit   Visit date not found Manjit Mckee II, MD     Refill Decision Note   Thai Santoyo  is requesting a refill authorization.  Brief Assessment and Rationale for Refill:  Approve     Medication Therapy Plan:         Comments:     Note composed:5:08 PM 12/19/2023

## 2023-12-28 DIAGNOSIS — G47.00 INSOMNIA: ICD-10-CM

## 2023-12-28 RX ORDER — PAROXETINE HYDROCHLORIDE 20 MG/1
20 TABLET, FILM COATED ORAL DAILY
Qty: 30 TABLET | Refills: 0 | Status: CANCELLED | OUTPATIENT
Start: 2023-12-19

## 2023-12-28 RX ORDER — ZOLPIDEM TARTRATE 10 MG/1
10 TABLET ORAL NIGHTLY PRN
Qty: 60 TABLET | Refills: 3 | OUTPATIENT
Start: 2023-12-28 | End: 2024-08-24

## 2023-12-28 NOTE — TELEPHONE ENCOUNTER
No care due was identified.  [facility] Embedded Care Due Messages. Reference number: [id number].   12/28/2023 2:18:19 PM CST

## 2024-01-04 ENCOUNTER — TELEPHONE (OUTPATIENT)
Dept: CARDIOLOGY | Facility: CLINIC | Age: 49
End: 2024-01-04
Payer: MEDICARE

## 2024-01-04 ENCOUNTER — TELEPHONE (OUTPATIENT)
Dept: OPHTHALMOLOGY | Facility: CLINIC | Age: 49
End: 2024-01-04
Payer: MEDICARE

## 2024-01-04 NOTE — TELEPHONE ENCOUNTER
----- Message from Ebony Huynh sent at 1/4/2024  3:59 PM CST -----  Type:  Sooner Apoointment Request    Caller is requesting a sooner appointment.  Caller declined first available appointment listed below.  Caller will not accept being placed on the waitlist and is requesting a message be sent to doctor.  Name of Caller: Pt  When is the first available appointment?  Symptoms: Check up  Would the patient rather a call back or a response via [facility]? Call  Best Call Back Number: [phone]  Additional Information:

## 2024-01-04 NOTE — TELEPHONE ENCOUNTER
Spoke to pt and scheduled    ----- Message from Ebony Huynh sent at 1/4/2024  4:01 PM CST -----  Type:  Sooner Apoointment Request    Caller is requesting a sooner appointment.  Caller declined first available appointment listed below.  Caller will not accept being placed on the waitlist and is requesting a message be sent to doctor.  Name of Caller: Pt  When is the first available appointment?  Symptoms: Diabetic eye check  Would the patient rather a call back or a response via [facility]? Call  Best Call Back Number: [phone]  Additional Information:

## 2024-01-26 ENCOUNTER — PATIENT MESSAGE (OUTPATIENT)
Dept: INTERNAL MEDICINE | Facility: CLINIC | Age: 49
End: 2024-01-26
Payer: MEDICARE

## 2024-01-26 ENCOUNTER — LAB VISIT (OUTPATIENT)
Dept: LAB | Facility: HOSPITAL | Age: 49
End: 2024-01-26
Attending: INTERNAL MEDICINE
Payer: MEDICARE

## 2024-01-26 ENCOUNTER — OFFICE VISIT (OUTPATIENT)
Dept: INTERNAL MEDICINE | Facility: CLINIC | Age: 49
End: 2024-01-26
Payer: MEDICARE

## 2024-01-26 VITALS
HEART RATE: 85 BPM | HEIGHT: 71 IN | BODY MASS INDEX: 41.36 KG/M2 | SYSTOLIC BLOOD PRESSURE: 172 MMHG | WEIGHT: 295.44 LBS | DIASTOLIC BLOOD PRESSURE: 100 MMHG

## 2024-01-26 DIAGNOSIS — Z79.4 TYPE 2 DIABETES MELLITUS WITH CHRONIC KIDNEY DISEASE ON CHRONIC DIALYSIS, WITH LONG-TERM CURRENT USE OF INSULIN: Primary | ICD-10-CM

## 2024-01-26 DIAGNOSIS — E11.59 HYPERTENSION ASSOCIATED WITH DIABETES: ICD-10-CM

## 2024-01-26 DIAGNOSIS — Z79.4 TYPE 2 DIABETES MELLITUS WITH CHRONIC KIDNEY DISEASE ON CHRONIC DIALYSIS, WITH LONG-TERM CURRENT USE OF INSULIN: ICD-10-CM

## 2024-01-26 DIAGNOSIS — I15.2 HYPERTENSION ASSOCIATED WITH DIABETES: ICD-10-CM

## 2024-01-26 DIAGNOSIS — Z99.2 TYPE 2 DIABETES MELLITUS WITH CHRONIC KIDNEY DISEASE ON CHRONIC DIALYSIS, WITH LONG-TERM CURRENT USE OF INSULIN: ICD-10-CM

## 2024-01-26 DIAGNOSIS — E11.22 TYPE 2 DIABETES MELLITUS WITH CHRONIC KIDNEY DISEASE ON CHRONIC DIALYSIS, WITH LONG-TERM CURRENT USE OF INSULIN: Primary | ICD-10-CM

## 2024-01-26 DIAGNOSIS — E11.22 TYPE 2 DIABETES MELLITUS WITH CHRONIC KIDNEY DISEASE ON CHRONIC DIALYSIS, WITH LONG-TERM CURRENT USE OF INSULIN: ICD-10-CM

## 2024-01-26 DIAGNOSIS — N18.6 TYPE 2 DIABETES MELLITUS WITH CHRONIC KIDNEY DISEASE ON CHRONIC DIALYSIS, WITH LONG-TERM CURRENT USE OF INSULIN: Primary | ICD-10-CM

## 2024-01-26 DIAGNOSIS — I15.0 RENOVASCULAR HYPERTENSION: ICD-10-CM

## 2024-01-26 DIAGNOSIS — Z99.2 TYPE 2 DIABETES MELLITUS WITH CHRONIC KIDNEY DISEASE ON CHRONIC DIALYSIS, WITH LONG-TERM CURRENT USE OF INSULIN: Primary | ICD-10-CM

## 2024-01-26 DIAGNOSIS — Z12.11 SCREEN FOR COLON CANCER: ICD-10-CM

## 2024-01-26 DIAGNOSIS — G47.33 OSA (OBSTRUCTIVE SLEEP APNEA): ICD-10-CM

## 2024-01-26 DIAGNOSIS — N18.6 ESRD ON HEMODIALYSIS: ICD-10-CM

## 2024-01-26 DIAGNOSIS — Z99.2 ESRD ON HEMODIALYSIS: ICD-10-CM

## 2024-01-26 DIAGNOSIS — G47.00 INSOMNIA, UNSPECIFIED TYPE: ICD-10-CM

## 2024-01-26 DIAGNOSIS — N18.6 TYPE 2 DIABETES MELLITUS WITH CHRONIC KIDNEY DISEASE ON CHRONIC DIALYSIS, WITH LONG-TERM CURRENT USE OF INSULIN: ICD-10-CM

## 2024-01-26 DIAGNOSIS — E66.01 MORBID OBESITY WITH BMI OF 40.0-44.9, ADULT: ICD-10-CM

## 2024-01-26 LAB
ALBUMIN SERPL BCP-MCNC: 3.9 G/DL (ref 3.5–5.2)
ALP SERPL-CCNC: 48 U/L (ref 55–135)
ALT SERPL W/O P-5'-P-CCNC: 16 U/L (ref 10–44)
ANION GAP SERPL CALC-SCNC: 10 MMOL/L (ref 8–16)
AST SERPL-CCNC: 21 U/L (ref 10–40)
BILIRUB SERPL-MCNC: 0.7 MG/DL (ref 0.1–1)
BUN SERPL-MCNC: 40 MG/DL (ref 6–20)
CALCIUM SERPL-MCNC: 11.1 MG/DL (ref 8.7–10.5)
CHLORIDE SERPL-SCNC: 100 MMOL/L (ref 95–110)
CO2 SERPL-SCNC: 28 MMOL/L (ref 23–29)
CREAT SERPL-MCNC: 12.7 MG/DL (ref 0.5–1.4)
EST. GFR  (NO RACE VARIABLE): 4.5 ML/MIN/1.73 M^2
ESTIMATED AVG GLUCOSE: 100 MG/DL (ref 68–131)
GLUCOSE SERPL-MCNC: 100 MG/DL (ref 70–110)
HBA1C MFR BLD: 5.1 % (ref 4–5.6)
POTASSIUM SERPL-SCNC: 4.2 MMOL/L (ref 3.5–5.1)
PROT SERPL-MCNC: 7.5 G/DL (ref 6–8.4)
SODIUM SERPL-SCNC: 138 MMOL/L (ref 136–145)

## 2024-01-26 PROCEDURE — 90471 IMMUNIZATION ADMIN: CPT | Mod: S$GLB,,, | Performed by: INTERNAL MEDICINE

## 2024-01-26 PROCEDURE — 99214 OFFICE O/P EST MOD 30 MIN: CPT | Mod: 25,S$GLB,, | Performed by: INTERNAL MEDICINE

## 2024-01-26 PROCEDURE — 90715 TDAP VACCINE 7 YRS/> IM: CPT | Mod: S$GLB,,, | Performed by: INTERNAL MEDICINE

## 2024-01-26 PROCEDURE — 99999 PR PBB SHADOW E&M-EST. PATIENT-LVL V: CPT | Mod: PBBFAC,,, | Performed by: INTERNAL MEDICINE

## 2024-01-26 PROCEDURE — 83036 HEMOGLOBIN GLYCOSYLATED A1C: CPT | Performed by: INTERNAL MEDICINE

## 2024-01-26 PROCEDURE — 80053 COMPREHEN METABOLIC PANEL: CPT | Performed by: INTERNAL MEDICINE

## 2024-01-26 PROCEDURE — 36415 COLL VENOUS BLD VENIPUNCTURE: CPT | Performed by: INTERNAL MEDICINE

## 2024-01-26 RX ORDER — HYDRALAZINE HYDROCHLORIDE 50 MG/1
50 TABLET, FILM COATED ORAL EVERY 12 HOURS
Qty: 180 TABLET | Refills: 3 | Status: SHIPPED | OUTPATIENT
Start: 2024-01-26 | End: 2024-03-14 | Stop reason: SDUPTHER

## 2024-01-26 NOTE — PROGRESS NOTES
Subjective:       Patient ID: Thai Santoyo Jr. is a 48 y.o. male.    Chief Complaint:   Follow-up    Patient is here for follow up, his last visit was in September of 2023. He has been experiencing worsened hypertension (172/100) since his last visit along with worsening anxiety. His sister in law (and her kids) have been living with him and his family since September, which has been adding to his existing stress levels--he was in a low mood, and not his usual positive self today during his visit. Additionally, he has been having edema from the calf down to the foot in both LE's. He is due for his dialysis, which he is scheduled for later today. Discussed scheduling a colonoscopy, getting his tetanus shot and an HbA1c today.    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    HENT: Negative.     Eyes: Negative.    Respiratory: Negative.     Cardiovascular:  Positive for leg swelling.   Gastrointestinal: Negative.    Endocrine: Negative.    Genitourinary: Negative.    Musculoskeletal: Negative.    Skin: Negative.    Allergic/Immunologic: Negative.    Neurological: Negative.    Hematological: Negative.    Psychiatric/Behavioral:  Positive for sleep disturbance. The patient is nervous/anxious.        Objective:      Physical Exam  Vitals reviewed.    Constitutional:       General: He is not in acute distress.     Appearance: He is well-developed. He is not diaphoretic.   HENT:      Head: Normocephalic and atraumatic.   Cardiovascular:      Rate and Rhythm: Normal rate and regular rhythm.      Heart sounds: Normal heart sounds. No murmur heard.    No friction rub. No gallop.   Pulmonary:      Effort: No respiratory distress.      Breath sounds: No wheezing or rales.   Chest:      Chest wall: No tenderness.   Skin:     General: Skin is warm.      Findings: Bilateral LE edema.   Neurological:      Mental Status: He is alert and oriented to person, place, and time.   Psychiatric:         Thought Content: Thought content normal.     Assessment:       1. Type 2 diabetes mellitus with chronic kidney disease on chronic dialysis, with long-term current use of insulin    2. Renovascular hypertension    3. Hypertension associated with diabetes    4. CONRAD (obstructive sleep apnea)    5. Morbid obesity with BMI of 40.0-44.9, adult    6. ESRD on hemodialysis    7. Screen for colon cancer    8. Insomnia, unspecified type        Plan:        Thai was seen today for follow-up.    Diagnoses and all orders for this visit:    Type 2 diabetes mellitus with chronic kidney disease on chronic dialysis, with long-term current use of insulin - he has not had good control in the past, but it has gotten a lot better.  Due for a1c  -     Hemoglobin A1C; Future    Renovascular hypertension - not at goal.  Increasing meds  -     COMPREHENSIVE METABOLIC PANEL; Future  -     hydrALAZINE (APRESOLINE) 50 MG tablet; Take 1 tablet (50 mg total) by mouth every 12 (twelve) hours.    Hypertension associated with diabetes  -     COMPREHENSIVE METABOLIC PANEL; Future    CONRAD (obstructive sleep apnea) - needs better treatment  -     Ambulatory referral/consult to Sleep Disorders; Future    Morbid obesity with BMI of 40.0-44.9, adult    ESRD on hemodialysis    Screen for colon cancer  -     Ambulatory  referral/consult to Endo Procedure ; Future    Insomnia, unspecified type  -     Ambulatory referral/consult to Sleep Disorders; Future    Farzad Mason MS4      Rtc prn, or in a month    CHARLES Mckee MD MPH  Staff Internist

## 2024-01-30 NOTE — TELEPHONE ENCOUNTER
Can you call him and figure out what his insulin regimen is?  Make sure he stopped the glucotrol.    D

## 2024-01-31 NOTE — TELEPHONE ENCOUNTER
Spoke to patient he states he is using Trulicity once a week, 45 units of Humalog twice daily and 45 units of lantus twice daily. Hasn't taken Glucotrol in awhile. Patient also stated he's only eating maybe once a day. Very low appetite.

## 2024-02-01 ENCOUNTER — TELEPHONE (OUTPATIENT)
Dept: INTERNAL MEDICINE | Facility: CLINIC | Age: 49
End: 2024-02-01
Payer: MEDICARE

## 2024-02-01 NOTE — TELEPHONE ENCOUNTER
Can you call him back?  Thanks for getting this information.  Let's cut that insulin down to 35 units humalog twice daily and 35 units lantus twice daily.    Stay off glucotrol.    Dr. MCMAHON

## 2024-02-01 NOTE — TELEPHONE ENCOUNTER
----- Message from Zainab Gaitan sent at 1/31/2024  3:53 PM CST -----  Contact: [phone]  Patient is returning a phone call.  Who left a message for the patient: Estelle Harrington MA  Does patient know what this is regarding:  yes   Would you like a call back, or a response through your [facility] portal?:   call back   Comments:

## 2024-02-23 DIAGNOSIS — Z79.4 TYPE 2 DIABETES MELLITUS WITH CHRONIC KIDNEY DISEASE ON CHRONIC DIALYSIS, WITH LONG-TERM CURRENT USE OF INSULIN: ICD-10-CM

## 2024-02-23 DIAGNOSIS — E11.65 TYPE 2 DIABETES MELLITUS WITH HYPERGLYCEMIA, WITH LONG-TERM CURRENT USE OF INSULIN: ICD-10-CM

## 2024-02-23 DIAGNOSIS — N18.6 TYPE 2 DIABETES MELLITUS WITH CHRONIC KIDNEY DISEASE ON CHRONIC DIALYSIS, WITH LONG-TERM CURRENT USE OF INSULIN: ICD-10-CM

## 2024-02-23 DIAGNOSIS — E78.2 MIXED HYPERLIPIDEMIA DUE TO TYPE 2 DIABETES MELLITUS: ICD-10-CM

## 2024-02-23 DIAGNOSIS — E11.59 HYPERTENSION ASSOCIATED WITH DIABETES: ICD-10-CM

## 2024-02-23 DIAGNOSIS — I15.2 HYPERTENSION ASSOCIATED WITH DIABETES: ICD-10-CM

## 2024-02-23 DIAGNOSIS — E11.22 TYPE 2 DIABETES MELLITUS WITH CHRONIC KIDNEY DISEASE ON CHRONIC DIALYSIS, WITH LONG-TERM CURRENT USE OF INSULIN: ICD-10-CM

## 2024-02-23 DIAGNOSIS — Z99.2 TYPE 2 DIABETES MELLITUS WITH CHRONIC KIDNEY DISEASE ON CHRONIC DIALYSIS, WITH LONG-TERM CURRENT USE OF INSULIN: ICD-10-CM

## 2024-02-23 DIAGNOSIS — E11.69 MIXED HYPERLIPIDEMIA DUE TO TYPE 2 DIABETES MELLITUS: ICD-10-CM

## 2024-02-23 DIAGNOSIS — Z79.4 TYPE 2 DIABETES MELLITUS WITH HYPERGLYCEMIA, WITH LONG-TERM CURRENT USE OF INSULIN: ICD-10-CM

## 2024-02-23 RX ORDER — DULAGLUTIDE 1.5 MG/.5ML
1.5 INJECTION, SOLUTION SUBCUTANEOUS
Qty: 12 PEN | Refills: 1 | Status: SHIPPED | OUTPATIENT
Start: 2024-02-23

## 2024-02-23 RX ORDER — AMLODIPINE BESYLATE 10 MG/1
TABLET ORAL
Qty: 90 TABLET | Refills: 3 | Status: SHIPPED | OUTPATIENT
Start: 2024-02-23

## 2024-02-23 NOTE — TELEPHONE ENCOUNTER
Care Due:                  Date            Visit Type   Department     Provider  --------------------------------------------------------------------------------                                EP -                              PRIMARY      [facility] INTERNAL  Last Visit: 01-      CARE ([facility])   MARILYN Mckee                              EP -                              PRIMARY      [facility] INTERNAL  Next Visit: 03-      CARE ([facility])   MARILYN Mckee                                                            Last  Test          Frequency    Reason                     Performed    Due Date  --------------------------------------------------------------------------------    CBC.........  12 months..  allopurinoL, hydrALAZINE.  Not Found    Overdue    Lipid Panel.  12 months..  rosuvastatin.............  03- 03-    Uric Acid...  12 months..  allopurinoL..............  Not Found    Overdue    [employer] Embedded Care Due Messages. Reference number: [id number].   2/23/2024 10:57:18 AM CST

## 2024-02-23 NOTE — TELEPHONE ENCOUNTER
Refill Routing Note   Medication(s) are not appropriate for processing by [facility] for the following reason(s):        Drug-disease interaction    [facility] action(s):  Defer  Approve   Requires labs : Yes      Medication Therapy Plan: Drug-Disease: rosuvastatin and Anemia in end-stage renal disease; ESRD on hemodialysis; Type 2 diabetes mellitus with chronic kidney disease on chronic dialysis, with long-term current use of insulin      Appointments  past 12m or future 3m with PCP    Date Provider   Last Visit   1/26/2024 Manjit Mckee II, MD   Next Visit   3/5/2024 Manjit Mckee II, MD   ED visits in past 90 days: 0        Note composed:5:13 PM 02/23/2024

## 2024-02-26 ENCOUNTER — PATIENT MESSAGE (OUTPATIENT)
Dept: SLEEP MEDICINE | Facility: CLINIC | Age: 49
End: 2024-02-26
Payer: MEDICARE

## 2024-02-26 RX ORDER — ROSUVASTATIN CALCIUM 10 MG/1
10 TABLET, COATED ORAL DAILY
Qty: 90 TABLET | Refills: 0 | Status: SHIPPED | OUTPATIENT
Start: 2024-02-26

## 2024-03-05 ENCOUNTER — TELEPHONE (OUTPATIENT)
Dept: INTERNAL MEDICINE | Facility: CLINIC | Age: 49
End: 2024-03-05
Payer: MEDICARE

## 2024-03-05 NOTE — TELEPHONE ENCOUNTER
Spoke to patient he states he was unable to make appointment die to car trouble. Patient advised to upload BP readings to portal.

## 2024-03-05 NOTE — TELEPHONE ENCOUNTER
----- Message from Karin Landeros sent at 3/5/2024  7:51 AM CST -----  Regarding: Questions and concerns  Contact: [phone]  Type:  Needs Medical Advice    Who Called: Thai  Would the patient rather a call back or a response via [facility]? Call  Best Call Back Number: [phone]  Additional Information: Patient would like to speak to the office in ref to getting email address.

## 2024-03-09 ENCOUNTER — HOSPITAL ENCOUNTER (INPATIENT)
Facility: HOSPITAL | Age: 49
LOS: 1 days | Discharge: HOME | DRG: 304 | End: 2024-03-11
Attending: EMERGENCY MEDICINE | Admitting: STUDENT IN AN ORGANIZED HEALTH CARE EDUCATION/TRAINING PROGRAM
Payer: MEDICARE

## 2024-03-09 DIAGNOSIS — R07.9 CHEST PAIN: ICD-10-CM

## 2024-03-09 DIAGNOSIS — R51.9 NONINTRACTABLE HEADACHE, UNSPECIFIED CHRONICITY PATTERN, UNSPECIFIED HEADACHE TYPE: Primary | ICD-10-CM

## 2024-03-09 DIAGNOSIS — E11.22 TYPE 2 DIABETES MELLITUS WITH CHRONIC KIDNEY DISEASE ON CHRONIC DIALYSIS, WITH LONG-TERM CURRENT USE OF INSULIN: ICD-10-CM

## 2024-03-09 DIAGNOSIS — N18.6 TYPE 2 DIABETES MELLITUS WITH CHRONIC KIDNEY DISEASE ON CHRONIC DIALYSIS, WITH LONG-TERM CURRENT USE OF INSULIN: ICD-10-CM

## 2024-03-09 DIAGNOSIS — I15.0 RENOVASCULAR HYPERTENSION: ICD-10-CM

## 2024-03-09 DIAGNOSIS — Z79.4 TYPE 2 DIABETES MELLITUS WITH CHRONIC KIDNEY DISEASE ON CHRONIC DIALYSIS, WITH LONG-TERM CURRENT USE OF INSULIN: ICD-10-CM

## 2024-03-09 DIAGNOSIS — N18.6 ESRD (END STAGE RENAL DISEASE): ICD-10-CM

## 2024-03-09 DIAGNOSIS — Z99.2 TYPE 2 DIABETES MELLITUS WITH CHRONIC KIDNEY DISEASE ON CHRONIC DIALYSIS, WITH LONG-TERM CURRENT USE OF INSULIN: ICD-10-CM

## 2024-03-09 DIAGNOSIS — R29.818 ACUTE FOCAL NEUROLOGICAL DEFICIT: ICD-10-CM

## 2024-03-09 DIAGNOSIS — G45.9 TIA (TRANSIENT ISCHEMIC ATTACK): ICD-10-CM

## 2024-03-09 LAB
ALBUMIN SERPL BCP-MCNC: 3.4 G/DL (ref 3.5–5.2)
ALP SERPL-CCNC: 56 U/L (ref 55–135)
ALT SERPL W/O P-5'-P-CCNC: 29 U/L (ref 10–44)
ANION GAP SERPL CALC-SCNC: 11 MMOL/L (ref 8–16)
APTT PPP: 28.8 SEC (ref 21–32)
AST SERPL-CCNC: 27 U/L (ref 10–40)
BASOPHILS # BLD AUTO: 0.06 K/UL (ref 0–0.2)
BASOPHILS NFR BLD: 0.6 % (ref 0–1.9)
BILIRUB SERPL-MCNC: 0.4 MG/DL (ref 0.1–1)
BUN SERPL-MCNC: 23 MG/DL (ref 6–20)
CALCIUM SERPL-MCNC: 11.2 MG/DL (ref 8.7–10.5)
CHLORIDE SERPL-SCNC: 95 MMOL/L (ref 95–110)
CHOLEST SERPL-MCNC: 94 MG/DL (ref 120–199)
CHOLEST/HDLC SERPL: 3.2 {RATIO} (ref 2–5)
CO2 SERPL-SCNC: 35 MMOL/L (ref 23–29)
CREAT SERPL-MCNC: 10.1 MG/DL (ref 0.5–1.4)
DIFFERENTIAL METHOD BLD: ABNORMAL
EOSINOPHIL # BLD AUTO: 1.4 K/UL (ref 0–0.5)
EOSINOPHIL NFR BLD: 14.2 % (ref 0–8)
ERYTHROCYTE [DISTWIDTH] IN BLOOD BY AUTOMATED COUNT: 16.9 % (ref 11.5–14.5)
EST. GFR  (NO RACE VARIABLE): 6 ML/MIN/1.73 M^2
ESTIMATED AVG GLUCOSE: 97 MG/DL (ref 68–131)
GLUCOSE SERPL-MCNC: 104 MG/DL (ref 70–110)
HBA1C MFR BLD: 5 % (ref 4.5–6.2)
HCT VFR BLD AUTO: 37.4 % (ref 40–54)
HDLC SERPL-MCNC: 29 MG/DL (ref 40–75)
HDLC SERPL: 30.9 % (ref 20–50)
HGB BLD-MCNC: 11.6 G/DL (ref 14–18)
IMM GRANULOCYTES # BLD AUTO: 0.03 K/UL (ref 0–0.04)
IMM GRANULOCYTES NFR BLD AUTO: 0.3 % (ref 0–0.5)
INR PPP: 1 (ref 0.8–1.2)
LDLC SERPL CALC-MCNC: 53.2 MG/DL (ref 63–159)
LYMPHOCYTES # BLD AUTO: 1 K/UL (ref 1–4.8)
LYMPHOCYTES NFR BLD: 9.8 % (ref 18–48)
MCH RBC QN AUTO: 29 PG (ref 27–31)
MCHC RBC AUTO-ENTMCNC: 31 G/DL (ref 32–36)
MCV RBC AUTO: 94 FL (ref 82–98)
MONOCYTES # BLD AUTO: 1.2 K/UL (ref 0.3–1)
MONOCYTES NFR BLD: 11.9 % (ref 4–15)
NEUTROPHILS # BLD AUTO: 6.1 K/UL (ref 1.8–7.7)
NEUTROPHILS NFR BLD: 63.2 % (ref 38–73)
NONHDLC SERPL-MCNC: 65 MG/DL
NRBC BLD-RTO: 0 /100 WBC
PLATELET # BLD AUTO: 193 K/UL (ref 150–450)
PMV BLD AUTO: 8.9 FL (ref 9.2–12.9)
POCT GLUCOSE: 82 MG/DL (ref 70–110)
POTASSIUM SERPL-SCNC: 4.6 MMOL/L (ref 3.5–5.1)
PROT SERPL-MCNC: 7.3 G/DL (ref 6–8.4)
PROTHROMBIN TIME: 10.9 SEC (ref 9–12.5)
RBC # BLD AUTO: 4 M/UL (ref 4.6–6.2)
SODIUM SERPL-SCNC: 141 MMOL/L (ref 136–145)
TRIGL SERPL-MCNC: 59 MG/DL (ref 30–150)
TSH SERPL DL<=0.005 MIU/L-ACNC: 0.99 UIU/ML (ref 0.4–4)
WBC # BLD AUTO: 9.67 K/UL (ref 3.9–12.7)

## 2024-03-09 PROCEDURE — 36415 COLL VENOUS BLD VENIPUNCTURE: CPT | Performed by: PHYSICIAN ASSISTANT

## 2024-03-09 PROCEDURE — 83036 HEMOGLOBIN GLYCOSYLATED A1C: CPT

## 2024-03-09 PROCEDURE — 5A1D70Z PERFORMANCE OF URINARY FILTRATION, INTERMITTENT, LESS THAN 6 HOURS PER DAY: ICD-10-PCS | Performed by: INTERNAL MEDICINE

## 2024-03-09 PROCEDURE — 63600175 PHARM REV CODE 636 W HCPCS: Performed by: INTERNAL MEDICINE

## 2024-03-09 PROCEDURE — 84443 ASSAY THYROID STIM HORMONE: CPT | Performed by: PHYSICIAN ASSISTANT

## 2024-03-09 PROCEDURE — 99900035 HC TECH TIME PER 15 MIN (STAT)

## 2024-03-09 PROCEDURE — 94761 N-INVAS EAR/PLS OXIMETRY MLT: CPT

## 2024-03-09 PROCEDURE — 85730 THROMBOPLASTIN TIME PARTIAL: CPT

## 2024-03-09 PROCEDURE — 93010 ELECTROCARDIOGRAM REPORT: CPT | Mod: ,,, | Performed by: INTERNAL MEDICINE

## 2024-03-09 PROCEDURE — 85025 COMPLETE CBC W/AUTO DIFF WBC: CPT | Performed by: PHYSICIAN ASSISTANT

## 2024-03-09 PROCEDURE — 5A09357 ASSISTANCE WITH RESPIRATORY VENTILATION, LESS THAN 24 CONSECUTIVE HOURS, CONTINUOUS POSITIVE AIRWAY PRESSURE: ICD-10-PCS | Performed by: STUDENT IN AN ORGANIZED HEALTH CARE EDUCATION/TRAINING PROGRAM

## 2024-03-09 PROCEDURE — G0378 HOSPITAL OBSERVATION PER HR: HCPCS

## 2024-03-09 PROCEDURE — 96375 TX/PRO/DX INJ NEW DRUG ADDON: CPT

## 2024-03-09 PROCEDURE — 94760 N-INVAS EAR/PLS OXIMETRY 1: CPT

## 2024-03-09 PROCEDURE — 80061 LIPID PANEL: CPT | Performed by: PHYSICIAN ASSISTANT

## 2024-03-09 PROCEDURE — 99285 EMERGENCY DEPT VISIT HI MDM: CPT | Mod: 25

## 2024-03-09 PROCEDURE — 36415 COLL VENOUS BLD VENIPUNCTURE: CPT

## 2024-03-09 PROCEDURE — 80053 COMPREHEN METABOLIC PANEL: CPT | Performed by: PHYSICIAN ASSISTANT

## 2024-03-09 PROCEDURE — 94660 CPAP INITIATION&MGMT: CPT

## 2024-03-09 PROCEDURE — 25000003 PHARM REV CODE 250: Performed by: INTERNAL MEDICINE

## 2024-03-09 PROCEDURE — 63600175 PHARM REV CODE 636 W HCPCS

## 2024-03-09 PROCEDURE — 85610 PROTHROMBIN TIME: CPT | Performed by: PHYSICIAN ASSISTANT

## 2024-03-09 PROCEDURE — 96374 THER/PROPH/DIAG INJ IV PUSH: CPT

## 2024-03-09 PROCEDURE — 93005 ELECTROCARDIOGRAM TRACING: CPT

## 2024-03-09 RX ORDER — INSULIN ASPART 100 [IU]/ML
0-10 INJECTION, SOLUTION INTRAVENOUS; SUBCUTANEOUS
Status: DISCONTINUED | OUTPATIENT
Start: 2024-03-09 | End: 2024-03-11 | Stop reason: HOSPADM

## 2024-03-09 RX ORDER — MUPIROCIN 20 MG/G
OINTMENT TOPICAL 2 TIMES DAILY
Status: DISCONTINUED | OUTPATIENT
Start: 2024-03-09 | End: 2024-03-11 | Stop reason: HOSPADM

## 2024-03-09 RX ORDER — ALLOPURINOL 100 MG/1
100 TABLET ORAL DAILY
Status: DISCONTINUED | OUTPATIENT
Start: 2024-03-10 | End: 2024-03-11 | Stop reason: HOSPADM

## 2024-03-09 RX ORDER — SODIUM CHLORIDE 9 MG/ML
INJECTION, SOLUTION INTRAVENOUS ONCE
Status: DISCONTINUED | OUTPATIENT
Start: 2024-03-09 | End: 2024-03-11 | Stop reason: HOSPADM

## 2024-03-09 RX ORDER — FAMOTIDINE 20 MG/1
20 TABLET, FILM COATED ORAL DAILY
Status: DISCONTINUED | OUTPATIENT
Start: 2024-03-10 | End: 2024-03-11 | Stop reason: HOSPADM

## 2024-03-09 RX ORDER — POLYETHYLENE GLYCOL 3350 17 G/17G
17 POWDER, FOR SOLUTION ORAL DAILY
Status: DISCONTINUED | OUTPATIENT
Start: 2024-03-10 | End: 2024-03-11 | Stop reason: HOSPADM

## 2024-03-09 RX ORDER — SODIUM CHLORIDE 0.9 % (FLUSH) 0.9 %
10 SYRINGE (ML) INJECTION
Status: DISCONTINUED | OUTPATIENT
Start: 2024-03-09 | End: 2024-03-11 | Stop reason: HOSPADM

## 2024-03-09 RX ORDER — DIPHENHYDRAMINE HYDROCHLORIDE 50 MG/ML
25 INJECTION INTRAMUSCULAR; INTRAVENOUS EVERY 6 HOURS PRN
Status: DISCONTINUED | OUTPATIENT
Start: 2024-03-09 | End: 2024-03-11 | Stop reason: HOSPADM

## 2024-03-09 RX ORDER — CALCIUM CARBONATE 200(500)MG
500 TABLET,CHEWABLE ORAL 3 TIMES DAILY PRN
Status: DISCONTINUED | OUTPATIENT
Start: 2024-03-09 | End: 2024-03-11 | Stop reason: HOSPADM

## 2024-03-09 RX ORDER — ASPIRIN 81 MG/1
81 TABLET ORAL DAILY
Status: DISCONTINUED | OUTPATIENT
Start: 2024-03-10 | End: 2024-03-11 | Stop reason: HOSPADM

## 2024-03-09 RX ORDER — METOCLOPRAMIDE HYDROCHLORIDE 5 MG/ML
5 INJECTION INTRAMUSCULAR; INTRAVENOUS EVERY 6 HOURS PRN
Status: DISCONTINUED | OUTPATIENT
Start: 2024-03-09 | End: 2024-03-11 | Stop reason: HOSPADM

## 2024-03-09 RX ORDER — GLUCAGON 1 MG
1 KIT INJECTION
Status: DISCONTINUED | OUTPATIENT
Start: 2024-03-09 | End: 2024-03-11 | Stop reason: HOSPADM

## 2024-03-09 RX ORDER — IBUPROFEN 200 MG
24 TABLET ORAL
Status: DISCONTINUED | OUTPATIENT
Start: 2024-03-09 | End: 2024-03-11 | Stop reason: HOSPADM

## 2024-03-09 RX ORDER — ONDANSETRON HYDROCHLORIDE 2 MG/ML
4 INJECTION, SOLUTION INTRAVENOUS EVERY 12 HOURS PRN
Status: DISCONTINUED | OUTPATIENT
Start: 2024-03-09 | End: 2024-03-11 | Stop reason: HOSPADM

## 2024-03-09 RX ORDER — ATORVASTATIN CALCIUM 40 MG/1
40 TABLET, FILM COATED ORAL DAILY
Status: DISCONTINUED | OUTPATIENT
Start: 2024-03-10 | End: 2024-03-11 | Stop reason: HOSPADM

## 2024-03-09 RX ORDER — LABETALOL HYDROCHLORIDE 5 MG/ML
10 INJECTION, SOLUTION INTRAVENOUS EVERY 6 HOURS PRN
Status: DISCONTINUED | OUTPATIENT
Start: 2024-03-09 | End: 2024-03-11 | Stop reason: HOSPADM

## 2024-03-09 RX ORDER — HEPARIN SODIUM 5000 [USP'U]/ML
5000 INJECTION, SOLUTION INTRAVENOUS; SUBCUTANEOUS EVERY 8 HOURS
Status: DISCONTINUED | OUTPATIENT
Start: 2024-03-09 | End: 2024-03-09

## 2024-03-09 RX ORDER — IBUPROFEN 200 MG
16 TABLET ORAL
Status: DISCONTINUED | OUTPATIENT
Start: 2024-03-09 | End: 2024-03-11 | Stop reason: HOSPADM

## 2024-03-09 RX ADMIN — LABETALOL HYDROCHLORIDE 10 MG: 5 INJECTION, SOLUTION INTRAVENOUS at 05:03

## 2024-03-09 RX ADMIN — DIPHENHYDRAMINE HYDROCHLORIDE 25 MG: 50 INJECTION INTRAMUSCULAR; INTRAVENOUS at 05:03

## 2024-03-09 RX ADMIN — MUPIROCIN: 20 OINTMENT TOPICAL at 09:03

## 2024-03-09 NOTE — ED PROVIDER NOTES
Encounter Date: 3/9/2024       History     Chief Complaint   Patient presents with    Headache     X 4 days      Patient is a 48-year-old male who presents with headache for 4 days.  Past medical history significant for gout, end-stage renal disease, diabetes and hypertension.  Reports compliant with hemodialysis.  He gets dialyzed Monday, Wednesday and Friday.  He reports no chest pain or shortness of breath.  He reports stuttering and difficulty finding words.  Denies weakness.  Denies dizziness.  Denies visual changes.  Denies gait abnormalities.  Reports recent increase in stress.  States mother has recently been diagnosed with multiple strokes.    The history is provided by the patient.     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 5/21/2018    Morbid obesity 9/12/2012    SVT (supraventricular tachycardia)      Past Surgical History:   Procedure Laterality Date    ABLATION N/A 10/5/2018    Procedure: ABLATION;  Surgeon: Ayden Arana MD;  Location: [facility] CATH LAB;  Service: Cardiology;  Laterality: N/A;  SVT, RFA, ARGELIA, MAC (light), NM, 3 Prep    DIALYSIS FISTULA CREATION      KNEE ARTHROPLASTY      SHOULDER SURGERY      right    VASECTOMY       Family History   Problem Relation Age of Onset    Diabetes Mother     Kidney disease Mother         on dialysis    Hypertension Mother     Heart failure Mother     Hypertension Father     Kidney disease Paternal Uncle         dialysis    Kidney disease Paternal Uncle          dialysis    Eczema Sister     No Known Problems Son     No Known Problems Sister     No Known Problems Son     No Known Problems Son     Heart disease Maternal Grandfather     Melanoma Neg Hx     Psoriasis Neg Hx     Lupus Neg Hx     Acne Neg Hx      Social History     Tobacco Use    Smoking status: Former     Types: Cigars    Smokeless tobacco: Never    Tobacco comments:     rare   Substance Use Topics    Alcohol use: No    Drug use: No     Review of Systems   Constitutional:  Negative for activity change, appetite change, chills and fever.   HENT:  Negative for congestion, rhinorrhea and sore throat.    Eyes:  Negative for redness and visual disturbance.   Respiratory:  Negative for cough, chest tightness and shortness of breath.    Cardiovascular:  Negative for chest pain.   Gastrointestinal:  Negative for abdominal pain, diarrhea, nausea and vomiting.   Genitourinary:  Negative for dysuria, flank pain and frequency.   Musculoskeletal:  Negative for back pain, gait problem, neck pain and neck stiffness.   Skin:  Negative for rash.   Neurological:  Positive for speech difficulty and headaches. Negative for dizziness, syncope, facial asymmetry, weakness, light-headedness and numbness.   Psychiatric/Behavioral:  Negative for confusion.        Physical Exam     Initial Vitals [03/09/24 1306]   BP Pulse Resp Temp SpO2   (!) 182/84 93 19 97.8 °F (36.6 °C) (!) 92 %      MAP       --         Physical Exam    Nursing note and vitals reviewed.  Constitutional: Vital signs are normal. He appears well-developed and well-nourished. He is cooperative.  Non-toxic appearance. He does not have a sickly appearance.   HENT:   Head: Normocephalic and atraumatic.   Right Ear: External ear normal.   Left Ear: External ear normal.   Nose: Nose normal.   Eyes: Conjunctivae and lids are normal. Pupils are equal, round, and reactive to light.   Neck: Neck supple.   Normal range of motion.   Full passive range of motion without  pain.     Cardiovascular:  Normal rate, regular rhythm and normal heart sounds.     Exam reveals no gallop and no friction rub.       No murmur heard.  Pulmonary/Chest: Breath sounds normal. He has no wheezes. He has no rhonchi. He has no rales.   Abdominal: Abdomen is soft. There is no abdominal tenderness. There is no rebound and no guarding.   Musculoskeletal:      Cervical back: Full passive range of motion without pain, normal range of motion and neck supple.     Neurological: He is alert and oriented to person, place, and time.   No focal neurological deficits noted.  Cranial nerves III-XII grossly intact.  Equal, rapid alternating movements noted to bilateral upper and lower extremities.      Skin: Skin is warm, dry and intact. No rash noted.         ED Course   Procedures  Labs Reviewed   CBC W/ AUTO DIFFERENTIAL - Abnormal; Notable for the following components:       Result Value    RBC 4.00 (*)     Hemoglobin 11.6 (*)     Hematocrit 37.4 (*)     MCHC 31.0 (*)     RDW 16.9 (*)     MPV 8.9 (*)     Mono # 1.2 (*)     Eos # 1.4 (*)     Lymph % 9.8 (*)     Eosinophil % 14.2 (*)     All other components within normal limits   COMPREHENSIVE METABOLIC PANEL - Abnormal; Notable for the following components:    CO2 35 (*)     BUN 23 (*)     Creatinine 10.1 (*)     Calcium 11.2 (*)     Albumin 3.4 (*)     eGFR 6 (*)     All other components within normal limits   LIPID PANEL - Abnormal; Notable for the following components:    Cholesterol 94 (*)     HDL 29 (*)     LDL Cholesterol 53.2 (*)     All other components within normal limits   PROTIME-INR   TSH   HEMOGLOBIN A1C   APTT          Imaging Results              US Carotid Bilateral (Final result)  Result time 03/09/24 16:59:31      Final result by Coretta Jorge MD (03/09/24 16:59:31)                   Impression:      No evidence of a hemodynamically significant carotid bifurcation stenosis.    Flow in vertebral arteries appears antegrade  bilaterally.      Electronically signed by: Coretta Jorge MD  Date:    03/09/2024  Time:    16:59               Narrative:    EXAMINATION:  US CAROTID BILATERAL    CLINICAL HISTORY:  Velocities evaluation;    TECHNIQUE:  Grayscale and color Doppler ultrasound examination of the carotid and vertebral artery systems bilaterally.  Stenosis estimates are per the NASCET measurement criteria.    COMPARISON:  None.    FINDINGS:  Right:    Internal Carotid Artery (ICA) peak systolic velocity 79 cm/sec    ICA/CCA peak systolic velocity ratio: 1.1    Plaque formation: Appears mild    Vertebral artery: Antegrade flow and normal waveform.    Left:    Internal Carotid Artery (ICA)  peak systolic velocity 91 cm/sec    ICA/CCA peak systolic velocity ratio: 1.3    Plaque formation: Appears mild    Vertebral artery: Antegrade flow and normal waveform.                                       X-Ray Chest AP Single View (Final result)  Result time 03/09/24 16:23:19      Final result by Coretta Jorge MD (03/09/24 16:23:19)                   Impression:      No acute cardiopulmonary disease.      Electronically signed by: Coretta Jorge MD  Date:    03/09/2024  Time:    16:23               Narrative:    EXAMINATION:  XR CHEST 1 VIEW    CLINICAL HISTORY:  tia;    TECHNIQUE:  Single frontal view of the chest was performed.    COMPARISON:  09/14/2018    FINDINGS:  Cardiac silhouette appears mildly enlarged.  Exaggerated however by the somewhat lordotic positioning and appearing just slightly if any larger than on the prior exam.  The lungs are clear of infiltrate.  There is no pleural effusion.                                       CT Head Without Contrast (Final result)  Result time 03/09/24 14:32:08      Final result by Coretta Jorge MD (03/09/24 14:32:08)                   Impression:      No acute intracranial findings.  Findings as detailed above including findings suggesting mild microvascular ischemic change.   Opacification in sphenoid sinus.      Electronically signed by: Coretta Jorge MD  Date:    03/09/2024  Time:    14:32               Narrative:    EXAMINATION:  CT HEAD WITHOUT CONTRAST    CLINICAL HISTORY:  Neuro deficit, acute, stroke suspected;    TECHNIQUE:  Low dose axial images were obtained through the head.  Coronal and sagittal reformations were also performed. Contrast was not administered.    COMPARISON:  None.    FINDINGS:  Ventricles, sulci, fissures unremarkable appearance for the patient's age.  Subtle low density in white matter suggesting microvascular ischemic change.  There is no acute intracranial hemorrhage. There is no intracranial mass effect. There is no acute major vascular territory infarct. Note is made that MRI is typically more sensitive than CT particularly for detection of early or small nonhemorrhagic infarcts.The calvarium appears intact. Mastoids well pneumatized. Opacification in the sphenoid sinus suggesting combination of mucosal thickening and small amount of fluid                                       Medications   labetaloL injection 10 mg (10 mg Intravenous Given 3/9/24 1704)   famotidine tablet 20 mg (has no administration in time range)   mupirocin 2 % ointment (has no administration in time range)   0.9%  NaCl infusion (has no administration in time range)     Medical Decision Making  Emergent evaluation of a 48-year-old male who presents with headache for 4 days with associated difficulty finding words.  Denies dizziness, visual changes or difficulty ambulating.  He is alert, oriented and well-appearing.  No speech difficulty during my evaluation.  He is end-stage renal disease patient who has not missed hemodialysis.  Denies chest pain or shortness of breath.  CT of the head shows no acute CVA.  He is hypertensive.  Discussed with Nephrology who will dialyze patient today.  Also discussed with hospital medicine for TIA rule out.  Patient updated on plan of care.       Amount and/or Complexity of Data Reviewed  Independent Historian: friend  External Data Reviewed: labs, radiology, ECG and notes.  Labs: ordered.  Radiology: ordered.  ECG/medicine tests: ordered.    Risk  Decision regarding hospitalization.               ED Course as of 03/09/24 1722   Sat Mar 09, 2024   1358 Sinus rhythm 91 beats per minute left axis no ST elevation or depression low voltage independently interpreted [EF]      ED Course User Index  [EF] Heraclio Yost MD                           Clinical Impression:  Final diagnoses:  [R29.818] Acute focal neurological deficit  [R51.9] Nonintractable headache, unspecified chronicity pattern, unspecified headache type (Primary)  [N18.6] ESRD (end stage renal disease)          ED Disposition Condition    Observation Stable                [place] Arelis Petit PA-C  03/09/24 1723

## 2024-03-09 NOTE — CONSULTS
Nephrology Consult Note        Patient Name: Thai Santoyo Jr.  MRN: [id number]    Patient Class: Emergency   Admission Date: 3/9/2024  Length of Stay: 0 days  Date of Service: 3/9/2024    Attending Physician: Heraclio Yost MD  Primary Care Provider: Manjit Mckee II, MD    Reason for Consult: esrd    SUBJECTIVE:     HPI: 48M with ESRD on HD MWF presents with headache for 4 days. Past medical history significant for gout, diabetes and hypertension. He reports no chest pain or shortness of breath. He reports stuttering and difficulty finding words. Denies weakness. Denies dizziness. Denies visual changes. Denies gait abnormalities. Reports recent increase in stress. States mother has recently been diagnosed with multiple strokes.     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 5/21/2018    Morbid obesity 9/12/2012    SVT (supraventricular tachycardia)      Past Surgical History:   Procedure Laterality Date    ABLATION N/A 10/5/2018    Procedure: ABLATION;  Surgeon: Ayden Arana MD;  Location: [facility] CATH LAB;  Service: Cardiology;  Laterality: N/A;  SVT, RFA, ARGELIA, MAC (light), ND, 3 Prep    DIALYSIS FISTULA CREATION      KNEE ARTHROPLASTY      SHOULDER SURGERY      right    VASECTOMY       Family History   Problem Relation Age of Onset    Diabetes Mother     Kidney disease Mother         on dialysis    Hypertension Mother     Heart failure Mother     Hypertension Father     Kidney disease Paternal Uncle         dialysis    Kidney disease Paternal Uncle         dialysis     Eczema Sister     No Known Problems Son     No Known Problems Sister     No Known Problems Son     No Known Problems Son     Heart disease Maternal Grandfather     Melanoma Neg Hx     Psoriasis Neg Hx     Lupus Neg Hx     Acne Neg Hx      Social History     Tobacco Use    Smoking status: Former     Types: Cigars    Smokeless tobacco: Never    Tobacco comments:     rare   Substance Use Topics    Alcohol use: No    Drug use: No       Review of patient's allergies indicates:  No Known Allergies    Outpatient meds:  No current facility-administered medications on file prior to encounter.     Current Outpatient Medications on File Prior to Encounter   Medication Sig Dispense Refill    allopurinoL (ZYLOPRIM) 100 MG tablet Take 1 tablet (100 mg total) by mouth once daily. 90 tablet 3    amLODIPine (NORVASC) 10 MG tablet TAKE ONE TABLET BY MOUTH ONCE DAILY 90 tablet 3    blood sugar diagnostic Strp Use one strip each time to check blood sugar three times daily. 300 strip 4    blood-glucose meter Misc use as directed 1 each 0    carvediloL (COREG) 25 MG tablet Take 1 tablet (25 mg total) by mouth 2 (two) times daily with meals. 180 tablet 3    cyclobenzaprine (FLEXERIL) 10 MG tablet Take 1 tablet (10 mg total) by mouth 3 (three) times daily. 90 tablet 0    dulaglutide (TRULICITY) 1.5 mg/0.5 mL pen injector Inject 1.5 mg into the skin every 7 days. 12 pen 1    furosemide (LASIX) 40 MG tablet Take 1 tablet (40 mg total) by mouth once daily. 90 tablet 3    hydrALAZINE (APRESOLINE) 50 MG tablet Take 1 tablet (50 mg total) by mouth every 12 (twelve) hours. 180 tablet 3    HYDROmorphone (DILAUDID) 4 MG tablet Take one tablet by mouth every 6 hours as needed. 120 tablet 0    insulin (LANTUS SOLOSTAR U-100 INSULIN) glargine 100 units/mL SubQ pen Inject 45 Units into the skin 2 (two) times a day. 90 mL 0    insulin lispro (HUMALOG KWIKPEN INSULIN) 100 unit/mL pen Inject 45 Units into the skin 3 (three) times daily before meals. 120  "mL 1    lancets (ACCU-CHEK SOFTCLIX LANCETS) Misc 300 each by Misc.(Non-Drug; Combo Route) route 3 (three) times daily. 300 each 4    olmesartan-hydrochlorothiazide (BENICAR HCT) 40-25 mg per tablet Take 1 tablet by mouth once daily. 90 tablet 3    papaverine 30 mg/mL injection Papaverine 60mg  Add: Phentolamine 2 mg  Add: PGE1 20 mcg  (Double Strength)    Sig:  Inject 30 units as directed; titrate up by 5 units until desired results 10 mL 12    paroxetine (PAXIL) 20 MG tablet Take 1 tablet (20 mg total) by mouth once daily. 30 tablet 0    paroxetine (PAXIL) 20 MG tablet take 1 tablet by mouth once daily 30 tablet 0    pen needle, diabetic (BD ULTRA-FINE MAIKOL PEN NEEDLE) 32 gauge x 5/32" Ndle To be used with insulin twice daily. 200 each 3    rosuvastatin (CRESTOR) 10 MG tablet Take 1 tablet (10 mg total) by mouth once daily. 90 tablet 0    sucroferric oxyhydroxide (VELPHORO) 500 mg Chew Chew 1 tablet with each meal three times daily 90 tablet 11    syringe-needle,safety,disp unt 1 mL 27 gauge x 1/2" Syrg Use every 2 weeks for testosterone injection 100 Syringe 0    testosterone cypionate (DEPOTESTOTERONE CYPIONATE) 200 mg/mL injection Inject 0.5 mLs (100 mg total) into the muscle every 14 (fourteen) days. 10 mL 3       Scheduled meds:      Infusions:      PRN meds:      Review of Systems:  Constitutional:  Negative for chills, fever, malaise/fatigue and weight loss.   HENT:  Negative for hearing loss and nosebleeds.    Eyes:  Negative for blurred vision, double vision and photophobia.   Respiratory:  Negative for cough, shortness of breath and wheezing.    Cardiovascular:  Negative for chest pain, palpitations and leg swelling.   Gastrointestinal:  Negative for abdominal pain, constipation, diarrhea, heartburn, nausea and vomiting.   Genitourinary:  Negative for dysuria, frequency and urgency.   Musculoskeletal:  Negative for falls, joint pain and myalgias.   Skin:  Negative for itching and rash.   Neurological:  " "Negative for dizziness, speech change, focal weakness, loss of consciousness and headaches.   Endo/Heme/Allergies:  Does not bruise/bleed easily.   Psychiatric/Behavioral:  Negative for depression and substance abuse. The patient is not nervous/anxious.      OBJECTIVE:     Vital Signs and IO:  Temp:  [97.8 °F (36.6 °C)]   Pulse:  [93]   Resp:  [19]   BP: (182)/(84)   SpO2:  [92 %]   No intake/output data recorded.  Wt Readings from Last 5 Encounters:   03/09/24 133.8 kg (295 lb)   01/26/24 134 kg (295 lb 6.7 oz)   09/07/23 (!) 136.9 kg (301 lb 13 oz)   09/07/23 135 kg (297 lb 9.9 oz)   06/13/23 136 kg (299 lb 13.2 oz)     Body mass index is 41.14 kg/m².    Physical Exam  Constitutional:       General: Patient is not in acute distress.     Appearance: Patient is well-developed. She is not diaphoretic.   HENT:      Head: Normocephalic and atraumatic.      Mouth/Throat: Mucous membranes are moist.   Eyes:      General: No scleral icterus.     Pupils: Pupils are equal, round, and reactive to light.   Cardiovascular:      Rate and Rhythm: Normal rate and regular rhythm.   Pulmonary:      Effort: Pulmonary effort is normal. No respiratory distress.      Breath sounds: No stridor.   Abdominal:      General: There is no distension.      Palpations: Abdomen is soft.   Musculoskeletal:         General: No deformity. Normal range of motion.      Cervical back: Neck supple.   Skin:     General: Skin is warm and dry.      Findings: No rash present. No erythema.   Neurological:      Mental Status: Patient is alert and oriented to person, place, and time.      Cranial Nerves: No cranial nerve deficit.   Psychiatric:         Behavior: Behavior normal.     Laboratory:  Recent Labs   Lab 03/09/24  1410      K 4.6   CL 95   CO2 35*   BUN 23*   CREATININE 10.1*          Recent Labs   Lab 03/09/24  1410   CALCIUM 11.2*   ALBUMIN 3.4*             No results for input(s): "POCTGLUCOSE" in the last 168 hours.    Recent Labs " "  Lab 03/07/23  1016 09/07/23  1236 01/26/24  0936   Hemoglobin A1C 10.4 H 5.5 5.1       Recent Labs   Lab 03/09/24  1410   WBC 9.67   HGB 11.6*   HCT 37.4*      MCV 94   MCHC 31.0*   MONO 11.9  1.2*   EOSINOPHIL 14.2*       Recent Labs   Lab 03/09/24  1410   BILITOT 0.4   PROT 7.3   ALBUMIN 3.4*   ALKPHOS 56   ALT 29   AST 27             Invalid input(s): "LEUCOCYTESUR"          Microbiology Results (last 7 days)       ** No results found for the last 168 hours. **            ASSESSMENT/PLAN:     ESRD on HD MWF via AVF  Next HD per schedule.  Continue current dialysis prescription.  Renal diet - low K, low phos.  No IVs or BP checks on access and/or non-dominant arm.    Anemia of CKD  Hgb and HCT are acceptable. Monitor for now.  Will provide SABIHA and/or IV iron PRN.    MBD / Secondary HPT  Ca, Phos, PTH and vitamin D levels are acceptable.   Phos binders, vitamin D and analogues, calcimimetics will be given as needed.    HTN urgency with headaches  BP seem uncontrolled. HD with UF today.  Tolerate asymptomatic HTN up to -160.  Continue home meds.  Low sodium diet.    Thank you for allowing us to participate in the care of your patient!   We will follow the patient and provide recommendations as needed.    Patient care time was spent personally by me on the following activities:     Obtaining a history.  Examination of patient.  Providing medical care at the patients bedside.  Developing a treatment plan with patient or surrogate and bedside caregivers.  Ordering and reviewing laboratory studies, radiographic studies, pulse oximetry.  Ordering and performing treatments and interventions.  Evaluation of patient's response to treatment.  Discussions with consultants while on the unit and immediately available to the patient.  Re-evaluation of the patient's condition.  Documentation in the medical record.     Red Mcintosh MD    [facility]  [address] [facility] [place] [address]    [phone] " [phone] - tel  [phone] - fax    3/9/2024

## 2024-03-09 NOTE — ED NOTES
Report given to receiving RN. Hospitalist aware of admission BP. Pt cleared to be admitted upstairs.

## 2024-03-09 NOTE — ED NOTES
Report given to receiving RN. Hospitalist NP Jenny Nation notified and aware of pt's admitting vitals. Pt cleared to be admitted to room.

## 2024-03-10 LAB
ALBUMIN SERPL BCP-MCNC: 3.5 G/DL (ref 3.5–5.2)
ALP SERPL-CCNC: 60 U/L (ref 55–135)
ALT SERPL W/O P-5'-P-CCNC: 26 U/L (ref 10–44)
ANION GAP SERPL CALC-SCNC: 10 MMOL/L (ref 8–16)
AST SERPL-CCNC: 23 U/L (ref 10–40)
BASOPHILS # BLD AUTO: 0.07 K/UL (ref 0–0.2)
BASOPHILS NFR BLD: 0.7 % (ref 0–1.9)
BILIRUB SERPL-MCNC: 0.5 MG/DL (ref 0.1–1)
BUN SERPL-MCNC: 24 MG/DL (ref 6–20)
CALCIUM SERPL-MCNC: 10.7 MG/DL (ref 8.7–10.5)
CHLORIDE SERPL-SCNC: 100 MMOL/L (ref 95–110)
CO2 SERPL-SCNC: 29 MMOL/L (ref 23–29)
CREAT SERPL-MCNC: 9.5 MG/DL (ref 0.5–1.4)
DIFFERENTIAL METHOD BLD: ABNORMAL
EOSINOPHIL # BLD AUTO: 1.5 K/UL (ref 0–0.5)
EOSINOPHIL NFR BLD: 15 % (ref 0–8)
ERYTHROCYTE [DISTWIDTH] IN BLOOD BY AUTOMATED COUNT: 16.8 % (ref 11.5–14.5)
EST. GFR  (NO RACE VARIABLE): 6 ML/MIN/1.73 M^2
GLUCOSE SERPL-MCNC: 81 MG/DL (ref 70–110)
HCT VFR BLD AUTO: 37.3 % (ref 40–54)
HGB BLD-MCNC: 11.7 G/DL (ref 14–18)
IMM GRANULOCYTES # BLD AUTO: 0.02 K/UL (ref 0–0.04)
IMM GRANULOCYTES NFR BLD AUTO: 0.2 % (ref 0–0.5)
LYMPHOCYTES # BLD AUTO: 1.2 K/UL (ref 1–4.8)
LYMPHOCYTES NFR BLD: 11.9 % (ref 18–48)
MAGNESIUM SERPL-MCNC: 2 MG/DL (ref 1.6–2.6)
MCH RBC QN AUTO: 28.6 PG (ref 27–31)
MCHC RBC AUTO-ENTMCNC: 31.4 G/DL (ref 32–36)
MCV RBC AUTO: 91 FL (ref 82–98)
MONOCYTES # BLD AUTO: 1.1 K/UL (ref 0.3–1)
MONOCYTES NFR BLD: 11.2 % (ref 4–15)
NEUTROPHILS # BLD AUTO: 6 K/UL (ref 1.8–7.7)
NEUTROPHILS NFR BLD: 61 % (ref 38–73)
NRBC BLD-RTO: 0 /100 WBC
PHOSPHATE SERPL-MCNC: 5.9 MG/DL (ref 2.7–4.5)
PLATELET # BLD AUTO: 193 K/UL (ref 150–450)
PMV BLD AUTO: 8.8 FL (ref 9.2–12.9)
POCT GLUCOSE: 102 MG/DL (ref 70–110)
POCT GLUCOSE: 69 MG/DL (ref 70–110)
POCT GLUCOSE: 75 MG/DL (ref 70–110)
POCT GLUCOSE: 80 MG/DL (ref 70–110)
POCT GLUCOSE: 99 MG/DL (ref 70–110)
POTASSIUM SERPL-SCNC: 4.8 MMOL/L (ref 3.5–5.1)
PROT SERPL-MCNC: 7.5 G/DL (ref 6–8.4)
RBC # BLD AUTO: 4.09 M/UL (ref 4.6–6.2)
SODIUM SERPL-SCNC: 139 MMOL/L (ref 136–145)
WBC # BLD AUTO: 9.86 K/UL (ref 3.9–12.7)

## 2024-03-10 PROCEDURE — 80053 COMPREHEN METABOLIC PANEL: CPT

## 2024-03-10 PROCEDURE — 85025 COMPLETE CBC W/AUTO DIFF WBC: CPT

## 2024-03-10 PROCEDURE — 25000003 PHARM REV CODE 250

## 2024-03-10 PROCEDURE — 25000003 PHARM REV CODE 250: Performed by: NURSE PRACTITIONER

## 2024-03-10 PROCEDURE — 83735 ASSAY OF MAGNESIUM: CPT

## 2024-03-10 PROCEDURE — 97116 GAIT TRAINING THERAPY: CPT

## 2024-03-10 PROCEDURE — 97165 OT EVAL LOW COMPLEX 30 MIN: CPT

## 2024-03-10 PROCEDURE — 80074 ACUTE HEPATITIS PANEL: CPT | Performed by: INTERNAL MEDICINE

## 2024-03-10 PROCEDURE — 92610 EVALUATE SWALLOWING FUNCTION: CPT

## 2024-03-10 PROCEDURE — 92523 SPEECH SOUND LANG COMPREHEN: CPT

## 2024-03-10 PROCEDURE — 97162 PT EVAL MOD COMPLEX 30 MIN: CPT

## 2024-03-10 PROCEDURE — A9585 GADOBUTROL INJECTION: HCPCS

## 2024-03-10 PROCEDURE — 84100 ASSAY OF PHOSPHORUS: CPT

## 2024-03-10 PROCEDURE — 99900035 HC TECH TIME PER 15 MIN (STAT)

## 2024-03-10 PROCEDURE — 63600175 PHARM REV CODE 636 W HCPCS: Performed by: NURSE PRACTITIONER

## 2024-03-10 PROCEDURE — 11000001 HC ACUTE MED/SURG PRIVATE ROOM

## 2024-03-10 PROCEDURE — 94761 N-INVAS EAR/PLS OXIMETRY MLT: CPT

## 2024-03-10 PROCEDURE — 94660 CPAP INITIATION&MGMT: CPT

## 2024-03-10 PROCEDURE — 25500020 PHARM REV CODE 255

## 2024-03-10 PROCEDURE — 36415 COLL VENOUS BLD VENIPUNCTURE: CPT | Performed by: INTERNAL MEDICINE

## 2024-03-10 RX ORDER — AMLODIPINE BESYLATE 5 MG/1
10 TABLET ORAL DAILY
Status: DISCONTINUED | OUTPATIENT
Start: 2024-03-10 | End: 2024-03-11 | Stop reason: HOSPADM

## 2024-03-10 RX ORDER — HYDRALAZINE HYDROCHLORIDE 20 MG/ML
10 INJECTION INTRAMUSCULAR; INTRAVENOUS ONCE
Status: COMPLETED | OUTPATIENT
Start: 2024-03-10 | End: 2024-03-10

## 2024-03-10 RX ORDER — GADOBUTROL 604.72 MG/ML
INJECTION INTRAVENOUS
Status: COMPLETED
Start: 2024-03-10 | End: 2024-03-10

## 2024-03-10 RX ORDER — HYDRALAZINE HYDROCHLORIDE 25 MG/1
50 TABLET, FILM COATED ORAL EVERY 12 HOURS
Status: DISCONTINUED | OUTPATIENT
Start: 2024-03-10 | End: 2024-03-11

## 2024-03-10 RX ORDER — CARVEDILOL 25 MG/1
25 TABLET ORAL 2 TIMES DAILY WITH MEALS
Status: DISCONTINUED | OUTPATIENT
Start: 2024-03-10 | End: 2024-03-11 | Stop reason: HOSPADM

## 2024-03-10 RX ORDER — FUROSEMIDE 40 MG/1
40 TABLET ORAL 2 TIMES DAILY
Status: DISCONTINUED | OUTPATIENT
Start: 2024-03-10 | End: 2024-03-11 | Stop reason: HOSPADM

## 2024-03-10 RX ADMIN — FUROSEMIDE 40 MG: 40 TABLET ORAL at 09:03

## 2024-03-10 RX ADMIN — ALLOPURINOL 100 MG: 100 TABLET ORAL at 08:03

## 2024-03-10 RX ADMIN — ASPIRIN 81 MG: 81 TABLET ORAL at 08:03

## 2024-03-10 RX ADMIN — MUPIROCIN: 20 OINTMENT TOPICAL at 08:03

## 2024-03-10 RX ADMIN — FAMOTIDINE 20 MG: 20 TABLET, FILM COATED ORAL at 08:03

## 2024-03-10 RX ADMIN — AMLODIPINE BESYLATE 10 MG: 5 TABLET ORAL at 04:03

## 2024-03-10 RX ADMIN — HYDRALAZINE HYDROCHLORIDE 50 MG: 25 TABLET, FILM COATED ORAL at 04:03

## 2024-03-10 RX ADMIN — GADOBUTROL 10 ML: 604.72 INJECTION INTRAVENOUS at 09:03

## 2024-03-10 RX ADMIN — HYDRALAZINE HYDROCHLORIDE 10 MG: 20 INJECTION INTRAMUSCULAR; INTRAVENOUS at 10:03

## 2024-03-10 RX ADMIN — CARVEDILOL 25 MG: 25 TABLET, FILM COATED ORAL at 04:03

## 2024-03-10 RX ADMIN — ATORVASTATIN CALCIUM 40 MG: 40 TABLET, FILM COATED ORAL at 08:03

## 2024-03-10 NOTE — ASSESSMENT & PLAN NOTE
Patient's anemia is currently controlled. Has not received any PRBCs to date. Etiology likely d/t chronic disease due to ESRD  Current CBC reviewed-   Lab Results   Component Value Date    HGB 11.6 (L) 03/09/2024    HCT 37.4 (L) 03/09/2024     Monitor serial CBC and transfuse if patient becomes hemodynamically unstable, symptomatic or H/H drops below 7/21.

## 2024-03-10 NOTE — ASSESSMENT & PLAN NOTE
Body mass index is 38.77 kg/m². Morbid obesity complicates all aspects of disease management from diagnostic modalities to treatment. Weight loss encouraged and health benefits explained to patient.

## 2024-03-10 NOTE — PT/OT/SLP EVAL
"Physical Therapy Evaluation and Discharge Note    Patient Name:  Thai Santoyo Jr.   MRN:  [id number]    Recommendations:     Discharge Recommendations: No Therapy Indicated  Discharge Equipment Recommendations: none   Barriers to discharge: None    Assessment:     Thai Santoyo Jr. is a 48 y.o. male admitted with a medical diagnosis of TIA (transient ischemic attack). .  At this time, patient is functioning at their prior level of function and does not require further acute PT services.     Recent Surgery: * No surgery found *      Plan:     During this hospitalization, patient does not require further acute PT services.  Please re-consult if situation changes.      Subjective     Chief Complaint: chronic pain in L knee  Patient/Family Comments/goals: go home  Pain/Comfort:  Pain Rating 1: other (see comments) (did not quantify)  Location - Side 1: Left  Location 1: knee (pt reports "I need a knee replacement")  Pain Rating Post-Intervention 1: 0/10    Patients cultural, spiritual, Uatsdin conflicts given the current situation:      Living Environment:  Pt lives in 2 [place] home, stays first. States he lives with "too many people" so will have assist as needed at home.  Prior to admission, patients level of function was independent, special .  Equipment used at home: none.  DME owned (not currently used): none.  Upon discharge, patient will have assistance from family.    Objective:     Communicated with RNChris prior to session.  Patient found up in chair with telemetry, peripheral IV upon PT entry to room.    General Precautions: Standard, fall    Orthopedic Precautions:    Braces:    Respiratory Status: Room air    Exams:  Cognitive Exam:  Patient is oriented to Person, Place, Time, and Situation  Gross Motor Coordination:  WFL  RLE ROM: WFL  RLE Strength: WFL  LLE ROM: WFL  LLE Strength: WFL    Functional Mobility:  Transfers:     Sit to Stand:  independence with no AD  Bed to Chair: independence with  " no AD  using  Step Transfer  Gait: x250' w/o AD, no LOB noted.    AM-PAC 6 CLICK MOBILITY  Total Score:24       Treatment and Education:  Pt was educated on the following: call light use, importance of OOB activity and functional mobility to negate the negative effects of prolonged bed rest during this hospitalization, safe transfers/ambulation and discharge planning recommendations/options.     AM-PAC 6 CLICK MOBILITY  Total Score:24     Patient left up in chair with all lines intact, call button in reach, and RN notified.    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 CATH LAB;  Service: Cardiology;  Laterality: N/A;  SVT, RFA, ARGELIA, MAC (light), GA, 3 Prep    DIALYSIS FISTULA CREATION      KNEE ARTHROPLASTY      SHOULDER SURGERY      right    VASECTOMY         Time Tracking:     PT Received On: 03/10/24  PT Start Time: 0942     PT Stop Time: 1001  PT Total Time (min): 19 min     Billable Minutes: Evaluation 9 and Gait Training 10      03/10/2024

## 2024-03-10 NOTE — ASSESSMENT & PLAN NOTE
Creatine stable for now. BMP reviewed- noted Estimated Creatinine Clearance: 12.4 mL/min (A) (based on SCr of 10.1 mg/dL (H)). according to latest data. Based on current GFR, CKD stage is stage 5 - GFR < 15.  Monitor UOP and serial BMP and adjust therapy as needed. Renally dose meds. Avoid nephrotoxic medications and procedures.    Nephrology consulted and made following recommendations:  NBP seem uncontrolled. HD with UF 3/9/24  Tolerate asymptomatic HTN up to -160.  Continue home meds.  Low sodium diet.

## 2024-03-10 NOTE — ASSESSMENT & PLAN NOTE
Patient's anemia is currently controlled. Has not received any PRBCs to date. Etiology likely d/t chronic disease due to ESRD  Current CBC reviewed-   Lab Results   Component Value Date    HGB 11.7 (L) 03/10/2024    HCT 37.3 (L) 03/10/2024     Monitor serial CBC and transfuse if patient becomes hemodynamically unstable, symptomatic or H/H drops below 7/21.

## 2024-03-10 NOTE — ASSESSMENT & PLAN NOTE
Patient's FSGs are controlled on current medication regimen.  Last A1c reviewed-   Lab Results   Component Value Date    HGBA1C 5.0 03/09/2024     Most recent fingerstick glucose reviewed-   Recent Labs   Lab 03/09/24  2057 03/09/24  2137 03/10/24  0714   POCTGLUCOSE 82 99 69*     Current correctional scale  Medium  Decrease anti-hyperglycemic dose as follows-   Antihyperglycemics (From admission, onward)      Start     Stop Route Frequency Ordered    03/09/24 2100  insulin detemir U-100 (Levemir) pen 23 Units         -- SubQ 2 times daily 03/09/24 1727    03/09/24 1727  insulin aspart U-100 pen 0-10 Units         -- SubQ Before meals & nightly PRN 03/09/24 1727          Hold Oral hypoglycemics while patient is in the hospital.   Long acting insulin held this morning due to hypoglycemia.  Encouraged oral intake.

## 2024-03-10 NOTE — PROGRESS NOTES
HD completed  UF 2 liters       03/09/24 1940   Handoff Report   Received From [facility]   Given To Cheyenne   Vital Signs   Pulse 87   BP (!) 180/82   During Hemodialysis Assessment   Blood Flow Rate (mL/min) 400 mL/min   Dialysate Flow Rate (mL/min) 800 ml/min   Ultrafiltration Rate (mL/Hr) 1210 mL/Hr   Arteriovenous Lines Secure Yes   Arterial Pressure (mmHg) -230 mmHg   Venous Pressure (mmHg) 200   UF Removed (mL) 2500 mL   TMP 30   Venous Line in Air Detector Yes   Intake (mL) 250 mL   Transducer Dry Yes   Access Visible Yes    notified of access issue? N/A   Heparin given? N/A   Intra-Hemodialysis Comments HD completed   Post-Hemodialysis Assessment   Rinseback Volume (mL) 250 mL   Blood Volume Processed (Liters) 42.3 L   Dialyzer Clearance Clear   Duration of Treatment 120 minutes   Additional Fluid Intake (mL) 500 mL   Total UF (mL) 2500 mL   Net Fluid Removal 2000   Patient Response to Treatment elijah well   Post-Treatment Weight 126 kg (277 lb 12.5 oz)   Treatment Weight Change -1.2   Arterial bleeding stop time (min) 5 min   Venous bleeding stop time (min) 5 min   Post-Hemodialysis Comments HD completedc

## 2024-03-10 NOTE — PLAN OF CARE
[facility] - Med/Surg  Initial Discharge Assessment       Primary Care Provider: Manjit Mckee II, MD    Admission Diagnosis: Nonintractable headache, unspecified chronicity pattern, unspecified headache type [R51.9]  Nonintractable headache, unspecified chronicity pattern, unspecified headache type [R51.9]    Admission Date: 3/9/2024  Expected Discharge Date: 3/11/2024    Transition of Care Barriers: None    Payor: [employer] [facility] / Plan: [employer] CHOICES / Product Type: Medicare Advantage /     Extended Emergency Contact Information  Primary Emergency Contact: Kathy Santoyo  Address: [id number]           [place] [address] [facility]  Home Phone: [phone]  Mobile Phone: [phone]  Relation: Mother  Secondary Emergency Contact: [place], Margaret  Mobile Phone: [phone]  Relation: Significant other  Preferred language: English    Discharge Plan A: Home with family  Discharge Plan B: Home with family      [facility]  [address]  [place] [address]  Phone: [phone] Fax: [phone]    [facility]  [address]  [facility] [address]  Phone: [phone] Fax: [phone]    [facility] - [place], [place] - [address]  [address] [place] [address]  Phone: [phone] Fax: [phone]    DC assessment completed at bedside with pt, information on facesheet verified. Lives at listed address with family. Son will drive him home. PCP verified. Pharm is [facility]. Denies coumadin and HH. [facility]. DME is listed. Insurance verified. Independent, drives self. Denies POA. Denies recent admission. Planning to DC home when clear.       Initial Assessment (most recent)       Adult Discharge Assessment - 03/10/24 1459          Discharge Assessment    Assessment Type Discharge Planning Assessment     Confirmed/corrected address, phone number and insurance  Yes     Confirmed Demographics Correct on Facesheet     Source of Information patient     Does patient/caregiver understand observation status Yes     Communicated HARJEET with patient/caregiver Yes     People in Home significant other;child(patria), adult;sibling(s)     Facility Arrived From: home     Do you expect to return to your current living situation? Yes     Do you have help at home or someone to help you manage your care at home? Yes     Who are your caregiver(s) and their phone number(s)? fiance     Prior to hospitilization cognitive status: Alert/Oriented     Current cognitive status: Alert/Oriented     Equipment Currently Used at Home glucometer;BIPAP     Readmission within 30 days? No     Patient currently being followed by outpatient case management? No     Do you currently have service(s) that help you manage your care at home? No     Do you take prescription medications? Yes     Do you have prescription coverage? Yes     Do you have any problems affording any of your prescribed medications? No     Is the patient taking medications as prescribed? yes     Who is going to help you get home at discharge? son     How do you get to doctors appointments? car, drives self     Are you on dialysis? Yes     Dialysis Name and Scheduled days MWF [facility] [address]     Do you take coumadin? No     Discharge Plan A Home with family     Discharge Plan B Home with family     DME Needed Upon Discharge  none     Discharge Plan discussed with: Patient     Transition of Care Barriers None

## 2024-03-10 NOTE — SUBJECTIVE & OBJECTIVE
Interval History: no acute events overnight.  Awaiting MRI/MRA    Review of Systems   Eyes:  Negative for visual disturbance.   Respiratory:  Negative for chest tightness, shortness of breath and wheezing.    Cardiovascular:  Negative for chest pain, palpitations and leg swelling.   Gastrointestinal:  Negative for abdominal distention, abdominal pain, nausea and vomiting.   Neurological:  Positive for speech difficulty and headaches.   Psychiatric/Behavioral:  Negative for agitation. The patient is not nervous/anxious.         Patient endorses stuttering for 2-3 weeks   All other systems reviewed and are negative.    Objective:     Vital Signs (Most Recent):  Temp: 98.6 °F (37 °C) (03/10/24 0852)  Pulse: 96 (03/10/24 0852)  Resp: 16 (03/10/24 0852)  BP: (!) 171/84 (03/10/24 0852)  SpO2: 97 % (03/10/24 0725) Vital Signs (24h Range):  Temp:  [97.8 °F (36.6 °C)-98.6 °F (37 °C)] 98.6 °F (37 °C)  Pulse:  [85-97] 96  Resp:  [16-19] 16  SpO2:  [91 %-98 %] 97 %  BP: (158-222)/() 171/84     Weight: 126.1 kg (278 lb)  Body mass index is 38.77 kg/m².    Intake/Output Summary (Last 24 hours) at 3/10/2024 0930  Last data filed at 3/10/2024 0800  Gross per 24 hour   Intake 1340 ml   Output 2515 ml   Net -1175 ml         Physical Exam  Constitutional:       General: He is not in acute distress.     Appearance: He is well-developed. He is not toxic-appearing.   HENT:      Head: Normocephalic and atraumatic.   Eyes:      Conjunctiva/sclera: Conjunctivae normal.      Pupils: Pupils are equal, round, and reactive to light.   Cardiovascular:      Rate and Rhythm: Normal rate and regular rhythm.      Heart sounds: Normal heart sounds.   Pulmonary:      Effort: Pulmonary effort is normal.      Breath sounds: Normal breath sounds.   Abdominal:      General: Bowel sounds are normal. There is no distension.      Palpations: Abdomen is soft.      Tenderness: There is no abdominal tenderness.   Musculoskeletal:         General: Normal  range of motion.      Cervical back: Normal range of motion and neck supple.   Skin:     General: Skin is warm and dry.   Neurological:      Mental Status: He is alert and oriented to person, place, and time.      Cranial Nerves: Dysarthria present.   Psychiatric:         Behavior: Behavior normal.             Significant Labs: All pertinent labs within the past 24 hours have been reviewed.  CBC:   Recent Labs   Lab 03/09/24  1410 03/10/24  0519   WBC 9.67 9.86   HGB 11.6* 11.7*   HCT 37.4* 37.3*    193     CMP:   Recent Labs   Lab 03/09/24  1410 03/10/24  0518    139   K 4.6 4.8   CL 95 100   CO2 35* 29    81   BUN 23* 24*   CREATININE 10.1* 9.5*   CALCIUM 11.2* 10.7*   PROT 7.3 7.5   ALBUMIN 3.4* 3.5   BILITOT 0.4 0.5   ALKPHOS 56 60   AST 27 23   ALT 29 26   ANIONGAP 11 10     Significant Imaging: I have reviewed all pertinent imaging results/findings within the past 24 hours.  US carotid bilateral:  No evidence of a hemodynamically significant carotid bifurcation stenosis.     Flow in vertebral arteries appears antegrade bilaterally.

## 2024-03-10 NOTE — ASSESSMENT & PLAN NOTE
"Patient's FSGs are controlled on current medication regimen.  Last A1c reviewed-   Lab Results   Component Value Date    HGBA1C 5.0 03/09/2024     Most recent fingerstick glucose reviewed- No results for input(s): "POCTGLUCOSE" in the last 24 hours.  Current correctional scale  Medium  Decrease anti-hyperglycemic dose as follows-   Antihyperglycemics (From admission, onward)      Start     Stop Route Frequency Ordered    03/09/24 2100  insulin detemir U-100 (Levemir) pen 23 Units         -- SubQ 2 times daily 03/09/24 1727    03/09/24 1727  insulin aspart U-100 pen 0-10 Units         -- SubQ Before meals & nightly PRN 03/09/24 1727          Hold Oral hypoglycemics while patient is in the hospital.         "

## 2024-03-10 NOTE — PROGRESS NOTES
[facility] Medicine  Progress Note    Patient Name: Thai Santoyo Jr.  MRN: [id number]  Patient Class: OP- Observation   Admission Date: 3/9/2024  Length of Stay: 0 days  Attending Physician: Xavier Cummings MD  Primary Care Provider: Manjit Mckee II, MD        Subjective:     Principal Problem:TIA (transient ischemic attack)        HPI:  Thai Santoyo Jr. Is a 49 year old male with a previous medical history of ESRD on hemodialysis MWF, DMII, HTN, morbid obesity, SVT, gout and arthritis who presents to the emergency room for an intractable headache for four days, stuttering for 2-3 weeks and forgetfulness for months. Patient denies any associated adverse symptoms such as visual disturbance, neck pain or rigidity, fevers, chills photophobia, nausea, vomiting, chest pain, or shortness of breath. Headache pain is described as pressure in left frontal area and across entire back of head that was not eased or worsened by anything. During admit evaluation patient reports that headache had spontaneously resolved prior to being transported to admit room. It is noted that patient BP in ED prior to admission 222/107 and was given 10mg of labetalol prior to transport to floor. Patient does endorse he has been working with Dr. Mckee as [facility] for the past three months to obtain better blood pressure control. Patient did complete dialysis on Wednesday 3/6/24 and per nephrology recommendation had emergent dialysis session with ultrafiltration upon admit 3/9/24. During examination patient noted to have stuttered speech and some difficulty finding words and reports this occurring for 2-3 weeks. Other than these two symptoms patient is neurologically intact. Endorses forgetfulness for months and when queried describes a time driving with son in car and forgot where he was going. Initial ED workup revealed a negative head CT, cmp showed normal electrolytes and ESRD, CMP showed anemia of  chronic diease and WBC within normal limits, TSH and coags within normal limits, lipid panel unremarkable, Ha1c shows well controlled DMII. Chest xray normal with no signs of volume overload. Patient to be admitted by hospital medicine for further evaluation and management.     Overview/Hospital Course:  Thai Santoyo Jr. Was monitored closely during his hospitalization.  CT head performed in ED negative for intracranial pathology and he was placed on the stroke pathway.  Nephrology was consulted for continuation of his chronic dialysis.  Neurology service was consulted regarding TIA symptoms. He underwent US bilateral carotid arteries was without acute findings.      Interval History: no acute events overnight.  Awaiting MRI/MRA    Review of Systems   Eyes:  Negative for visual disturbance.   Respiratory:  Negative for chest tightness, shortness of breath and wheezing.    Cardiovascular:  Negative for chest pain, palpitations and leg swelling.   Gastrointestinal:  Negative for abdominal distention, abdominal pain, nausea and vomiting.   Neurological:  Positive for speech difficulty and headaches.   Psychiatric/Behavioral:  Negative for agitation. The patient is not nervous/anxious.         Patient endorses stuttering for 2-3 weeks   All other systems reviewed and are negative.    Objective:     Vital Signs (Most Recent):  Temp: 98.6 °F (37 °C) (03/10/24 0852)  Pulse: 96 (03/10/24 0852)  Resp: 16 (03/10/24 0852)  BP: (!) 171/84 (03/10/24 0852)  SpO2: 97 % (03/10/24 0725) Vital Signs (24h Range):  Temp:  [97.8 °F (36.6 °C)-98.6 °F (37 °C)] 98.6 °F (37 °C)  Pulse:  [85-97] 96  Resp:  [16-19] 16  SpO2:  [91 %-98 %] 97 %  BP: (158-222)/() 171/84     Weight: 126.1 kg (278 lb)  Body mass index is 38.77 kg/m².    Intake/Output Summary (Last 24 hours) at 3/10/2024 0930  Last data filed at 3/10/2024 0800  Gross per 24 hour   Intake 1340 ml   Output 2515 ml   Net -1175 ml         Physical Exam  Constitutional:        General: He is not in acute distress.     Appearance: He is well-developed. He is not toxic-appearing.   HENT:      Head: Normocephalic and atraumatic.   Eyes:      Conjunctiva/sclera: Conjunctivae normal.      Pupils: Pupils are equal, round, and reactive to light.   Cardiovascular:      Rate and Rhythm: Normal rate and regular rhythm.      Heart sounds: Normal heart sounds.   Pulmonary:      Effort: Pulmonary effort is normal.      Breath sounds: Normal breath sounds.   Abdominal:      General: Bowel sounds are normal. There is no distension.      Palpations: Abdomen is soft.      Tenderness: There is no abdominal tenderness.   Musculoskeletal:         General: Normal range of motion.      Cervical back: Normal range of motion and neck supple.   Skin:     General: Skin is warm and dry.   Neurological:      Mental Status: He is alert and oriented to person, place, and time.      Cranial Nerves: Dysarthria present.   Psychiatric:         Behavior: Behavior normal.             Significant Labs: All pertinent labs within the past 24 hours have been reviewed.  CBC:   Recent Labs   Lab 03/09/24  1410 03/10/24  0519   WBC 9.67 9.86   HGB 11.6* 11.7*   HCT 37.4* 37.3*    193     CMP:   Recent Labs   Lab 03/09/24  1410 03/10/24  0518    139   K 4.6 4.8   CL 95 100   CO2 35* 29    81   BUN 23* 24*   CREATININE 10.1* 9.5*   CALCIUM 11.2* 10.7*   PROT 7.3 7.5   ALBUMIN 3.4* 3.5   BILITOT 0.4 0.5   ALKPHOS 56 60   AST 27 23   ALT 29 26   ANIONGAP 11 10     Significant Imaging: I have reviewed all pertinent imaging results/findings within the past 24 hours.  US carotid bilateral:  No evidence of a hemodynamically significant carotid bifurcation stenosis.     Flow in vertebral arteries appears antegrade bilaterally.    Assessment/Plan:      * TIA (transient ischemic attack)  Patient endorses intractable headache for four days described as pressure in left frontal and entire back of head. Patient endorses that  headache resolved prior to being transported to admit room.  Antithrombotics for secondary stroke prevention: Antiplatelets: Aspirin: 81 mg daily    Statins for secondary stroke prevention and hyperlipidemia, if present:   Statins: Atorvastatin- 40 mg daily    Aggressive risk factor modification: HTN, DM, HLD, Obesity     Rehab efforts: The patient has been evaluated by a stroke team provider and the therapy needs have been fully considered based off the presenting complaints and exam findings. The following therapy evaluations are needed: PT evaluate and treat, OT evaluate and treat, SLP evaluate and treat    Diagnostics ordered/pending: Carotid ultrasound to assess vasculature, CT scan of head without contrast to asses brain parenchyma, HgbA1C to assess blood glucose levels, Lipid Profile to assess cholesterol levels, MRA head to assess vasculature, MRA neck/arch to assess vasculature, MRI head without contrast to assess brain parenchyma, Other: ECHO with saline bubble    VTE prophylaxis: None: Reason for No Pharmacological VTE Prophylaxis: Uncontrolled arterial hypertension (SBP > 180 mmHg or DPB > 100 mmHg)    BP parameters: TIA: SBP <220 until imaging confirmation of no infarct     -neurology consulted        Hypertension associated with diabetes  Chronic, uncontrolled. Latest blood pressure and vitals reviewed-     Temp:  [97.8 °F (36.6 °C)-98.6 °F (37 °C)]   Pulse:  [85-97]   Resp:  [16-19]   BP: (158-222)/()   SpO2:  [91 %-98 %] .   Home meds for hypertension were reviewed and noted below.   Hypertension Medications               amLODIPine (NORVASC) 10 MG tablet TAKE ONE TABLET BY MOUTH ONCE DAILY    carvediloL (COREG) 25 MG tablet Take 1 tablet (25 mg total) by mouth 2 (two) times daily with meals.    furosemide (LASIX) 40 MG tablet Take 1 tablet (40 mg total) by mouth once daily.    hydrALAZINE (APRESOLINE) 50 MG tablet Take 1 tablet (50 mg total) by mouth every 12 (twelve) hours.     olmesartan-hydrochlorothiazide (BENICAR HCT) 40-25 mg per tablet Take 1 tablet by mouth once daily.            While in the hospital, will manage blood pressure as follows; Adjust home antihypertensive regimen as follows- Home medications on hold and permissive HTN in place pending MRI results    Will utilize p.r.n. blood pressure medication labetol 10mg  only if patient's blood pressure greater than 220/110 and he develops symptoms such as worsening chest pain or shortness of breath.       Anemia in end-stage renal disease  Patient's anemia is currently controlled. Has not received any PRBCs to date. Etiology likely d/t chronic disease due to ESRD  Current CBC reviewed-   Lab Results   Component Value Date    HGB 11.7 (L) 03/10/2024    HCT 37.3 (L) 03/10/2024     Monitor serial CBC and transfuse if patient becomes hemodynamically unstable, symptomatic or H/H drops below 7/21.    Type 2 diabetes mellitus with chronic kidney disease on chronic dialysis, with long-term current use of insulin  Patient's FSGs are controlled on current medication regimen.  Last A1c reviewed-   Lab Results   Component Value Date    HGBA1C 5.0 03/09/2024     Most recent fingerstick glucose reviewed-   Recent Labs   Lab 03/09/24 2057 03/09/24  2137 03/10/24  0714   POCTGLUCOSE 82 99 69*     Current correctional scale  Medium  Decrease anti-hyperglycemic dose as follows-   Antihyperglycemics (From admission, onward)      Start     Stop Route Frequency Ordered    03/09/24 2100  insulin detemir U-100 (Levemir) pen 23 Units         -- SubQ 2 times daily 03/09/24 1727    03/09/24 1727  insulin aspart U-100 pen 0-10 Units         -- SubQ Before meals & nightly PRN 03/09/24 1727          Hold Oral hypoglycemics while patient is in the hospital.   Long acting insulin held this morning due to hypoglycemia.  Encouraged oral intake.          ESRD on hemodialysis  Chronic  Nephrology consult for continued HD      Obesity (BMI 30-39.9)  Body mass index  is 38.77 kg/m². Morbid obesity complicates all aspects of disease management from diagnostic modalities to treatment. Weight loss encouraged and health benefits explained to patient.           VTE Risk Mitigation (From admission, onward)           Ordered     IP VTE HIGH RISK PATIENT  Once         03/09/24 1727     Place sequential compression device  Until discontinued         03/09/24 1727                    Discharge Planning   HARJEET:      Code Status: Full Code   Is the patient medically ready for discharge?:     Reason for patient still in hospital (select all that apply): Imaging and Consult recommendations                     JUAN MANUEL Braxton  Department of Hospital Medicine   [facility]/Surg

## 2024-03-10 NOTE — H&P
[facility]  History & Physical    Patient Name: Thai Santoyo Jr.  MRN: [id number]  Patient Class: OP- Observation  Admission Date: 3/9/2024  Attending Physician: Xavier Cummings MD   Primary Care Provider: Manjit Mckee II, MD         Patient information was obtained from patient, past medical records, and ER records.     Subjective:     Principal Problem:TIA (transient ischemic attack)    Chief Complaint:   Chief Complaint   Patient presents with    Headache     X 4 days         HPI: Thai Santoyo Jr. Is a 49 year old male with a previous medical history of ESRD on hemodialysis MWF, DMII, HTN, morbid obesity, SVT, gout and arthritis who presents to the emergency room for an intractable headache for four days, stuttering for 2-3 weeks and forgetfulness for months. Patient denies any associated adverse symptoms such as visual disturbance, neck pain or rigidity, fevers, chills photophobia, nausea, vomiting, chest pain, or shortness of breath. Headache pain is described as pressure in left frontal area and across entire back of head that was not eased or worsened by anything. During admit evaluation patient reports that headache had spontaneously resolved prior to being transported to admit room. It is noted that patient BP in ED prior to admission 222/107 and was given 10mg of labetalol prior to transport to floor. Patient does endorse he has been working with Dr. Mckee as [facility] for the past three months to obtain better blood pressure control. Patient did complete dialysis on Wednesday 3/6/24 and per nephrology recommendation had emergent dialysis session with ultrafiltration upon admit 3/9/24. During examination patient noted to have stuttered speech and some difficulty finding words and reports this occurring for 2-3 weeks. Other than these two symptoms patient is neurologically intact. Endorses forgetfulness for months and when queried describes a time  driving with son in car and forgot where he was going. Initial ED workup revealed a negative head CT, cmp showed normal electrolytes and ESRD, CMP showed anemia of chronic diease and WBC within normal limits, TSH and coags within normal limits, lipid panel unremarkable, Ha1c shows well controlled DMII. Chest xray normal with no signs of volume overload. Patient to be admitted by hospital medicine for further evaluation and management.     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 5/21/2018    Morbid obesity 9/12/2012    SVT (supraventricular tachycardia)        Past Surgical History:   Procedure Laterality Date    ABLATION N/A 10/5/2018    Procedure: ABLATION;  Surgeon: Ayden Arana MD;  Location: [facility] CATH LAB;  Service: Cardiology;  Laterality: N/A;  SVT, RFA, ARGELIA, MAC (light), ND, 3 Prep    DIALYSIS FISTULA CREATION      KNEE ARTHROPLASTY      SHOULDER SURGERY      right    VASECTOMY         Review of patient's allergies indicates:  No Known Allergies    No current facility-administered medications on file prior to encounter.     Current Outpatient Medications on File Prior to Encounter   Medication Sig    allopurinoL (ZYLOPRIM) 100 MG tablet Take 1 tablet (100 mg total) by mouth once daily.    amLODIPine (NORVASC) 10 MG tablet TAKE ONE TABLET BY MOUTH ONCE DAILY (Patient taking differently: Take 10 mg by mouth once daily.)    carvediloL (COREG) 25 MG tablet Take 1 tablet (25 mg total) by mouth 2 (two) times daily with meals.    dulaglutide (TRULICITY) 1.5 mg/0.5 mL pen  injector Inject 1.5 mg into the skin every 7 days. (Patient taking differently: Inject 1.5 mg into the skin every 7 days. Saturdays)    furosemide (LASIX) 40 MG tablet Take 1 tablet (40 mg total) by mouth once daily. (Patient taking differently: Take 40 mg by mouth 2 (two) times a day.)    hydrALAZINE (APRESOLINE) 50 MG tablet Take 1 tablet (50 mg total) by mouth every 12 (twelve) hours.    insulin (LANTUS SOLOSTAR U-100 INSULIN) glargine 100 units/mL SubQ pen Inject 45 Units into the skin 2 (two) times a day. (Patient taking differently: Inject 30 Units into the skin 2 (two) times a day.)    insulin lispro (HUMALOG KWIKPEN INSULIN) 100 unit/mL pen Inject 45 Units into the skin 3 (three) times daily before meals. (Patient taking differently: Inject 30 Units into the skin 3 (three) times daily before meals.)    olmesartan-hydrochlorothiazide (BENICAR HCT) 40-25 mg per tablet Take 1 tablet by mouth once daily. (Patient taking differently: Take 1 tablet by mouth every evening.)    rosuvastatin (CRESTOR) 10 MG tablet Take 1 tablet (10 mg total) by mouth once daily.    sucroferric oxyhydroxide (VELPHORO) 500 mg Chew Chew 1 tablet with each meal three times daily (Patient taking differently: Take 1 each by mouth 3 (three) times daily with meals.)    blood sugar diagnostic Strp Use one strip each time to check blood sugar three times daily.    blood-glucose meter Misc use as directed    HYDROmorphone (DILAUDID) 4 MG tablet Take one tablet by mouth every 6 hours as needed. (Patient not taking: Reported on 3/9/2024)    lancets (ACCU-CHEK SOFTCLIX LANCETS) Misc 300 each by Misc.(Non-Drug; Combo Route) route 3 (three) times daily.    papaverine 30 mg/mL injection Papaverine 60mg  Add: Phentolamine 2 mg  Add: PGE1 20 mcg  (Double Strength)    Sig:  Inject 30 units as directed; titrate up by 5 units until desired results (Patient not taking: Reported on 3/9/2024)    paroxetine (PAXIL) 20 MG tablet Take 1 tablet (20 mg total) by  "mouth once daily. (Patient not taking: Reported on 3/9/2024)    pen needle, diabetic (BD ULTRA-FINE MAIKOL PEN NEEDLE) 32 gauge x 5/32" Ndle To be used with insulin twice daily.    syringe-needle,safety,disp unt 1 mL 27 gauge x 1/2" Syrg Use every 2 weeks for testosterone injection    testosterone cypionate (DEPOTESTOTERONE CYPIONATE) 200 mg/mL injection Inject 0.5 mLs (100 mg total) into the muscle every 14 (fourteen) days. (Patient not taking: Reported on 3/9/2024)    [DISCONTINUED] cyclobenzaprine (FLEXERIL) 10 MG tablet Take 1 tablet (10 mg total) by mouth 3 (three) times daily.    [DISCONTINUED] paroxetine (PAXIL) 20 MG tablet take 1 tablet by mouth once daily     Family History       Problem Relation (Age of Onset)    Diabetes Mother    Eczema Sister    Heart disease Maternal Grandfather    Heart failure Mother    Hypertension Mother, Father    Kidney disease Mother, Paternal Uncle, Paternal Uncle    No Known Problems Son, Sister, Son, Son          Tobacco Use    Smoking status: Former     Types: Cigars    Smokeless tobacco: Never    Tobacco comments:     rare   Substance and Sexual Activity    Alcohol use: No    Drug use: No    Sexual activity: Yes     Partners: Female     Birth control/protection: None     Review of Systems   Eyes:  Negative for visual disturbance.   Respiratory:  Negative for chest tightness, shortness of breath and wheezing.    Cardiovascular:  Negative for chest pain, palpitations and leg swelling.   Gastrointestinal:  Negative for abdominal distention, abdominal pain, nausea and vomiting.   Neurological:  Positive for speech difficulty and headaches.        Endorses headache for four days left frontal and across back of head   Psychiatric/Behavioral:          Patient endorses forgetfulness for months. Can describe one event in where he was driving car with son and forgot where he was going.     Patient endorses stuttering for 2-3 weeks   All other systems reviewed and are " negative.    Objective:     Vital Signs (Most Recent):  Temp: 97.8 °F (36.6 °C) (03/09/24 1306)  Pulse: 87 (03/09/24 1940)  Resp: 16 (03/09/24 1725)  BP: (!) 180/82 (03/09/24 1940)  SpO2: 95 % (03/09/24 2054) Vital Signs (24h Range):  Temp:  [97.8 °F (36.6 °C)] 97.8 °F (36.6 °C)  Pulse:  [85-95] 87  Resp:  [16-19] 16  SpO2:  [91 %-97 %] 95 %  BP: (158-222)/() 180/82     Weight: 126 kg (277 lb 12.5 oz)  Body mass index is 38.74 kg/m².     Physical Exam  Constitutional:       Appearance: Normal appearance.   HENT:      Head: Normocephalic and atraumatic.      Mouth/Throat:      Mouth: Mucous membranes are moist.      Pharynx: Oropharynx is clear.   Eyes:      General: No visual field deficit.     Extraocular Movements: Extraocular movements intact.      Pupils: Pupils are equal, round, and reactive to light.   Cardiovascular:      Rate and Rhythm: Normal rate and regular rhythm.      Pulses: Normal pulses.      Heart sounds: Normal heart sounds.   Left upper arm dialysis access has palpable pulse and auscultated thrill  Pulmonary:      Effort: Pulmonary effort is normal.      Breath sounds: Normal breath sounds.   Abdominal:      General: Bowel sounds are normal.      Palpations: Abdomen is soft.   Musculoskeletal:         General: Normal range of motion.      Cervical back: Normal range of motion and neck supple. No rigidity or tenderness.      Right lower leg: No edema.      Left lower leg: No edema.   Skin:     General: Skin is warm and dry.      Capillary Refill: Capillary refill takes less than 2 seconds.   Neurological:      General: No focal deficit present.      Mental Status: He is alert.      GCS: GCS eye subscore is 4. GCS verbal subscore is 5. GCS motor subscore is 6.      Cranial Nerves: Dysarthria present.      Sensory: No sensory deficit.      Motor: No weakness, tremor or pronator drift.   Psychiatric:         Mood and Affect: Mood normal.         Behavior: Behavior normal.         Thought  Content: Thought content normal.         Judgment: Judgment normal.              CRANIAL NERVES     CN III, IV, VI   Pupils are equal, round, and reactive to light.       Significant Labs: All pertinent labs within the past 24 hours have been reviewed.  A1C:   Recent Labs   Lab 01/26/24  0936 03/09/24  1410   HGBA1C 5.1 5.0     CBC:   Recent Labs   Lab 03/09/24  1410   WBC 9.67   HGB 11.6*   HCT 37.4*        CMP:   Recent Labs   Lab 03/09/24  1410      K 4.6   CL 95   CO2 35*      BUN 23*   CREATININE 10.1*   CALCIUM 11.2*   PROT 7.3   ALBUMIN 3.4*   BILITOT 0.4   ALKPHOS 56   AST 27   ALT 29   ANIONGAP 11     Coagulation:   Recent Labs   Lab 03/09/24  1410   INR 1.0   APTT 28.8     Lipid Panel:   Recent Labs   Lab 03/09/24  1410   CHOL 94*   HDL 29*   LDLCALC 53.2*   TRIG 59   CHOLHDL 30.9     TSH:   Recent Labs   Lab 03/09/24  1410   TSH 0.989       Significant Imaging: I have reviewed all pertinent imaging results/findings within the past 24 hours.  Narrative & Impression  EXAMINATION:  CT HEAD WITHOUT CONTRAST     CLINICAL HISTORY:  Neuro deficit, acute, stroke suspected;     TECHNIQUE:  Low dose axial images were obtained through the head.  Coronal and sagittal reformations were also performed. Contrast was not administered.     COMPARISON:  None.     FINDINGS:  Ventricles, sulci, fissures unremarkable appearance for the patient's age.  Subtle low density in white matter suggesting microvascular ischemic change.  There is no acute intracranial hemorrhage. There is no intracranial mass effect. There is no acute major vascular territory infarct. Note is made that MRI is typically more sensitive than CT particularly for detection of early or small nonhemorrhagic infarcts.The calvarium appears intact. Mastoids well pneumatized. Opacification in the sphenoid sinus suggesting combination of mucosal thickening and small amount of fluid     Impression:     No acute intracranial findings.  Findings  as detailed above including findings suggesting mild microvascular ischemic change.  Opacification in sphenoid sinus.        Electronically signed by: Coretta Jorge MD  Date:                                            03/09/2024  Time:                                           14:32    Narrative & Impression  EXAMINATION:  XR CHEST 1 VIEW     CLINICAL HISTORY:  tia;     TECHNIQUE:  Single frontal view of the chest was performed.     COMPARISON:  09/14/2018     FINDINGS:  Cardiac silhouette appears mildly enlarged.  Exaggerated however by the somewhat lordotic positioning and appearing just slightly if any larger than on the prior exam.  The lungs are clear of infiltrate.  There is no pleural effusion.     Impression:     No acute cardiopulmonary disease.        Electronically signed by: Coretta Jorge MD  Date:                                            03/09/2024  Time:                                           16:23                Narrative & Impression  EXAMINATION:  US CAROTID BILATERAL     CLINICAL HISTORY:  Velocities evaluation;     TECHNIQUE:  Grayscale and color Doppler ultrasound examination of the carotid and vertebral artery systems bilaterally.  Stenosis estimates are per the NASCET measurement criteria.     COMPARISON:  None.     FINDINGS:  Right:     Internal Carotid Artery (ICA) peak systolic velocity 79 cm/sec     ICA/CCA peak systolic velocity ratio: 1.1     Plaque formation: Appears mild     Vertebral artery: Antegrade flow and normal waveform.     Left:     Internal Carotid Artery (ICA)  peak systolic velocity 91 cm/sec     ICA/CCA peak systolic velocity ratio: 1.3     Plaque formation: Appears mild     Vertebral artery: Antegrade flow and normal waveform.     Impression:     No evidence of a hemodynamically significant carotid bifurcation stenosis.     Flow in vertebral arteries appears antegrade bilaterally.        Electronically signed by: Coretta Jorge MD  Date:                                             03/09/2024  Time:                                           16:59    Assessment/Plan:     * TIA (transient ischemic attack)  Patient endorses intractable headache for four days described as pressure in left frontal and entire back of head. Patient endorses that headache resolved prior to being transported to admit room.  Antithrombotics for secondary stroke prevention: Antiplatelets: Aspirin: 81 mg daily    Statins for secondary stroke prevention and hyperlipidemia, if present:   Statins: Atorvastatin- 40 mg daily    Aggressive risk factor modification: HTN, DM, HLD, Obesity     Rehab efforts: The patient has been evaluated by a stroke team provider and the therapy needs have been fully considered based off the presenting complaints and exam findings. The following therapy evaluations are needed: PT evaluate and treat, OT evaluate and treat, SLP evaluate and treat    Diagnostics ordered/pending: Carotid ultrasound to assess vasculature, CT scan of head without contrast to asses brain parenchyma, HgbA1C to assess blood glucose levels, Lipid Profile to assess cholesterol levels, MRA head to assess vasculature, MRA neck/arch to assess vasculature, MRI head without contrast to assess brain parenchyma, Other: ECHO with saline bubble    VTE prophylaxis: None: Reason for No Pharmacological VTE Prophylaxis: Uncontrolled arterial hypertension (SBP > 180 mmHg or DPB > 100 mmHg)    BP parameters: TIA: SBP <220 until imaging confirmation of no infarct     -neurology consulted        Hypertension associated with diabetes  Chronic, uncontrolled. Latest blood pressure and vitals reviewed-     Temp:  [97.8 °F (36.6 °C)]   Pulse:  [85-95]   Resp:  [16-19]   BP: (158-222)/()   SpO2:  [91 %-97 %] .   Home meds for hypertension were reviewed and noted below.   Hypertension Medications               amLODIPine (NORVASC) 10 MG tablet TAKE ONE TABLET BY MOUTH ONCE DAILY    carvediloL (COREG) 25 MG tablet Take 1 tablet  "(25 mg total) by mouth 2 (two) times daily with meals.    furosemide (LASIX) 40 MG tablet Take 1 tablet (40 mg total) by mouth once daily.    hydrALAZINE (APRESOLINE) 50 MG tablet Take 1 tablet (50 mg total) by mouth every 12 (twelve) hours.    olmesartan-hydrochlorothiazide (BENICAR HCT) 40-25 mg per tablet Take 1 tablet by mouth once daily.            While in the hospital, will manage blood pressure as follows; Adjust home antihypertensive regimen as follows- Home medications on hold and permissive HTN in place pending MRI results    Will utilize p.r.n. blood pressure medication labetol 10mg  only if patient's blood pressure greater than 220/110 and he develops symptoms such as worsening chest pain or shortness of breath.       Anemia in end-stage renal disease  Patient's anemia is currently controlled. Has not received any PRBCs to date. Etiology likely d/t chronic disease due to ESRD  Current CBC reviewed-   Lab Results   Component Value Date    HGB 11.6 (L) 03/09/2024    HCT 37.4 (L) 03/09/2024     Monitor serial CBC and transfuse if patient becomes hemodynamically unstable, symptomatic or H/H drops below 7/21.    Type 2 diabetes mellitus with chronic kidney disease on chronic dialysis, with long-term current use of insulin  Patient's FSGs are controlled on current medication regimen.  Last A1c reviewed-   Lab Results   Component Value Date    HGBA1C 5.0 03/09/2024     Most recent fingerstick glucose reviewed- No results for input(s): "POCTGLUCOSE" in the last 24 hours.  Current correctional scale  Medium  Decrease anti-hyperglycemic dose as follows-   Antihyperglycemics (From admission, onward)      Start     Stop Route Frequency Ordered    03/09/24 2100  insulin detemir U-100 (Levemir) pen 23 Units         -- SubQ 2 times daily 03/09/24 1727    03/09/24 1727  insulin aspart U-100 pen 0-10 Units         -- SubQ Before meals & nightly PRN 03/09/24 1727          Hold Oral hypoglycemics while patient is in the " hospital.           ESRD on hemodialysis  Creatine stable for now. BMP reviewed- noted Estimated Creatinine Clearance: 12.4 mL/min (A) (based on SCr of 10.1 mg/dL (H)). according to latest data. Based on current GFR, CKD stage is stage 5 - GFR < 15.  Monitor UOP and serial BMP and adjust therapy as needed. Renally dose meds. Avoid nephrotoxic medications and procedures.    Nephrology consulted and made following recommendations:  NBP seem uncontrolled. HD with UF 3/9/24  Tolerate asymptomatic HTN up to -160.  Continue home meds.  Low sodium diet.        Morbid obesity with BMI of 40.0-44.9, adult  Body mass index is 38.74 kg/m². Morbid obesity complicates all aspects of disease management from diagnostic modalities to treatment. Weight loss encouraged and health benefits explained to patient.           VTE Risk Mitigation (From admission, onward)           Ordered     IP VTE HIGH RISK PATIENT  Once         03/09/24 1727     Place sequential compression device  Until discontinued         03/09/24 1727                         On 03/09/2024, patient should be placed in hospital observation services under my care in collaboration with Dr. Xavier Cummings MD.      HD completed  UF 2 liters       03/09/24 1940   Handoff Report   Received From [facility]   Given To [place]   Vital Signs   Pulse 87   BP (!) 180/82   During Hemodialysis Assessment   Blood Flow Rate (mL/min) 400 mL/min   Dialysate Flow Rate (mL/min) 800 ml/min   Ultrafiltration Rate (mL/Hr) 1210 mL/Hr   Arteriovenous Lines Secure Yes   Arterial Pressure (mmHg) -230 mmHg   Venous Pressure (mmHg) 200   UF Removed (mL) 2500 mL   TMP 30   Venous Line in Air Detector Yes   Intake (mL) 250 mL   Transducer Dry Yes   Access Visible Yes    notified of access issue? N/A   Heparin given? N/A   Intra-Hemodialysis Comments HD completed   Post-Hemodialysis Assessment   Rinseback Volume (mL) 250 mL   Blood Volume Processed (Liters) 42.3 L   Dialyzer Clearance Clear    Duration of Treatment 120 minutes   Additional Fluid Intake (mL) 500 mL   Total UF (mL) 2500 mL   Net Fluid Removal 2000   Patient Response to Treatment elijah well   Post-Treatment Weight 126 kg (277 lb 12.5 oz)   Treatment Weight Change -1.2   Arterial bleeding stop time (min) 5 min   Venous bleeding stop time (min) 5 min   Post-Hemodialysis Comments HD completedc         Jenny Nation NP  Department of Hospital Medicine  [facility]/Surg

## 2024-03-10 NOTE — SUBJECTIVE & OBJECTIVE
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 CATH LAB;  Service: Cardiology;  Laterality: N/A;  SVT, RFA, ARGELIA, MAC (light), MS, 3 Prep    DIALYSIS FISTULA CREATION      KNEE ARTHROPLASTY      SHOULDER SURGERY      right    VASECTOMY         Review of patient's allergies indicates:  No Known Allergies    No current facility-administered medications on file prior to encounter.     Current Outpatient Medications on File Prior to Encounter   Medication Sig    allopurinoL (ZYLOPRIM) 100 MG tablet Take 1 tablet (100 mg total) by mouth once daily.    amLODIPine (NORVASC) 10 MG tablet TAKE ONE TABLET BY MOUTH ONCE DAILY (Patient taking differently: Take 10 mg by mouth once daily.)    carvediloL (COREG) 25 MG tablet Take 1 tablet (25 mg total) by mouth 2 (two) times daily with meals.    dulaglutide (TRULICITY) 1.5 mg/0.5 mL pen injector Inject 1.5 mg into the skin every 7 days. (Patient taking differently: Inject 1.5 mg into the skin every 7 days. Saturdays)    furosemide (LASIX) 40 MG tablet Take 1 tablet (40 mg total) by mouth once daily. (Patient taking differently: Take 40 mg by mouth 2 (two) times a day.)    hydrALAZINE (APRESOLINE) 50 MG tablet Take 1 tablet (50 mg total) by mouth every 12 (twelve) hours.    insulin (LANTUS SOLOSTAR U-100 INSULIN) glargine  "100 units/mL SubQ pen Inject 45 Units into the skin 2 (two) times a day. (Patient taking differently: Inject 30 Units into the skin 2 (two) times a day.)    insulin lispro (HUMALOG KWIKPEN INSULIN) 100 unit/mL pen Inject 45 Units into the skin 3 (three) times daily before meals. (Patient taking differently: Inject 30 Units into the skin 3 (three) times daily before meals.)    olmesartan-hydrochlorothiazide (BENICAR HCT) 40-25 mg per tablet Take 1 tablet by mouth once daily. (Patient taking differently: Take 1 tablet by mouth every evening.)    rosuvastatin (CRESTOR) 10 MG tablet Take 1 tablet (10 mg total) by mouth once daily.    sucroferric oxyhydroxide (VELPHORO) 500 mg Chew Chew 1 tablet with each meal three times daily (Patient taking differently: Take 1 each by mouth 3 (three) times daily with meals.)    blood sugar diagnostic Strp Use one strip each time to check blood sugar three times daily.    blood-glucose meter Misc use as directed    HYDROmorphone (DILAUDID) 4 MG tablet Take one tablet by mouth every 6 hours as needed. (Patient not taking: Reported on 3/9/2024)    lancets (ACCU-CHEK SOFTCLIX LANCETS) Misc 300 each by Misc.(Non-Drug; Combo Route) route 3 (three) times daily.    papaverine 30 mg/mL injection Papaverine 60mg  Add: Phentolamine 2 mg  Add: PGE1 20 mcg  (Double Strength)    Sig:  Inject 30 units as directed; titrate up by 5 units until desired results (Patient not taking: Reported on 3/9/2024)    paroxetine (PAXIL) 20 MG tablet Take 1 tablet (20 mg total) by mouth once daily. (Patient not taking: Reported on 3/9/2024)    pen needle, diabetic (BD ULTRA-FINE MAIKOL PEN NEEDLE) 32 gauge x 5/32" Ndle To be used with insulin twice daily.    syringe-needle,safety,disp unt 1 mL 27 gauge x 1/2" Syrg Use every 2 weeks for testosterone injection    testosterone cypionate (DEPOTESTOTERONE CYPIONATE) 200 mg/mL injection Inject 0.5 mLs (100 mg total) into the muscle every 14 (fourteen) days. (Patient not " taking: Reported on 3/9/2024)    [DISCONTINUED] cyclobenzaprine (FLEXERIL) 10 MG tablet Take 1 tablet (10 mg total) by mouth 3 (three) times daily.    [DISCONTINUED] paroxetine (PAXIL) 20 MG tablet take 1 tablet by mouth once daily     Family History       Problem Relation (Age of Onset)    Diabetes Mother    Eczema Sister    Heart disease Maternal Grandfather    Heart failure Mother    Hypertension Mother, Father    Kidney disease Mother, Paternal Uncle, Paternal Uncle    No Known Problems Son, Sister, Son, Son          Tobacco Use    Smoking status: Former     Types: Cigars    Smokeless tobacco: Never    Tobacco comments:     rare   Substance and Sexual Activity    Alcohol use: No    Drug use: No    Sexual activity: Yes     Partners: Female     Birth control/protection: None     Review of Systems   Eyes:  Negative for visual disturbance.   Respiratory:  Negative for chest tightness, shortness of breath and wheezing.    Cardiovascular:  Negative for chest pain, palpitations and leg swelling.   Gastrointestinal:  Negative for abdominal distention, abdominal pain, nausea and vomiting.   Neurological:  Positive for speech difficulty and headaches.        Endorses headache for four days left frontal and across back of head   Psychiatric/Behavioral:          Patient endorses forgetfulness for months. Can describe one event in where he was driving car with son and forgot where he was going.     Patient endorses stuttering for 2-3 weeks   All other systems reviewed and are negative.    Objective:     Vital Signs (Most Recent):  Temp: 97.8 °F (36.6 °C) (03/09/24 1306)  Pulse: 87 (03/09/24 1940)  Resp: 16 (03/09/24 1725)  BP: (!) 180/82 (03/09/24 1940)  SpO2: 95 % (03/09/24 2054) Vital Signs (24h Range):  Temp:  [97.8 °F (36.6 °C)] 97.8 °F (36.6 °C)  Pulse:  [85-95] 87  Resp:  [16-19] 16  SpO2:  [91 %-97 %] 95 %  BP: (158-222)/() 180/82     Weight: 126 kg (277 lb 12.5 oz)  Body mass index is 38.74 kg/m².     Physical  Exam  Constitutional:       Appearance: Normal appearance.   HENT:      Head: Normocephalic and atraumatic.      Mouth/Throat:      Mouth: Mucous membranes are moist.      Pharynx: Oropharynx is clear.   Eyes:      General: No visual field deficit.     Extraocular Movements: Extraocular movements intact.      Pupils: Pupils are equal, round, and reactive to light.   Cardiovascular:      Rate and Rhythm: Normal rate and regular rhythm.      Pulses: Normal pulses.      Heart sounds: Normal heart sounds.   Pulmonary:      Effort: Pulmonary effort is normal.      Breath sounds: Normal breath sounds.   Abdominal:      General: Bowel sounds are normal.      Palpations: Abdomen is soft.   Musculoskeletal:         General: Normal range of motion.      Cervical back: Normal range of motion and neck supple. No rigidity or tenderness.      Right lower leg: No edema.      Left lower leg: No edema.   Skin:     General: Skin is warm and dry.      Capillary Refill: Capillary refill takes less than 2 seconds.   Neurological:      General: No focal deficit present.      Mental Status: He is alert.      GCS: GCS eye subscore is 4. GCS verbal subscore is 5. GCS motor subscore is 6.      Cranial Nerves: Dysarthria present.      Sensory: No sensory deficit.      Motor: No weakness, tremor or pronator drift.   Psychiatric:         Mood and Affect: Mood normal.         Behavior: Behavior normal.         Thought Content: Thought content normal.         Judgment: Judgment normal.              CRANIAL NERVES     CN III, IV, VI   Pupils are equal, round, and reactive to light.       Significant Labs: All pertinent labs within the past 24 hours have been reviewed.  A1C:   Recent Labs   Lab 01/26/24  0936 03/09/24  1410   HGBA1C 5.1 5.0     CBC:   Recent Labs   Lab 03/09/24  1410   WBC 9.67   HGB 11.6*   HCT 37.4*        CMP:   Recent Labs   Lab 03/09/24  1410      K 4.6   CL 95   CO2 35*      BUN 23*   CREATININE 10.1*    CALCIUM 11.2*   PROT 7.3   ALBUMIN 3.4*   BILITOT 0.4   ALKPHOS 56   AST 27   ALT 29   ANIONGAP 11     Coagulation:   Recent Labs   Lab 03/09/24  1410   INR 1.0   APTT 28.8     Lipid Panel:   Recent Labs   Lab 03/09/24  1410   CHOL 94*   HDL 29*   LDLCALC 53.2*   TRIG 59   CHOLHDL 30.9     TSH:   Recent Labs   Lab 03/09/24  1410   TSH 0.989       Significant Imaging: I have reviewed all pertinent imaging results/findings within the past 24 hours.  Narrative & Impression  EXAMINATION:  CT HEAD WITHOUT CONTRAST     CLINICAL HISTORY:  Neuro deficit, acute, stroke suspected;     TECHNIQUE:  Low dose axial images were obtained through the head.  Coronal and sagittal reformations were also performed. Contrast was not administered.     COMPARISON:  None.     FINDINGS:  Ventricles, sulci, fissures unremarkable appearance for the patient's age.  Subtle low density in white matter suggesting microvascular ischemic change.  There is no acute intracranial hemorrhage. There is no intracranial mass effect. There is no acute major vascular territory infarct. Note is made that MRI is typically more sensitive than CT particularly for detection of early or small nonhemorrhagic infarcts.The calvarium appears intact. Mastoids well pneumatized. Opacification in the sphenoid sinus suggesting combination of mucosal thickening and small amount of fluid     Impression:     No acute intracranial findings.  Findings as detailed above including findings suggesting mild microvascular ischemic change.  Opacification in sphenoid sinus.        Electronically signed by: Coretta Jorge MD  Date:                                            03/09/2024  Time:                                           14:32    Narrative & Impression  EXAMINATION:  XR CHEST 1 VIEW     CLINICAL HISTORY:  tia;     TECHNIQUE:  Single frontal view of the chest was performed.     COMPARISON:  09/14/2018     FINDINGS:  Cardiac silhouette appears mildly enlarged.  Exaggerated  however by the somewhat lordotic positioning and appearing just slightly if any larger than on the prior exam.  The lungs are clear of infiltrate.  There is no pleural effusion.     Impression:     No acute cardiopulmonary disease.        Electronically signed by: Coretta Jorge MD  Date:                                            03/09/2024  Time:                                           16:23                Narrative & Impression  EXAMINATION:  US CAROTID BILATERAL     CLINICAL HISTORY:  Velocities evaluation;     TECHNIQUE:  Grayscale and color Doppler ultrasound examination of the carotid and vertebral artery systems bilaterally.  Stenosis estimates are per the NASCET measurement criteria.     COMPARISON:  None.     FINDINGS:  Right:     Internal Carotid Artery (ICA) peak systolic velocity 79 cm/sec     ICA/CCA peak systolic velocity ratio: 1.1     Plaque formation: Appears mild     Vertebral artery: Antegrade flow and normal waveform.     Left:     Internal Carotid Artery (ICA)  peak systolic velocity 91 cm/sec     ICA/CCA peak systolic velocity ratio: 1.3     Plaque formation: Appears mild     Vertebral artery: Antegrade flow and normal waveform.     Impression:     No evidence of a hemodynamically significant carotid bifurcation stenosis.     Flow in vertebral arteries appears antegrade bilaterally.        Electronically signed by: Coretta Jorge MD  Date:                                            03/09/2024  Time:                                           16:59

## 2024-03-10 NOTE — PT/OT/SLP EVAL
Occupational Therapy   Evaluation    Name: Thai Santoyo Jr.  MRN: [id number]  Admitting Diagnosis: TIA (transient ischemic attack)  Recent Surgery: * No surgery found *      Recommendations:     Discharge Recommendations:    Discharge Equipment Recommendations:  none  Barriers to discharge:   none    Assessment:     Thai Santoyo Jr. is a 48 y.o. male with a medical diagnosis of TIA (transient ischemic attack).  He presents with no noted deficits for ADL performance. Performance deficits affecting function:  (no deficits for functional independence with ADL noted).      Rehab Prognosis:  n/a ; patient would benefit from acute skilled OT services to address these deficits and reach maximum level of function.       Plan:     Patient to be seen   to address the above listed problems via    Plan of Care Expires: 03/10/24  Plan of Care Reviewed with: patient (loved ones present)    Subjective     Chief Complaint: wants to go home  Patient/Family Comments/goals: return home with prior living environment, roles, etc.    Occupational Profile:  Living Environment: 1 story house  Previous level of function: independent with all ADL, driving, working part time  Roles and Routines: some housework; self care tasks  Equipment Used at Home: none  Assistance upon Discharge: family    Pain/Comfort:  Pain Rating 1: 0/10    Patients cultural, spiritual, Latter day conflicts given the current situation: no    Objective:     Communicated with: nurse jeong prior to session.  Patient found HOB elevated with peripheral IV upon OT entry to room.    General Precautions: Standard,    Orthopedic Precautions:  no  Braces:  no  Respiratory Status: Room air    Occupational Performance:    Bed Mobility:    Patient completed Rolling/Turning to Left with  modified independence  Patient completed Supine to Sit with modified independence    Functional Mobility/Transfers:  Patient completed Sit <> Stand Transfer with modified independence  with  no  assistive device   Functional Mobility: not tested    Activities of Daily Living:  Grooming: modified independence required  Upper Body Dressing: modified independence required  Lower Body Dressing: modified independence required  Toileting: modified independence required    Cognitive/Visual Perceptual:  Cognitive/Psychosocial Skills:     -       Oriented to: Person, Place, Time, and Situation   -       Follows Commands/attention:Follows one-step commands  -       Communication: clear/fluent  -       Memory: No Deficits noted  -       Safety awareness/insight to disability: intact   -       Mood/Affect/Coping skills/emotional control: Appropriate to situation  Visual/Perceptual:      -Intact seems to be the case plus says has no visual limitations/issues    Physical Exam:  Balance:    -       good sit static, dynamic; good stand static, dynamic  Postural examination/scapula alignment:    -       Rounded shoulders  Skin integrity: Visible skin intact  Edema:  None noted  Sensation:    -       Intact  Motor Planning:    -       wnl  Dominant hand:    -       right  Upper Extremity Range of Motion:     -       Right Upper Extremity: WNL  -       Left Upper Extremity: WNL  Upper Extremity Strength:    -       Right Upper Extremity: WNL  -       Left Upper Extremity: WNL  Fine Motor Coordination:    -       Intact  Gross motor coordination:   WFL    AMPAC 6 Click ADL:  AMPAC Total Score: 24    Treatment & Education:  Role of OT, explained continued OT nonessential    Patient left sitting edge of bed with all lines intact, call button in reach, and bed alarm off with loved ones present    GOALS:   Multidisciplinary Problems       Occupational Therapy Goals          Problem: Occupational Therapy    Goal Priority Disciplines Outcome Interventions   Occupational Therapy Goal     OT, PT/OT     Description: No goals needed                       History:     Past Medical History:   Diagnosis Date    Acute gouty arthropathy  8/12/2013    Arthritis     Chronic kidney disease, stage IV (severe) 1/13/2011    Diabetes mellitus type II     Dialysis patient     DM (diabetes mellitus) type II uncontrolled with renal manifestation 8/12/2013    ESRD on hemodialysis 8/12/2013    MWF    Hypertension 9/12/2012    Line sepsis 8/15/2013    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 associated with type 2 diabetes mellitus 5/21/2018    Morbid obesity 9/12/2012    SVT (supraventricular tachycardia)          Past Surgical History:   Procedure Laterality Date    ABLATION N/A 10/5/2018    Procedure: ABLATION;  Surgeon: Ayden Arana MD;  Location: [facility] CATH LAB;  Service: Cardiology;  Laterality: N/A;  SVT, RFA, ARGELIA, MAC (light), MT, 3 Prep    DIALYSIS FISTULA CREATION      KNEE ARTHROPLASTY      SHOULDER SURGERY      right    VASECTOMY         Time Tracking:     OT Date of Treatment: 03/10/24  OT Start Time: 0850  OT Stop Time: 0905  OT Total Time (min): 15 min    Billable Minutes:Evaluation 15    3/10/2024

## 2024-03-10 NOTE — PROGRESS NOTES
HD completed  UF 2 Liters     03/09/24 1940   Handoff Report   Received From Aron   Given To Zeny   Vital Signs   Pulse 87   BP (!) 180/82   During Hemodialysis Assessment   Blood Flow Rate (mL/min) 400 mL/min   Dialysate Flow Rate (mL/min) 800 ml/min   Ultrafiltration Rate (mL/Hr) 1210 mL/Hr   Arteriovenous Lines Secure Yes   Arterial Pressure (mmHg) -230 mmHg   Venous Pressure (mmHg) 200   UF Removed (mL) 2500 mL   TMP 30   Venous Line in Air Detector Yes   Intake (mL) 250 mL   Transducer Dry Yes   Access Visible Yes    notified of access issue? N/A   Heparin given? N/A   Intra-Hemodialysis Comments HD completed   Post-Hemodialysis Assessment   Rinseback Volume (mL) 250 mL   Blood Volume Processed (Liters) 42.3 L   Dialyzer Clearance Clear   Duration of Treatment 120 minutes   Additional Fluid Intake (mL) 500 mL   Total UF (mL) 2500 mL   Net Fluid Removal 2000   Patient Response to Treatment elijah well   Post-Treatment Weight 126 kg (277 lb 12.5 oz)   Treatment Weight Change -1.2   Arterial bleeding stop time (min) 5 min   Venous bleeding stop time (min) 5 min   Post-Hemodialysis Comments HD completedc

## 2024-03-10 NOTE — HOSPITAL COURSE
Thai Santoyo Jr. Was monitored closely during his hospitalization.  CT head performed in ED negative for intracranial pathology and he was placed on the stroke pathway.  Nephrology was consulted for continuation of his chronic dialysis.  Neurology service was consulted regarding TIA symptoms. He underwent US bilateral carotid arteries and revealed no acute findings.  MRI obtained and revealed White matter findings which commonly represent microvascular ischemic change but appear more numerous than typically seen with the patient's age and some have appearances suggest demyelinating process/multiple sclerosis. Neurology recommended MRI with contrast to further evaluate. MRI brain with contrast no abnormal intracranial enhancement. Pt symptoms resolved and he felt well and desired to go home.  He underwent hemodialysis on day of discharge and tolerated well.  Patient's blood pressure was monitored and remained elevated and he was discharged with prescription for clonidine p.r.n..  Patient verbalized understanding of discharge instructions and return precautions.    PE:  AAO x 3, pleasant, NAD  Heart-regular rate rhythm, no edema   Lungs-Clear to auscultation bilaterally, respirations even unlabored   Abdomen-soft nontender normoactive bowel sounds   Neuro-no focal deficits, moving all extremities well

## 2024-03-10 NOTE — PLAN OF CARE
03/10/24 1503   GREER Message   Medicare Outpatient and Observation Notification regarding financial responsibility Given to patient/caregiver;Explained to patient/caregiver;Signed/date by patient/caregiver   Date GREER was signed 03/10/24   Time GREER was signed 1120

## 2024-03-10 NOTE — ASSESSMENT & PLAN NOTE
Chronic, uncontrolled. Latest blood pressure and vitals reviewed-     Temp:  [97.8 °F (36.6 °C)-98.6 °F (37 °C)]   Pulse:  [85-97]   Resp:  [16-19]   BP: (158-222)/()   SpO2:  [91 %-98 %] .   Home meds for hypertension were reviewed and noted below.   Hypertension Medications               amLODIPine (NORVASC) 10 MG tablet TAKE ONE TABLET BY MOUTH ONCE DAILY    carvediloL (COREG) 25 MG tablet Take 1 tablet (25 mg total) by mouth 2 (two) times daily with meals.    furosemide (LASIX) 40 MG tablet Take 1 tablet (40 mg total) by mouth once daily.    hydrALAZINE (APRESOLINE) 50 MG tablet Take 1 tablet (50 mg total) by mouth every 12 (twelve) hours.    olmesartan-hydrochlorothiazide (BENICAR HCT) 40-25 mg per tablet Take 1 tablet by mouth once daily.            While in the hospital, will manage blood pressure as follows; Adjust home antihypertensive regimen as follows- Home medications on hold and permissive HTN in place pending MRI results    Will utilize p.r.n. blood pressure medication labetol 10mg  only if patient's blood pressure greater than 220/110 and he develops symptoms such as worsening chest pain or shortness of breath.

## 2024-03-10 NOTE — PLAN OF CARE
Problem: Adult Inpatient Plan of Care  Goal: Plan of Care Review  Outcome: Ongoing, Progressing     Problem: Diabetes Comorbidity  Goal: Blood Glucose Level Within Targeted Range  Outcome: Ongoing, Progressing     Problem: Fall Injury Risk  Goal: Absence of Fall and Fall-Related Injury  Outcome: Ongoing, Progressing     Problem: Cognitive Impairment (Stroke, Ischemic/Transient Ischemic Attack)  Goal: Optimal Cognitive Function  Outcome: Ongoing, Progressing     Problem: Adjustment to Illness (Stroke, Ischemic/Transient Ischemic Attack)  Goal: Optimal Coping  Outcome: Ongoing, Progressing

## 2024-03-10 NOTE — PROGRESS NOTES
Nephrology Progress Note        Patient Name: Thai Santoyo Jr.  MRN: [id number]    Patient Class: OP- Observation   Admission Date: 3/9/2024  Length of Stay: 0 days  Date of Service: 3/10/2024    Attending Physician: Xavier Cummings MD  Primary Care Provider: Manjit Mckee II, MD    Reason for Consult: esrd    SUBJECTIVE:     HPI: 48M with ESRD on HD MWF presents with headache for 4 days. Past medical history significant for gout, diabetes and hypertension. He reports no chest pain or shortness of breath. He reports stuttering and difficulty finding words. Denies weakness. Denies dizziness. Denies visual changes. Denies gait abnormalities. Reports recent increase in stress. States mother has recently been diagnosed with multiple strokes.     3/10  2L off w/HD yesterday.  Denies HA, dizziness, numbness/tingling.  To have MRI/MRA today.    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 5/21/2018    Morbid obesity 9/12/2012    SVT (supraventricular tachycardia)      Past Surgical History:   Procedure Laterality Date    ABLATION N/A 10/5/2018    Procedure: ABLATION;  Surgeon: Ayden Arana MD;  Location: [facility] CATH LAB;  Service: Cardiology;  Laterality: N/A;  SVT, RFA, ARGELIA, MAC (light), [place], 3 Prep    DIALYSIS FISTULA CREATION      KNEE ARTHROPLASTY      SHOULDER SURGERY      right    VASECTOMY       Family History   Problem Relation Age of Onset    Diabetes Mother     Kidney disease Mother         on dialysis    Hypertension Mother     Heart failure Mother     Hypertension Father      Kidney disease Paternal Uncle         dialysis    Kidney disease Paternal Uncle         dialysis    Eczema Sister     No Known Problems Son     No Known Problems Sister     No Known Problems Son     No Known Problems Son     Heart disease Maternal Grandfather     Melanoma Neg Hx     Psoriasis Neg Hx     Lupus Neg Hx     Acne Neg Hx      Social History     Tobacco Use    Smoking status: Former     Types: Cigars    Smokeless tobacco: Never    Tobacco comments:     rare   Substance Use Topics    Alcohol use: No    Drug use: No       Review of patient's allergies indicates:  No Known Allergies    Outpatient meds:  No current facility-administered medications on file prior to encounter.     Current Outpatient Medications on File Prior to Encounter   Medication Sig Dispense Refill    allopurinoL (ZYLOPRIM) 100 MG tablet Take 1 tablet (100 mg total) by mouth once daily. 90 tablet 3    amLODIPine (NORVASC) 10 MG tablet TAKE ONE TABLET BY MOUTH ONCE DAILY (Patient taking differently: Take 10 mg by mouth once daily.) 90 tablet 3    carvediloL (COREG) 25 MG tablet Take 1 tablet (25 mg total) by mouth 2 (two) times daily with meals. 180 tablet 3    dulaglutide (TRULICITY) 1.5 mg/0.5 mL pen injector Inject 1.5 mg into the skin every 7 days. (Patient taking differently: Inject 1.5 mg into the skin every 7 days. Saturdays) 12 pen 1    furosemide (LASIX) 40 MG tablet Take 1 tablet (40 mg total) by mouth once daily. (Patient taking differently: Take 40 mg by mouth 2 (two) times a day.) 90 tablet 3    hydrALAZINE (APRESOLINE) 50 MG tablet Take 1 tablet (50 mg total) by mouth every 12 (twelve) hours. 180 tablet 3    insulin (LANTUS SOLOSTAR U-100 INSULIN) glargine 100 units/mL SubQ pen Inject 45 Units into the skin 2 (two) times a day. (Patient taking differently: Inject 30 Units into the skin 2 (two) times a day.) 90 mL 0    insulin lispro (HUMALOG KWIKPEN INSULIN) 100 unit/mL pen Inject 45 Units into the skin 3 (three) times  "daily before meals. (Patient taking differently: Inject 30 Units into the skin 3 (three) times daily before meals.) 120 mL 1    olmesartan-hydrochlorothiazide (BENICAR HCT) 40-25 mg per tablet Take 1 tablet by mouth once daily. (Patient taking differently: Take 1 tablet by mouth every evening.) 90 tablet 3    rosuvastatin (CRESTOR) 10 MG tablet Take 1 tablet (10 mg total) by mouth once daily. 90 tablet 0    sucroferric oxyhydroxide (VELPHORO) 500 mg Chew Chew 1 tablet with each meal three times daily (Patient taking differently: Take 1 each by mouth 3 (three) times daily with meals.) 90 tablet 11    blood sugar diagnostic Strp Use one strip each time to check blood sugar three times daily. 300 strip 4    blood-glucose meter Misc use as directed 1 each 0    HYDROmorphone (DILAUDID) 4 MG tablet Take one tablet by mouth every 6 hours as needed. (Patient not taking: Reported on 3/9/2024) 120 tablet 0    lancets (ACCU-CHEK SOFTCLIX LANCETS) Misc 300 each by Misc.(Non-Drug; Combo Route) route 3 (three) times daily. 300 each 4    papaverine 30 mg/mL injection Papaverine 60mg  Add: Phentolamine 2 mg  Add: PGE1 20 mcg  (Double Strength)    Sig:  Inject 30 units as directed; titrate up by 5 units until desired results (Patient not taking: Reported on 3/9/2024) 10 mL 12    paroxetine (PAXIL) 20 MG tablet Take 1 tablet (20 mg total) by mouth once daily. (Patient not taking: Reported on 3/9/2024) 30 tablet 0    pen needle, diabetic (BD ULTRA-FINE MAIKOL PEN NEEDLE) 32 gauge x 5/32" Ndle To be used with insulin twice daily. 200 each 3    syringe-needle,safety,disp unt 1 mL 27 gauge x 1/2" Syrg Use every 2 weeks for testosterone injection 100 Syringe 0    testosterone cypionate (DEPOTESTOTERONE CYPIONATE) 200 mg/mL injection Inject 0.5 mLs (100 mg total) into the muscle every 14 (fourteen) days. (Patient not taking: Reported on 3/9/2024) 10 mL 3       Scheduled meds:      Infusions:      PRN meds:      Review of " Systems:  Constitutional:  Negative for chills, fever, malaise/fatigue and weight loss.   HENT:  Negative for hearing loss and nosebleeds.    Eyes:  Negative for blurred vision, double vision and photophobia.   Respiratory:  Negative for cough, shortness of breath and wheezing.    Cardiovascular:  Negative for chest pain, palpitations and leg swelling.   Gastrointestinal:  Negative for abdominal pain, constipation, diarrhea, heartburn, nausea and vomiting.   Genitourinary:  Negative for dysuria, frequency and urgency.   Musculoskeletal:  Negative for falls, joint pain and myalgias.   Skin:  Negative for itching and rash.   Neurological:  Negative for dizziness, speech change, focal weakness, loss of consciousness and headaches.   Endo/Heme/Allergies:  Does not bruise/bleed easily.   Psychiatric/Behavioral:  Negative for depression and substance abuse. The patient is not nervous/anxious.      OBJECTIVE:     Vital Signs and IO:  Temp:  [97.8 °F (36.6 °C)-98.6 °F (37 °C)]   Pulse:  [85-97]   Resp:  [16-19]   BP: (158-222)/()   SpO2:  [91 %-98 %]   I/O last 3 completed shifts:  In: 1100 [P.O.:600; Other:500]  Out: 2515 [Urine:15; Other:2500]  Wt Readings from Last 5 Encounters:   03/10/24 126.1 kg (278 lb)   01/26/24 134 kg (295 lb 6.7 oz)   09/07/23 (!) 136.9 kg (301 lb 13 oz)   09/07/23 135 kg (297 lb 9.9 oz)   06/13/23 136 kg (299 lb 13.2 oz)     Body mass index is 38.77 kg/m².    Physical Exam  Constitutional:       General: Patient is not in acute distress.     Appearance: Patient is well-developed. She is not diaphoretic.   HENT:      Head: Normocephalic and atraumatic.      Mouth/Throat: Mucous membranes are moist.   Eyes:      General: No scleral icterus.     Pupils: Pupils are equal, round, and reactive to light.   Cardiovascular:      Rate and Rhythm: Normal rate and regular rhythm.   Pulmonary:      Effort: Pulmonary effort is normal. No respiratory distress.      Breath sounds: No stridor.   Abdominal:  "     General: There is no distension.      Palpations: Abdomen is soft.   Musculoskeletal:         General: No deformity. Normal range of motion.      Cervical back: Neck supple.   Skin:     General: Skin is warm and dry.      Findings: No rash present. No erythema.   Neurological:      Mental Status: Patient is alert and oriented to person, place, and time.      Cranial Nerves: No cranial nerve deficit.   Psychiatric:         Behavior: Behavior normal.     Laboratory:  Recent Labs   Lab 03/09/24  1410 03/10/24  0518    139   K 4.6 4.8   CL 95 100   CO2 35* 29   BUN 23* 24*   CREATININE 10.1* 9.5*    81         Recent Labs   Lab 03/09/24  1410 03/10/24  0518   CALCIUM 11.2* 10.7*   ALBUMIN 3.4* 3.5   PHOS  --  5.9*   MG  --  2.0               Recent Labs   Lab 03/09/24 2057 03/09/24  2137 03/10/24  0714   POCTGLUCOSE 82 99 69*       Recent Labs   Lab 09/07/23  1236 01/26/24  0936 03/09/24  1410   Hemoglobin A1C 5.5 5.1 5.0         Recent Labs   Lab 03/09/24  1410 03/10/24  0519   WBC 9.67 9.86   HGB 11.6* 11.7*   HCT 37.4* 37.3*    193   MCV 94 91   MCHC 31.0* 31.4*   MONO 11.9  1.2* 11.2  1.1*   EOSINOPHIL 14.2* 15.0*         Recent Labs   Lab 03/09/24  1410 03/10/24  0518   BILITOT 0.4 0.5   PROT 7.3 7.5   ALBUMIN 3.4* 3.5   ALKPHOS 56 60   ALT 29 26   AST 27 23               Invalid input(s): "LEUCOCYTESUR"          Microbiology Results (last 7 days)       ** No results found for the last 168 hours. **            ASSESSMENT/PLAN:     ESRD on HD MWF via AVF  Next HD per schedule.  Continue current dialysis prescription.  Renal diet - low K, low phos.  No IVs or BP checks on access and/or non-dominant arm.    Anemia of CKD  Hgb and HCT are acceptable. Monitor for now.  Will provide SABIHA and/or IV iron PRN.    MBD / Secondary HPT  Ca, Phos, PTH and vitamin D levels are acceptable.   Phos binders, vitamin D and analogues, calcimimetics will be given as needed.    HTN urgency with headaches  BP " seem uncontrolled. HD with UF today.  Tolerate asymptomatic HTN up to -160.  Continue home meds.  Low sodium diet.    Thank you for allowing us to participate in the care of your patient!   We will follow the patient and provide recommendations as needed.    Patient care time was spent personally by me on the following activities:     Obtaining a history.  Examination of patient.  Providing medical care at the patients bedside.  Developing a treatment plan with patient or surrogate and bedside caregivers.  Ordering and reviewing laboratory studies, radiographic studies, pulse oximetry.  Ordering and performing treatments and interventions.  Evaluation of patient's response to treatment.  Discussions with consultants while on the unit and immediately available to the patient.  Re-evaluation of the patient's condition.  Documentation in the medical record.     Abbie Park NP      [facility]  [address] [place] [address]  JO[place] [id number]    [phone] - tel  [phone] - fax    3/10/2024

## 2024-03-10 NOTE — HPI
Thai Santoyo Jr. Is a 49 year old male with a previous medical history of ESRD on hemodialysis MWF, DMII, HTN, morbid obesity, SVT, gout and arthritis who presents to the emergency room for an intractable headache for four days, stuttering for 2-3 weeks and forgetfulness for months. Patient denies any associated adverse symptoms such as visual disturbance, neck pain or rigidity, fevers, chills photophobia, nausea, vomiting, chest pain, or shortness of breath. Headache pain is described as pressure in left frontal area and across entire back of head that was not eased or worsened by anything. During admit evaluation patient reports that headache had spontaneously resolved prior to being transported to admit room. It is noted that patient BP in ED prior to admission 222/107 and was given 10mg of labetalol prior to transport to floor. Patient does endorse he has been working with Dr. Mckee as [facility] for the past three months to obtain better blood pressure control. Patient did complete dialysis on Wednesday 3/6/24 and per nephrology recommendation had emergent dialysis session with ultrafiltration upon admit 3/9/24. During examination patient noted to have stuttered speech and some difficulty finding words and reports this occurring for 2-3 weeks. Other than these two symptoms patient is neurologically intact. Endorses forgetfulness for months and when queried describes a time driving with son in car and forgot where he was going. Initial ED workup revealed a negative head CT, cmp showed normal electrolytes and ESRD, CMP showed anemia of chronic diease and WBC within normal limits, TSH and coags within normal limits, lipid panel unremarkable, Ha1c shows well controlled DMII. Chest xray normal with no signs of volume overload. Patient to be admitted by hospital medicine for further evaluation and management.

## 2024-03-10 NOTE — CONSULTS
[facility]  Department of Neurology  Neurology Consultation Note        PATIENT NAME: Thai Santoyo Jr.  MRN: [id number]  CSN: [id number]      TODAY'S DATE: 03/10/2024  ADMIT DATE: 3/9/2024                            CONSULTING PROVIDER: Arben Roque MD  CONSULT REQUESTED BY: Xavier Cummings MD      Reason for consult:  Headache, stuttering speech       History obtained from chart review and the patient.    HPI per EMR: Thai Santoyo Jr. is a 48 y.o. male with a history of ESRD on hemodialysis MWF, DMII, HTN, morbid obesity, SVT, gout and arthritis who presents to the emergency room for an intractable headache for four days, stuttering for 2-3 weeks and forgetfulness for months. Patient denies any associated adverse symptoms such as visual disturbance, neck pain or rigidity, fevers, chills photophobia, nausea, vomiting, chest pain, or shortness of breath. Headache pain is described as pressure in left frontal area and across entire back of head that was not eased or worsened by anything. During admit evaluation patient reports that headache had spontaneously resolved prior to being transported to admit room. It is noted that patient BP in ED prior to admission 222/107 and was given 10mg of labetalol prior to transport to floor. Patient does endorse he has been working with Dr. Mckee as [facility] for the past three months to obtain better blood pressure control. Patient did complete dialysis on Wednesday 3/6/24 and per nephrology recommendation had emergent dialysis session with ultrafiltration upon admit 3/9/24. During examination patient noted to have stuttered speech and some difficulty finding words and reports this occurring for 2-3 weeks. Other than these two symptoms patient is neurologically intact. Endorses forgetfulness for months and when queried describes a time driving with son in car and forgot where he was going. Initial ED workup revealed a negative head CT, cmp showed normal  electrolytes and ESRD, CMP showed anemia of chronic diease and WBC within normal limits, TSH and coags within normal limits, lipid panel unremarkable, Ha1c shows well controlled DMII. Chest xray normal with no signs of volume overload. Patient to be admitted by hospital medicine for further evaluation and management.      Neurology consult:  Patient was seen and examined by me.  He has a history of hypertension, diabetes mellitus and ESRD on hemodialysis.  He presented to the hospital with intractable headache and stuttering speech for the last few days and intermittent forgetfulness for the last few months.  He states that he has been in a lot of stress lately due to some personal issues.  He states that stress could be causing what has been happening.    His headache on arrival to the ER was 220 systolic.    He denies any vision loss, vision changes, unilateral weakness or sensory changes.  Currently he feels is back to his baseline.    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 5/21/2018    Morbid obesity 9/12/2012    SVT (supraventricular tachycardia)      PREVIOUS SURGICAL HISTORY:  Past Surgical History:   Procedure Laterality Date    ABLATION N/A 10/5/2018    Procedure: ABLATION;  Surgeon: Ayden Arana MD;  Location: [facility] CATH LAB;  Service: Cardiology;  Laterality: N/A;  SVT, RFA, ARGELIA, MAC (light), NE, 3 Prep    DIALYSIS FISTULA CREATION      KNEE ARTHROPLASTY      SHOULDER SURGERY      right    VASECTOMY       [facility]  HISTORY:  Family History   Problem Relation Age of Onset    Diabetes Mother     Kidney disease Mother         on dialysis    Hypertension Mother     Heart failure Mother     Hypertension Father     Kidney disease Paternal Uncle         dialysis    Kidney disease Paternal Uncle         dialysis    Eczema Sister     No Known Problems Son     No Known Problems Sister     No Known Problems Son     No Known Problems Son     Heart disease Maternal Grandfather     Melanoma Neg Hx     Psoriasis Neg Hx     Lupus Neg Hx     Acne Neg Hx      SOCIAL HISTORY:  Social History     Tobacco Use    Smoking status: Former     Types: Cigars    Smokeless tobacco: Never    Tobacco comments:     rare   Substance Use Topics    Alcohol use: No    Drug use: No     ALLERGIES:  Review of patient's allergies indicates:  No Known Allergies  HOME MEDICATIONS:  Prior to Admission medications    Medication Sig Start Date End Date Taking? Authorizing Provider   allopurinoL (ZYLOPRIM) 100 MG tablet Take 1 tablet (100 mg total) by mouth once daily. 7/21/23  Yes Alcides Amaral Jr., MD   amLODIPine (NORVASC) 10 MG tablet TAKE ONE TABLET BY MOUTH ONCE DAILY  Patient taking differently: Take 10 mg by mouth once daily. 2/23/24  Yes Manjit Mckee II, MD   carvediloL (COREG) 25 MG tablet Take 1 tablet (25 mg total) by mouth 2 (two) times daily with meals. 7/7/23  Yes Manjit Mckee II, MD   dulaglutide (TRULICITY) 1.5 mg/0.5 mL pen injector Inject 1.5 mg into the skin every 7 days.  Patient taking differently: Inject 1.5 mg into the skin every 7 days. Saturdays 2/23/24  Yes Manjit Mckee II, MD   furosemide (LASIX) 40 MG tablet Take 1 tablet (40 mg total) by mouth once daily.  Patient taking differently: Take 40 mg by mouth 2 (two) times a day. 8/15/23  Yes Manjit Mckee II, MD   hydrALAZINE (APRESOLINE) 50 MG tablet Take 1 tablet (50 mg total) by mouth every 12 (twelve) hours. 1/26/24 1/25/25 Yes Manjit Mckee II, MD   insulin (LANTUS  SOLOSTAR U-100 INSULIN) glargine 100 units/mL SubQ pen Inject 45 Units into the skin 2 (two) times a day.  Patient taking differently: Inject 30 Units into the skin 2 (two) times a day. 9/7/23 9/6/24 Yes Manjit Mckee II, MD   insulin lispro (HUMALOG KWIKPEN INSULIN) 100 unit/mL pen Inject 45 Units into the skin 3 (three) times daily before meals.  Patient taking differently: Inject 30 Units into the skin 3 (three) times daily before meals. 10/18/23  Yes Manjit Mckee II, MD   olmesartan-hydrochlorothiazide (BENICAR HCT) 40-25 mg per tablet Take 1 tablet by mouth once daily.  Patient taking differently: Take 1 tablet by mouth every evening. 8/23/23  Yes Manjit Mckee II, MD   rosuvastatin (CRESTOR) 10 MG tablet Take 1 tablet (10 mg total) by mouth once daily. 2/26/24  Yes Alcides Amaral Jr., MD   sucroferric oxyhydroxide (VELPHORO) 500 mg Chew Chew 1 tablet with each meal three times daily  Patient taking differently: Take 1 each by mouth 3 (three) times daily with meals. 12/23/22  Yes Johny Solomon MD   blood sugar diagnostic Strp Use one strip each time to check blood sugar three times daily. 4/19/21   Manjit Mckee II, MD   blood-glucose meter Misc use as directed 4/19/21 1/26/24  Manjit Mckee II, MD   HYDROmorphone (DILAUDID) 4 MG tablet Take one tablet by mouth every 6 hours as needed.  Patient not taking: Reported on 3/9/2024 1/24/23      lancets (ACCU-CHEK SOFTCLIX LANCETS) Misc 300 each by Misc.(Non-Drug; Combo Route) route 3 (three) times daily. 4/19/21   Manjit Mckee II, MD   papaverine 30 mg/mL injection Papaverine 60mg  Add: Phentolamine 2 mg  Add: PGE1 20 mcg  (Double Strength)    Sig:  Inject 30 units as directed; titrate up by 5 units until desired results  Patient not taking: Reported on 3/9/2024 9/7/23 9/5/24  Yamileth Chaudhari, NP   paroxetine (PAXIL) 20 MG tablet Take 1 tablet (20 mg total) by mouth once daily.  Patient not taking: Reported on 3/9/2024 11/13/23   Juanito  "MD Johny   pen needle, diabetic (BD ULTRA-FINE MAIKOL PEN NEEDLE) 32 gauge x 5/32" Ndle To be used with insulin twice daily. 10/19/23   Manjit Mckee II, MD   syringe-needle,safety,disp unt 1 mL 27 gauge x 1/2" Syrg Use every 2 weeks for testosterone injection 9/11/20   Manjit Mckee II, MD   testosterone cypionate (DEPOTESTOTERONE CYPIONATE) 200 mg/mL injection Inject 0.5 mLs (100 mg total) into the muscle every 14 (fourteen) days.  Patient not taking: Reported on 3/9/2024 8/23/23   Manjit Mckee II, MD     CURRENT SCHEDULED MEDICATIONS:   sodium chloride 0.9%   Intravenous Once    allopurinoL  100 mg Oral Daily    aspirin  81 mg Oral Daily    atorvastatin  40 mg Oral Daily    famotidine  20 mg Oral Daily    insulin detemir U-100  23 Units Subcutaneous BID    mupirocin   Nasal BID    polyethylene glycol  17 g Oral Daily     CURRENT INFUSIONS:   sodium chloride 0.9%       CURRENT PRN MEDICATIONS:  calcium carbonate, dextrose 10%, dextrose 10%, diphenhydrAMINE, glucagon (human recombinant), glucose, glucose, insulin aspart U-100, labetalol, metoclopramide, ondansetron, sodium chloride 0.9%, sodium chloride 0.9%    REVIEW OF SYSTEMS:  Please refer to the HPI for all pertinent positive and negative findings. A comprehensive review of all other systems was negative.       PHYSICAL EXAM:  Patient Vitals for the past 24 hrs:   BP Temp Temp src Pulse Resp SpO2 Height Weight   03/10/24 0852 (!) 171/84 98.6 °F (37 °C) Oral 96 16 -- -- --   03/10/24 0725 -- -- -- -- -- 97 % -- --   03/10/24 0547 (!) 174/85 98.6 °F (37 °C) Oral 97 17 96 % -- 126.1 kg (278 lb)   03/10/24 0300 (!) 176/87 97.9 °F (36.6 °C) Oral 87 18 98 % -- --   03/09/24 2300 (!) 168/89 98.5 °F (36.9 °C) Oral 94 18 95 % -- --   03/09/24 2054 -- -- -- -- -- 95 % -- --   03/09/24 2000 (!) 194/89 98.1 °F (36.7 °C) Oral 87 18 (!) 91 % -- --   03/09/24 1945 -- -- -- -- -- -- -- 126 kg (277 lb 12.5 oz)   03/09/24 1940 (!) 180/82 -- -- 87 -- -- -- -- " "  03/09/24 1930 (!) 158/81 -- -- 87 -- -- -- --   03/09/24 1900 (!) 173/87 -- -- 86 -- -- -- --   03/09/24 1830 (!) 176/91 -- -- 85 -- -- -- --   03/09/24 1800 (!) 167/87 -- -- 88 -- -- -- --   03/09/24 1730 -- -- -- -- -- -- -- 127.2 kg (280 lb 6.8 oz)   03/09/24 1725 (!) 213/118 -- -- 87 16 97 % -- --   03/09/24 1645 (!) 193/106 -- -- 93 16 (!) 92 % -- --   03/09/24 1630 (!) 222/107 -- -- 95 16 95 % -- --   03/09/24 1500 (!) 170/74 -- -- 89 16 (!) 93 % -- --   03/09/24 1430 (!) 187/90 -- -- 88 18 (!) 91 % -- --   03/09/24 1306 (!) 182/84 97.8 °F (36.6 °C) Oral 93 19 (!) 92 % 5' 11" (1.803 m) 133.8 kg (295 lb)       GENERAL APPEARANCE: Alert, well-developed, well-nourished male in no acute distress.  HEENT: Normocephalic and atraumatic. PERRL. Oropharynx unremarkable.  PULM: Normal respiratory effort. No accessory muscle use.  CV: RRR.  ABDOMEN: Soft, nontender.  EXTREMITIES: No obvious signs of vascular compromise. Pulses present. No cyanosis, clubbing or edema.  SKIN: Clear; no rashes, lesions or skin breaks in exposed areas.    NEURO:  MENTAL STATUS: Patient awake and oriented to time, place, and person, recent/remote memory normal, attention span/concentration normal, and speech fluent without paraphasic errors.  Affect euthymic.    CRANIAL NERVES:  CN I: Not tested.  CN II: Fundoscopic exam deferred.  CN III, IV, VI: Pupils equal, round and reactive to light.  Extraocular movements full and intact.  CN V: Facial sensation normal.  CN VII: Facial asymmetry absent.  CN VIII: Hearing grossly normal and equal bilaterally.  No skew deviation or pathologic nystagmus.  CN IX, X: Palate elevates symmetrically. Speech/articulation is clear without dysarthria.  CN XI: Shoulder shrug and chin rotation equal with good strength.  CN XII: Tongue protrusion midline.    MOTOR:  Bulk normal. Tone normal and symmetric throughout.  Abnormal movements absent.  Tremor: none present.  Strength 5/5 throughout.    REFLEXES:  DTRs 1+ " "throughout.  Plantar response equivocal bilaterally.  SENSATION: grossly intact throughout.  COORDINATION: normal finger-to-nose.  STATION: not tested.  GAIT: not tested.      Labs:  Recent Labs   Lab 03/09/24  1410 03/10/24  0518    139   K 4.6 4.8   CL 95 100   CO2 35* 29   BUN 23* 24*   CREATININE 10.1* 9.5*    81   CALCIUM 11.2* 10.7*   PHOS  --  5.9*   MG  --  2.0     Recent Labs   Lab 03/09/24  1410 03/10/24  0519   WBC 9.67 9.86   HGB 11.6* 11.7*   HCT 37.4* 37.3*    193     Recent Labs   Lab 03/09/24  1410 03/10/24  0518   ALBUMIN 3.4* 3.5   PROT 7.3 7.5   BILITOT 0.4 0.5   ALKPHOS 56 60   ALT 29 26   AST 27 23     Lab Results   Component Value Date    INR 1.0 03/09/2024     Lab Results   Component Value Date    TRIG 59 03/09/2024    HDL 29 (L) 03/09/2024    CHOLHDL 30.9 03/09/2024     Lab Results   Component Value Date    HGBA1C 5.0 03/09/2024     No results found for: "PROTEINCSF", "GLUCCSF", "WBCCSF"    Imaging:  I have reviewed and interpreted the pertinent imaging and lab results.      US Carotid Bilateral  Narrative: EXAMINATION:  US CAROTID BILATERAL    CLINICAL HISTORY:  Velocities evaluation;    TECHNIQUE:  Grayscale and color Doppler ultrasound examination of the carotid and vertebral artery systems bilaterally.  Stenosis estimates are per the NASCET measurement criteria.    COMPARISON:  None.    FINDINGS:  Right:    Internal Carotid Artery (ICA) peak systolic velocity 79 cm/sec    ICA/CCA peak systolic velocity ratio: 1.1    Plaque formation: Appears mild    Vertebral artery: Antegrade flow and normal waveform.    Left:    Internal Carotid Artery (ICA)  peak systolic velocity 91 cm/sec    ICA/CCA peak systolic velocity ratio: 1.3    Plaque formation: Appears mild    Vertebral artery: Antegrade flow and normal waveform.  Impression: No evidence of a hemodynamically significant carotid bifurcation stenosis.    Flow in vertebral arteries appears antegrade " bilaterally.    Electronically signed by: Coretta Jorge MD  Date:    03/09/2024  Time:    16:59  X-Ray Chest AP Single View  Narrative: EXAMINATION:  XR CHEST 1 VIEW    CLINICAL HISTORY:  tia;    TECHNIQUE:  Single frontal view of the chest was performed.    COMPARISON:  09/14/2018    FINDINGS:  Cardiac silhouette appears mildly enlarged.  Exaggerated however by the somewhat lordotic positioning and appearing just slightly if any larger than on the prior exam.  The lungs are clear of infiltrate.  There is no pleural effusion.  Impression: No acute cardiopulmonary disease.    Electronically signed by: Coretta Jorge MD  Date:    03/09/2024  Time:    16:23  CT Head Without Contrast  Narrative: EXAMINATION:  CT HEAD WITHOUT CONTRAST    CLINICAL HISTORY:  Neuro deficit, acute, stroke suspected;    TECHNIQUE:  Low dose axial images were obtained through the head.  Coronal and sagittal reformations were also performed. Contrast was not administered.    COMPARISON:  None.    FINDINGS:  Ventricles, sulci, fissures unremarkable appearance for the patient's age.  Subtle low density in white matter suggesting microvascular ischemic change.  There is no acute intracranial hemorrhage. There is no intracranial mass effect. There is no acute major vascular territory infarct. Note is made that MRI is typically more sensitive than CT particularly for detection of early or small nonhemorrhagic infarcts.The calvarium appears intact. Mastoids well pneumatized. Opacification in the sphenoid sinus suggesting combination of mucosal thickening and small amount of fluid  Impression: No acute intracranial findings.  Findings as detailed above including findings suggesting mild microvascular ischemic change.  Opacification in sphenoid sinus.    Electronically signed by: Coretta Jorge MD  Date:    03/09/2024  Time:    14:32         ASSESSMENT & PLAN:      Intractable headache   Hypertensive emergency  Anxiety    Plan:   Etiology of headache,  stuttering speech could likely be secondary to anxiety and hypertensive emergency.  Less likely to be an acute intracranial pathology.  MRI brain/MRA brain ordered and pending.  Slowly lower blood pressure to normotensive range.  Management of metabolic derangements per primary team.  PT OT and speech therapy evaluate and treat.  Outpatient psychiatry evaluation for anxiety  Will follow on the imaging.          Thank you kindly for including us in the care of this patient. Please do not hesitate to contact us with any questions.           Arben Roque MD  Neurology/vascular Neurology  Date of Service: 03/10/2024  10:22 AM    --------------------------------------------------------------------------------------------------------------------------------------------------------------------------------------------------------------------------------------------------------------  Please note: This note was transcribed using voice recognition software. Because of this technology there are often uinintended grammatical, spelling, and other transcription errors. Please disregard these errors.

## 2024-03-10 NOTE — ASSESSMENT & PLAN NOTE
Body mass index is 38.74 kg/m². Morbid obesity complicates all aspects of disease management from diagnostic modalities to treatment. Weight loss encouraged and health benefits explained to patient.

## 2024-03-10 NOTE — ASSESSMENT & PLAN NOTE
Patient endorses intractable headache for four days described as pressure in left frontal and entire back of head. Patient endorses that headache resolved prior to being transported to admit room.  Antithrombotics for secondary stroke prevention: Antiplatelets: Aspirin: 81 mg daily    Statins for secondary stroke prevention and hyperlipidemia, if present:   Statins: Atorvastatin- 40 mg daily    Aggressive risk factor modification: HTN, DM, HLD, Obesity     Rehab efforts: The patient has been evaluated by a stroke team provider and the therapy needs have been fully considered based off the presenting complaints and exam findings. The following therapy evaluations are needed: PT evaluate and treat, OT evaluate and treat, SLP evaluate and treat    Diagnostics ordered/pending: Carotid ultrasound to assess vasculature, CT scan of head without contrast to asses brain parenchyma, HgbA1C to assess blood glucose levels, Lipid Profile to assess cholesterol levels, MRA head to assess vasculature, MRA neck/arch to assess vasculature, MRI head without contrast to assess brain parenchyma, Other: ECHO with saline bubble    VTE prophylaxis: None: Reason for No Pharmacological VTE Prophylaxis: Uncontrolled arterial hypertension (SBP > 180 mmHg or DPB > 100 mmHg)    BP parameters: TIA: SBP <220 until imaging confirmation of no infarct     -neurology consulted

## 2024-03-10 NOTE — PLAN OF CARE
Nurses Note -- 4 Eyes      3/9/2024   6:07 PM      Skin assessed during: Admit      [x] No Altered Skin Integrity Present    [x]Prevention Measures Documented      [] Yes- Altered Skin Integrity Present or Discovered   [] LDA Added if Not in Epic (Describe Wound)   [] New Altered Skin Integrity was Present on Admit and Documented in LDA   [] Wound Image Taken    Wound Care Consulted? No    Attending Nurse:  Allen Downing RN/Staff Member:   Pallavi

## 2024-03-10 NOTE — ASSESSMENT & PLAN NOTE
Chronic, uncontrolled. Latest blood pressure and vitals reviewed-     Temp:  [97.8 °F (36.6 °C)]   Pulse:  [85-95]   Resp:  [16-19]   BP: (158-222)/()   SpO2:  [91 %-97 %] .   Home meds for hypertension were reviewed and noted below.   Hypertension Medications               amLODIPine (NORVASC) 10 MG tablet TAKE ONE TABLET BY MOUTH ONCE DAILY    carvediloL (COREG) 25 MG tablet Take 1 tablet (25 mg total) by mouth 2 (two) times daily with meals.    furosemide (LASIX) 40 MG tablet Take 1 tablet (40 mg total) by mouth once daily.    hydrALAZINE (APRESOLINE) 50 MG tablet Take 1 tablet (50 mg total) by mouth every 12 (twelve) hours.    olmesartan-hydrochlorothiazide (BENICAR HCT) 40-25 mg per tablet Take 1 tablet by mouth once daily.            While in the hospital, will manage blood pressure as follows; Adjust home antihypertensive regimen as follows- Home medications on hold and permissive HTN in place pending MRI results    Will utilize p.r.n. blood pressure medication labetol 10mg  only if patient's blood pressure greater than 220/110 and he develops symptoms such as worsening chest pain or shortness of breath.

## 2024-03-10 NOTE — PT/OT/SLP EVAL
Speech Language Pathology Evaluation  Cognitive/Bedside Swallow    Patient Name:  Thai Santoyo Jr.   MRN:  [id number]  Admitting Diagnosis: TIA (transient ischemic attack)    Recommendations:                  General Recommendations:  Follow-up not indicated  Diet recommendations:  Regular Diet - IDDSI Level 7, Thin liquids - IDDSI Level 0   Aspiration Precautions: Standard aspiration precautions   General Precautions: Standard,    Communication strategies:  none    Assessment:     Thai Santoyo Jr. is a 48 y.o. male admitted with c/o headache accompanied by stuttering, word finding deficits and memory deficits for a few weeks. All deficits related to speech, language, swallowing and memory have resolved. Pts cognition is WNL as evidenced by MOCA score which is WNL. ST not indicated.    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 CATH LAB;  Service: Cardiology;  Laterality: N/A;  SVT, RFA, ARGELIA, MAC (light), SC, 3 Prep    DIALYSIS FISTULA CREATION      KNEE ARTHROPLASTY      SHOULDER SURGERY      right    VASECTOMY             Subjective     The pt was sitting up in a chair alert, pleasant and cooperative for ST session. Son was at bedside. Pt stated he thinks recent headaches, AMS may be due to stress and anxiety.        Respiratory Status: Room  air    Objective:     Cognitive Status:    MoCA (Version 8.1) administered with pt achieving a score of 26/30 suggesting NO cognitive-linguistic impairment. Pt earned 5 of 5 points on visuospatial/executive functions, 3 of 3 points on naming, 6 of 6 points for attention, 2 of 3 points for language (0/1 for language fluency), 2 of 2 points for abstraction, 4 of 5 points for delayed recall and 6 of 6 points for orientation.   Receptive Language:   Comprehension:      WNL    Pragmatics:    WNL    Expressive Language:  Verbal:    WNL        Motor Speech:  WNL    Voice:   WNL  Vision: Blurry vision which started yesterday while headache was still present has resolved. No field cuts or deficits currently observed or reported  Oral Musculature Evaluation  Oral Musculature: WFL  Dentition: present and adequate  Secretion Management: adequate  Mucosal Quality: good  Mandibular Strength and Mobility: WFL  Oral Labial Strength and Mobility: WFL  Lingual Strength and Mobility: WFL  Velar Elevation: WFL  Buccal Strength and Mobility: WFL  Volitional Cough: strong  Volitional Swallow: present to palpation  Voice Prior to PO Intake: Good    Bedside Swallow Eval:   Consistencies Assessed:  Thin liquids and pt report      Oral Phase:   WNL    Pharyngeal Phase:   no overt clinical signs/symptoms of aspiration  no overt clinical signs/symptoms of pharyngeal dysphagia    Compensatory Strategies  None    Treatment: Pt/family educated re: results/recs of evaluation, SLP role and POC. Receptive to information provided.      Goals:   Multidisciplinary Problems       SLP Goals       Not on file                    Plan:     Patient to be seen:      Plan of Care expires:     Plan of Care reviewed with:      SLP Follow-Up:  No       Discharge recommendations:  Therapy Intensity Recommendations at Discharge: No Therapy Indicated   Barriers to Discharge:  None    Time Tracking:     SLP Treatment Date:   03/10/24  Speech Start Time:  1038  Speech  Stop Time:  1103     Speech Total Time (min):  25 min    Billable Minutes: Eval 13 , Eval Swallow and Oral Function 12, and Total Time 25    03/10/2024

## 2024-03-11 ENCOUNTER — TELEPHONE (OUTPATIENT)
Dept: INTERNAL MEDICINE | Facility: CLINIC | Age: 49
End: 2024-03-11
Payer: MEDICARE

## 2024-03-11 VITALS
HEIGHT: 71 IN | OXYGEN SATURATION: 93 % | WEIGHT: 278 LBS | DIASTOLIC BLOOD PRESSURE: 80 MMHG | HEART RATE: 88 BPM | BODY MASS INDEX: 38.92 KG/M2 | RESPIRATION RATE: 18 BRPM | TEMPERATURE: 98 F | SYSTOLIC BLOOD PRESSURE: 188 MMHG

## 2024-03-11 LAB
ALBUMIN SERPL BCP-MCNC: 3.3 G/DL (ref 3.5–5.2)
ALP SERPL-CCNC: 58 U/L (ref 55–135)
ALT SERPL W/O P-5'-P-CCNC: 21 U/L (ref 10–44)
ANION GAP SERPL CALC-SCNC: 12 MMOL/L (ref 8–16)
AST SERPL-CCNC: 17 U/L (ref 10–40)
BASOPHILS # BLD AUTO: 0.06 K/UL (ref 0–0.2)
BASOPHILS NFR BLD: 0.6 % (ref 0–1.9)
BILIRUB SERPL-MCNC: 0.6 MG/DL (ref 0.1–1)
BUN SERPL-MCNC: 41 MG/DL (ref 6–20)
CALCIUM SERPL-MCNC: 10.6 MG/DL (ref 8.7–10.5)
CHLORIDE SERPL-SCNC: 101 MMOL/L (ref 95–110)
CO2 SERPL-SCNC: 25 MMOL/L (ref 23–29)
CREAT SERPL-MCNC: 12 MG/DL (ref 0.5–1.4)
DIFFERENTIAL METHOD BLD: ABNORMAL
EOSINOPHIL # BLD AUTO: 1.6 K/UL (ref 0–0.5)
EOSINOPHIL NFR BLD: 15.7 % (ref 0–8)
ERYTHROCYTE [DISTWIDTH] IN BLOOD BY AUTOMATED COUNT: 16.7 % (ref 11.5–14.5)
EST. GFR  (NO RACE VARIABLE): 5 ML/MIN/1.73 M^2
GLUCOSE SERPL-MCNC: 99 MG/DL (ref 70–110)
HCT VFR BLD AUTO: 34.9 % (ref 40–54)
HGB BLD-MCNC: 11.1 G/DL (ref 14–18)
IMM GRANULOCYTES # BLD AUTO: 0.02 K/UL (ref 0–0.04)
IMM GRANULOCYTES NFR BLD AUTO: 0.2 % (ref 0–0.5)
LYMPHOCYTES # BLD AUTO: 1 K/UL (ref 1–4.8)
LYMPHOCYTES NFR BLD: 9.3 % (ref 18–48)
MCH RBC QN AUTO: 29 PG (ref 27–31)
MCHC RBC AUTO-ENTMCNC: 31.8 G/DL (ref 32–36)
MCV RBC AUTO: 91 FL (ref 82–98)
MONOCYTES # BLD AUTO: 1 K/UL (ref 0.3–1)
MONOCYTES NFR BLD: 9.9 % (ref 4–15)
NEUTROPHILS # BLD AUTO: 6.6 K/UL (ref 1.8–7.7)
NEUTROPHILS NFR BLD: 64.3 % (ref 38–73)
NRBC BLD-RTO: 0 /100 WBC
PLATELET # BLD AUTO: 204 K/UL (ref 150–450)
PMV BLD AUTO: 9.6 FL (ref 9.2–12.9)
POCT GLUCOSE: 101 MG/DL (ref 70–110)
POCT GLUCOSE: 105 MG/DL (ref 70–110)
POTASSIUM SERPL-SCNC: 4.8 MMOL/L (ref 3.5–5.1)
PROT SERPL-MCNC: 7 G/DL (ref 6–8.4)
RBC # BLD AUTO: 3.83 M/UL (ref 4.6–6.2)
SODIUM SERPL-SCNC: 138 MMOL/L (ref 136–145)
WBC # BLD AUTO: 10.2 K/UL (ref 3.9–12.7)

## 2024-03-11 PROCEDURE — 36415 COLL VENOUS BLD VENIPUNCTURE: CPT

## 2024-03-11 PROCEDURE — 80053 COMPREHEN METABOLIC PANEL: CPT

## 2024-03-11 PROCEDURE — 25000003 PHARM REV CODE 250: Performed by: INTERNAL MEDICINE

## 2024-03-11 PROCEDURE — 94761 N-INVAS EAR/PLS OXIMETRY MLT: CPT

## 2024-03-11 PROCEDURE — 94660 CPAP INITIATION&MGMT: CPT

## 2024-03-11 PROCEDURE — 85025 COMPLETE CBC W/AUTO DIFF WBC: CPT

## 2024-03-11 PROCEDURE — 90935 HEMODIALYSIS ONE EVALUATION: CPT

## 2024-03-11 PROCEDURE — 25000003 PHARM REV CODE 250

## 2024-03-11 PROCEDURE — 63600175 PHARM REV CODE 636 W HCPCS: Performed by: INTERNAL MEDICINE

## 2024-03-11 PROCEDURE — 25000003 PHARM REV CODE 250: Performed by: NURSE PRACTITIONER

## 2024-03-11 PROCEDURE — 99900035 HC TECH TIME PER 15 MIN (STAT)

## 2024-03-11 PROCEDURE — 63600175 PHARM REV CODE 636 W HCPCS

## 2024-03-11 RX ORDER — INSULIN LISPRO 100 [IU]/ML
30 INJECTION, SOLUTION INTRAVENOUS; SUBCUTANEOUS
Qty: 15 ML | Refills: 0 | Status: SHIPPED | OUTPATIENT
Start: 2024-03-11 | End: 2024-05-23 | Stop reason: SDUPTHER

## 2024-03-11 RX ORDER — HEPARIN SODIUM 5000 [USP'U]/ML
5000 INJECTION, SOLUTION INTRAVENOUS; SUBCUTANEOUS
Status: CANCELLED | OUTPATIENT
Start: 2024-03-11

## 2024-03-11 RX ORDER — CLONIDINE HYDROCHLORIDE 0.1 MG/1
0.1 TABLET ORAL EVERY 6 HOURS PRN
Status: DISCONTINUED | OUTPATIENT
Start: 2024-03-11 | End: 2024-03-11 | Stop reason: HOSPADM

## 2024-03-11 RX ORDER — SODIUM CHLORIDE 9 MG/ML
INJECTION, SOLUTION INTRAVENOUS ONCE
Status: CANCELLED | OUTPATIENT
Start: 2024-03-11 | End: 2024-03-11

## 2024-03-11 RX ORDER — SODIUM CHLORIDE 9 MG/ML
INJECTION, SOLUTION INTRAVENOUS
Status: CANCELLED | OUTPATIENT
Start: 2024-03-11

## 2024-03-11 RX ORDER — CLONIDINE HYDROCHLORIDE 0.1 MG/1
0.1 TABLET ORAL EVERY 6 HOURS PRN
Qty: 60 TABLET | Refills: 3 | Status: SHIPPED | OUTPATIENT
Start: 2024-03-11 | End: 2025-03-11

## 2024-03-11 RX ORDER — HYDRALAZINE HYDROCHLORIDE 25 MG/1
50 TABLET, FILM COATED ORAL EVERY 8 HOURS
Status: DISCONTINUED | OUTPATIENT
Start: 2024-03-11 | End: 2024-03-11 | Stop reason: HOSPADM

## 2024-03-11 RX ORDER — INSULIN GLARGINE 100 [IU]/ML
30 INJECTION, SOLUTION SUBCUTANEOUS 2 TIMES DAILY
Start: 2024-03-11 | End: 2024-03-14 | Stop reason: SDUPTHER

## 2024-03-11 RX ADMIN — AMLODIPINE BESYLATE 10 MG: 5 TABLET ORAL at 08:03

## 2024-03-11 RX ADMIN — ATORVASTATIN CALCIUM 40 MG: 40 TABLET, FILM COATED ORAL at 08:03

## 2024-03-11 RX ADMIN — INSULIN DETEMIR 23 UNITS: 100 INJECTION, SOLUTION SUBCUTANEOUS at 08:03

## 2024-03-11 RX ADMIN — FUROSEMIDE 40 MG: 40 TABLET ORAL at 08:03

## 2024-03-11 RX ADMIN — DIPHENHYDRAMINE HYDROCHLORIDE 25 MG: 50 INJECTION INTRAMUSCULAR; INTRAVENOUS at 12:03

## 2024-03-11 RX ADMIN — MUPIROCIN: 20 OINTMENT TOPICAL at 08:03

## 2024-03-11 RX ADMIN — ASPIRIN 81 MG: 81 TABLET ORAL at 08:03

## 2024-03-11 RX ADMIN — FAMOTIDINE 20 MG: 20 TABLET, FILM COATED ORAL at 08:03

## 2024-03-11 RX ADMIN — ALLOPURINOL 100 MG: 100 TABLET ORAL at 08:03

## 2024-03-11 RX ADMIN — ONDANSETRON 4 MG: 2 INJECTION INTRAMUSCULAR; INTRAVENOUS at 12:03

## 2024-03-11 RX ADMIN — CARVEDILOL 25 MG: 25 TABLET, FILM COATED ORAL at 08:03

## 2024-03-11 NOTE — PLAN OF CARE
Pt clear for DC from case management standpoint. Discharging to home.  Awaiting clearance from Neurology and completed HD.       03/11/24 1037   Final Note   Assessment Type Final Discharge Note   Anticipated Discharge Disposition Home

## 2024-03-11 NOTE — PLAN OF CARE
Messages sent for PCP and Neurology to contact pt to schedule hospital follow up appointments.  Office information added to AVS.    Contacted [facility] pharmacy regarding Lispro.  States has 3 month supply order, is in stock, copay $105.

## 2024-03-11 NOTE — DISCHARGE INSTRUCTIONS
Take medications as prescribed.  Follow-up as directed.  Return to ED for any worsening symptoms or concerns.  Monitor blood pressure closely at home and bring a log with you to your follow-up appointments.    Discharge Instructions, [facility] Medicine    Thank you for choosing [facility] for your medical care.     You were admitted to the hospital with intractable headache and uncontrolled hypertension.    Please note your discharge instructions, including diet/activity restrictions, follow-up appointments, and medication changes.  If you have any questions about your medical issues, prescriptions, or any other questions, please feel free to contact the [facility] Medicine Dept at [phone] and we will help.    If you are previously with Home health, outpatient PT/OT or under a therapy program, you are cleared to return to those programs.    Please direct all long term medication refills and follow up to your primary care provider, Manjit Mckee II, MD. Thank you again for letting us take care of your health care needs.    Please note the following discharge instructions per your discharging physician-  Shirley Savage NP

## 2024-03-11 NOTE — TELEPHONE ENCOUNTER
----- Message from Prabhakarely Barksdale sent at 3/11/2024  8:45 AM CDT -----  Contact: Self [phone]  Would like to receive medical advice.    Would they like a call back or a response via [facility]:  call back     Additional information:  Calling to review results from hospital stay.

## 2024-03-11 NOTE — PLAN OF CARE
Called and spoke with Giovana Garcia on [facility].  Informed them pt is receiving dialysis in hospital today and will be discharged today.  Will resume scheduled dialysis MWF at 0630.       03/11/24 1302   Post-Acute Status   Post-Acute Authorization Dialysis   Hospital Resources/Appts/Education Provided Dilaysis schedule provided

## 2024-03-11 NOTE — TELEPHONE ENCOUNTER
----- Message from Lisa Arzola RN sent at 3/11/2024 10:20 AM CDT -----  Regarding: Hospital Follow up  Good morning,    Pt to be discharged today from [facility].  Pt has dialysis MWF.  Please contact pt to schedule follow up appointment.  Smart scheduling rec follow up within 7 days.    Thank you,    Lisa  [phone]

## 2024-03-11 NOTE — PROGRESS NOTES
[facility]  Department of Neurology  Neurology Progress Note        PATIENT NAME: Thai Santoyo Jr.  MRN: [id number]  CSN: [id number]      TODAY'S DATE: 03/11/2024  ADMIT DATE: 3/9/2024                            CONSULTING PROVIDER: Zainab Perez DNP  CONSULT REQUESTED BY: Xavier Cummings MD      Reason for consult:  Headache, stuttering speech       History obtained from chart review and the patient.    HPI per EMR: Thai Santoyo Jr. is a 48 y.o. male with a history of ESRD on hemodialysis MWF, DMII, HTN, morbid obesity, SVT, gout and arthritis who presents to the emergency room for an intractable headache for four days, stuttering for 2-3 weeks and forgetfulness for months. Patient denies any associated adverse symptoms such as visual disturbance, neck pain or rigidity, fevers, chills photophobia, nausea, vomiting, chest pain, or shortness of breath. Headache pain is described as pressure in left frontal area and across entire back of head that was not eased or worsened by anything. During admit evaluation patient reports that headache had spontaneously resolved prior to being transported to admit room. It is noted that patient BP in ED prior to admission 222/107 and was given 10mg of labetalol prior to transport to floor. Patient does endorse he has been working with Dr. Mckee as [facility] for the past three months to obtain better blood pressure control. Patient did complete dialysis on Wednesday 3/6/24 and per nephrology recommendation had emergent dialysis session with ultrafiltration upon admit 3/9/24. During examination patient noted to have stuttered speech and some difficulty finding words and reports this occurring for 2-3 weeks. Other than these two symptoms patient is neurologically intact. Endorses forgetfulness for months and when queried describes a time driving with son in car and forgot where he was going. Initial ED workup revealed a negative head CT, cmp showed normal  electrolytes and ESRD, CMP showed anemia of chronic diease and WBC within normal limits, TSH and coags within normal limits, lipid panel unremarkable, Ha1c shows well controlled DMII. Chest xray normal with no signs of volume overload. Patient to be admitted by hospital medicine for further evaluation and management.      Neurology consult:  Patient was seen and examined by me.  He has a history of hypertension, diabetes mellitus and ESRD on hemodialysis.  He presented to the hospital with intractable headache and stuttering speech for the last few days and intermittent forgetfulness for the last few months.  He states that he has been in a lot of stress lately due to some personal issues.  He states that stress could be causing what has been happening.    His headache on arrival to the ER was 220 systolic.    He denies any vision loss, vision changes, unilateral weakness or sensory changes.  Currently he feels is back to his baseline.    3/11/2024: Patient seen and examined this morning, POC reviewed with Dr Sigala. Patient is oriented x 3, denies headache/ vision changes this morning. Neuro exam is normal. Discussed POC with patient and with his sister via Facetime.     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 5/21/2018    Morbid obesity 9/12/2012    SVT (supraventricular tachycardia)      PREVIOUS SURGICAL HISTORY:  Past Surgical History:   Procedure Laterality Date    ABLATION N/A 10/5/2018    Procedure: ABLATION;  Surgeon: Ayden RIVERA  MD Sumit;  Location: [facility] CATH LAB;  Service: Cardiology;  Laterality: N/A;  SVT, RFA, ARGELIA, MAC (light), SD, 3 Prep    DIALYSIS FISTULA CREATION      KNEE ARTHROPLASTY      SHOULDER SURGERY      right    VASECTOMY       FAMILY MEDICAL HISTORY:  Family History   Problem Relation Age of Onset    Diabetes Mother     Kidney disease Mother         on dialysis    Hypertension Mother     Heart failure Mother     Hypertension Father     Kidney disease Paternal Uncle         dialysis    Kidney disease Paternal Uncle         dialysis    Eczema Sister     No Known Problems Son     No Known Problems Sister     No Known Problems Son     No Known Problems Son     Heart disease Maternal Grandfather     Melanoma Neg Hx     Psoriasis Neg Hx     Lupus Neg Hx     Acne Neg Hx      SOCIAL HISTORY:  Social History     Tobacco Use    Smoking status: Former     Types: Cigars    Smokeless tobacco: Never    Tobacco comments:     rare   Substance Use Topics    Alcohol use: No    Drug use: No     ALLERGIES:  Review of patient's allergies indicates:  No Known Allergies  HOME MEDICATIONS:  Prior to Admission medications    Medication Sig Start Date End Date Taking? Authorizing Provider   allopurinoL (ZYLOPRIM) 100 MG tablet Take 1 tablet (100 mg total) by mouth once daily. 7/21/23  Yes Alcides Amaral Jr., MD   amLODIPine (NORVASC) 10 MG tablet TAKE ONE TABLET BY MOUTH ONCE DAILY  Patient taking differently: Take 10 mg by mouth once daily. 2/23/24  Yes Manjit Mckee II, MD   carvediloL (COREG) 25 MG tablet Take 1 tablet (25 mg total) by mouth 2 (two) times daily with meals. 7/7/23  Yes Manjit Mckee II, MD   dulaglutide (TRULICITY) 1.5 mg/0.5 mL pen injector Inject 1.5 mg into the skin every 7 days.  Patient taking differently: Inject 1.5 mg into the skin every 7 days. Saturdays 2/23/24  Yes Manjit Mckee II, MD   furosemide (LASIX) 40 MG tablet Take 1 tablet (40 mg total) by mouth once daily.  Patient taking differently: Take 40  mg by mouth 2 (two) times a day. 8/15/23  Yes Manjit Mckee II, MD   hydrALAZINE (APRESOLINE) 50 MG tablet Take 1 tablet (50 mg total) by mouth every 12 (twelve) hours. 1/26/24 1/25/25 Yes Manjit Mckee II, MD   insulin (LANTUS SOLOSTAR U-100 INSULIN) glargine 100 units/mL SubQ pen Inject 45 Units into the skin 2 (two) times a day.  Patient taking differently: Inject 30 Units into the skin 2 (two) times a day. 9/7/23 9/6/24 Yes Manjit Mckee II, MD   insulin lispro (HUMALOG KWIKPEN INSULIN) 100 unit/mL pen Inject 45 Units into the skin 3 (three) times daily before meals.  Patient taking differently: Inject 30 Units into the skin 3 (three) times daily before meals. 10/18/23  Yes Manjit Mckee II, MD   olmesartan-hydrochlorothiazide (BENICAR HCT) 40-25 mg per tablet Take 1 tablet by mouth once daily.  Patient taking differently: Take 1 tablet by mouth every evening. 8/23/23  Yes Manjit Mckee II, MD   rosuvastatin (CRESTOR) 10 MG tablet Take 1 tablet (10 mg total) by mouth once daily. 2/26/24  Yes Alcides Amaral Jr., MD   sucroferric oxyhydroxide (VELPHORO) 500 mg Chew Chew 1 tablet with each meal three times daily  Patient taking differently: Take 1 each by mouth 3 (three) times daily with meals. 12/23/22  Yes Johny Solomon MD   blood sugar diagnostic Strp Use one strip each time to check blood sugar three times daily. 4/19/21   Manjit Mckee II, MD   blood-glucose meter Misc use as directed 4/19/21 1/26/24  Manjit Mckee II, MD   HYDROmorphone (DILAUDID) 4 MG tablet Take one tablet by mouth every 6 hours as needed.  Patient not taking: Reported on 3/9/2024 1/24/23      lancets (ACCU-CHEK SOFTCLIX LANCETS) Misc 300 each by Misc.(Non-Drug; Combo Route) route 3 (three) times daily. 4/19/21   Manjit Mckee II, MD   papaverine 30 mg/mL injection Papaverine 60mg  Add: Phentolamine 2 mg  Add: PGE1 20 mcg  (Double Strength)    Sig:  Inject 30 units as directed; titrate up by 5 units until  "desired results  Patient not taking: Reported on 3/9/2024 9/7/23 9/5/24  Yamileth Chaudhari, NP   paroxetine (PAXIL) 20 MG tablet Take 1 tablet (20 mg total) by mouth once daily.  Patient not taking: Reported on 3/9/2024 11/13/23   Johny Solomon MD   pen needle, diabetic (BD ULTRA-FINE MAIKOL PEN NEEDLE) 32 gauge x 5/32" Ndle To be used with insulin twice daily. 10/19/23   Manjit Mckee II, MD   syringe-needle,safety,disp unt 1 mL 27 gauge x 1/2" Syrg Use every 2 weeks for testosterone injection 9/11/20   Manjit Mckee II, MD   testosterone cypionate (DEPOTESTOTERONE CYPIONATE) 200 mg/mL injection Inject 0.5 mLs (100 mg total) into the muscle every 14 (fourteen) days.  Patient not taking: Reported on 3/9/2024 8/23/23   Manjit Mckee II, MD     CURRENT SCHEDULED MEDICATIONS:   sodium chloride 0.9%   Intravenous Once    allopurinoL  100 mg Oral Daily    amLODIPine  10 mg Oral Daily    aspirin  81 mg Oral Daily    atorvastatin  40 mg Oral Daily    carvediloL  25 mg Oral BID WM    famotidine  20 mg Oral Daily    furosemide  40 mg Oral BID    hydrALAZINE  50 mg Oral Q8H    insulin detemir U-100  23 Units Subcutaneous BID    mupirocin   Nasal BID    polyethylene glycol  17 g Oral Daily     CURRENT INFUSIONS:   sodium chloride 0.9%       CURRENT PRN MEDICATIONS:  calcium carbonate, cloNIDine, dextrose 10%, dextrose 10%, diphenhydrAMINE, glucagon (human recombinant), glucose, glucose, insulin aspart U-100, labetalol, metoclopramide, ondansetron, sodium chloride 0.9%, sodium chloride 0.9%    REVIEW OF SYSTEMS:  Please refer to the HPI for all pertinent positive and negative findings. A comprehensive review of all other systems was negative.       PHYSICAL EXAM:  Patient Vitals for the past 24 hrs:   BP Temp Temp src Pulse Resp SpO2   03/11/24 0845 -- -- -- 94 18 96 %   03/11/24 0834 (!) 130/96 -- -- -- -- --   03/11/24 0833 (!) 214/101 98 °F (36.7 °C) Oral 92 17 96 %   03/11/24 0321 (!) 166/82 -- Oral 90 17 (!) 93 " %   03/11/24 0043 (!) 198/97 98.3 °F (36.8 °C) Oral 91 18 97 %   03/10/24 2142 (!) 196/94 98.2 °F (36.8 °C) Oral 94 18 95 %   03/10/24 2052 -- -- -- -- -- 95 %   03/10/24 1800 (!) 186/89 98.6 °F (37 °C) -- 89 18 (!) 94 %   03/10/24 1500 (!) 195/92 97.9 °F (36.6 °C) -- 91 16 98 %   03/10/24 1300 -- -- -- -- -- 98 %   03/10/24 1145 (!) 193/93 97.6 °F (36.4 °C) -- 90 16 95 %       GENERAL APPEARANCE: Alert, well-developed, well-nourished male in no acute distress.  HEENT: Normocephalic and atraumatic. PERRL. Oropharynx unremarkable.  PULM: Normal respiratory effort. No accessory muscle use.  CV: RRR.  ABDOMEN: Soft, nontender.  EXTREMITIES: No obvious signs of vascular compromise. Pulses present. No cyanosis, clubbing or edema.  SKIN: Clear; no rashes, lesions or skin breaks in exposed areas.    NEURO:  MENTAL STATUS: Patient awake and oriented to time, place, and person, recent/remote memory normal, attention span/concentration normal, and speech fluent without paraphasic errors.  Affect euthymic.    CRANIAL NERVES:  CN I: Not tested.  CN II: Fundoscopic exam deferred.  CN III, IV, VI: Pupils equal, round and reactive to light.  Extraocular movements full and intact.  CN V: Facial sensation normal.  CN VII: Facial asymmetry absent.  CN VIII: Hearing grossly normal and equal bilaterally.  No skew deviation or pathologic nystagmus.  CN IX, X: Palate elevates symmetrically. Speech/articulation is clear without dysarthria.  CN XI: Shoulder shrug and chin rotation equal with good strength.  CN XII: Tongue protrusion midline.    MOTOR:  Bulk normal. Tone normal and symmetric throughout.  Abnormal movements absent.  Tremor: none present.  Strength 5/5 throughout.    REFLEXES:  DTRs 1+ throughout.  Plantar response equivocal bilaterally.  SENSATION: grossly intact throughout.  COORDINATION: normal finger-to-nose.  STATION: not tested.  GAIT: not tested.      Labs:  Recent Labs   Lab 03/09/24  1410 03/10/24  0518  "03/11/24  0339    139 138   K 4.6 4.8 4.8   CL 95 100 101   CO2 35* 29 25   BUN 23* 24* 41*   CREATININE 10.1* 9.5* 12.0*    81 99   CALCIUM 11.2* 10.7* 10.6*   PHOS  --  5.9*  --    MG  --  2.0  --      Recent Labs   Lab 03/09/24  1410 03/10/24  0519 03/11/24  0339   WBC 9.67 9.86 10.20   HGB 11.6* 11.7* 11.1*   HCT 37.4* 37.3* 34.9*    193 204     Recent Labs   Lab 03/09/24  1410 03/10/24  0518 03/11/24 0339   ALBUMIN 3.4* 3.5 3.3*   PROT 7.3 7.5 7.0   BILITOT 0.4 0.5 0.6   ALKPHOS 56 60 58   ALT 29 26 21   AST 27 23 17     Lab Results   Component Value Date    INR 1.0 03/09/2024     Lab Results   Component Value Date    TRIG 59 03/09/2024    HDL 29 (L) 03/09/2024    CHOLHDL 30.9 03/09/2024     Lab Results   Component Value Date    HGBA1C 5.0 03/09/2024     No results found for: "PROTEINCSF", "GLUCCSF", "WBCCSF"    Imaging:  I have reviewed and interpreted the pertinent imaging and lab results.      MRI Brain With Contrast  Narrative: EXAMINATION:  MRI BRAIN WITH CONTRAST    CLINICAL HISTORY:  Follow up for non MRI;    TECHNIQUE:  Limited postcontrast axial T1 weighted sequences were performed with isometric technique.  Coronal and sagittal reformatted images were also provided for review.    COMPARISON:  Concurrently performed noncontrast MRI of the brain 03/10/2024.  MRA 03/10/2024.  Head CT 03/09/2024.    FINDINGS:  Limited supplemental post-contrast imaging demonstrates no abnormal intracranial enhancement.  No enhancing mass identified.  No midline shift, mass effect, or hydrocephalus.  Please see separately dictated noncontrast MRI for additional details of brain parenchymal findings.    Incidentally noted hypoenhancing nodular focus within the left aspect of the pituitary gland measuring approximately 7 mm.  No suprasellar extension.  Marrow signal is within normal limits.    Visualized paranasal sinuses and mastoids are clear.  Impression: 1. Limited postcontrast supplemental imaging " of the brain demonstrates no abnormal intracranial enhancement.  Please see separately dictated noncontrast MRI from 03/10/2024 for additional findings.  2. Incidentally noted hypoenhancing 7 mm nodular focus within the left aspect of the pituitary gland most suggestive of a microadenoma.  This can be correlated with biochemical markers and potential routine/outpatient follow-up imaging of the brain with pituitary protocol.    Electronically signed by: Xavier Duran  Date:    03/11/2024  Time:    07:15         ASSESSMENT & PLAN:      Intractable headache   Hypertensive emergency  Anxiety    Plan:   Etiology of headache, stuttering speech could likely be secondary to anxiety and hypertensive emergency.  Less likely to be an acute intracranial pathology.  MRI brain: white matter changes usually associated with MS noted, however MRI brain with contrast was negative for abnormal enhancement. There appears to be an incidentally found microadenoma on left aspect of the pituitary gland. Outpatient f/u recommended  MRA brain/ neck without significant occlusion/ stenosis.   Slowly lower blood pressure to normotensive range.  Management of metabolic derangements per primary team.  PT OT and speech therapy evaluate and treat.  Outpatient psychiatry evaluation for anxiety  No further inpatient recs, neurology will sign off at this time.          Thank you kindly for including us in the care of this patient. Please do not hesitate to contact us with any questions.           Zainab Perez DNP  Neurology/vascular Neurology  Date of Service: 03/11/2024  10:22 AM

## 2024-03-11 NOTE — PROGRESS NOTES
2500 ml net uf removed   03/11/24 1515   Required for all Hemodialysis Patients   Hepatitis Status negative   Handoff Report   Received From Jeniffer   Given To Maci   Treatment Type   Treatment Type Maintenance   Vital Signs   Temp 98.2 °F (36.8 °C)   Pulse 88   Resp 18   BP (!) 188/80   Assessments (Pre/Post)   Safety vein preservation armband present   Date Hepatitis Profile Obtained 02/26/24   Blood Liters Processed (BLP) 42   Transport Modality ambulatory   Level of Consciousness (AVPU) alert   Dialyzer Clearance mildly streaked   Pain   Preferred Pain Scale number (Numeric Rating Pain Scale)   Pain Rating (0-10): Rest 0        Hemodialysis AV Fistula Left upper arm   No placement date or time found.   Location: Left upper arm   Site Assessment Clean;Dry;Intact   Patency Present;Thrill;Bruit   Status Deaccessed   Flows Good   Dressing Status Clean;Dry;Intact   Site Condition No complications   Dressing Gauze   Post-Hemodialysis Assessment   Rinseback Volume (mL) 250 mL   Blood Volume Processed (Liters) 42 L   Dialyzer Clearance Lightly streaked   Duration of Treatment 120 minutes   Additional Fluid Intake (mL) 500 mL   Total UF (mL) 3000 mL   Net Fluid Removal 2500   Patient Response to Treatment tolerated well   Post-Treatment Weight 123.6 kg (272 lb 7.8 oz)   Treatment Weight Change -2.5   Arterial bleeding stop time (min) 5 min   Venous bleeding stop time (min) 5 min   Post-Hemodialysis Comments tx completed     Educated on fluid intake

## 2024-03-11 NOTE — PROGRESS NOTES
Nephrology Progress Note        Patient Name: Thai Santoyo Jr.  MRN: [id number]    Patient Class: IP- Inpatient   Admission Date: 3/9/2024  Length of Stay: 1 days  Date of Service: 3/11/2024    Attending Physician: Xavier Cummings MD  Primary Care Provider: Manjit Mckee II, MD    Reason for Consult: esrd    SUBJECTIVE:     HPI: 48M with ESRD on HD MWF presents with headache for 4 days. Past medical history significant for gout, diabetes and hypertension. He reports no chest pain or shortness of breath. He reports stuttering and difficulty finding words. Denies weakness. Denies dizziness. Denies visual changes. Denies gait abnormalities. Reports recent increase in stress. States mother has recently been diagnosed with multiple strokes.     3/10  2L off w/HD yesterday.  Denies HA, dizziness, numbness/tingling.  To have MRI/MRA today.  3/11  hypertensive.  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 5/21/2018    Morbid obesity 9/12/2012    SVT (supraventricular tachycardia)      Past Surgical History:   Procedure Laterality Date    ABLATION N/A 10/5/2018    Procedure: ABLATION;  Surgeon: Ayden Arana MD;  Location: [facility] CATH LAB;  Service: Cardiology;  Laterality: N/A;  SVT, RFA, ARGELIA, MAC (light), IL, 3 Prep    DIALYSIS FISTULA CREATION      KNEE ARTHROPLASTY      SHOULDER SURGERY      right    VASECTOMY       Family History   Problem Relation Age of Onset    Diabetes  Mother     Kidney disease Mother         on dialysis    Hypertension Mother     Heart failure Mother     Hypertension Father     Kidney disease Paternal Uncle         dialysis    Kidney disease Paternal Uncle         dialysis    Eczema Sister     No Known Problems Son     No Known Problems Sister     No Known Problems Son     No Known Problems Son     Heart disease Maternal Grandfather     Melanoma Neg Hx     Psoriasis Neg Hx     Lupus Neg Hx     Acne Neg Hx      Social History     Tobacco Use    Smoking status: Former     Types: Cigars    Smokeless tobacco: Never    Tobacco comments:     rare   Substance Use Topics    Alcohol use: No    Drug use: No       Review of patient's allergies indicates:  No Known Allergies    Outpatient meds:  No current facility-administered medications on file prior to encounter.     Current Outpatient Medications on File Prior to Encounter   Medication Sig Dispense Refill    allopurinoL (ZYLOPRIM) 100 MG tablet Take 1 tablet (100 mg total) by mouth once daily. 90 tablet 3    amLODIPine (NORVASC) 10 MG tablet TAKE ONE TABLET BY MOUTH ONCE DAILY (Patient taking differently: Take 10 mg by mouth once daily.) 90 tablet 3    carvediloL (COREG) 25 MG tablet Take 1 tablet (25 mg total) by mouth 2 (two) times daily with meals. 180 tablet 3    dulaglutide (TRULICITY) 1.5 mg/0.5 mL pen injector Inject 1.5 mg into the skin every 7 days. (Patient taking differently: Inject 1.5 mg into the skin every 7 days. Saturdays) 12 pen 1    furosemide (LASIX) 40 MG tablet Take 1 tablet (40 mg total) by mouth once daily. (Patient taking differently: Take 40 mg by mouth 2 (two) times a day.) 90 tablet 3    hydrALAZINE (APRESOLINE) 50 MG tablet Take 1 tablet (50 mg total) by mouth every 12 (twelve) hours. 180 tablet 3    insulin (LANTUS SOLOSTAR U-100 INSULIN) glargine 100 units/mL SubQ pen Inject 45 Units into the skin 2 (two) times a day. (Patient taking differently: Inject 30 Units into the skin 2 (two) times  "a day.) 90 mL 0    insulin lispro (HUMALOG KWIKPEN INSULIN) 100 unit/mL pen Inject 45 Units into the skin 3 (three) times daily before meals. (Patient taking differently: Inject 30 Units into the skin 3 (three) times daily before meals.) 120 mL 1    olmesartan-hydrochlorothiazide (BENICAR HCT) 40-25 mg per tablet Take 1 tablet by mouth once daily. (Patient taking differently: Take 1 tablet by mouth every evening.) 90 tablet 3    rosuvastatin (CRESTOR) 10 MG tablet Take 1 tablet (10 mg total) by mouth once daily. 90 tablet 0    sucroferric oxyhydroxide (VELPHORO) 500 mg Chew Chew 1 tablet with each meal three times daily (Patient taking differently: Take 1 each by mouth 3 (three) times daily with meals.) 90 tablet 11    blood sugar diagnostic Strp Use one strip each time to check blood sugar three times daily. 300 strip 4    blood-glucose meter Misc use as directed 1 each 0    HYDROmorphone (DILAUDID) 4 MG tablet Take one tablet by mouth every 6 hours as needed. (Patient not taking: Reported on 3/9/2024) 120 tablet 0    lancets (ACCU-CHEK SOFTCLIX LANCETS) Misc 300 each by Misc.(Non-Drug; Combo Route) route 3 (three) times daily. 300 each 4    papaverine 30 mg/mL injection Papaverine 60mg  Add: Phentolamine 2 mg  Add: PGE1 20 mcg  (Double Strength)    Sig:  Inject 30 units as directed; titrate up by 5 units until desired results (Patient not taking: Reported on 3/9/2024) 10 mL 12    paroxetine (PAXIL) 20 MG tablet Take 1 tablet (20 mg total) by mouth once daily. (Patient not taking: Reported on 3/9/2024) 30 tablet 0    pen needle, diabetic (BD ULTRA-FINE MAIKOL PEN NEEDLE) 32 gauge x 5/32" Ndle To be used with insulin twice daily. 200 each 3    syringe-needle,safety,disp unt 1 mL 27 gauge x 1/2" Syrg Use every 2 weeks for testosterone injection 100 Syringe 0    testosterone cypionate (DEPOTESTOTERONE CYPIONATE) 200 mg/mL injection Inject 0.5 mLs (100 mg total) into the muscle every 14 (fourteen) days. (Patient not " taking: Reported on 3/9/2024) 10 mL 3       Scheduled meds:      Infusions:      PRN meds:      Review of Systems:  Constitutional:  Negative for chills, fever, malaise/fatigue and weight loss.   HENT:  Negative for hearing loss and nosebleeds.    Eyes:  Negative for blurred vision, double vision and photophobia.   Respiratory:  Negative for cough, shortness of breath and wheezing.    Cardiovascular:  Negative for chest pain, palpitations and leg swelling.   Gastrointestinal:  Negative for abdominal pain, constipation, diarrhea, heartburn, nausea and vomiting.   Genitourinary:  Negative for dysuria, frequency and urgency.   Musculoskeletal:  Negative for falls, joint pain and myalgias.   Skin:  Negative for itching and rash.   Neurological:  Negative for dizziness, speech change, focal weakness, loss of consciousness and headaches.   Endo/Heme/Allergies:  Does not bruise/bleed easily.   Psychiatric/Behavioral:  Negative for depression and substance abuse. The patient is not nervous/anxious.      OBJECTIVE:     Vital Signs and IO:  Temp:  [97.6 °F (36.4 °C)-98.6 °F (37 °C)]   Pulse:  [89-96]   Resp:  [16-18]   BP: (130-214)/()   SpO2:  [93 %-98 %]   I/O last 3 completed shifts:  In: 1820 [P.O.:1320; Other:500]  Out: 2515 [Urine:15; Other:2500]  Wt Readings from Last 5 Encounters:   03/10/24 126.1 kg (278 lb)   01/26/24 134 kg (295 lb 6.7 oz)   09/07/23 (!) 136.9 kg (301 lb 13 oz)   09/07/23 135 kg (297 lb 9.9 oz)   06/13/23 136 kg (299 lb 13.2 oz)     Body mass index is 38.77 kg/m².    Physical Exam  Constitutional:       General: Patient is not in acute distress.     Appearance: Patient is well-developed. She is not diaphoretic.   HENT:      Head: Normocephalic and atraumatic.      Mouth/Throat: Mucous membranes are moist.   Eyes:      General: No scleral icterus.     Pupils: Pupils are equal, round, and reactive to light.   Cardiovascular:      Rate and Rhythm: Normal rate and regular rhythm.   Pulmonary:      " Effort: Pulmonary effort is normal. No respiratory distress.      Breath sounds: No stridor.   Abdominal:      General: There is no distension.      Palpations: Abdomen is soft.   Musculoskeletal:         General: No deformity. Normal range of motion.      Cervical back: Neck supple.   Skin:     General: Skin is warm and dry.      Findings: No rash present. No erythema.   Neurological:      Mental Status: Patient is alert and oriented to person, place, and time.      Cranial Nerves: No cranial nerve deficit.   Psychiatric:         Behavior: Behavior normal.     Laboratory:  Recent Labs   Lab 03/09/24  1410 03/10/24  0518 03/11/24  0339    139 138   K 4.6 4.8 4.8   CL 95 100 101   CO2 35* 29 25   BUN 23* 24* 41*   CREATININE 10.1* 9.5* 12.0*    81 99         Recent Labs   Lab 03/09/24  1410 03/10/24  0518 03/11/24  0339   CALCIUM 11.2* 10.7* 10.6*   ALBUMIN 3.4* 3.5 3.3*   PHOS  --  5.9*  --    MG  --  2.0  --                Recent Labs   Lab 03/09/24  2057 03/09/24  2137 03/10/24  0714 03/10/24  1110 03/10/24  1630 03/10/24  2209 03/11/24  0726   POCTGLUCOSE 82 99 69* 75 80 102 101         Recent Labs   Lab 09/07/23  1236 01/26/24  0936 03/09/24  1410   Hemoglobin A1C 5.5 5.1 5.0         Recent Labs   Lab 03/09/24  1410 03/10/24  0519 03/11/24  0339   WBC 9.67 9.86 10.20   HGB 11.6* 11.7* 11.1*   HCT 37.4* 37.3* 34.9*    193 204   MCV 94 91 91   MCHC 31.0* 31.4* 31.8*   MONO 11.9  1.2* 11.2  1.1* 9.9  1.0   EOSINOPHIL 14.2* 15.0* 15.7*         Recent Labs   Lab 03/09/24  1410 03/10/24  0518 03/11/24  0339   BILITOT 0.4 0.5 0.6   PROT 7.3 7.5 7.0   ALBUMIN 3.4* 3.5 3.3*   ALKPHOS 56 60 58   ALT 29 26 21   AST 27 23 17               Invalid input(s): "LEUCOCYTESUR"          Microbiology Results (last 7 days)       ** No results found for the last 168 hours. **            ASSESSMENT/PLAN:     ESRD on HD MWF via AVF  Next HD per schedule.  Continue current dialysis prescription.  Renal diet - low " K, low phos.  No IVs or BP checks on access and/or non-dominant arm.    Anemia of CKD  Hgb and HCT are acceptable. Monitor for now.  Will provide SABIHA and/or IV iron PRN.    MBD / Secondary HPT  Ca, Phos, PTH and vitamin D levels are acceptable.   Phos binders, vitamin D and analogues, calcimimetics will be given as needed.    HTN urgency with headaches  BP seem uncontrolled. HD with UF today.  Tolerate asymptomatic HTN up to -160.  Continue home meds.  Low sodium diet.  Hydralazine to 50mg [id number]  Clonidine 0.1mg po q6 hours prn sbp greater than 180 (rx for this placed in pt's blue chart)    Thank you for allowing us to participate in the care of your patient!   We will follow the patient and provide recommendations as needed.    Patient care time was spent personally by me on the following activities:     Obtaining a history.  Examination of patient.  Providing medical care at the patients bedside.  Developing a treatment plan with patient or surrogate and bedside caregivers.  Ordering and reviewing laboratory studies, radiographic studies, pulse oximetry.  Ordering and performing treatments and interventions.  Evaluation of patient's response to treatment.  Discussions with consultants while on the unit and immediately available to the patient.  Re-evaluation of the patient's condition.  Documentation in the medical record.     Johny Solomon MD      [facility]  [address] [place] [address]  [place] [address]    [phone] - tel  [phone] - fax    3/11/2024

## 2024-03-11 NOTE — PLAN OF CARE
Problem: Adjustment to Illness (Stroke, Ischemic/Transient Ischemic Attack)  Goal: Optimal Coping  Outcome: Ongoing, Progressing     Problem: Bowel Elimination Impaired (Stroke, Ischemic/Transient Ischemic Attack)  Goal: Effective Bowel Elimination  Outcome: Ongoing, Progressing     Problem: Cerebral Tissue Perfusion (Stroke, Ischemic/Transient Ischemic Attack)  Goal: Optimal Cerebral Tissue Perfusion  Outcome: Ongoing, Progressing     Problem: Cognitive Impairment (Stroke, Ischemic/Transient Ischemic Attack)  Goal: Optimal Cognitive Function  Outcome: Ongoing, Progressing     Problem: Communication Impairment (Stroke, Ischemic/Transient Ischemic Attack)  Goal: Improved Communication Skills  Outcome: Ongoing, Progressing     Problem: Functional Ability Impaired (Stroke, Ischemic/Transient Ischemic Attack)  Goal: Optimal Functional Ability  Outcome: Ongoing, Progressing     Problem: Respiratory Compromise (Stroke, Ischemic/Transient Ischemic Attack)  Goal: Effective Oxygenation and Ventilation  Outcome: Ongoing, Progressing     Problem: Sensorimotor Impairment (Stroke, Ischemic/Transient Ischemic Attack)  Goal: Improved Sensorimotor Function  Outcome: Ongoing, Progressing     Problem: Swallowing Impairment (Stroke, Ischemic/Transient Ischemic Attack)  Goal: Optimal Eating and Swallowing without Aspiration  Outcome: Ongoing, Progressing     Problem: Urinary Elimination Impaired (Stroke, Ischemic/Transient Ischemic Attack)  Goal: Effective Urinary Elimination  Outcome: Ongoing, Progressing     Problem: Fall Injury Risk  Goal: Absence of Fall and Fall-Related Injury  Outcome: Ongoing, Progressing     Problem: Adult Inpatient Plan of Care  Goal: Plan of Care Review  Outcome: Ongoing, Progressing  Goal: Patient-Specific Goal (Individualized)  Outcome: Ongoing, Progressing  Goal: Absence of Hospital-Acquired Illness or Injury  Outcome: Ongoing, Progressing  Goal: Optimal Comfort and Wellbeing  Outcome: Ongoing,  Progressing  Goal: Readiness for Transition of Care  Outcome: Ongoing, Progressing     Problem: Bariatric Environmental Safety  Goal: Safety Maintained with Care  Outcome: Ongoing, Progressing     Problem: Device-Related Complication Risk (Hemodialysis)  Goal: Safe, Effective Therapy Delivery  Outcome: Ongoing, Progressing     Problem: Hemodynamic Instability (Hemodialysis)  Goal: Effective Tissue Perfusion  Outcome: Ongoing, Progressing     Problem: Infection (Hemodialysis)  Goal: Absence of Infection Signs and Symptoms  Outcome: Ongoing, Progressing     Problem: Diabetes Comorbidity  Goal: Blood Glucose Level Within Targeted Range  Outcome: Ongoing, Progressing     Problem: Infection  Goal: Absence of Infection Signs and Symptoms  Outcome: Ongoing, Progressing

## 2024-03-11 NOTE — PLAN OF CARE
03/10/24 2310   Patient Assessment/Suction   Level of Consciousness (AVPU) alert   Respiratory Effort Normal;Unlabored   Rhythm/Pattern, Respiratory pattern regular   Skin Integrity   $ Wound Care Tech Time 15 min   Area Observed Bridge of nose   Skin Appearance without discoloration   Barrier used? Liquid Filled Cushion   PRE-TX-O2   Device (Oxygen Therapy) CPAP   Preset CPAP/BiPAP Settings   Mode Of Delivery CPAP   $ Initial CPAP/BiPAP Setup? No   $ Is patient using? Yes   Sized Appropriately? Yes   Equipment Type DeVilbiss   Airway Device Type large full face mask   Humidifier not applicable   CPAP (cm H2O) 16   Patient CPAP/BiPAP Settings   RR Total (Breaths/Min) 20   Tidal Volume (mL) 710   VE Minute Ventilation (L/min) 14.3 L/min   Total Leak (L/Min) 32

## 2024-03-11 NOTE — NURSING
Morena Lantigua [facility] was notified of patients bp being 198/97 after giving iv hydralazine. No new orders placed will continue to monitor.

## 2024-03-11 NOTE — NURSING
Morena Lantigua [facility] was notified of patients blood pressure being 196/94. Hydralazine was ordered and given.

## 2024-03-12 LAB
HAV IGM SERPL QL IA: NEGATIVE
HBV CORE IGM SERPL QL IA: NEGATIVE
HBV SURFACE AG SERPL QL IA: NEGATIVE
HCV IGG SERPL QL IA: NEGATIVE

## 2024-03-12 NOTE — DISCHARGE SUMMARY
[facility]  Discharge Summary      Patient Name: Thai Santoyo Jr.  MRN: [id number]  HALIE: [id number]  Patient Class: IP- Inpatient  Admission Date: 3/9/2024  Hospital Length of Stay: 1 days  Discharge Date and Time: 3/11/2024  3:35 PM  Attending Physician: No att. providers found   Discharging Provider: Shirley Savage NP  Primary Care Provider: Manjit Mckee II, MD    Primary Care Team: Networked reference to record PCT     HPI:   Thai Santoyo Jr. Is a 49 year old male with a previous medical history of ESRD on hemodialysis MWF, DMII, HTN, morbid obesity, SVT, gout and arthritis who presents to the emergency room for an intractable headache for four days, stuttering for 2-3 weeks and forgetfulness for months. Patient denies any associated adverse symptoms such as visual disturbance, neck pain or rigidity, fevers, chills photophobia, nausea, vomiting, chest pain, or shortness of breath. Headache pain is described as pressure in left frontal area and across entire back of head that was not eased or worsened by anything. During admit evaluation patient reports that headache had spontaneously resolved prior to being transported to admit room. It is noted that patient BP in ED prior to admission 222/107 and was given 10mg of labetalol prior to transport to floor. Patient does endorse he has been working with Dr. Mckee as [facility] for the past three months to obtain better blood pressure control. Patient did complete dialysis on Wednesday 3/6/24 and per nephrology recommendation had emergent dialysis session with ultrafiltration upon admit 3/9/24. During examination patient noted to have stuttered speech and some difficulty finding words and reports this occurring for 2-3 weeks. Other than these two symptoms patient is neurologically intact. Endorses forgetfulness for months and when queried describes a time driving with son in car and forgot where he was going.  Initial ED workup revealed a negative head CT, cmp showed normal electrolytes and ESRD, CMP showed anemia of chronic diease and WBC within normal limits, TSH and coags within normal limits, lipid panel unremarkable, Ha1c shows well controlled DMII. Chest xray normal with no signs of volume overload. Patient to be admitted by hospital medicine for further evaluation and management.     * No surgery found *      Hospital Course:   Thai Santoyo Jr. Was monitored closely during his hospitalization.  CT head performed in ED negative for intracranial pathology and he was placed on the stroke pathway.  Nephrology was consulted for continuation of his chronic dialysis.  Neurology service was consulted regarding TIA symptoms. He underwent US bilateral carotid arteries and revealed no acute findings.  MRI obtained and revealed White matter findings which commonly represent microvascular ischemic change but appear more numerous than typically seen with the patient's age and some have appearances suggest demyelinating process/multiple sclerosis. Neurology recommended MRI with contrast to further evaluate. MRI brain with contrast no abnormal intracranial enhancement. Pt symptoms resolved and he felt well and desired to go home.  He underwent hemodialysis on day of discharge and tolerated well.  Patient's blood pressure was monitored and remained elevated and he was discharged with prescription for clonidine p.r.n..  Patient verbalized understanding of discharge instructions and return precautions.    PE:  AAO x 3, pleasant, NAD  Heart-regular rate rhythm, no edema   Lungs-Clear to auscultation bilaterally, respirations even unlabored   Abdomen-soft nontender normoactive bowel sounds   Neuro-no focal deficits, moving all extremities well       Goals of Care Treatment Preferences:  Code Status: Full Code      Consults:   Consults (From admission, onward)          Status Ordering Provider     Inpatient consult to Neurology  Once         Provider:  Arben Roque MD    Completed GEORGIE COLON     IP consult to case management/social work  Once        Provider:  (Not yet assigned)    Completed GEORGIE COLON     Inpatient consult to Nephrology  Once        Provider:  Red Mcintosh MD    Completed SAADIA ESPARZA new Assessment & Plan notes have been filed under this hospital service since the last note was generated.  Service: Hospital Medicine    Final Active Diagnoses:    Diagnosis Date Noted POA    PRINCIPAL PROBLEM:  TIA (transient ischemic attack) [G45.9] 03/09/2024 Yes    Hypertension associated with diabetes [E11.59, I15.2] 08/21/2014 Yes    Anemia in end-stage renal disease [N18.6, D63.1] 12/28/2013 Yes    ESRD on hemodialysis [N18.6, Z99.2] 08/12/2013 Not Applicable    Type 2 diabetes mellitus with chronic kidney disease on chronic dialysis, with long-term current use of insulin [E11.22, N18.6, Z99.2, Z79.4] 08/12/2013 Not Applicable    Obesity (BMI 30-39.9) [E66.9] 09/12/2012 Unknown      Problems Resolved During this Admission:       Discharged Condition: good    Disposition: Home or Self Care    Follow Up:   Follow-up Information       Manjit Mckee II, MD Follow up in 3 day(s).    Specialty: Internal Medicine  Why: to recheck your symptoms, hospital follow up.    [employer] Friend message sent to office to contact you to schedule follow up appointment.  Please contact office if you have not heard from them in 2 days.  Contact information:  [address]  [facility] [address] [phone]               Arben Roque MD Follow up in 1 week(s).    Specialty: Neurology  Why: hospital follow up, to recheck your symptoms  Message sent to office to contact to schedule follow up appointment.  If you don't hear from the office in the next week, please contact them to make appointment.  Contact information:  [facility] [address] [phone]                           Patient  Instructions:      Diet Cardiac     Diet diabetic     Notify your health care provider if you experience any of the following:  temperature >100.4     Notify your health care provider if you experience any of the following:  persistent nausea and vomiting or diarrhea     Notify your health care provider if you experience any of the following:  severe uncontrolled pain     Notify your health care provider if you experience any of the following:  difficulty breathing or increased cough     Notify your health care provider if you experience any of the following:  persistent dizziness, light-headedness, or visual disturbances     Notify your health care provider if you experience any of the following:  increased confusion or weakness     Activity as tolerated       Significant Diagnostic Studies: Labs: CMP   Recent Labs   Lab 03/11/24  0339      K 4.8      CO2 25   GLU 99   BUN 41*   CREATININE 12.0*   CALCIUM 10.6*   PROT 7.0   ALBUMIN 3.3*   BILITOT 0.6   ALKPHOS 58   AST 17   ALT 21   ANIONGAP 12   , CBC   Recent Labs   Lab 03/11/24  0339   WBC 10.20   HGB 11.1*   HCT 34.9*      , and All labs within the past 24 hours have been reviewed    Pending Diagnostic Studies:       Procedure Component Value Units Date/Time    Hepatitis panel, acute [[id number]] Collected: 03/10/24 0519    Order Status: Sent Lab Status: In process Updated: 03/10/24 0523    Specimen: Blood            Medications:  Reconciled Home Medications:      Medication List        START taking these medications      cloNIDine 0.1 MG tablet  Commonly known as: CATAPRES  Take 1 tablet (0.1 mg total) by mouth every 6 (six) hours as needed (SBP >180).            CHANGE how you take these medications      olmesartan-hydrochlorothiazide 40-25 mg per tablet  Commonly known as: BENICAR HCT  Take 1 tablet by mouth once daily.  What changed: when to take this            CONTINUE taking these medications      allopurinoL 100 MG tablet  Commonly  "known as: ZYLOPRIM  Take 1 tablet (100 mg total) by mouth once daily.     amLODIPine 10 MG tablet  Commonly known as: NORVASC  TAKE ONE TABLET BY MOUTH ONCE DAILY     BD ULTRA-FINE MAIKOL PEN NEEDLE 32 gauge x 5/32" Ndle  Generic drug: pen needle, diabetic  To be used with insulin twice daily.     blood sugar diagnostic Strp  Use one strip each time to check blood sugar three times daily.     blood-glucose meter Misc  use as directed     carvediloL 25 MG tablet  Commonly known as: COREG  Take 1 tablet (25 mg total) by mouth 2 (two) times daily with meals.     furosemide 40 MG tablet  Commonly known as: LASIX  Take 1 tablet (40 mg total) by mouth once daily.     hydrALAZINE 50 MG tablet  Commonly known as: APRESOLINE  Take 1 tablet (50 mg total) by mouth every 12 (twelve) hours.     insulin lispro 100 unit/mL pen  Commonly known as: HumaLOG KwikPen Insulin  Inject 30 Units into the skin 3 (three) times daily before meals.     lancets Misc  Commonly known as: ACCU-CHEK SOFTCLIX LANCETS  300 each by Misc.(Non-Drug; Combo Route) route 3 (three) times daily.     LANTUS SOLOSTAR U-100 INSULIN glargine 100 units/mL SubQ pen  Generic drug: insulin  Inject 30 Units into the skin 2 (two) times a day.     rosuvastatin 10 MG tablet  Commonly known as: CRESTOR  Take 1 tablet (10 mg total) by mouth once daily.     syringe-needle,safety,disp unt 1 mL 27 gauge x 1/2" Syrg  Use every 2 weeks for testosterone injection     TRULICITY 1.5 mg/0.5 mL pen injector  Generic drug: dulaglutide  Inject 1.5 mg into the skin every 7 days.     VELPHORO 500 mg Chew  Generic drug: sucroferric oxyhydroxide  Chew 1 tablet with each meal three times daily            STOP taking these medications      HYDROmorphone 4 MG tablet  Commonly known as: DILAUDID     papaverine 30 mg/mL injection     paroxetine 20 MG tablet  Commonly known as: PAXIL     testosterone cypionate 200 mg/mL injection  Commonly known as: DEPOTESTOTERONE CYPIONATE        "       Indwelling Lines/Drains at time of discharge:   Lines/Drains/Airways       Drain  Duration                  Hemodialysis AV Fistula Left upper arm -- days                    Time spent on the discharge of patient: 45 minutes         Shirley Savage NP  Department of Hospital Medicine  [facility]/Surg

## 2024-03-12 NOTE — TELEPHONE ENCOUNTER
Called patient; no answer.  Left a message that I'd see him in clinic in two days as scheduled.    D

## 2024-03-14 ENCOUNTER — OFFICE VISIT (OUTPATIENT)
Dept: INTERNAL MEDICINE | Facility: CLINIC | Age: 49
End: 2024-03-14
Payer: MEDICARE

## 2024-03-14 ENCOUNTER — PATIENT MESSAGE (OUTPATIENT)
Dept: BEHAVIORAL HEALTH | Facility: CLINIC | Age: 49
End: 2024-03-14
Payer: MEDICARE

## 2024-03-14 VITALS
OXYGEN SATURATION: 91 % | HEART RATE: 83 BPM | DIASTOLIC BLOOD PRESSURE: 80 MMHG | WEIGHT: 279.13 LBS | BODY MASS INDEX: 39.08 KG/M2 | HEIGHT: 71 IN | SYSTOLIC BLOOD PRESSURE: 146 MMHG

## 2024-03-14 DIAGNOSIS — Z79.4 TYPE 2 DIABETES MELLITUS WITH CHRONIC KIDNEY DISEASE ON CHRONIC DIALYSIS, WITH LONG-TERM CURRENT USE OF INSULIN: ICD-10-CM

## 2024-03-14 DIAGNOSIS — G47.00 INSOMNIA, UNSPECIFIED TYPE: ICD-10-CM

## 2024-03-14 DIAGNOSIS — F41.9 ANXIETY: ICD-10-CM

## 2024-03-14 DIAGNOSIS — E11.59 HYPERTENSION ASSOCIATED WITH DIABETES: ICD-10-CM

## 2024-03-14 DIAGNOSIS — E29.1 HYPOGONADISM IN MALE: ICD-10-CM

## 2024-03-14 DIAGNOSIS — I15.0 RENOVASCULAR HYPERTENSION: Primary | ICD-10-CM

## 2024-03-14 DIAGNOSIS — I15.2 HYPERTENSION ASSOCIATED WITH DIABETES: ICD-10-CM

## 2024-03-14 DIAGNOSIS — N18.6 TYPE 2 DIABETES MELLITUS WITH CHRONIC KIDNEY DISEASE ON CHRONIC DIALYSIS, WITH LONG-TERM CURRENT USE OF INSULIN: ICD-10-CM

## 2024-03-14 DIAGNOSIS — Z99.2 ESRD ON HEMODIALYSIS: ICD-10-CM

## 2024-03-14 DIAGNOSIS — E11.22 TYPE 2 DIABETES MELLITUS WITH CHRONIC KIDNEY DISEASE ON CHRONIC DIALYSIS, WITH LONG-TERM CURRENT USE OF INSULIN: ICD-10-CM

## 2024-03-14 DIAGNOSIS — N25.81 SECONDARY HYPERPARATHYROIDISM OF RENAL ORIGIN: ICD-10-CM

## 2024-03-14 DIAGNOSIS — Z99.2 TYPE 2 DIABETES MELLITUS WITH CHRONIC KIDNEY DISEASE ON CHRONIC DIALYSIS, WITH LONG-TERM CURRENT USE OF INSULIN: ICD-10-CM

## 2024-03-14 DIAGNOSIS — N18.6 ESRD ON HEMODIALYSIS: ICD-10-CM

## 2024-03-14 PROCEDURE — 99214 OFFICE O/P EST MOD 30 MIN: CPT | Mod: S$GLB,,, | Performed by: INTERNAL MEDICINE

## 2024-03-14 PROCEDURE — 99999 PR PBB SHADOW E&M-EST. PATIENT-LVL V: CPT | Mod: PBBFAC,,, | Performed by: INTERNAL MEDICINE

## 2024-03-14 RX ORDER — HYDRALAZINE HYDROCHLORIDE 50 MG/1
50 TABLET, FILM COATED ORAL EVERY 8 HOURS
Qty: 270 TABLET | Refills: 3 | Status: SHIPPED | OUTPATIENT
Start: 2024-03-14 | End: 2025-03-14

## 2024-03-14 RX ORDER — INSULIN GLARGINE 100 [IU]/ML
24 INJECTION, SOLUTION SUBCUTANEOUS 2 TIMES DAILY
Qty: 36 ML | Refills: 3 | Status: SHIPPED | OUTPATIENT
Start: 2024-03-14 | End: 2025-01-08

## 2024-03-14 RX ORDER — TESTOSTERONE CYPIONATE 1000 MG/10ML
100 INJECTION, SOLUTION INTRAMUSCULAR
Qty: 10 ML | Refills: 2 | Status: SHIPPED | OUTPATIENT
Start: 2024-03-14 | End: 2025-05-08

## 2024-03-14 RX ORDER — QUETIAPINE FUMARATE 50 MG/1
50 TABLET, FILM COATED ORAL NIGHTLY
Qty: 90 TABLET | Refills: 3 | Status: SHIPPED | OUTPATIENT
Start: 2024-03-14 | End: 2024-05-23

## 2024-03-14 NOTE — PROGRESS NOTES
Subjective:       Patient ID: Thai Santoyo Jr. is a 48 y.o. male.    Chief Complaint:   Follow-up    HPI - Admitted 3/9-11 for intractable headache likely 2/2 uncontrolled HTN.  BP meds adjusted; he feels better.  Workup was negative for TIA or stroke.  He's got CONRAD, but only gets 3-4 hours of cpap at night b/c of anxiety and racing thoughts.  He's interested in seeing a therapist.  He needs some refills.  Asked for a dietician referral.  Nephrologist recommended prn clonodine if HA or elevated bp return.      PMH:  ESRD on dialysis  DM2, not at goal; infrequent f/u  HTN, worsening  SVT x2 in 2018  Gout, quiescent  Morbid Obesity  Knee pain on narcotics     Meds:  Reviewed and reconciled in EPIC with patient during visit today.    Review of Systems   Constitutional:  Negative for fever.   HENT:  Negative for congestion.    Respiratory:  Negative for shortness of breath.    Cardiovascular:  Negative for chest pain.   Gastrointestinal:  Negative for abdominal pain.   Genitourinary:  Negative for difficulty urinating.   Musculoskeletal:  Negative for arthralgias.   Skin:  Negative for rash.   Neurological:  Negative for headaches.   Psychiatric/Behavioral:  Positive for sleep disturbance.        Objective:      Physical Exam  Constitutional:       Appearance: He is obese.   HENT:      Head: Normocephalic and atraumatic.   Pulmonary:      Effort: Pulmonary effort is normal.   Neurological:      General: No focal deficit present.      Mental Status: He is alert.   Psychiatric:         Mood and Affect: Mood normal.         Assessment:       1. Renovascular hypertension    2. Hypertension associated with diabetes    3. Type 2 diabetes mellitus with chronic kidney disease on chronic dialysis, with long-term current use of insulin    4. ESRD on hemodialysis    5. Secondary hyperparathyroidism of renal origin    6. Insomnia, unspecified type    7. Anxiety    8. Hypogonadism in male        Plan:       Thai was seen today for  follow-up.    Diagnoses and all orders for this visit:    Renovascular hypertension - improved from hospitalization, but still too high.  Agree with prn clonidine.  Tid hydralazine should help.  Re-evaluate in a month  -     hydrALAZINE (APRESOLINE) 50 MG tablet; Take 1 tablet (50 mg total) by mouth every 8 (eight) hours.    Hypertension associated with diabetes - as above    Type 2 diabetes mellitus with chronic kidney disease on chronic dialysis, with long-term current use of insulin - unstable, [id number].  Last A1c was 5.0.  reduce insulin to 24 at night.  -     insulin (LANTUS SOLOSTAR U-100 INSULIN) glargine 100 units/mL SubQ pen; Inject 24 Units into the skin 2 (two) times a day.  -     Ambulatory referral/consult to Nutrition Services; Future    ESRD on hemodialysis - stable.  Monitor     Secondary hyperparathyroidism of renal origin - stable.  Monitor     Insomnia, unspecified type - unstable.  Failed hydralazine and ambien.  Trial of quetiapine  -     QUEtiapine (SEROQUEL) 50 MG tablet; Take 1 tablet (50 mg total) by mouth every evening.    Anxiety - new problem.  Looking for a therapist  -     Ambulatory referral/consult to Primary Care Behavioral Health (Non-Opioids); Future    Hypogonadism in male - stable.  Needs refills  -     testosterone cypionate (DEPOTESTOTERONE CYPIONATE) 100 mg/mL injection; Inject 1 mL (100 mg total) into the muscle every 14 (fourteen) days.    Rtc one month    CHARLES Mckee MD MPH  Staff Internist

## 2024-03-15 ENCOUNTER — TELEPHONE (OUTPATIENT)
Dept: BEHAVIORAL HEALTH | Facility: CLINIC | Age: 49
End: 2024-03-15
Payer: MEDICARE

## 2024-03-15 LAB
OHS QRS DURATION: 90 MS
OHS QTC CALCULATION: 460 MS

## 2024-03-15 RX ORDER — SUCROFERRIC OXYHYDROXIDE 500 MG/1
TABLET, CHEWABLE ORAL
Qty: 90 TABLET | Refills: 6 | OUTPATIENT
Start: 2024-03-15

## 2024-03-15 NOTE — PROGRESS NOTES
Behavioral Health Community Health Worker  Initial Assessment  Completed by:  Rene Wiggins    Date:  3/15/2024    Patient Enrollment in Behavioral Health Program:       Assessments     Single Item Health Literacy Scale:       Promis 10:       Depression PHQ:      3/15/2024    10:58 AM 1/26/2024     8:39 AM 6/13/2023     2:43 PM 3/7/2023     9:18 AM 4/12/2022     3:09 PM 7/6/2021     9:29 AM 10/24/2017    11:52 AM   PHQ-9 Depression Patient Health Questionnaire   Over the last two weeks how often have you been bothered by little interest or pleasure in doing things 3 0 0 0 0 0 1   Over the last two weeks how often have you been bothered by feeling down, depressed or hopeless 1 0 0 0 0 0 0   Over the last two weeks how often have you been bothered by trouble falling or staying asleep, or sleeping too much 1         Over the last two weeks how often have you been bothered by feeling tired or having little energy 2         Over the last two weeks how often have you been bothered by a poor appetite or overeating 2         Over the last two weeks how often have you been bothered by feeling bad about yourself - or that you are a failure or have let yourself or your family down 0         Over the last two weeks how often have you been bothered by trouble concentrating on things, such as reading the newspaper or watching television 3         Over the last two weeks how often have you been bothered by moving or speaking so slowly that other people could have noticed. 2         Over the last two weeks how often have you been bothered by thoughts that you would be better off dead, or of hurting yourself 0         If you checked off any problems, how difficult have these problems made it for you to do your work, take care of things at home or get along with other people? Not difficult at all         PHQ-9 Score 14                Generalized Anxiety Disorder 7-Item Scale:      3/15/2024    11:00 AM   GAD7   1. Feeling nervous,  "anxious, or on edge? 1   2. Not being able to stop or control worrying? 3   3. Worrying too much about different things? 3   4. Trouble relaxing? 3   5. Being so restless that it is hard to sit still? 2   6. Becoming easily annoyed or irritable? 3   7. Feeling afraid as if something awful might happen? 0   8. If you checked off any problems, how difficult have these problems made it for you to do your work, take care of things at home, or get along with other people? 1   ALBERTO-7 Score 15       History     Social History     Socioeconomic History    Marital status: Single   Occupational History    Occupation: Unemployed     Employer: OTHER     Comment: [employer] for 12 years   Tobacco Use    Smoking status: Former     Types: Cigars    Smokeless tobacco: Never    Tobacco comments:     rare   Substance and Sexual Activity    Alcohol use: No    Drug use: No    Sexual activity: Yes     Partners: Female     Birth control/protection: None       Call Summary   Patient was referred to the BHI (Non-opioid) program by Primary Care Provider, Dr. Mckee CHW contacted Thai Natanael Jc who reports anxiety that limits [his/her] activities of daily living (ADLs).   Patient scored "15" on the PHQ9 and "14" on the ALBERTO 7. Based on these scores patient is eligible for the Behavioral health Integration (Non-opioid) Program. CHW completed the intake and scheduled an appointment for patient with Dr Grace Ji, LPC,PhD on 3/26/24.          "

## 2024-03-26 ENCOUNTER — PATIENT MESSAGE (OUTPATIENT)
Dept: BEHAVIORAL HEALTH | Facility: CLINIC | Age: 49
End: 2024-03-26
Payer: MEDICARE

## 2024-03-26 ENCOUNTER — OFFICE VISIT (OUTPATIENT)
Dept: BEHAVIORAL HEALTH | Facility: CLINIC | Age: 49
End: 2024-03-26
Payer: MEDICARE

## 2024-03-26 DIAGNOSIS — F41.9 ANXIETY: ICD-10-CM

## 2024-03-26 PROCEDURE — 90791 PSYCH DIAGNOSTIC EVALUATION: CPT | Mod: S$GLB,,, | Performed by: COUNSELOR

## 2024-03-26 NOTE — PROGRESS NOTES
Primary Care Behavioral Health Integration: Initial  Date:  3/26/2024  Referral Source:  Manjti Mckee II, MD  Type of Visit:  In person  Length of Appointment: 30    Chief Complaint/Reason for Encounter:  Anxiety    History of Present Illness: Thai Santoyo Jr., a 48 y.o. male referred by Manjit Mckee II, MD.  Patient was seen, examined and chart was reviewed. Met with patient.     Current Session: Patient reported having a lot of stress in his life. Was a special  who reported to his now fiance and the school made him quit. Just started working at [email or web address] yesterday. Says that he and fikenyatta love each other but don't like each other most times. Communication has been failing since her sister moved in with twins. Pt has 3 sons with only the youngest one living with them. Son is going to be a senior in high school so they have begun to look for colleges. Pt's parents are older and his mother has a lot of health problems. Father is retired and just wants to stay home and do nothing. Pt is on dialysis bc of diabetes and hypertension.    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 5/21/2018    Morbid obesity 9/12/2012    SVT (supraventricular tachycardia)          Current Outpatient Medications:     allopurinoL (ZYLOPRIM) 100 MG tablet, Take 1 tablet (100 mg total) by mouth once daily., Disp: 90 tablet, Rfl: 3    amLODIPine (NORVASC) 10 MG tablet, TAKE ONE TABLET BY MOUTH ONCE DAILY (Patient taking differently: Take 10 mg by mouth once daily.), Disp: 90  "tablet, Rfl: 3    blood sugar diagnostic Strp, Use one strip each time to check blood sugar three times daily., Disp: 300 strip, Rfl: 4    blood-glucose meter Misc, use as directed, Disp: 1 each, Rfl: 0    carvediloL (COREG) 25 MG tablet, Take 1 tablet (25 mg total) by mouth 2 (two) times daily with meals., Disp: 180 tablet, Rfl: 3    cloNIDine (CATAPRES) 0.1 MG tablet, Take 1 tablet (0.1 mg total) by mouth every 6 (six) hours as needed (SBP >180)., Disp: 60 tablet, Rfl: 3    dulaglutide (TRULICITY) 1.5 mg/0.5 mL pen injector, Inject 1.5 mg into the skin every 7 days. (Patient taking differently: Inject 1.5 mg into the skin every 7 days. Saturdays), Disp: 12 pen , Rfl: 1    furosemide (LASIX) 40 MG tablet, Take 1 tablet (40 mg total) by mouth once daily., Disp: 90 tablet, Rfl: 3    hydrALAZINE (APRESOLINE) 50 MG tablet, Take 1 tablet (50 mg total) by mouth every 8 (eight) hours., Disp: 270 tablet, Rfl: 3    insulin (LANTUS SOLOSTAR U-100 INSULIN) glargine 100 units/mL SubQ pen, Inject 24 Units into the skin 2 (two) times a day., Disp: 36 mL, Rfl: 3    insulin lispro (HUMALOG KWIKPEN INSULIN) 100 unit/mL pen, Inject 30 Units into the skin 3 (three) times daily before meals., Disp: 15 mL, Rfl: 0    lancets (ACCU-CHEK SOFTCLIX LANCETS) Misc, 300 each by Misc.(Non-Drug; Combo Route) route 3 (three) times daily., Disp: 300 each, Rfl: 4    olmesartan-hydrochlorothiazide (BENICAR HCT) 40-25 mg per tablet, Take 1 tablet by mouth once daily. (Patient taking differently: Take 1 tablet by mouth every evening.), Disp: 90 tablet, Rfl: 3    pen needle, diabetic (BD ULTRA-FINE MAIKOL PEN NEEDLE) 32 gauge x 5/32" Ndle, To be used with insulin twice daily., Disp: 200 each, Rfl: 3    QUEtiapine (SEROQUEL) 50 MG tablet, Take 1 tablet (50 mg total) by mouth every evening., Disp: 90 tablet, Rfl: 3    rosuvastatin (CRESTOR) 10 MG tablet, Take 1 tablet (10 mg total) by mouth once daily., Disp: 90 tablet, Rfl: 0    sucroferric oxyhydroxide " "(VELPHORO) 500 mg Chew, Chew 1 tablet with each meal three times daily, Disp: 90 tablet, Rfl: 6    syringe-needle,safety,disp unt 1 mL 27 gauge x 1/2" Syrg, Use every 2 weeks for testosterone injection, Disp: 100 Syringe, Rfl: 0    testosterone cypionate (DEPOTESTOTERONE CYPIONATE) 100 mg/mL injection, Inject 1 mL (100 mg total) into the muscle every 14 (fourteen) days., Disp: 10 mL, Rfl: 2    Current symptoms:  Depression Symptoms: denies.  Anxiety Symptoms: excessive worrying and restlessness.  Sleep Difficulties: frequent night time awakening and difficulty falling asleep.  Manic Symptoms:  denies.  Psychosis: denies .    Risk assessment:  Patient reports no suicidal ideation  Patient reports no homicidal ideation  Patient reports no self-injurious behavior  Patient reports no violent behavior    Patient advised to call [phone] or present the the nearest ED if they experience suicidal or homicidal ideation, plan or intent.      Psychiatric History:  Diagnosis:    Current Psychiatric Medication: No .   Medication Trial History:  Medication Trials: Unsure    Outpatient Treatment: No   Inpatient Treatment: No   Suicide Attempts: No   Access to Firearms: No   History of Trauma: Yes     Current and Past Substance Use:  Alcohol: none   Drugs:  Denied.   Nicotine: denied   Caffeine:  Denied     Mental Status Exam  General Appearance:  appears stated age, neatly dressed, well groomed   Speech: normal tone, normal rate, normal pitch, normal volume      Level of Cooperation: cooperative      Thought Processes: linear, logical, goal-directed   Mood: anxious      Thought Content: {relevant and appropriate   Affect: congruent and appropriate   Orientation: Oriented x3   Memory/Attention/Concentration: No gross cognitive deficits made evident during conversation   Judgment & Insight: poor   Language  intact          No data to display                    3/15/2024    11:00 AM   GAD7   1. Feeling nervous, anxious, or on edge? 1 "   2. Not being able to stop or control worrying? 3   3. Worrying too much about different things? 3   4. Trouble relaxing? 3   5. Being so restless that it is hard to sit still? 2   6. Becoming easily annoyed or irritable? 3   7. Feeling afraid as if something awful might happen? 0   8. If you checked off any problems, how difficult have these problems made it for you to do your work, take care of things at home, or get along with other people? 1   ALBERTO-7 Score 15       Impression: Initial appointment focused on gathering history, identifying treatment goals and developing a treatment plan.      Diagnosis:  1. Anxiety  Ambulatory referral/consult to Primary Care Behavioral Health (Non-Opioids)          Treatment Goals and Plan:   Anxiety: reducing negative automatic thoughts    Future treatment will utilize CBT and Solution-focused Therapy.      Return to Clinic: No follow-ups on file.

## 2024-04-07 ENCOUNTER — HOSPITAL ENCOUNTER (EMERGENCY)
Facility: HOSPITAL | Age: 49
Discharge: HOME | End: 2024-04-07
Attending: EMERGENCY MEDICINE
Payer: MEDICARE

## 2024-04-07 VITALS
HEIGHT: 71 IN | HEART RATE: 90 BPM | TEMPERATURE: 99 F | OXYGEN SATURATION: 94 % | DIASTOLIC BLOOD PRESSURE: 80 MMHG | BODY MASS INDEX: 39.06 KG/M2 | RESPIRATION RATE: 18 BRPM | SYSTOLIC BLOOD PRESSURE: 164 MMHG | WEIGHT: 279 LBS

## 2024-04-07 DIAGNOSIS — R09.89 CHEST CONGESTION: ICD-10-CM

## 2024-04-07 DIAGNOSIS — J06.9 VIRAL URI WITH COUGH: Primary | ICD-10-CM

## 2024-04-07 LAB
INFLUENZA A, MOLECULAR: NEGATIVE
INFLUENZA B, MOLECULAR: NEGATIVE
SARS-COV-2 RDRP RESP QL NAA+PROBE: NEGATIVE
SPECIMEN SOURCE: NORMAL

## 2024-04-07 PROCEDURE — U0002 COVID-19 LAB TEST NON-CDC: HCPCS | Performed by: NURSE PRACTITIONER

## 2024-04-07 PROCEDURE — 99283 EMERGENCY DEPT VISIT LOW MDM: CPT | Mod: 25

## 2024-04-07 PROCEDURE — 87502 INFLUENZA DNA AMP PROBE: CPT | Performed by: NURSE PRACTITIONER

## 2024-04-07 NOTE — ED NOTES
Pt identifiers checked and accurate with Thai Santoyo Jr.    Pt presents to ED with complaints of cough and headache worsening x several days. Pt denies all other complaints at this time

## 2024-04-07 NOTE — FIRST PROVIDER EVALUATION
Emergency Department TeleTriage Encounter Note      CHIEF COMPLAINT    Chief Complaint   Patient presents with    Cough     X 2 days  / no fever     Headache       VITAL SIGNS   Initial Vitals [04/07/24 1220]   BP Pulse Resp Temp SpO2   (!) 141/78 95 20 98.6 °F (37 °C) (!) 94 %      MAP       --            ALLERGIES    Review of patient's allergies indicates:  No Known Allergies    PROVIDER TRIAGE NOTE  Verbal consent for the teletriage evaluation was given by the patient at the start of the evaluation.  All efforts will be made to maintain patient's privacy during the evaluation.      This is a teletriage evaluation of a 48 y.o. male presenting to the ED with c/o cough, chest congestion, body aches, and fatigue for 2 days. Limited physical exam via telehealth: The patient is awake, alert, answering questions appropriately and is not in respiratory distress.  As the Teletriage provider, I performed an initial assessment and ordered appropriate labs and imaging studies, if any, to facilitate the patient's care once placed in the ED. Once a room is available, care and a full evaluation will be completed by an alternate ED provider.  Any additional orders and the final disposition will be determined by that provider.  All imaging and labs will not be followed-up by the Teletriage Team, including myself.          ORDERS  Labs Reviewed   INFLUENZA A & B BY MOLECULAR   SARS-COV-2 RNA AMPLIFICATION, QUAL       ED Orders (720h ago, onward)      Start Ordered     Status Ordering Provider    04/07/24 1226 04/07/24 1226  EKG 12-lead  Once         Ordered MARCOS JAY    04/07/24 1226 04/07/24 1226  X-Ray Chest PA And Lateral  1 time imaging         Ordered MARCOS JAY    04/07/24 1226 04/07/24 1226  COVID-19 Rapid Screening  STAT         Ordered MARCOS JAY    04/07/24 1226 04/07/24 1226  Influenza A & B by Molecular  Once         Ordered MARCOS JAY    04/07/24 1226 04/07/24 1226  Nursing communication  Once         Comments: Ambulatory SPO2    Ordered MARCOS JAY A              Virtual Visit Note: The provider triage portion of this emergency department evaluation and documentation was performed via [employer], a HIPAA-compliant telemedicine application, in concert with a tele-presenter in the room. A face to face patient evaluation with one of my colleagues will occur once the patient is placed in an emergency department room.      DISCLAIMER: This note was prepared with [employer] voice recognition transcription software. Garbled syntax, mangled pronouns, and other bizarre constructions may be attributed to that software system.

## 2024-04-07 NOTE — Clinical Note
"Thai"Mary Grace Santoyo was seen and treated in our emergency department on 4/7/2024.  He may return to work on 04/10/2024.       If you have any questions or concerns, please don't hesitate to call.      SOPHIA Piper RN    "

## 2024-04-07 NOTE — ED PROVIDER NOTES
Encounter Date: 4/7/2024       History     Chief Complaint   Patient presents with    Cough     X 2 days  / no fever     Headache     48-year-old male with a past medical history of diabetes mellitus, hypertension, and chronic kidney disease on dialysis presents with multiple complaints.  The patient reports that he has had a cough, congestion, headache and body aches for the last 2 days.  He denies any associated shortness of breath, chest pain, abdominal pain, nausea/vomiting, fever/chills, or diarrhea.  There are no alleviating or aggravating factors.  He denies any recent sick contacts.  He received his normal dialysis 2 days ago, and is due for his next session tomorrow.      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 CATH LAB;  Service: Cardiology;  Laterality: N/A;  SVT, RFA, ARGELIA, MAC (light), CO, 3 Prep    DIALYSIS FISTULA CREATION      KNEE ARTHROPLASTY      SHOULDER SURGERY      right    VASECTOMY       Family History   Problem Relation Age of Onset    Diabetes Mother     Kidney disease Mother         on dialysis    Hypertension Mother     Heart failure Mother     Hypertension Father     Kidney disease Paternal Uncle         dialysis    Kidney disease Paternal Uncle         dialysis     Eczema Sister     No Known Problems Son     No Known Problems Sister     No Known Problems Son     No Known Problems Son     Heart disease Maternal Grandfather     Melanoma Neg Hx     Psoriasis Neg Hx     Lupus Neg Hx     Acne Neg Hx      Social History     Tobacco Use    Smoking status: Former     Types: Cigars    Smokeless tobacco: Never    Tobacco comments:     rare   Substance Use Topics    Alcohol use: No    Drug use: No     Review of Systems   Constitutional:  Negative for chills, diaphoresis, fatigue and fever.   HENT:  Positive for congestion and rhinorrhea.    Respiratory:  Positive for cough. Negative for shortness of breath.    Cardiovascular:  Negative for chest pain.   Gastrointestinal:  Negative for abdominal pain, diarrhea, nausea and vomiting.   Genitourinary:  Negative for dysuria, frequency and testicular pain.   Musculoskeletal:  Positive for myalgias. Negative for gait problem.   Skin:  Negative for color change.   Neurological:  Positive for headaches. Negative for dizziness and numbness.   Psychiatric/Behavioral:  Negative for agitation and confusion.        Physical Exam     Initial Vitals [04/07/24 1220]   BP Pulse Resp Temp SpO2   (!) 141/78 95 20 98.6 °F (37 °C) (!) 94 %      MAP       --         Physical Exam    Nursing note and vitals reviewed.  Constitutional: He appears well-developed and well-nourished.   HENT:   Head: Normocephalic and atraumatic.   Right Ear: External ear normal.   Left Ear: External ear normal.   Eyes: EOM are normal. Pupils are equal, round, and reactive to light.   Neck: Neck supple.   Cardiovascular:  Normal rate and regular rhythm.           Pulmonary/Chest: Breath sounds normal.   Abdominal: Abdomen is soft. Bowel sounds are normal. He exhibits no distension. There is no abdominal tenderness. There is no rebound and no guarding.   Musculoskeletal:         General: Normal range of motion.      Cervical back: Neck supple.     Neurological: He is alert and  oriented to person, place, and time.   Skin: Skin is warm and dry.   Psychiatric: He has a normal mood and affect.         ED Course   Procedures  Labs Reviewed   INFLUENZA A & B BY MOLECULAR   SARS-COV-2 RNA AMPLIFICATION, QUAL          Imaging Results              X-Ray Chest PA And Lateral (Final result)  Result time 04/07/24 13:13:58      Final result by Sola Cates MD (04/07/24 13:13:58)                   Impression:      No acute abnormality.      Electronically signed by: Sola Cates MD  Date:    04/07/2024  Time:    13:13               Narrative:    EXAMINATION:  XR CHEST PA AND LATERAL    CLINICAL HISTORY:  chest congestion;    TECHNIQUE:  PA and lateral views of the chest were performed.    COMPARISON:  03/09/2024    FINDINGS:  The lungs are clear, with normal appearance of pulmonary vasculature and no pleural effusion or pneumothorax.    The cardiac silhouette is normal in size. The hilar and mediastinal contours are unremarkable.    Bones are intact.                                       Medications - No data to display  Medical Decision Making  48-year-old male presented with multiple complaints.    Initial differential diagnosis included but not limited to pneumonia, COVID, and influenza.    Amount and/or Complexity of Data Reviewed  Labs: ordered.  Radiology: ordered.    Risk  Risk Details: The patient was emergently evaluated in the emergency department, his evaluation was significant for a middle-age male with a normal lung exam.  The patient's chest x-ray showed no acute abnormalities per Radiology.  The patient's COVID and influenza swabs were noted to be negative.  The patient likely has a different viral illness causing his symptoms.  He is stable for discharge to home, and does not require further care or workup at this time.  He can continue over-the-counter medications as needed for symptomatic relief.  He is referred to primary care for follow-up.                                       Clinical Impression:  Final diagnoses:  [R09.89] Chest congestion  [J06.9] Viral URI with cough (Primary)          ED Disposition Condition    Discharge Stable          ED Prescriptions    None       Follow-up Information       Follow up With Specialties Details Why Contact Info    Manjit Mckee II, MD Internal Medicine Schedule an appointment as soon as possible for a visit   [address]  [place] [address]  [phone]               Mian Lindo MD  04/07/24 [id number]

## 2024-04-08 ENCOUNTER — PATIENT MESSAGE (OUTPATIENT)
Dept: BEHAVIORAL HEALTH | Facility: CLINIC | Age: 49
End: 2024-04-08
Payer: MEDICARE

## 2024-04-25 ENCOUNTER — OFFICE VISIT (OUTPATIENT)
Dept: BEHAVIORAL HEALTH | Facility: CLINIC | Age: 49
End: 2024-04-25
Payer: COMMERCIAL

## 2024-04-25 ENCOUNTER — PATIENT MESSAGE (OUTPATIENT)
Dept: BEHAVIORAL HEALTH | Facility: CLINIC | Age: 49
End: 2024-04-25
Payer: MEDICARE

## 2024-04-25 DIAGNOSIS — F41.1 GAD (GENERALIZED ANXIETY DISORDER): Primary | ICD-10-CM

## 2024-04-25 PROCEDURE — 90832 PSYTX W PT 30 MINUTES: CPT | Mod: S$GLB,,, | Performed by: COUNSELOR

## 2024-04-25 PROCEDURE — 3044F HG A1C LEVEL LT 7.0%: CPT | Mod: CPTII,S$GLB,, | Performed by: COUNSELOR

## 2024-04-25 PROCEDURE — 3066F NEPHROPATHY DOC TX: CPT | Mod: CPTII,S$GLB,, | Performed by: COUNSELOR

## 2024-04-25 NOTE — PROGRESS NOTES
Primary Care Behavioral Health Integration: Follow-up  Date:  4/25/2024  Patient Name: Thai Santoyo Jr.  Type of Visit:  In person  Site:  [facility]  Referral Source:  Manjit Mckee II, MD    History of Present Illness:  Thai Santoyo Jr., a 48 y.o.  male with history of Anxiety disorders; generalized anxiety disorder [F41.1] referred by Manjit Mckee II, MD.  Patient was seen, examined and chart was reviewed.    Met with patient.     Chief complaint/reason for encounter: anxiety     Current session: Patient reported doing good today. Said things are the same but it's just him that's changed. 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 Wants to find what makes him happy but has only ever done sports. Cannot do sports again bc of his health. Considering joining [employer] which is a mentoring program.    Treatment plan:  Target symptoms: anxiety   Why chosen therapy is appropriate versus another modality: relevant to diagnosis  Outcome monitoring methods: self-report, observation  Therapeutic intervention type: supportive psychotherapy    Risk parameters:  Patient reports no suicidal ideation  Patient reports no homicidal ideation  Patient reports no self-injurious behavior  Patient reports no violent behavior    Verbal deficits: None    Patient's response to intervention:  The patient's response to intervention is accepting.    Progress toward goals and other mental status changes:  The patient's progress toward goals is good.    Patient advised to call [phone] or present the the nearest ED if they experience suicidal or homicidal ideation, plan or intent.           No data to display                    3/15/2024    11:00 AM   GAD7   1. Feeling nervous, anxious, or on edge? 1   2. Not being able to stop or control worrying? 3   3. Worrying too much about different  things? 3   4. Trouble relaxing? 3   5. Being so restless that it is hard to sit still? 2   6. Becoming easily annoyed or irritable? 3   7. Feeling afraid as if something awful might happen? 0   8. If you checked off any problems, how difficult have these problems made it for you to do your work, take care of things at home, or get along with other people? 1   ALBERTO-7 Score 15       Diagnosis:     ICD-10-CM ICD-9-CM   1. ALBERTO (generalized anxiety disorder)  F41.1 300.02       Plan: Pt plans to continue individual therapy.    Return to clinic: 2 weeks    Length of Service (minutes): 30

## 2024-05-07 ENCOUNTER — OFFICE VISIT (OUTPATIENT)
Dept: BEHAVIORAL HEALTH | Facility: CLINIC | Age: 49
End: 2024-05-07
Payer: MEDICARE

## 2024-05-07 ENCOUNTER — TELEPHONE (OUTPATIENT)
Dept: BEHAVIORAL HEALTH | Facility: CLINIC | Age: 49
End: 2024-05-07
Payer: MEDICARE

## 2024-05-07 DIAGNOSIS — F41.1 GAD (GENERALIZED ANXIETY DISORDER): Primary | ICD-10-CM

## 2024-05-07 PROCEDURE — 3066F NEPHROPATHY DOC TX: CPT | Mod: CPTII,S$GLB,, | Performed by: COUNSELOR

## 2024-05-07 PROCEDURE — 3044F HG A1C LEVEL LT 7.0%: CPT | Mod: CPTII,S$GLB,, | Performed by: COUNSELOR

## 2024-05-07 PROCEDURE — 90832 PSYTX W PT 30 MINUTES: CPT | Mod: S$GLB,,, | Performed by: COUNSELOR

## 2024-05-07 NOTE — PROGRESS NOTES
Primary Care Behavioral Health Integration: Follow-up  Date:  5/7/2024  Patient Name: Thai Santoyo Jr.  Type of Visit:  In person  Site:  [facility]  Referral Source:  Manjit Mckee II, MD    History of Present Illness:  Thai Santoyo Jr., a 48 y.o.  male with history of Anxiety disorders; generalized anxiety disorder [F41.1] referred by Manjit Mckee II, MD.  Patient was seen, examined and chart was reviewed.    Met with patient.     Chief complaint/reason for encounter: anxiety     Current session: Patient reported having an overdose at work. He knew the signs and gave the kid Narcan. He saved his life. 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. Waiting on a meeting with the mayor to start the process. Discussed having foster kids and girlfriend wanting to adopt but not a baby. Pt has 3 sons. Has a relationship with 2 of them but not the oldest. Youngest son lives with pt. Told his GF's sister that she and her kids need to move out. Decided to give her a deadline. Her living there has caused a lot of drama. Pt's mother has ongoing health issues and pt is trying to become her caregiver. Mother is in her late 60's and pt doesn't want her to drive or cook again. Mother isn't happy about that.    Treatment plan:  Target symptoms: anxiety   Why chosen therapy is appropriate versus another modality: relevant to diagnosis  Outcome monitoring methods: self-report, observation  Therapeutic intervention type: supportive psychotherapy    Risk parameters:  Patient reports no suicidal ideation  Patient reports no homicidal ideation  Patient reports no self-injurious behavior  Patient reports no violent behavior    Verbal deficits: None    Patient's response to intervention:  The patient's response to intervention is accepting.    Progress toward goals and other mental status changes:  The patient's progress toward goals is  good.    Patient advised to call [phone] or present the the nearest ED if they experience suicidal or homicidal ideation, plan or intent.           No data to display                    3/15/2024    11:00 AM   GAD7   1. Feeling nervous, anxious, or on edge? 1   2. Not being able to stop or control worrying? 3   3. Worrying too much about different things? 3   4. Trouble relaxing? 3   5. Being so restless that it is hard to sit still? 2   6. Becoming easily annoyed or irritable? 3   7. Feeling afraid as if something awful might happen? 0   8. If you checked off any problems, how difficult have these problems made it for you to do your work, take care of things at home, or get along with other people? 1   ALBERTO-7 Score 15       Diagnosis:     ICD-10-CM ICD-9-CM   1. ALBERTO (generalized anxiety disorder)  F41.1 300.02         Plan: Pt plans to continue individual therapy.    Return to clinic: 2 weeks    Length of Service (minutes): 30            negative

## 2024-05-08 ENCOUNTER — TELEPHONE (OUTPATIENT)
Dept: TRANSPLANT | Facility: CLINIC | Age: 49
End: 2024-05-08
Payer: MEDICARE

## 2024-05-13 ENCOUNTER — TELEPHONE (OUTPATIENT)
Dept: TRANSPLANT | Facility: CLINIC | Age: 49
End: 2024-05-13
Payer: MEDICARE

## 2024-05-21 ENCOUNTER — TELEPHONE (OUTPATIENT)
Dept: TRANSPLANT | Facility: CLINIC | Age: 49
End: 2024-05-21
Payer: MEDICARE

## 2024-05-22 DIAGNOSIS — Z76.82 ORGAN TRANSPLANT CANDIDATE: Primary | ICD-10-CM

## 2024-05-23 ENCOUNTER — OFFICE VISIT (OUTPATIENT)
Dept: INTERNAL MEDICINE | Facility: CLINIC | Age: 49
End: 2024-05-23
Payer: MEDICARE

## 2024-05-23 VITALS
HEART RATE: 90 BPM | DIASTOLIC BLOOD PRESSURE: 88 MMHG | WEIGHT: 278.69 LBS | BODY MASS INDEX: 39.02 KG/M2 | HEIGHT: 71 IN | SYSTOLIC BLOOD PRESSURE: 158 MMHG

## 2024-05-23 DIAGNOSIS — F32.1 MAJOR DEPRESSIVE DISORDER, SINGLE EPISODE, MODERATE: ICD-10-CM

## 2024-05-23 DIAGNOSIS — Z79.4 TYPE 2 DIABETES MELLITUS WITH CHRONIC KIDNEY DISEASE ON CHRONIC DIALYSIS, WITH LONG-TERM CURRENT USE OF INSULIN: Primary | ICD-10-CM

## 2024-05-23 DIAGNOSIS — I15.0 RENOVASCULAR HYPERTENSION: ICD-10-CM

## 2024-05-23 DIAGNOSIS — E11.22 TYPE 2 DIABETES MELLITUS WITH CHRONIC KIDNEY DISEASE ON CHRONIC DIALYSIS, WITH LONG-TERM CURRENT USE OF INSULIN: Primary | ICD-10-CM

## 2024-05-23 DIAGNOSIS — E66.9 OBESITY (BMI 30-39.9): ICD-10-CM

## 2024-05-23 DIAGNOSIS — I15.2 HYPERTENSION ASSOCIATED WITH DIABETES: ICD-10-CM

## 2024-05-23 DIAGNOSIS — I13.2 HYPERTENSIVE HEART AND RENAL DISEASE WITH CONGESTIVE HEART FAILURE AND RENAL FAILURE: ICD-10-CM

## 2024-05-23 DIAGNOSIS — E11.59 HYPERTENSION ASSOCIATED WITH DIABETES: ICD-10-CM

## 2024-05-23 DIAGNOSIS — Z99.2 TYPE 2 DIABETES MELLITUS WITH CHRONIC KIDNEY DISEASE ON CHRONIC DIALYSIS, WITH LONG-TERM CURRENT USE OF INSULIN: Primary | ICD-10-CM

## 2024-05-23 DIAGNOSIS — N19 HYPERTENSIVE HEART AND RENAL DISEASE WITH CONGESTIVE HEART FAILURE AND RENAL FAILURE: ICD-10-CM

## 2024-05-23 DIAGNOSIS — G47.00 INSOMNIA, UNSPECIFIED TYPE: ICD-10-CM

## 2024-05-23 DIAGNOSIS — G47.33 OSA (OBSTRUCTIVE SLEEP APNEA): ICD-10-CM

## 2024-05-23 DIAGNOSIS — N18.6 TYPE 2 DIABETES MELLITUS WITH CHRONIC KIDNEY DISEASE ON CHRONIC DIALYSIS, WITH LONG-TERM CURRENT USE OF INSULIN: Primary | ICD-10-CM

## 2024-05-23 DIAGNOSIS — D84.9 IMMUNODEFICIENCY, UNSPECIFIED: ICD-10-CM

## 2024-05-23 PROCEDURE — 3044F HG A1C LEVEL LT 7.0%: CPT | Mod: CPTII,S$GLB,, | Performed by: INTERNAL MEDICINE

## 2024-05-23 PROCEDURE — 99999 PR PBB SHADOW E&M-EST. PATIENT-LVL IV: CPT | Mod: PBBFAC,,, | Performed by: INTERNAL MEDICINE

## 2024-05-23 PROCEDURE — 3008F BODY MASS INDEX DOCD: CPT | Mod: CPTII,S$GLB,, | Performed by: INTERNAL MEDICINE

## 2024-05-23 PROCEDURE — 99214 OFFICE O/P EST MOD 30 MIN: CPT | Mod: S$GLB,,, | Performed by: INTERNAL MEDICINE

## 2024-05-23 PROCEDURE — 3077F SYST BP >= 140 MM HG: CPT | Mod: CPTII,S$GLB,, | Performed by: INTERNAL MEDICINE

## 2024-05-23 PROCEDURE — 1160F RVW MEDS BY RX/DR IN RCRD: CPT | Mod: CPTII,S$GLB,, | Performed by: INTERNAL MEDICINE

## 2024-05-23 PROCEDURE — 3066F NEPHROPATHY DOC TX: CPT | Mod: CPTII,S$GLB,, | Performed by: INTERNAL MEDICINE

## 2024-05-23 PROCEDURE — 1159F MED LIST DOCD IN RCRD: CPT | Mod: CPTII,S$GLB,, | Performed by: INTERNAL MEDICINE

## 2024-05-23 PROCEDURE — 3079F DIAST BP 80-89 MM HG: CPT | Mod: CPTII,S$GLB,, | Performed by: INTERNAL MEDICINE

## 2024-05-23 RX ORDER — INSULIN LISPRO 100 [IU]/ML
INJECTION, SOLUTION INTRAVENOUS; SUBCUTANEOUS
Qty: 15 ML | Refills: 0 | Status: SHIPPED | OUTPATIENT
Start: 2024-05-23

## 2024-05-23 RX ORDER — AMITRIPTYLINE HYDROCHLORIDE 25 MG/1
25 TABLET, FILM COATED ORAL NIGHTLY PRN
Qty: 90 TABLET | Refills: 3 | Status: SHIPPED | OUTPATIENT
Start: 2024-05-23 | End: 2025-05-23

## 2024-05-23 NOTE — PROGRESS NOTES
Subjective:       Patient ID: Thai Santoyo Jr. is a 48 y.o. male.    Chief Complaint:   Follow-up    HPI - here for quick turnaround visit for BP and diabetes.  Last saw him 6 weeks ago.  He feels well.  He's taking 30 units of short acting insulin before meals and checking BS after meals.  A1C has been in the low 5 range, and post-meal sugars running 150-190 range.  We stopped glucotrol at last visit.  He's only taking hydralazine twice daily instead of thrice.  Not a smoker.  Dialysis tiw.  No other complaints.  Here with his son today.    PMH:  ESRD on dialysis  DM2, overtreated right now  HTN, not at goal  SVT x2 in 2018  Gout, quiescent  Class 2 obesity with complications     Meds:  Reviewed and reconciled in EPIC with patient during visit today.    Review of Systems   Constitutional:  Negative for fatigue.   HENT:  Negative for congestion.    Respiratory:  Negative for shortness of breath.    Cardiovascular:  Negative for chest pain.   Gastrointestinal:  Negative for abdominal pain.   Genitourinary:  Negative for difficulty urinating.   Musculoskeletal:  Positive for arthralgias.   Skin:  Negative for rash.   Neurological:  Negative for headaches.   Psychiatric/Behavioral:  Positive for sleep disturbance.        Objective:      Physical Exam  Constitutional:       General: He is not in acute distress.     Appearance: He is well-developed. He is not diaphoretic.   HENT:      Head: Normocephalic and atraumatic.   Cardiovascular:      Rate and Rhythm: Normal rate and regular rhythm.      Pulses:           Dorsalis pedis pulses are 2+ on the right side and 2+ on the left side.        Posterior tibial pulses are 2+ on the right side and 2+ on the left side.      Heart sounds: Normal heart sounds. No murmur heard.     No friction rub. No gallop.   Pulmonary:      Effort: No respiratory distress.      Breath sounds: No wheezing or rales.   Chest:      Chest wall: No tenderness.   Musculoskeletal:        Feet:    Feet:       Right foot:      Protective Sensation: 4 sites tested.  4 sites sensed.      Skin integrity: No ulcer, blister or skin breakdown.      Left foot:      Protective Sensation: 4 sites tested.  4 sites sensed.      Skin integrity: No ulcer, blister or skin breakdown.   Skin:     General: Skin is warm.      Findings: No erythema.   Neurological:      Mental Status: He is alert and oriented to person, place, and time.   Psychiatric:         Thought Content: Thought content normal.         Assessment:       1. Type 2 diabetes mellitus with chronic kidney disease on chronic dialysis, with long-term current use of insulin    2. Obesity (BMI 30-39.9)    3. Renovascular hypertension    4. Hypertension associated with diabetes    5. CONRAD (obstructive sleep apnea)    6. Major depressive disorder, single episode, moderate    7. 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 Immunodeficiency, unspecified    9. Insomnia, unspecified type        Plan:       Thai was seen today for follow-up.    Diagnoses and all orders for this visit:    Type 2 diabetes mellitus with chronic kidney disease on chronic dialysis, with long-term current use of insulin - unstable, overtreated.  Adjusting lispro dose to a sliding scale.  Advised him that he can adjust the insulin doses if sugars seem to rise.  Please check pre-meal sugars.  -     insulin lispro (HUMALOG KWIKPEN INSULIN) 100 unit/mL pen; Use sliding scale based on pre-meal fingersticks.    <150, no insulin  150-180, take 4 units  180-200, take 8 units  >200, take 10 units    Obesity (BMI 30-39.9) - he continues to lose weight.  Down about 20 lbs from March.  Keep going    Renovascular hypertension - not at goal.  Weight loss, adjusting insulin to avoid hypos and tid hydralazine.    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  Monitor     Insomnia, unspecified type - didn't tolerate quetiapine.  Trazodone and ambien not tolerated.  Trial of elavil  -     amitriptyline (ELAVIL) 25 MG tablet; Take 1 tablet (25 mg total) by mouth nightly as needed for Insomnia.    Rtc prn    CHARLES Mckee MD MPH  Staff Internist

## 2024-06-07 DIAGNOSIS — R00.0 TACHYCARDIA: ICD-10-CM

## 2024-06-07 DIAGNOSIS — I15.0 RENOVASCULAR HYPERTENSION: ICD-10-CM

## 2024-06-07 RX ORDER — CLONIDINE HYDROCHLORIDE 0.1 MG/1
0.1 TABLET ORAL EVERY 6 HOURS PRN
Qty: 60 TABLET | Refills: 3 | Status: CANCELLED | OUTPATIENT
Start: 2024-06-07 | End: 2025-06-07

## 2024-06-07 RX ORDER — CARVEDILOL 25 MG/1
25 TABLET ORAL 2 TIMES DAILY WITH MEALS
Qty: 180 TABLET | Refills: 3 | Status: SHIPPED | OUTPATIENT
Start: 2024-06-07

## 2024-06-07 RX ORDER — ALLOPURINOL 100 MG/1
100 TABLET ORAL DAILY
Qty: 90 TABLET | Refills: 3 | Status: SHIPPED | OUTPATIENT
Start: 2024-06-07

## 2024-06-07 NOTE — TELEPHONE ENCOUNTER
Refill Routing Note   Medication(s) are not appropriate for processing by [facility] for the following reason(s):        Required labs outdated    [facility] action(s):  Defer               Appointments  past 12m or future 3m with PCP    Date Provider   Last Visit   5/23/2024 Manjit Mckee II, MD   Next Visit   7/9/2024 Manjit Mckee II, MD   ED visits in past 90 days: 1        Note composed:9:58 AM 06/07/2024

## 2024-06-07 NOTE — TELEPHONE ENCOUNTER
Care Due:                  Date            Visit Type   Department     Provider  --------------------------------------------------------------------------------                                EP -                              PRIMARY      [facility] INTERNAL  Last Visit: 05-      CARE ([facility])   MARILYN Mckee                              EP -                              PRIMARY      [facility] INTERNAL  Next Visit: 07-      CARE ([facility])   MARILYN Mckee                                                            Last  Test          Frequency    Reason                     Performed    Due Date  --------------------------------------------------------------------------------    Uric Acid...  12 months..  allopurinoL..............  Not Found    Overdue    [employer] Embedded Care Due Messages. Reference number: [id number].   6/07/2024 9:30:47 AM CDT

## 2024-06-07 NOTE — TELEPHONE ENCOUNTER
No care due was identified.  [facility] Embedded Care Due Messages. Reference number: [id number].   6/07/2024 9:31:16 AM CDT

## 2024-06-07 NOTE — TELEPHONE ENCOUNTER
Refill Routing Note   Medication(s) are not appropriate for processing by [facility] for the following reason(s):        Required vitals abnormal    [facility] action(s):  Defer     Requires labs : Yes      Medication Therapy Plan: FOVS      Appointments  past 12m or future 3m with PCP    Date Provider   Last Visit   5/23/2024 Manjit Mckee II, MD   Next Visit   6/7/2024 Manjit Mckee II, MD   ED visits in past 90 days: 1        Note composed:10:03 AM 06/07/2024

## 2024-06-10 ENCOUNTER — PATIENT MESSAGE (OUTPATIENT)
Dept: INTERNAL MEDICINE | Facility: CLINIC | Age: 49
End: 2024-06-10
Payer: MEDICARE

## 2024-06-10 ENCOUNTER — TELEPHONE (OUTPATIENT)
Dept: TRANSPLANT | Facility: CLINIC | Age: 49
End: 2024-06-10
Payer: MEDICARE

## 2024-06-10 PROBLEM — G45.9 TIA (TRANSIENT ISCHEMIC ATTACK): Status: RESOLVED | Noted: 2024-03-09 | Resolved: 2024-06-10

## 2024-06-11 ENCOUNTER — HOSPITAL ENCOUNTER (OUTPATIENT)
Dept: RADIOLOGY | Facility: HOSPITAL | Age: 49
Discharge: HOME | End: 2024-06-11
Payer: MEDICARE

## 2024-06-11 ENCOUNTER — TELEPHONE (OUTPATIENT)
Dept: TRANSPLANT | Facility: CLINIC | Age: 49
End: 2024-06-11
Payer: MEDICARE

## 2024-06-11 ENCOUNTER — OFFICE VISIT (OUTPATIENT)
Dept: TRANSPLANT | Facility: CLINIC | Age: 49
End: 2024-06-11
Payer: MEDICARE

## 2024-06-11 ENCOUNTER — HOSPITAL ENCOUNTER (OUTPATIENT)
Dept: RADIOLOGY | Facility: HOSPITAL | Age: 49
Discharge: HOME | End: 2024-06-11
Attending: NURSE PRACTITIONER
Payer: MEDICARE

## 2024-06-11 ENCOUNTER — PATIENT MESSAGE (OUTPATIENT)
Dept: UROLOGY | Facility: CLINIC | Age: 49
End: 2024-06-11
Payer: MEDICARE

## 2024-06-11 VITALS
WEIGHT: 282.63 LBS | SYSTOLIC BLOOD PRESSURE: 157 MMHG | TEMPERATURE: 97 F | RESPIRATION RATE: 18 BRPM | DIASTOLIC BLOOD PRESSURE: 77 MMHG | HEIGHT: 71 IN | BODY MASS INDEX: 39.57 KG/M2 | HEART RATE: 87 BPM | OXYGEN SATURATION: 93 %

## 2024-06-11 DIAGNOSIS — I15.2 HYPERTENSION ASSOCIATED WITH DIABETES: ICD-10-CM

## 2024-06-11 DIAGNOSIS — N18.6 ESRD ON HEMODIALYSIS: Primary | ICD-10-CM

## 2024-06-11 DIAGNOSIS — E11.59 HYPERTENSION ASSOCIATED WITH DIABETES: ICD-10-CM

## 2024-06-11 DIAGNOSIS — Z76.82 ORGAN TRANSPLANT CANDIDATE: ICD-10-CM

## 2024-06-11 DIAGNOSIS — N25.81 SECONDARY HYPERPARATHYROIDISM OF RENAL ORIGIN: ICD-10-CM

## 2024-06-11 DIAGNOSIS — E11.22 TYPE 2 DIABETES MELLITUS WITH CHRONIC KIDNEY DISEASE ON CHRONIC DIALYSIS, WITH LONG-TERM CURRENT USE OF INSULIN: ICD-10-CM

## 2024-06-11 DIAGNOSIS — N18.6 TYPE 2 DIABETES MELLITUS WITH CHRONIC KIDNEY DISEASE ON CHRONIC DIALYSIS, WITH LONG-TERM CURRENT USE OF INSULIN: ICD-10-CM

## 2024-06-11 DIAGNOSIS — N18.6 ANEMIA IN END-STAGE RENAL DISEASE: ICD-10-CM

## 2024-06-11 DIAGNOSIS — G47.33 OSA (OBSTRUCTIVE SLEEP APNEA): ICD-10-CM

## 2024-06-11 DIAGNOSIS — Z99.2 ESRD ON HEMODIALYSIS: Primary | ICD-10-CM

## 2024-06-11 DIAGNOSIS — Z79.4 TYPE 2 DIABETES MELLITUS WITH CHRONIC KIDNEY DISEASE ON CHRONIC DIALYSIS, WITH LONG-TERM CURRENT USE OF INSULIN: ICD-10-CM

## 2024-06-11 DIAGNOSIS — Z99.2 TYPE 2 DIABETES MELLITUS WITH CHRONIC KIDNEY DISEASE ON CHRONIC DIALYSIS, WITH LONG-TERM CURRENT USE OF INSULIN: ICD-10-CM

## 2024-06-11 DIAGNOSIS — D63.1 ANEMIA IN END-STAGE RENAL DISEASE: ICD-10-CM

## 2024-06-11 PROCEDURE — 93978 VASCULAR STUDY: CPT | Mod: TC,TXP

## 2024-06-11 PROCEDURE — 3078F DIAST BP <80 MM HG: CPT | Mod: CPTII,S$GLB,TXP, | Performed by: INTERNAL MEDICINE

## 2024-06-11 PROCEDURE — 3008F BODY MASS INDEX DOCD: CPT | Mod: CPTII,S$GLB,TXP, | Performed by: INTERNAL MEDICINE

## 2024-06-11 PROCEDURE — 99205 OFFICE O/P NEW HI 60 MIN: CPT | Mod: S$GLB,TXP,, | Performed by: INTERNAL MEDICINE

## 2024-06-11 PROCEDURE — 99214 OFFICE O/P EST MOD 30 MIN: CPT | Mod: S$GLB,TXP,, | Performed by: SURGERY

## 2024-06-11 PROCEDURE — 3066F NEPHROPATHY DOC TX: CPT | Mod: CPTII,S$GLB,TXP, | Performed by: INTERNAL MEDICINE

## 2024-06-11 PROCEDURE — 93978 VASCULAR STUDY: CPT | Mod: 26,TXP,, | Performed by: INTERNAL MEDICINE

## 2024-06-11 PROCEDURE — 3077F SYST BP >= 140 MM HG: CPT | Mod: CPTII,S$GLB,TXP, | Performed by: INTERNAL MEDICINE

## 2024-06-11 PROCEDURE — 72192 CT PELVIS W/O DYE: CPT | Mod: TC,TXP

## 2024-06-11 PROCEDURE — 1159F MED LIST DOCD IN RCRD: CPT | Mod: CPTII,S$GLB,TXP, | Performed by: INTERNAL MEDICINE

## 2024-06-11 PROCEDURE — 99999 PR PBB SHADOW E&M-EST. PATIENT-LVL V: CPT | Mod: PBBFAC,TXP,,

## 2024-06-11 PROCEDURE — 76770 US EXAM ABDO BACK WALL COMP: CPT | Mod: 26,TXP,, | Performed by: INTERNAL MEDICINE

## 2024-06-11 PROCEDURE — 3044F HG A1C LEVEL LT 7.0%: CPT | Mod: CPTII,S$GLB,TXP, | Performed by: INTERNAL MEDICINE

## 2024-06-11 PROCEDURE — 99204 OFFICE O/P NEW MOD 45 MIN: CPT | Mod: S$GLB,TXP,, | Performed by: PHYSICIAN ASSISTANT

## 2024-06-11 PROCEDURE — 76770 US EXAM ABDO BACK WALL COMP: CPT | Mod: TC,TXP

## 2024-06-11 PROCEDURE — 72192 CT PELVIS W/O DYE: CPT | Mod: 26,TXP,, | Performed by: RADIOLOGY

## 2024-06-11 PROCEDURE — 1160F RVW MEDS BY RX/DR IN RCRD: CPT | Mod: CPTII,S$GLB,TXP, | Performed by: INTERNAL MEDICINE

## 2024-06-11 NOTE — PROGRESS NOTES
PRE-TRANSPLANT INFECTIOUS DISEASE CONSULT    Reason for Visit:  Pre-transplant evaluation  Referring Provider: Dr. Johny Solomon     History of Present Illness:    48 y.o. male with a history of ESRD 2/2 diabetic nephropathy and HTN on HD presents for pre-kidney transplant evaluation. Patient denies recent dialysis related infections. He does note history of line infection in 2013.    Infectious History:  Recent hospital admissions: No  Recent infections: No  Recent or current antibiotic use: No  History of recurrent infections: No  History of diabetic foot wound or bone/joint infection: Denies. He reports he is getting set up with podiatrist   Recent dental infections, issues or procedures: No  History of chicken pox: Yes  History of shingles: No  History of STI: Chlamydia and Gonorrhea - treated   History of COVID infection: Yes    History of Immunosuppression:  Prior chemotherapy / immunosuppression: No  Prior transplant: No  History of splenectomy: No    Tuberculosis:  Prior screening for latent TB: Yes  Prior diagnosis of latent TB: No  Risk factors for TB: No    Geographical exposures:  Currently lives in [place], [place] with son and girlfriend  Lived in the following states: [place]  Lived or travelled to the [facility] [place]: No  International travel: No  Travel-associated illness: No    Social/Environmental:  Occupation:  Disability; previously worked as a   Pets: Yes - cat and two guinea pigs.   Livestock: No  Fishing / hunting: Yes - fishing  Hobbies: fishing  Water: [place]  Consumption of raw/undercooked meat or seafood?  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 associated with type 2 diabetes mellitus 05/21/2018    Morbid obesity 09/12/2012    SVT (supraventricular tachycardia)      Past Surgical History:   Procedure Laterality Date    ABLATION N/A 10/05/2018    Procedure: ABLATION;  Surgeon: Ayden Arana MD;  Location: [facility] CATH LAB;  Service: Cardiology;  Laterality: N/A;  SVT, RFA, ARGELIA, MAC (light), [place], 3 Prep    DIALYSIS FISTULA CREATION      KNEE ARTHROPLASTY      SHOULDER SURGERY      right    VASECTOMY       Family History   Problem Relation Name Age of Onset    Diabetes Mother      Kidney disease Mother          on dialysis    Hypertension Mother      Heart failure Mother      Hypertension Father      Kidney disease Paternal Uncle          dialysis    Kidney disease Paternal Uncle          dialysis    Eczema Sister      No Known Problems Son      No Known Problems Sister      No Known Problems Son      No Known Problems Son      Heart disease Maternal Grandfather      Melanoma Neg Hx      Psoriasis Neg Hx      Lupus Neg Hx      Acne Neg Hx       Social History     Tobacco Use    Smoking status: Former     Types: Cigars    Smokeless tobacco: Never    Tobacco comments:     rare   Substance Use Topics    Alcohol use: No    Drug use: No     Review of patient's allergies indicates:  No Known Allergies      Current antibiotics:  Antibiotics (From admission, onward)      None              Review of Systems  Review of Systems   Constitutional: Negative for chills, fever, malaise/fatigue and night sweats.   HENT:  Negative for congestion and sore throat.    Eyes:  Negative for blurred vision and visual disturbance.   Cardiovascular:  Negative for chest pain and leg swelling.   Respiratory:  Negative for cough, shortness of breath and sputum production.    Skin:  Negative for color change,  dry skin and itching.   Musculoskeletal:  Negative for back pain, joint pain and joint swelling.   Gastrointestinal:  Negative for abdominal pain, diarrhea, heartburn, nausea and vomiting.   Genitourinary:  Negative for dysuria, flank pain and hematuria.   Neurological:  Negative for dizziness, numbness and weakness.   Psychiatric/Behavioral:  Negative for altered mental status and depression. The patient is not nervous/anxious.           Objective  Physical Exam  Constitutional:       General: He is not in acute distress.     Appearance: Normal appearance. He is well-developed. He is obese. He is not ill-appearing or diaphoretic.   HENT:      Head: Normocephalic and atraumatic.      Right Ear: External ear normal.      Left Ear: External ear normal.      Nose: Nose normal.   Eyes:      General: No scleral icterus.        Right eye: No discharge.         Left eye: No discharge.      Extraocular Movements: Extraocular movements intact.      Conjunctiva/sclera: Conjunctivae normal.   Pulmonary:      Effort: Pulmonary effort is normal. No respiratory distress.      Breath sounds: No stridor.   Skin:     General: Skin is dry.      Coloration: Skin is not jaundiced or pale.      Findings: No erythema.   Neurological:      General: No focal deficit present.      Mental Status: He is alert and oriented to person, place, and time. Mental status is at baseline.   Psychiatric:         Mood and Affect: Mood normal.         Behavior: Behavior normal.         Thought Content: Thought content normal.         Judgment: Judgment normal.           Labs:    CBC:   Lab Results   Component Value Date    WBC 7.79 06/11/2024    HGB 10.2 (L) 06/11/2024    HCT 31.5 (L) 06/11/2024    MCV 92 06/11/2024     06/11/2024    GRAN 5.2 06/11/2024    GRAN 66.5 06/11/2024    LYMPH 0.8 (L) 06/11/2024    LYMPH 9.9 (L) 06/11/2024    MONO 0.9 06/11/2024    MONO 11.0 06/11/2024    EOSINOPHIL 11.7 (H) 06/11/2024       Syphilis screening:   Lab  "Results   Component Value Date    TREPABIGMIGG Nonreactive 06/11/2024        TB screening: No results found for: "TBGOLDPLUS", "TSPOTSCREN"    HIV screening:   Lab Results   Component Value Date    [id number] Non-reactive 06/11/2024       Strongyloides IgG: No results found for: "STRONGANTIGG"    Hepatitis Serologies:   Lab Results   Component Value Date    HEPAIGG Non-reactive 06/11/2024    HEPBCAB Non-reactive 06/11/2024    HEPBSAB 56.03 06/11/2024    HEPBSAB Reactive 06/11/2024    HEPCAB Non-reactive 06/11/2024        Varicella IgG: No results found for: "VARICELLAINT"      Immunization History:  Received all childhood vaccines: Yes  All household members receive annual flu vaccine: Yes  All household members are up to date on COVID vaccine: Yes  Immunization History   Administered Date(s) Administered    COVID-19 MRNA, LN-S PF (MODERNA HALF 0.25 ML DOSE) 12/19/2021    COVID-19, MRNA, LN-S, PF (MODERNA FULL 0.5 ML DOSE) 01/08/2021, 02/05/2021    COVID-19, mRNA, LNP-S, bivalent booster, PF (Moderna Omicron)12 + YEARS 09/07/2023    Hepatitis B, Adult 12/06/2013, 01/06/2014, 02/04/2014, 06/04/2014, 06/01/2015, 06/29/2015, 07/29/2015, 11/09/2015    Influenza 10/10/2010, 10/01/2020    Influenza - Quadrivalent 10/04/2017    Influenza - Quadrivalent - PF *Preferred* (6 months and older) 09/21/2020    Influenza - Trivalent (ADULT) 10/25/2021    Influenza Split 10/10/2010    PPD Test 05/26/2021, 06/18/2021    Pneumococcal 01/15/2016, 11/15/2021    Pneumococcal Polysaccharide - 23 Valent 02/15/2016, 12/15/2016, 04/11/2019, 11/15/2021    Tdap 01/26/2024   Shingles - never  Hep A - never    Assessment and Plan    1. Risks of Infection: Available serologies were reviewed. No unusual risks of infection or significant barriers to transplantation were identified from the infectious disease standpoint given the information available at this time.    - Acute infectious issues: None   - Pending serologies: Quantiferon gold / T-spot, " Treponema Ab / RPR / FTA, Strongyloides IgG, and VZV IgG   - Please call if any pending serologic testing is positive.    2. Immunizations:  Based on the patient's immunization history and serologies, the following immunizations are recommended:  - Hepatitis A    Patient does not have immunity to hepatitis A    Vaccination ordered today: Yes   - Hepatitis B    Patient does have immunity to hepatitis B    Vaccination ordered today: No. Reason for not ordering: Immunity   - COVID    Current CDC vaccination recommendations were discussed with the patient   - Annual high dose influenza     Vaccination ordered today: No. Reason for not ordering: Vaccination up to date   - Prevnar 20    Vaccination ordered today: No. Reason for not ordering: Vaccination up to date   - Tdap    Vaccination ordered today: No. Reason for not ordering: Vaccination up to date   - Shingrix    Vaccination ordered today: Yes    Recommended Pre-Transplant Immunization Schedule   Vaccine  0m 1m 2m 6m   Pneumococcal conjugate vaccine (Prevnar 20) X      Tetanus-diphtheria-pertussis (Tdap)* X      Hepatitis A Vaccine (Havrix)** X   X   Hepatitis B Vaccine (Heplisav)** X X     Influenza (annual) X      Zoster Recombinant Vaccine (Shingrix) X  X           *Administer booster every 10 years.       **Administer if no immunity demonstrated on serologies               Patient will receive vaccines at local pharmacy. A written prescription was provided for all vaccine doses.     3. Counseling:   I discussed with the patient the risk for increased susceptibility to infections following transplantation including increased risk for infection right after transplant and if rejection should occur.  The patient has been counseled on the importance of vaccinations to decrease risk of infection and severe illness. Specific guidance has been provided to the patient regarding the patient's occupation, hobbies and activities to avoid future infectious complications.      4. Transplant Candidacy: Based on available information, there are no identified significant barriers to transplantation from an infectious disease standpoint.  Final determination of transplant candidacy will be made once evaluation is complete and reviewed by the Selection Committee.      Follow up with infectious disease as needed.       The total time for evaluation and management services performed on 06/11/2024 was greater than 45 minutes.

## 2024-06-11 NOTE — PROGRESS NOTES
INITIAL PATIENT EDUCATION NOTE AA    Mr. Thai Santoyo Jr. was seen in pre-kidney transplant clinic for evaluation for kidney, kidney/pancreas or pancreas only transplant.  The patient attended an individual video education session that discussed/reviewed the following aspects of transplantation: evaluation including diagnostic and laboratory testing,(Chemistries, Hematology, Serologies including HIV and Hepatitis and HLA) required for transplantation and selection committee process, UNOS waitlist management/multiple listings, types of organs offered (KDPI < 85%, KDPI > 85%, PHS risk, DCD, HCV+, HIV+ for HIV+ recipients and enbloc/dual), financial aspects, surgical procedures, dietary instruction pre- and post-transplant, health maintenance pre- and post-transplant, post-transplant hospitalization and outpatient follow-up, potential to participate in a research protocol, and medication management and side effects.  A question and answer session was provided after the presentation.    The patient was seen by all members of the multi-disciplinary team to include: Nephrologist/CARMINE, Surgeon, , Transplant Coordinator, , Pharmacist and Dietician (if applicable).    The patient reviewed and signed all consents for evaluation which were witnessed and sent to scanning into the [employer] chart.    The patient was given an education book and plan for further evaluation based on his individual assessment.      The Patient was educated on OPTN policy change regarding race based eGFR. For Black or  Americans, this eGFR could have shown that their kidneys were working better than they were.    Because of this change, we are looking at everyones record and assessing waiting time for people who are eligible. We will be reviewing your medical records and will notify you if you are eligible. We also encouraged patient to provide span 20 labs that are not in our electronic medical  records    Reviewed program requirement for complete COVID vaccination with documentation prior to listing.  COVID education information reviewed with patient. Patient encouraged to be up to date on all vaccinations.     The patient was informed that the transplant team would manage immediate post op pain. If the patient requires long term pain management, they will need to have that pain management addressed by their PCP or previous provider who wrote for long term pain medicines.    The patient was encouraged to call with any questions or concerns.

## 2024-06-11 NOTE — TELEPHONE ENCOUNTER
Reviewed pt transplant labs.  Notified dialysis unit dietitian of the following abnormal labs via fax and requested their most recent nutrition note on this pt.  Once this note is received it will be scanned into pt's chart.      Cholesterol 95  Glucose 135  Albumin 3.4  Phos 6.2     Physical Therapy Daily Note  Visit: 2  Date of Initial Visit: Type: THERAPY  Noted: 3/28/2023    Patient: Chaitanya Browne   : 1921  Diagnosis/ICD-10 Code:  Impaired functional mobility, balance, gait, and endurance [Z74.09]  Referring practitioner: Dirk Russ MD  Date of Initial Visit: Type: THERAPY  Noted: 3/28/2023  Today's Date: 2023  Patient seen for 2 sessions      Subjective:   Patient reports: he is feeling better since he fell and scraped his arm.   Pain: 0/10  Clinical Progress: improved  Home Program Compliance: Yes  Treatment has included: therapeutic exercise and neuromuscular re-education    Objective   See Exercise, Manual, and Modality Logs for complete treatment.    PT Neuro          Assessment & Plan     Assessment    Assessment details: Patient fell last week at home from a seated position and he seems to be doing well. His arms remain bandaged and he is limiting use of them. He performed all exercises well without reports of pain or discomfort. Patient will continue to be progressed as indicated.     Plan  Plan details: Patient to continue with PT services to improve gait, balance, strength, transfers and overall functional mobility.          Timed:  Manual Therapy:            0     mins  17014;  Therapeutic Exercise:    25    mins  06684;     Neuromuscular Tahmina:    15    mins  09253;    Therapeutic Activity:      0     mins  23231;     Gait Trainin    mins  00951;     Electrical Stimulation:    0    mins  22493 ( );     Untimed:  Canalith Repositioning techniques _[id number]_ [id number]      Timed Treatment:   40   mins   Total Treatment:     40   mins      Vianca Durant, PT, DPT, MSCS, CDP, CSRS  [place] License #: [id number]  Physical Therapist

## 2024-06-11 NOTE — PROGRESS NOTES
Transplant Surgery  Kidney Transplant Recipient Evaluation    Referring Physician: Johny Solomon  Current Nephrologist: Johny Solomon    Subjective:     Reason for Visit: evaluate transplant candidacy    History of Present Illness: Thai Santoyo is a 48 y.o. year old male undergoing transplant evaluation.    Dialysis History: Thai is on hemodialysis.      Transplant History: N/A    Etiology of Renal Disease: Diabetes Mellitus - Type II (based on medical records from referral).    External provider notes reviewed: No    Review of Systems   Constitutional:  Positive for fatigue.   HENT:  Negative for drooling, postnasal drip and sore throat.    Eyes:  Negative for discharge and itching.   Respiratory:  Negative for choking and stridor.    Gastrointestinal:  Negative for rectal pain.   Endocrine: Negative for polydipsia.   Genitourinary:  Negative for enuresis and genital sores.   Musculoskeletal:  Negative for back pain, neck pain and neck stiffness.   Allergic/Immunologic: Negative for immunocompromised state.   Neurological:  Negative for facial asymmetry and numbness.   Hematological:  Negative for adenopathy.   Psychiatric/Behavioral:  Negative for behavioral problems, self-injury and suicidal ideas.    Objective:     Physical Exam:  Constitutional:   Vitals reviewed: yes   Well-nourished and well-groomed: yes  Eyes:   Sclerae icteric: no   Extraocular movements intact: yes  GI:    Bowel sounds normal: yes   Tenderness: no    If yes, quadrant/location: not applicable   Palpable masses: no    If yes, quadrant/location: not applicable   Hepatosplenomegaly: no   Ascites: no   Hernia: no    If yes, type/location: not applicable   Surgical scars: no    If yes, type/location: not applicable  Resp:   Effort normal: yes   Breath sounds normal: yes    CV:   Regular rate and rhythm: yes   Heart sounds normal: yes   Femoral pulses normal: yes   Extremities edematous: no  Skin:   Rashes or lesions present: no    If yes,  describe:not applicable   Jaundice:: no    Musculoskeletal:   Gait normal: yes   Strength normal: yes  Psych:   Oriented to person, place, and time: yes   Affect and mood normal: yes    Additional comments: not applicable    Diagnostics:  The following labs have been reviewed: CBC  CMP    Counseling: We provided Thai Santoyo JrAshanti with a group education session today.  We discussed kidney transplantation at length with him, including risks, potential complications, and alternatives in the management of his renal failure.  The discussion included complications related to anesthesia, bleeding, infection, primary nonfunction, and ATN.  I discussed the typical postoperative course, length of hospitalization, the need for long-term immunosuppression, and the need for long-term routine follow-up.  I discussed living-donor and -donor transplantation and the relative advantages and disadvantages of each.  I also discussed average waiting times for both living donation and  donation.  I discussed national and center-specific survival rates.  I also mentioned the potential benefit of multicenter listing to candidates listed with centers within more than one organ procurement organization.  All questions were answered.    Patient advised that it is recommended that all transplant candidates, and their close contacts and household members receive Covid vaccination.    Final determination of transplant candidacy will be made once evaluation is complete and reviewed by the Kidney & Kidney/Pancreas Selection Committee.    Coronavirus disease (COVID-19) caused by severe acute respiratory virus coronavirus 2 (SARS-C0V 2) is associated with increased mortality in solid organ transplant recipients (SOT) compared to non-transplant patients. Vaccine responses to vaccination are depressed against SARS-CoV2 compared to normal individuals but improve with third vaccination doses. Vaccination prior to SOT provides both the best  opportunity for transplant candidates to develop protective immunity and to reduce the risk of serious COVID19 infections post transplantation. Organ transplant candidates at [facility] will be required to receive SARS-CoV-2 vaccination prior to being listed with a an active status, whenever possible. Exceptions will be made for disability related reasons or for sincerely held Confucianism beliefs.          Transplant Surgery - Candidacy   Assessment/Plan:   Thai Santoyo Jr. has end stage renal disease (ESRD) on dialysis. I see no surgical contraindication to placing a kidney transplant. Based on available information, Thai Santoyo Jr. is a suitable kidney transplant candidate.     Additional testing to be completed according to the Written Order Guidelines for Adult Pre-kidney and Pancreas Transplant Evaluation (KI-02).  Interpretation of tests and discussion of patient management involves all members of the multidisciplinary transplant team.    Hitesh Loaiza MD

## 2024-06-11 NOTE — PROGRESS NOTES
Iliac calcifications are present.  Acceptable currently but high risk due to calcifications and high BMI.   He should be counseled to lose weight and find a donor asap.    Doppler pending    Need repeat CT in 2 years if not transplanted.

## 2024-06-11 NOTE — PROGRESS NOTES
PHARM.D. PRE-TRANSPLANT NOTE:    This patient's medication therapy was evaluated as part of his pre-transplant evaluation.      The following general pharmacologic concerns were noted: none    The following concerns for post-operative pain management were noted: none    The following pharmacologic concerns related to HCV therapy were noted: none      This patient's medication profile was reviewed for considerations for DAA Hepatitis C therapy:    [x]  No current inducers of CYP 3A4 or PGP  [x]  No amiodarone on this patient's EMR profile in the last 24 months  [x]  No past or current atrial fibrillation on this patient's EMR profile       Current Outpatient Medications   Medication Sig Dispense Refill    allopurinoL (ZYLOPRIM) 100 MG tablet Take 1 tablet (100 mg total) by mouth once daily. 90 tablet 3    amitriptyline (ELAVIL) 25 MG tablet Take 1 tablet (25 mg total) by mouth nightly as needed for Insomnia. 90 tablet 3    amLODIPine (NORVASC) 10 MG tablet TAKE ONE TABLET BY MOUTH ONCE DAILY (Patient taking differently: Take 10 mg by mouth once daily.) 90 tablet 3    blood sugar diagnostic Strp Use one strip each time to check blood sugar three times daily. 300 strip 4    carvediloL (COREG) 25 MG tablet Take 1 tablet (25 mg total) by mouth 2 (two) times daily with meals. 180 tablet 3    cloNIDine (CATAPRES) 0.1 MG tablet Take 1 tablet (0.1 mg total) by mouth every 6 (six) hours as needed (SBP >180). 60 tablet 3    dulaglutide (TRULICITY) 1.5 mg/0.5 mL pen injector Inject 1.5 mg into the skin every 7 days. 12 pen 1    furosemide (LASIX) 40 MG tablet Take 1 tablet (40 mg total) by mouth once daily. 90 tablet 3    hydrALAZINE (APRESOLINE) 50 MG tablet Take 1 tablet (50 mg total) by mouth every 8 (eight) hours. 270 tablet 3    insulin (LANTUS SOLOSTAR U-100 INSULIN) glargine 100 units/mL SubQ pen Inject 24 Units into the skin 2 (two) times a day. 36 mL 3    insulin lispro (HUMALOG KWIKPEN INSULIN) 100 unit/mL pen Use  "sliding scale based on pre-meal fingersticks.    <150, no insulin  150-180, take 4 units  180-200, take 8 units  >200, take 10 units 15 mL 0    lancets (ACCU-CHEK SOFTCLIX LANCETS) Misc 300 each by Misc.(Non-Drug; Combo Route) route 3 (three) times daily. 300 each 4    olmesartan-hydrochlorothiazide (BENICAR HCT) 40-25 mg per tablet Take 1 tablet by mouth once daily. (Patient taking differently: Take 1 tablet by mouth every evening.) 90 tablet 3    pen needle, diabetic (BD ULTRA-FINE MAIKOL PEN NEEDLE) 32 gauge x 5/32" Ndle To be used with insulin twice daily. 200 each 3    rosuvastatin (CRESTOR) 10 MG tablet Take 1 tablet (10 mg total) by mouth once daily. 90 tablet 0    sucroferric oxyhydroxide (VELPHORO) 500 mg Chew Chew 1 tablet with each meal three times daily 90 tablet 6    syringe-needle,safety,disp unt 1 mL 27 gauge x 1/2" Syrg Use every 2 weeks for testosterone injection 100 Syringe 0    testosterone cypionate (DEPOTESTOTERONE CYPIONATE) 100 mg/mL injection Inject 1 mL (100 mg total) into the muscle every 14 (fourteen) days. 10 mL 2    blood-glucose meter Misc use as directed 1 each 0     No current facility-administered medications for this visit.           I am available for consultation and can be contacted, as needed by the other members of the Transplant team.     "

## 2024-06-11 NOTE — PROGRESS NOTES
Transplant Nephrology  Kidney Transplant Recipient Evaluation    Referring Physician: Johny Solomon  Current Nephrologist: Johny Solomon    Subjective:   CC:  Initial evaluation of kidney transplant candidacy.    HPI:  Mr. Santoyo is a 48 y.o. year old Black or  male who has presented to be evaluated as a potential kidney transplant recipient.  He has ESRD secondary to diabetic nephropathy and HTN.  Patient is currently on hemodialysis started on 2012. Patient is dialyzing on MWF schedule.  Patient reports that he is tolerating dialysis well.. He has a LUE AV fistula for dialysis access.     He was diagnosed with DM in 2002  He was diagnosed with kidney disease just before he was initiated on dialysis. He never had a kidney biopsy  DM and hypertension are the presumed causes of kidney failure  Denied any complications with dialysis. No CAd No PVD No h/o heart failure  No amputations very active and very motivated  He is currently on disability but remains active at home  He uses his CPAP machine         Previous Transplant: no    Past Medical and Surgical History: Mr. 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  He has a past surgical history that includes Knee Arthroplasty; Dialysis fistula creation; Vasectomy; Shoulder surgery; and Ablation (N/A, 10/05/2018).    Past Social and Family History: Mr. Santoyo reports that he has quit smoking. His smoking use included cigars. He has never used smokeless tobacco. He reports that he does not drink alcohol and does not use drugs. His family history  "includes Diabetes in his mother; Eczema in his sister; Heart disease in his maternal grandfather; Heart failure in his mother; Hypertension in his father and mother; Kidney disease in his mother, paternal uncle, and paternal uncle; No Known Problems in his sister, son, son, and son.    Review of Systems    Skin: no skin rash  CNS; no headaches, blurred vision, seizure, or syncope  ENT: No JVD,  Adenopathies,  nasal congestion. No oral lesions  Cardiac: No chest pain, dyspnea, claudication, edema or palpitations  Respiratory: No SOB, cough, hemoptysis   Gastro-intestinal: No diarrhea, constipation, abdominal pain, nausea, vomit. No ascitis  Genitourinary: no hematuria, dysuria, frequency, frequency  Musculoskeletal: joint pain, arthritis or vasculitic changes  Psych: alert awake, oriented, No cranial nerves deficit.      Objective:   Blood pressure (!) 157/77, pulse 87, temperature 97.3 °F (36.3 °C), temperature source Tympanic, resp. rate 18, height 5' 10.59" (1.793 m), weight 128.2 kg (282 lb 10.1 oz), SpO2 (!) 93%.body mass index is 39.88 kg/m².    Physical Exam    Head: normocephalic  Neck: No JVD, cervical axillary, or femoral adenopathies  Heart: no murmurs, Normal s1 and s2, No gallops, no rubs, No murmurs  Lungs; CTA, good respiratory effort, no crackles  Abdomen: soft, non tender, no splenomegaly or hepatomegaly, no massess, no bruits  Extremities: No edema, skin rash, joint pain. LUE AVF  SNC: awake, alert oriented. Cranial nerves are intact, no focalized, sensitivity and strength preserved      Labs:  Lab Results   Component Value Date    WBC 7.79 06/11/2024    HGB 10.2 (L) 06/11/2024    HCT 31.5 (L) 06/11/2024     06/11/2024    K 4.5 06/11/2024    CL 99 06/11/2024    CO2 27 06/11/2024    BUN 48 (H) 06/11/2024    CREATININE 12.2 (H) 06/11/2024    EGFRNORACEVR 4.7 (A) 06/11/2024    CALCIUM 8.9 06/11/2024    PHOS 6.2 (H) 06/11/2024    MG 2.0 03/10/2024    ALBUMIN 3.4 (L) 06/11/2024    AST 24 06/11/2024 " "   ALT 20 06/11/2024    .3 (H) 06/11/2024       Lab Results   Component Value Date    BILIRUBINUA Negative 04/12/2017    PROTEINUA 2+ (A) 04/12/2017    NITRITE Negative 04/12/2017    RBCUA 4 04/12/2017    WBCUA 2 04/12/2017       No results found for: "HLAABCTYPE"    Labs were reviewed with the patient.    Assessment:     1. ESRD on hemodialysis    2. Hypertension associated with diabetes    3. CONRAD (obstructive sleep apnea)    4. Secondary hyperparathyroidism of renal origin    5. Type 2 diabetes mellitus with chronic kidney disease on chronic dialysis, with long-term current use of insulin    6. Anemia in end-stage renal disease        Plan:     Transplant Candidacy:   Based on available information, Mr. Santoyo is a suitable kidney transplant candidate.   Meets center eligibility for accepting HCV+ donor offer - Yes.  Patient educated on HCV+ donors. Thai is willing to accept HCV+ donor offer - Yes  Patient is a candidate for KDPI > 85 kidney donor offer - No.  Final determination of transplant candidacy will be made once workup is complete and reviewed by the selection committee.    We discussed in detail obesity and need for sustained wt loss.   We discussed living donation  No contra-indication to continue with transplant evaluation  Acceptable risk at the present time    Patient advised that it is recommended that all transplant candidates, and their close contacts and household members receive Covid vaccination.    UNOS Patient Status  Functional Status: 60% - Requires occasional assistance but is able to care for needs    Diabetes: Type II   Age at Onset of Diabetes: 26  On insulin: yes  Date start insulin: 2002  Total insulin units/day: 20 units long acting and 15 units short acting with each meal  Retinopathy: no  Neuropathy: no  Ketoacidosis: no  Severe hypoglycemia (< 40 mg/dL): no  Hypoglycemic unawareness: no  Amputation: no  Symptomatic Peripheral Vascular Disease: no    Any Previous Malignancy: " No,  \Exhausted Vascular Access: no  Exhausted Peritoneal Access: no  Henrique Almanza MD

## 2024-06-12 NOTE — PROGRESS NOTES
"Transplant Recipient Adult Psychosocial Assessment    Thai Santoyo  [address]  [place] [address]  Telephone Information:   Mobile [phone]   Home  [phone] (home)  Work  There is no work phone number on file.  E-mail  [email or web address]    Sex: male  YOB: 1975  Age: 48 y.o.    Encounter Date: 6/11/2024  [place] Citizen: yes  Primary Language: English   Needed: no    Emergency Contact:  Name: Marline Santoyo  Relationship: sister  Address: [address]  Phone Numbers: [phone] (mobile)    Family/Social Support:   Number of dependents/: Pt reports 1 minor son Rangel. Pt reports biological mom does not assist with childcare.  Marital history: Pt reports as "single" and states Yecenia is his fiancee. They have been together for 7yrs.  Other family dynamics: Pt presents with highly supportive sister Marline. Pt resides with denice Keene and son Rangel. Pt reports both parents are alive. Pt also has 2 adult children: Chelsea 19 and Thai 24. Pt reports family as highly supportive. Pt reports uncle had a kidney transplant and mother is currently on dialysis.    Household Composition:  Name: Margarette Phoenix  Age: 43  Relationship: significant other  Does person drive? yes    Name: Rangel Santoyo  Age: 18  Relationship: son  Does person drive? no     Do you and your caregivers have access to reliable transportation? yes  PRIMARY CAREGIVER: Marline Santoyo will be primary caregiver, phone number [phone].      provided in-depth information to patient and caregiver regarding pre- and post-transplant caregiver role.   strongly encourages patient and caregiver to have concrete plan regarding post-transplant care giving, including back-up caregiver(s) to ensure care giving needs are met as needed.    Patient and Caregiver states understanding all aspects of caregiver role/commitment and is able/willing/committed to being caregiver to the fullest " extent necessary.    Patient and Caregiver verbalizes understanding of the education provided today and caregiver responsibilities.         remains available. Patient and Caregiver agree to contact  in a timely manner if concerns arise.      Able to take time off work without financial concerns: yes.     Additional Significant Others who will Assist with Transplant:  Name: Nadya Santoyo  Age: 41  Number: [phone]  City: [place] State: [place]  Relationship: sister  Does person drive? yes    Living Will: no  Healthcare Power of : no Pt reports trusting sisters Chacho with medical decisions as needed   Advance Directives on file: <<no information> per medical record.  Verbally reviewed LW/HCPA information.   provided patient with copy of LW/HCPA documents and provided education on completion of forms.    Living Donors: No. Education and resource information given to patient.    Highest Education Level: Associate/Bachelor Degree  Reading Ability: college  Reports difficulty with: memory Pt reports very minor memory concerns but does not forget to take medications or attend appointments.  Learns Best By: hands-on instruction      Status: no  VA Benefits: no     Working for Income: No  If no, reason not working: Demands of Treatment  Patient is disabled.  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    Spouse/Significant Other Employment: Pt'kandife is also employed with [email or web address]     Disabled: yes: date disability began: 2018, due to: ESRD.    Monthly Income:   Disability: $1,800 monthly  Able to afford all costs now and if transplanted, including medications: yes  Patient and Caregiver verbalizes understanding of personal responsibilities related to transplant costs and the importance of having a financial plan to ensure that patients transplant costs are fully covered.        provided fundraising information/education. Patient and Caregiververbalizes understanding.   remains available.    Insurance:   Payer/Plan Subscr  Sex Relation Sub. Ins. ID Effective Group Num   1. [employer]* LUCIO WINSTON JR. 1975 Male Self [id number]                                   [address] [address]   2. [facility]* LUCIO WINSTON JR. 1975 Male Self [id number]                                    [address] [address]     Primary Insurance (for UNOS reporting): Public Insurance - Medicare & Choice  Secondary Insurance (for UNOS reporting): Public Insurance - Medicaid  Patient and Caregiver verbalizes clear understanding that patient may experience difficulty obtaining and/or be denied insurance coverage post-surgery. This includes and is not limited to disability insurance, life insurance, health insurance, burial insurance, long term care insurance, and other insurances.      Patient and Caregiver also reports understanding that future health concerns related to or unrelated to transplantation may not be covered by patient's insurance.  Resources and information provided and reviewed.     Patient and Caregiver provides verbal permission to release any necessary information to outside resources for patient care and discharge planning.  Resources and information provided are reviewed.      Dialysis Adherence: Patient reports as highly compliant with hemodialysis treatments. Pt reports he has been on dialysis for 13yrs.  SW awaits adherence report.    Infusion Service: patient utilizing? no  Home Health: patient utilizing? no  DME: yes diabetic equipment (insulin dependent) and BiPAP  Pulmonary/Cardiac Rehab: pt denies   ADLS:  Pt reports pt is independent with all ADLS including driving, bathing,walking,taking medications, cooking, housekeeping, eating, and shopping.  Pt did express some difficulty with knees and requiring a replacement.    Adherence: Adherence education and  "counseling provided.     Per History Section:  Past Medical History:   Diagnosis Date    Acute gouty arthropathy 08/12/2013    Arthritis     Chronic kidney disease, stage IV (severe) 01/13/2011    Diabetes mellitus type II     Dialysis patient     DM (diabetes mellitus) type II uncontrolled with renal manifestation 08/12/2013    ESRD on hemodialysis 08/12/2013    MWF    Hypertension 09/12/2012    Line sepsis 08/15/2013    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 associated with type 2 diabetes mellitus 05/21/2018    Morbid obesity 09/12/2012    SVT (supraventricular tachycardia)      Social History     Tobacco Use    Smoking status: Former     Types: Cigars    Smokeless tobacco: Never    Tobacco comments:     rare   Substance Use Topics    Alcohol use: No     Social History     Substance and Sexual Activity   Drug Use No     Social History     Substance and Sexual Activity   Sexual Activity Yes    Partners: Female    Birth control/protection: None       Per Today's Psychosocial:  Tobacco:  Pt reports ceasing the use of cigars .  Alcohol: none, patient denies any use.  Illicit Drugs/Non-prescribed Medications: none, patient denies any use.    Patient and Caregiver states clear understanding of the potential impact of substance use as it relates to transplant candidacy and is aware of possible random substance screening.  Substance abstinence/cessation counseling, education and resources provided and reviewed.     Arrests/DWI/Treatment/Rehab: patient denies    Psychiatric History:    Mental Health: anxiety Pt expressed difficulty sleep due to racing thoughts and some financial stressors due to having to care for children with no help from their biological mother. Pt expressed s/o supports as needed but family does not go without. SW provided acknowledgement, active listening, normalization, validation, support and encouragement. Pt reports "it is what it is, we just gotta push " "through it". Pt also reports as a man of leelee and extended family is very supportive and we always show up for each other".   Psychiatrist/Counselor: Pt reports meeting with a counselor 2x weekly but could not report name. Pt reports "I believe everyone needs someone to talk to like that". Pt reports he will contact SW with counselor information.   Medications:  Pt denies   Suicide/Homicide Issues: pt strongly denies    Safety at home: Pt expressed no concerns.    Knowledge: Patient and Caregiver states having clear understanding and realistic expectations regarding the potential risks and potential benefits of organ transplantation and organ donation and agrees to discuss with health care team members and support system members, as well as to utilize available resources and express questions and/or concerns in order to further facilitate the pt informed decision-making.  Resources and information provided and reviewed.    Patient and Caregiver is aware of [facility]'s affiliation and/or partnership with agencies in home health care, LTAC, SNF, [facility], and other hospitals and clinics.    Understanding: Patient and Caregiver reports having a clear understanding of the many lifetime commitments involved with being a transplant recipient, including costs, compliance, medications, lab work, procedures, appointments, concrete and financial planning, preparedness, timely and appropriate communication of concerns, abstinence (ETOH, tobacco, illicit non-prescribed drugs), adherence to all health care team recommendations, support system and caregiver involvement, appropriate and timely resource utilization and follow-through, mental health counseling as needed/recommended, and patient and caregiver responsibilities.  Social Service Handbook, resources and detailed educational information provided and reviewed.  Educational information provided.    Patient and Caregiver also reports current and expected compliance with health " care regime and states having a clear understanding of the importance of compliance.      Patient and Caregiver reports a clear understanding that risks and benefits may be involved with organ transplantation and with organ donation.       Patient and Caregiver also reports clear understanding that psychosocial risk factors may affect patient, and include but are not limited to feelings of depression, generalized anxiety, anxiety regarding dependence on others, post traumatic stress disorder, feelings of guilt and other emotional and/or mental concerns, and/or exacerbation of existing mental health concerns.  Detailed resources provided and discussed.      Patient and Caregiver agrees to access appropriate resources in a timely manner as needed and/or as recommended, and to communicate concerns appropriately.  Patient and Caregiver also reports a clear understanding of treatment options available.     Patient and Caregiver received education in a group setting.   reviewed education, provided additional information, and answered questions.    Feelings or Concerns: Pt denies having any concerns and or overwhelming feelings regarding transplant at this time.       Coping: Identify Patient & Caregiver Strategies to [place]:   1. In the past, coping with major surgery and/or related stress - leelee and family   2. Currently & Pre-transplant - leelee and family   3. At the time of surgery - leelee and family   4. 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  Patient referred to Vocational Rehabilitation.    Interview Behavior: Patient and Caregiver presents as alert and oriented x 4, pleasant, good eye contact, well groomed, recall good, concentration/judgement good, average intelligence, calm, communicative, cooperative, and asking and answering questions appropriately. Pt and sister presents as highly supportive and  engaged during assessment.         Transplant Social Work - Candidacy  Assessment/Plan:     Psychosocial Suitability: Patient presents as a suitable candidate for transplant at this time. Based on psychosocial risk factors, patient presents as medium risk, due to due to currently undergoing mental health treatment twice weekly BUT this should not effect pt's listing status. Pt reports he will provide  clearance letter or contact for SW . Pt currently affording all needs, strong adherence in 13yr dialysis history and willingness to utilize all resources as needed.    Recommendations/Additional Comments: SW recommends that pt conduct fundraising to assist pt with pay for cost of medications, food, gas, and other transplant related needs. SW recommends that pt remain aware of potential mental health concerns and contact the team if any concerns arise. SW recommends that pt remain abstinent from tobacco, ETOH, and drug use. SW supports pt's continued dialysis adherence. SW remains available to answer any questions or concerns that arise as the pt moves through the transplant process.        Elian Chaudhari, MSW, LMSW

## 2024-06-19 ENCOUNTER — TELEPHONE (OUTPATIENT)
Dept: TRANSPLANT | Facility: CLINIC | Age: 49
End: 2024-06-19
Payer: MEDICARE

## 2024-06-19 DIAGNOSIS — Z76.82 ORGAN TRANSPLANT CANDIDATE: Primary | ICD-10-CM

## 2024-06-20 ENCOUNTER — OFFICE VISIT (OUTPATIENT)
Dept: UROLOGY | Facility: CLINIC | Age: 49
End: 2024-06-20
Payer: MEDICARE

## 2024-06-20 VITALS
WEIGHT: 277.75 LBS | DIASTOLIC BLOOD PRESSURE: 95 MMHG | BODY MASS INDEX: 38.89 KG/M2 | HEIGHT: 71 IN | HEART RATE: 92 BPM | SYSTOLIC BLOOD PRESSURE: 163 MMHG

## 2024-06-20 DIAGNOSIS — Z99.2 ESRD ON HEMODIALYSIS: ICD-10-CM

## 2024-06-20 DIAGNOSIS — N18.6 ESRD ON HEMODIALYSIS: ICD-10-CM

## 2024-06-20 DIAGNOSIS — E11.59 TYPE 2 DIABETES MELLITUS WITH CIRCULATORY DISORDER CAUSING ERECTILE DYSFUNCTION: Primary | ICD-10-CM

## 2024-06-20 DIAGNOSIS — N52.1 TYPE 2 DIABETES MELLITUS WITH CIRCULATORY DISORDER CAUSING ERECTILE DYSFUNCTION: Primary | ICD-10-CM

## 2024-06-20 DIAGNOSIS — E66.01 CLASS 2 SEVERE OBESITY WITH SERIOUS COMORBIDITY AND BODY MASS INDEX (BMI) OF 39.0 TO 39.9 IN ADULT, UNSPECIFIED OBESITY TYPE: ICD-10-CM

## 2024-06-20 PROCEDURE — 99999 PR PBB SHADOW E&M-EST. PATIENT-LVL IV: CPT | Mod: PBBFAC,TXP,, | Performed by: NURSE PRACTITIONER

## 2024-06-20 RX ORDER — PAPAVERINE HYDROCHLORIDE 30 MG/ML
INJECTION INTRAMUSCULAR; INTRAVENOUS
Qty: 10 ML | Refills: 12 | Status: SHIPPED | OUTPATIENT
Start: 2024-06-20 | End: 2024-06-20 | Stop reason: SDUPTHER

## 2024-06-20 RX ORDER — PAPAVERINE HYDROCHLORIDE 30 MG/ML
INJECTION INTRAMUSCULAR; INTRAVENOUS
Qty: 10 ML | Refills: 12 | Status: SHIPPED | OUTPATIENT
Start: 2024-06-20 | End: 2025-06-17

## 2024-06-20 NOTE — PATIENT INSTRUCTIONS
1. Wipe off top of medication vial with an alcohol prep.   2. With the vial held firmly on a flat surface, insert the syringe into the vial.  3. Now carefully  the vial with the needle in place and turn upside down.  4. You can then push in syringe (like you are giving a shot) to get all the air out of the syringe.  5. You can then pull the plunger of the syringe back down while keeping the needle inserted in the vial to get your desired results.   6. If having difficulty seeing the medication, rapidly pull back the syringe and then you will see the medication fill up.    You may never get all the tiny air bubbles out. It is ok.     Penile Self-Injection Procedure  Self-injection is a good option for many men with erectile dysfunction (ED). A tiny needle is used to inject medication into the penis. This helps your penis get hard and stay that way long enough for sex. And sex and orgasm will feel as good as always. You may be nervous about doing self-injection at first. But with practice, it will get easier. Your healthcare provider will show you how to do self-injection the first time. The simple steps are outlined on this sheet.  Preparing for Injection  Wash your hands well with soap and water.  Prepare the medication (if needed).  Sit or  a comfortable position in a warm, well-lit room. If you need to, sit or  front of a mirror.  Find an injection site on one side of your penis, in a place with no visible veins. (Dont inject into the top, bottom, or head of the penis.)  Clean the injection site with an alcohol swab. Grasp the head of your penis firmly with your thumb and forefinger (dont just pinch the skin). Stretch the penis so the skin on the shaft is taut.  Injecting the Medication  Rest your penis against your inner thigh and pull it gently toward your knee. Dont twist or rotate it. This way youll be sure to inject the medication into the spot you chose and cleaned before.  Hold  the syringe between your thumb and fingers, like youre holding a pen. Rest your forearm on your thigh for support.  Insert the needle at a 90-degree angle (perpendicular) to the shaft. Do this quickly to reduce discomfort. (The needle should go in easily. If it doesnt, stop right away.)  Move your thumb to the plunger. Press down to inject the medication, counting to 5.  Remove the needle and dispose of it safely.     The injection site can be in any part of the shaded area.     Insert the needle straight into the penis.    Gaining an Erection  Apply pressure to the injection site for a few minutes. This prevents swelling and bruising and helps spread the medication.   Stand up. This may help your erection develop. Foreplay often helps, too.  Your penis should become firm within 10-20 minutes. The erection will last long enough for sex, and maybe longer.  Call Your Doctor If You Have:   An erection that lasts longer than 3-4  hours  Bleeding or bruising  Severe pain  Scarring or curvature of the penis       © 3942-6485 [facility], [address] [place] [address], [place] [address]. All rights reserved. This information is not intended as a substitute for professional medical care. Always follow your healthcare professional's instructions.

## 2024-06-20 NOTE — PROGRESS NOTES
CHIEF COMPLAINT:    Thai Santoyo Jr. is a 48 y.o. male presents today for ED.     HISTORY OF PRESENTING ILLINESS:    Thai Santoyo Jr. is a 48 y.o. Type 2 DIABETIC male with ESRD on HD. He is an established patient of our clinic with a history of infertility and ED.   He had been seen in clinic with Dr. Dow 11/21/2021 for infertility.   I saw him 09/07/2023 to restart ICI for his ED.   He had been using ~25 units of DS Trimix when he was last seen.   Reported no issues.     Here today states he tried the Rx did not work. Had no results.   He did not come back for reteaching.   Would like to try again.     He still urinates during the day due to diuretics twice a day.  Nocturia x 1.      06/11/2024 Hgb A1c  5.2  03/07/2023 Hgb A1c  10.4     He is now living in [facility]  Son played football at [employer]; now at [facility]  [facility]              REVIEW OF SYSTEMS:  Review of Systems   Constitutional: Negative.  Negative for chills and fever.   Eyes:  Negative for double vision.   Respiratory:  Negative for cough and shortness of breath.    Cardiovascular:  Negative for chest pain and palpitations.   Gastrointestinal:  Negative for abdominal pain, constipation, diarrhea, nausea and vomiting.   Genitourinary:         On HD   Musculoskeletal:  Negative for falls.   Neurological:  Negative for dizziness and seizures.   Endo/Heme/Allergies:  Negative for polydipsia.         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 associated with type 2 diabetes mellitus 05/21/2018    Morbid  obesity 09/12/2012    SVT (supraventricular tachycardia)        Past Surgical History:   Procedure Laterality Date    ABLATION N/A 10/05/2018    Procedure: ABLATION;  Surgeon: Ayden Arana MD;  Location: [facility] CATH LAB;  Service: Cardiology;  Laterality: N/A;  SVT, RFA, ARGELIA, MAC (light), [place], 3 Prep    DIALYSIS FISTULA CREATION      KNEE ARTHROPLASTY      SHOULDER SURGERY      right    VASECTOMY         Family History   Problem Relation Name Age of Onset    Diabetes Mother      Kidney disease Mother          on dialysis    Hypertension Mother      Heart failure Mother      Hypertension Father      Kidney disease Paternal Uncle          dialysis    Kidney disease Paternal Uncle          dialysis    Eczema Sister      No Known Problems Son      No Known Problems Sister      No Known Problems Son      No Known Problems Son      Heart disease Maternal Grandfather      Melanoma Neg Hx      Psoriasis Neg Hx      Lupus Neg Hx      Acne Neg Hx         Social History     Socioeconomic History    Marital status: Significant Other     Spouse name: Margaret Phoenix   Occupational History    Occupation: Unemployed     Employer: OTHER     Comment: [employer] for 12 years   Tobacco Use    Smoking status: Former     Types: Cigars    Smokeless tobacco: Never    Tobacco comments:     rare   Substance and Sexual Activity    Alcohol use: No    Drug use: No    Sexual activity: Yes     Partners: Female     Birth control/protection: None   Social History Narrative    Caregiver S[employer]       Allergies:  Patient has no known allergies.    Medications:    Current Outpatient Medications:     allopurinoL (ZYLOPRIM) 100 MG tablet, Take 1 tablet (100 mg total) by mouth once daily., Disp: 90 tablet, Rfl: 3    amitriptyline (ELAVIL) 25 MG tablet, Take 1 tablet (25 mg total) by mouth nightly as needed for Insomnia., Disp: 90 tablet, Rfl: 3    amLODIPine (NORVASC) 10 MG tablet, TAKE ONE TABLET BY MOUTH ONCE DAILY (Patient taking differently: Take  "10 mg by mouth once daily.), Disp: 90 tablet, Rfl: 3    blood sugar diagnostic Strp, Use one strip each time to check blood sugar three times daily., Disp: 300 strip, Rfl: 4    carvediloL (COREG) 25 MG tablet, Take 1 tablet (25 mg total) by mouth 2 (two) times daily with meals., Disp: 180 tablet, Rfl: 3    cloNIDine (CATAPRES) 0.1 MG tablet, Take 1 tablet (0.1 mg total) by mouth every 6 (six) hours as needed (SBP >180)., Disp: 60 tablet, Rfl: 3    dulaglutide (TRULICITY) 1.5 mg/0.5 mL pen injector, Inject 1.5 mg into the skin every 7 days., Disp: 12 pen , Rfl: 1    furosemide (LASIX) 40 MG tablet, Take 1 tablet (40 mg total) by mouth once daily., Disp: 90 tablet, Rfl: 3    hydrALAZINE (APRESOLINE) 50 MG tablet, Take 1 tablet (50 mg total) by mouth every 8 (eight) hours., Disp: 270 tablet, Rfl: 3    insulin (LANTUS SOLOSTAR U-100 INSULIN) glargine 100 units/mL SubQ pen, Inject 24 Units into the skin 2 (two) times a day., Disp: 36 mL, Rfl: 3    insulin lispro (HUMALOG KWIKPEN INSULIN) 100 unit/mL pen, Use sliding scale based on pre-meal fingersticks.   <150, no insulin 150-180, take 4 units 180-200, take 8 units >200, take 10 units, Disp: 15 mL, Rfl: 0    lancets (ACCU-CHEK SOFTCLIX LANCETS) Misc, 300 each by Misc.(Non-Drug; Combo Route) route 3 (three) times daily., Disp: 300 each, Rfl: 4    olmesartan-hydrochlorothiazide (BENICAR HCT) 40-25 mg per tablet, Take 1 tablet by mouth once daily. (Patient taking differently: Take 1 tablet by mouth every evening.), Disp: 90 tablet, Rfl: 3    pen needle, diabetic (BD ULTRA-FINE MAIKOL PEN NEEDLE) 32 gauge x 5/32" Ndle, To be used with insulin twice daily., Disp: 200 each, Rfl: 3    rosuvastatin (CRESTOR) 10 MG tablet, Take 1 tablet (10 mg total) by mouth once daily., Disp: 90 tablet, Rfl: 0    sucroferric oxyhydroxide (VELPHORO) 500 mg Chew, Chew 1 tablet with each meal three times daily, Disp: 90 tablet, Rfl: 6    syringe-needle,safety,disp unt 1 mL 27 gauge x 1/2" Syrg, Use " every 2 weeks for testosterone injection, Disp: 100 Syringe, Rfl: 0    testosterone cypionate (DEPOTESTOTERONE CYPIONATE) 100 mg/mL injection, Inject 1 mL (100 mg total) into the muscle every 14 (fourteen) days., Disp: 10 mL, Rfl: 2    blood-glucose meter Misc, use as directed, Disp: 1 each, Rfl: 0    papaverine 30 mg/mL injection, Papaverine 60mg  Add: Phentolamine 2 mg Add: PGE1 20 mcg Double Strength Sig:  Inject 30 units as directed; titrate up by 5 units until desired results, Disp: 10 mL, Rfl: 12    PHYSICAL EXAMINATION:  Physical Exam  Vitals and nursing note reviewed.   Constitutional:       General: He is awake.      Appearance: Normal appearance. He is obese.   HENT:      Head: Normocephalic.      Right Ear: External ear normal.      Left Ear: External ear normal.      Nose: Nose normal.   Cardiovascular:      Rate and Rhythm: Normal rate.   Pulmonary:      Effort: Pulmonary effort is normal. No respiratory distress.   Abdominal:      Tenderness: There is no abdominal tenderness. There is no right CVA tenderness or left CVA tenderness.   Genitourinary:     Penis: Normal.       Testes: Normal.   Musculoskeletal:         General: Normal range of motion.      Cervical back: Normal range of motion.   Skin:     General: Skin is warm and dry.   Neurological:      General: No focal deficit present.      Mental Status: He is alert and oriented to person, place, and time.   Psychiatric:         Mood and Affect: Mood normal.         Behavior: Behavior is cooperative.           LABS:          Lab Results   Component Value Date    PSA 0.58 06/11/2024    PSA 1.3 05/26/2017       Lab Results   Component Value Date    CREATININE 12.2 (H) 06/11/2024    EGFRNORACEVR 4.7 (A) 06/11/2024               IMPRESSION:    Encounter Diagnoses   Name Primary?    Type 2 diabetes mellitus with circulatory disorder causing erectile dysfunction Yes    ESRD on hemodialysis     Class 2 severe obesity with serious comorbidity and body mass  index (BMI) of 39.0 to 39.9 in adult, unspecified obesity type          Assessment:       1. Type 2 diabetes mellitus with circulatory disorder causing erectile dysfunction    2. ESRD on hemodialysis    3. Class 2 severe obesity with serious comorbidity and body mass index (BMI) of 39.0 to 39.9 in adult, unspecified obesity type        Plan:         I spent 30 minutes with the patient of which more than half was spent in direct consultation with the patient in regards to our treatment and plan.  We addressed the office findings and recent labs; any need to go ER today.   Education and recommendations of today's plan of care including home remedies and needed follow up with PCP.   We discussed the chief complaints; and the possible contributory factors. Needs to improve his DM control.   Reviewed management; including possible medical and surgical options.   Recommended lifestyle modifications with a proper, healthy diet, good hydration but during the day. Reducing bladder irritants.   Benefits of regular exercise and weight loss.   Rx for Trimix sent to [facility]  RTC for reteaching

## 2024-06-28 ENCOUNTER — PATIENT MESSAGE (OUTPATIENT)
Dept: CARDIOLOGY | Facility: HOSPITAL | Age: 49
End: 2024-06-28
Payer: MEDICARE

## 2024-07-03 ENCOUNTER — TELEPHONE (OUTPATIENT)
Dept: TRANSPLANT | Facility: CLINIC | Age: 49
End: 2024-07-03
Payer: MEDICARE

## 2024-07-03 DIAGNOSIS — Z76.82 ORGAN TRANSPLANT CANDIDATE: Primary | ICD-10-CM

## 2024-07-08 ENCOUNTER — OFFICE VISIT (OUTPATIENT)
Dept: CARDIOLOGY | Facility: CLINIC | Age: 49
End: 2024-07-08
Payer: MEDICARE

## 2024-07-08 VITALS
WEIGHT: 277.13 LBS | HEART RATE: 81 BPM | DIASTOLIC BLOOD PRESSURE: 91 MMHG | SYSTOLIC BLOOD PRESSURE: 176 MMHG | BODY MASS INDEX: 39.1 KG/M2

## 2024-07-08 DIAGNOSIS — Z76.82 ORGAN TRANSPLANT CANDIDATE: ICD-10-CM

## 2024-07-08 DIAGNOSIS — N18.6 ESRD ON HEMODIALYSIS: ICD-10-CM

## 2024-07-08 DIAGNOSIS — G47.33 OSA (OBSTRUCTIVE SLEEP APNEA): ICD-10-CM

## 2024-07-08 DIAGNOSIS — E66.9 OBESITY (BMI 30-39.9): ICD-10-CM

## 2024-07-08 DIAGNOSIS — Z99.2 DEPENDENCE ON RENAL DIALYSIS: ICD-10-CM

## 2024-07-08 DIAGNOSIS — E11.3291 MILD NONPROLIFERATIVE DIABETIC RETINOPATHY OF RIGHT EYE WITHOUT MACULAR EDEMA ASSOCIATED WITH TYPE 2 DIABETES MELLITUS: Primary | ICD-10-CM

## 2024-07-08 DIAGNOSIS — N19 HYPERTENSIVE HEART AND RENAL DISEASE WITH CONGESTIVE HEART FAILURE AND RENAL FAILURE: ICD-10-CM

## 2024-07-08 DIAGNOSIS — I15.0 RENOVASCULAR HYPERTENSION: ICD-10-CM

## 2024-07-08 DIAGNOSIS — E11.59 HYPERTENSION ASSOCIATED WITH DIABETES: ICD-10-CM

## 2024-07-08 DIAGNOSIS — I15.2 HYPERTENSION ASSOCIATED WITH DIABETES: ICD-10-CM

## 2024-07-08 DIAGNOSIS — E87.8 ELECTROLYTE IMBALANCE: ICD-10-CM

## 2024-07-08 DIAGNOSIS — Z99.2 ESRD ON HEMODIALYSIS: ICD-10-CM

## 2024-07-08 DIAGNOSIS — I13.2 HYPERTENSIVE HEART AND RENAL DISEASE WITH CONGESTIVE HEART FAILURE AND RENAL FAILURE: ICD-10-CM

## 2024-07-08 PROCEDURE — 99999 PR PBB SHADOW E&M-EST. PATIENT-LVL IV: CPT | Mod: PBBFAC,TXP,, | Performed by: INTERNAL MEDICINE

## 2024-07-08 NOTE — PROGRESS NOTES
Subjective:    Patient ID:  Thai Santoyo Jr. is a 48 y.o. male patient here for evaluation Establish Care      History of Present Illness:  New patient cardiac evaluation.  End-stage renal disease, 12 years.  Last noninvasive cardiac assessment about a years ago negative.  No known ischemic structural heart disease.  Remote past history of ablation for arrhythmia 2018.  Non recurrent.  [facility].    Stable dyspnea, no angina.  No arrhythmia.  No syncope/presyncope.    Hypertension, dyslipidemia.  Remote past tobacco use.   Diabetes mellitus.    No history of CVA Ca. DVT PE.  No known chronic lung disease.      Review of patient's allergies indicates:  No Known Allergies    Past Medical History:   Diagnosis Date    Acute gouty arthropathy 08/12/2013    Arthritis     Chronic kidney disease, stage IV (severe) 01/13/2011    Diabetes mellitus type II     Dialysis patient     DM (diabetes mellitus) type II uncontrolled with renal manifestation 08/12/2013    ESRD on hemodialysis 08/12/2013    MWF    Hypertension 09/12/2012    Line sepsis 08/15/2013    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 associated with type 2 diabetes mellitus 05/21/2018    Morbid obesity 09/12/2012    SVT (supraventricular tachycardia)      Past Surgical History:   Procedure Laterality Date    ABLATION N/A 10/05/2018    Procedure: ABLATION;  Surgeon: Ayden Arana MD;  Location: [facility] CATH LAB;  Service: Cardiology;  Laterality: N/A;  SVT, RFA, ARGELIA, MAC (light), [place], 3 Prep    DIALYSIS FISTULA CREATION      KNEE ARTHROPLASTY      SHOULDER SURGERY      right    VASECTOMY       Social History     Tobacco Use    Smoking status: Former     Types: Cigars    Smokeless tobacco: Never    Tobacco comments:     rare   Substance Use Topics    Alcohol use: No    Drug use: No        Review of Systems:    As noted in HPI in addition         REVIEW OF SYSTEMS  Review of Systems   Constitutional: Negative for decreased appetite,  diaphoresis, night sweats, weight gain and weight loss.   HENT:  Negative for nosebleeds and odynophagia.    Eyes:  Negative for double vision and photophobia.   Cardiovascular:  Negative for chest pain, claudication, cyanosis, dyspnea on exertion, irregular heartbeat, leg swelling, near-syncope, orthopnea, palpitations, paroxysmal nocturnal dyspnea and syncope.   Respiratory:  Negative for cough, hemoptysis, shortness of breath and wheezing.    Hematologic/Lymphatic: Negative for adenopathy.   Skin:  Negative for flushing, skin cancer and suspicious lesions.   Musculoskeletal:  Negative for gout, myalgias and neck pain.   Gastrointestinal:  Negative for abdominal pain, heartburn, hematemesis and hematochezia.   Genitourinary:  Negative for bladder incontinence, hesitancy and nocturia.   Neurological:  Negative for focal weakness, headaches, light-headedness and paresthesias.   Psychiatric/Behavioral:  Negative for memory loss and substance abuse.        Objective:        Vitals:    07/08/24 1511   BP: (!) 176/91   Pulse: 81       Lab Results   Component Value Date    WBC 7.79 06/11/2024    HGB 10.2 (L) 06/11/2024    HCT 31.5 (L) 06/11/2024     06/11/2024    CHOL 95 (L) 06/11/2024    TRIG 68 06/11/2024    HDL 26 (L) 06/11/2024    ALT 20 06/11/2024    AST 24 06/11/2024     06/11/2024    K 4.5 06/11/2024    CL 99 06/11/2024    CREATININE 12.2 (H) 06/11/2024    BUN 48 (H) 06/11/2024    CO2 27 06/11/2024    TSH 0.989 03/09/2024    PSA 0.58 06/11/2024    INR 1.0 06/11/2024    HGBA1C 5.2 06/11/2024      CARDIOGRAM RESULTS  No results found for this or any previous visit.    No results found for this or any previous visit.          CURRENT/PREVIOUS VISIT EKG  Results for orders placed or performed during the hospital encounter of 03/09/24   ECG 12 lead    Collection Time: 03/09/24  1:54 PM   Result Value Ref Range    QRS Duration 90 ms    [facility] QTC Calculation 460 ms    Narrative    Test Reason :  R29.818,    Vent. Rate : 091 BPM     Atrial Rate : 091 BPM     P-R Int : 168 ms          QRS Dur : 090 ms      QT Int : 374 ms       P-R-T Axes : 063 -07 059 degrees     QTc Int : 460 ms    Normal sinus rhythm  Possible Left atrial enlargement  Low voltage QRS  Septal infarct (cited on or before 09-MAR-2024)  Abnormal ECG  When compared with ECG of 05-OCT-2018 10:32,  Nonspecific T wave abnormality now evident in Anterior-lateral leads  Confirmed by Jhon Logan MD ([id number]) on 3/15/2024 11:08:24 AM    Referred By: TIFFANIE   SELF           Confirmed By:John Logan MD     No valid procedures specified.   No results found for this or any previous visit.    No valid procedures specified.        PHYSICAL EXAM  GENERAL: well built, well nourished, well-developed in no apparent distress alert and oriented.   HEENT: Normocephalic. Pupils normal and conjunctivae normal.  Mucous membranes normal, no cyanosis or icterus, trachea central,no pallor or icterus is noted..   NECK: No JVD. No bruit..   THYROID: Thyroid not enlarged. No nodules present..   CARDIAC:  Normal S1-S2.  No murmur rub click or gallop.  PMI nondisplaced.  LUNGS: Clear to auscultation. No wheezing or rhonchi..   ABDOMEN: Soft no masses or organomegaly.  No abdomen pulsations or bruits.  Normal bowel sounds. No pulsations and no masses felt, No guarding or rebound.   URINARY: No decker catheter   EXTREMITIES: No cyanosis, clubbing or edema noted at this time., no calf tenderness bilaterally.   PERIPHERAL VASCULAR SYSTEM: Good palpable distal pulses.  2+ femoral, popliteal and pedal pulses.  No bruits    CENTRAL NERVOUS SYSTEM: No focal motor or sensory deficits noted.   SKIN: Skin without lesions, moist, well perfused.   MUSCLE STRENGTH & TONE: No noteable weakness, atrophy or abnormal movement.     I HAVE REVIEWED :    The vital signs, nurses notes, and all the pertinent radiology and labs.         Current Outpatient Medications   Medication  "Instructions    allopurinoL (ZYLOPRIM) 100 mg, Oral, Daily    amitriptyline (ELAVIL) 25 mg, Oral, Nightly PRN    amLODIPine (NORVASC) 10 MG tablet TAKE ONE TABLET BY MOUTH ONCE DAILY    blood sugar diagnostic Strp Use one strip each time to check blood sugar three times daily.    blood-glucose meter Misc use as directed    carvediloL (COREG) 25 mg, Oral, 2 times daily with meals    cloNIDine (CATAPRES) 0.1 mg, Oral, Every 6 hours PRN    furosemide (LASIX) 40 mg, Oral, Daily    hydrALAZINE (APRESOLINE) 50 mg, Oral, Every 8 hours    insulin lispro (HUMALOG KWIKPEN INSULIN) 100 unit/mL pen Use sliding scale based on pre-meal fingersticks.  <BR><150, no insulin<BR>150-180, take 4 units<BR>180-200, take 8 units<BR>>200, take 10 units    lancets (ACCU-CHEK SOFTCLIX LANCETS) Misc 300 each, Misc.(Non-Drug; Combo Route), 3 times daily    LANTUS SOLOSTAR U-100 INSULIN 24 Units, Subcutaneous, 2 times daily    olmesartan-hydrochlorothiazide (BENICAR HCT) 40-25 mg per tablet 1 tablet, Oral, Daily    papaverine 30 mg/mL injection Papaverine 60mg <BR>Add: Phentolamine 2 mg<BR>Add: PGE1 20 mcg<BR>Double Strength<BR>Sig:  Inject 30 units as directed; titrate up by 5 units until desired results    pen needle, diabetic (BD ULTRA-FINE MAIKOL PEN NEEDLE) 32 gauge x 5/32" Ndle To be used with insulin twice daily.    rosuvastatin (CRESTOR) 10 mg, Oral, Daily    sucroferric oxyhydroxide (VELPHORO) 500 mg Chew Chew 1 tablet with each meal three times daily    syringe-needle,safety,disp unt 1 mL 27 gauge x 1/2" Syrg Use every 2 weeks for testosterone injection    testosterone cypionate (DEPOTESTOTERONE CYPIONATE) 100 mg, Intramuscular, Every 14 days    TRULICITY 1.5 mg, Subcutaneous, Every 7 days          Assessment:   End-stage renal disease.  Hemodialysis.  Echo and nuclear study pending near future.  Renal transplant list.    Hypertension, multidrug regime.  Dyslipidemia  Diabetes mellitus    Remote history of cardiac ablation, [facility] " for arrhythmia.  Non recurrent.  Records pending review.    Plan:   Await results of cardiac echo and nuclear study, call.  Monitor home blood pressures.  Stable at home.  6m          No follow-ups on file.

## 2024-07-16 ENCOUNTER — TELEPHONE (OUTPATIENT)
Dept: ENDOSCOPY | Facility: HOSPITAL | Age: 49
End: 2024-07-16
Payer: MEDICARE

## 2024-07-16 ENCOUNTER — OFFICE VISIT (OUTPATIENT)
Dept: OPTOMETRY | Facility: CLINIC | Age: 49
End: 2024-07-16
Payer: COMMERCIAL

## 2024-07-16 ENCOUNTER — PATIENT MESSAGE (OUTPATIENT)
Dept: CARDIOLOGY | Facility: HOSPITAL | Age: 49
End: 2024-07-16
Payer: MEDICARE

## 2024-07-16 DIAGNOSIS — Z01.00 EXAMINATION OF EYES AND VISION: Primary | ICD-10-CM

## 2024-07-16 DIAGNOSIS — Z12.11 SCREEN FOR COLON CANCER: Primary | ICD-10-CM

## 2024-07-16 DIAGNOSIS — H52.7 REFRACTIVE ERROR: ICD-10-CM

## 2024-07-16 DIAGNOSIS — E11.3293 MILD NONPROLIFERATIVE DIABETIC RETINOPATHY OF BOTH EYES WITHOUT MACULAR EDEMA ASSOCIATED WITH TYPE 2 DIABETES MELLITUS: ICD-10-CM

## 2024-07-16 PROCEDURE — 99999 PR PBB SHADOW E&M-EST. PATIENT-LVL III: CPT | Mod: PBBFAC,TXP,, | Performed by: OPTOMETRIST

## 2024-07-16 PROCEDURE — 92015 DETERMINE REFRACTIVE STATE: CPT | Mod: S$GLB,TXP,, | Performed by: OPTOMETRIST

## 2024-07-16 PROCEDURE — 92004 COMPRE OPH EXAM NEW PT 1/>: CPT | Mod: S$GLB,TXP,, | Performed by: OPTOMETRIST

## 2024-07-16 RX ORDER — POLYETHYLENE GLYCOL 3350, SODIUM SULFATE ANHYDROUS, SODIUM BICARBONATE, SODIUM CHLORIDE, POTASSIUM CHLORIDE 236; 22.74; 6.74; 5.86; 2.97 G/4L; G/4L; G/4L; G/4L; G/4L
4 POWDER, FOR SOLUTION ORAL ONCE
Qty: 1 EACH | Refills: 0 | Status: SHIPPED | OUTPATIENT
Start: 2024-07-16 | End: 2024-07-24

## 2024-07-16 NOTE — TELEPHONE ENCOUNTER
----- Message from Gilma Smith sent at 7/16/2024  9:14 AM CDT -----    ----- Message -----  From: Keon Lou MA  Sent: 7/16/2024   6:37 AM CDT  To: [facility] Endoscopy Schedulers    Good Morning,       The attached patient is in need of a Colonoscopy for pre kid txp wrk up. He was scheduled for a visit in [place] today. However, I scheduled it incorrectly and had to cancel it. Is there anyway you can schedule him for a virtual appointment for a colonoscopy? Please and Thank You!          Thanks,         Keon

## 2024-07-16 NOTE — PROGRESS NOTES
HPI    Pt here today for annual DM exam.   States blurred vision at near only,   distance vision good.  Would like specs rx today.    Denies any eye pain.    Hemoglobin A1C       Date                     Value               Ref Range             Status                06/11/2024               5.2                 4.0 - 5.6 %           Final                 03/09/2024               5.0                 4.5 - 6.2 %           Final                 01/26/2024               5.1                 4.0 - 5.6 %           Final              BSL controlled    (+) headaches --- thinks related to stress  (-) allergies / dry eyes  (-) gtts  (+) floaters OU -- no light flashes  Last edited by David Enrique, OD on 7/16/2024  8:21 AM.            Assessment /Plan     For exam results, see Encounter Report.    Examination of eyes and vision    Mild nonproliferative diabetic retinopathy of both eyes without macular edema associated with type 2 diabetes mellitus    Refractive error      1. Examination of eyes and vision    2. Mild nonproliferative diabetic retinopathy of both eyes without macular edema associated with type 2 diabetes mellitus  Scattered dot blot hemes posterior pole OU  No CSME  Improving A1C x 1 year, hx of uncontrolled ~12 A1C  Discussed possible ocular affects of uncontrolled blood sugar with patient.   Recommended continued strong blood sugar control and continued care with PCP.   Recheck DM DFE in 6 months    3. Refractive error  Dispensed updated spectacle Rx. Discussed various spectacle lens options. Discussed adaptation period to new specs.

## 2024-07-16 NOTE — TELEPHONE ENCOUNTER
Spoke to pt to schedule procedure(s) Colonoscopy       Physician to perform procedure(s) Dr. DAYDAY Roque  Date of Procedure (s) 10/1/2024  Arrival Time 12:00 PM  Time of Procedure(s) 1:00 PM   Location of Procedure(s) [place] [address] Floor  Type of Rx Prep sent to patient: PEG  Instructions provided to [facility] via [facility]    Patient was informed on the following information and verbalized understanding. Screening questionnaire reviewed with patient and complete. If procedure requires anesthesia, a responsible adult needs to be present to accompany the patient home, patient cannot drive after receiving anesthesia. Appointment details are tentative, especially check-in time. Patient will receive a prep-op call 7 days prior to confirm check-in time for procedure. If applicable the patient should contact their pharmacy to verify Rx for procedure prep is ready for pick-up. Patient was advised to call the scheduling department at [phone] if pharmacy states no Rx is available. Patient was advised to call the endoscopy scheduling department if any questions or concerns arise.      [facility]oscopy Scheduling Department

## 2024-07-18 ENCOUNTER — HOSPITAL ENCOUNTER (OUTPATIENT)
Dept: CARDIOLOGY | Facility: HOSPITAL | Age: 49
Discharge: HOME | End: 2024-07-18
Attending: NURSE PRACTITIONER
Payer: MEDICARE

## 2024-07-18 ENCOUNTER — HOSPITAL ENCOUNTER (OUTPATIENT)
Dept: RADIOLOGY | Facility: HOSPITAL | Age: 49
Discharge: HOME | End: 2024-07-18
Attending: NURSE PRACTITIONER
Payer: MEDICARE

## 2024-07-18 VITALS — BODY MASS INDEX: 39.65 KG/M2 | HEIGHT: 70 IN | WEIGHT: 277 LBS

## 2024-07-18 VITALS — WEIGHT: 277 LBS | HEIGHT: 70 IN | BODY MASS INDEX: 39.65 KG/M2

## 2024-07-18 DIAGNOSIS — Z76.82 ORGAN TRANSPLANT CANDIDATE: ICD-10-CM

## 2024-07-18 PROCEDURE — 93015 CV STRESS TEST SUPVJ I&R: CPT | Mod: S$GLB,TXP,, | Performed by: INTERNAL MEDICINE

## 2024-07-18 PROCEDURE — 78452 HT MUSCLE IMAGE SPECT MULT: CPT | Mod: 26,TXP,, | Performed by: INTERNAL MEDICINE

## 2024-07-18 PROCEDURE — A9502 TC99M TETROFOSMIN: HCPCS | Mod: PO,TXP | Performed by: NURSE PRACTITIONER

## 2024-07-18 PROCEDURE — 78452 HT MUSCLE IMAGE SPECT MULT: CPT | Mod: PO,TXP

## 2024-07-18 PROCEDURE — 63600175 PHARM REV CODE 636 W HCPCS: Mod: PO,TXP | Performed by: NURSE PRACTITIONER

## 2024-07-18 PROCEDURE — 93306 TTE W/DOPPLER COMPLETE: CPT | Mod: PO,TXP

## 2024-07-18 PROCEDURE — 93017 CV STRESS TEST TRACING ONLY: CPT | Mod: PO,TXP

## 2024-07-18 RX ORDER — REGADENOSON 0.08 MG/ML
0.4 INJECTION, SOLUTION INTRAVENOUS
Status: COMPLETED | OUTPATIENT
Start: 2024-07-18 | End: 2024-07-18

## 2024-07-18 RX ADMIN — TETROFOSMIN 48.5 MILLICURIE: 1.38 INJECTION, POWDER, LYOPHILIZED, FOR SOLUTION INTRAVENOUS at 02:07

## 2024-07-18 RX ADMIN — REGADENOSON 0.4 MG: 0.08 INJECTION, SOLUTION INTRAVENOUS at 02:07

## 2024-07-18 RX ADMIN — TETROFOSMIN 16.4 MILLICURIE: 1.38 INJECTION, POWDER, LYOPHILIZED, FOR SOLUTION INTRAVENOUS at 02:07

## 2024-07-19 LAB
ASCENDING AORTA: 3.07 CM
AV INDEX (PROSTH): 0.72
AV MEAN GRADIENT: 4 MMHG
AV PEAK GRADIENT: 7 MMHG
AV VALVE AREA BY VELOCITY RATIO: 3.29 CM²
AV VALVE AREA: 2.91 CM²
AV VELOCITY RATIO: 0.81
BSA FOR ECHO PROCEDURE: 2.49 M2
CV ECHO LV RWT: 0.57 CM
DOP CALC AO PEAK VEL: 1.28 M/S
DOP CALC AO VTI: 31.4 CM
DOP CALC LVOT AREA: 4 CM2
DOP CALC LVOT DIAMETER: 2.27 CM
DOP CALC LVOT PEAK VEL: 1.04 M/S
DOP CALC LVOT STROKE VOLUME: 91.42 CM3
DOP CALCLVOT PEAK VEL VTI: 22.6 CM
E WAVE DECELERATION TIME: 136.82 MSEC
E/A RATIO: 2.63
E/E' RATIO: 21 M/S
ECHO LV POSTERIOR WALL: 1.53 CM (ref 0.6–1.1)
FRACTIONAL SHORTENING: 31 % (ref 28–44)
INTERVENTRICULAR SEPTUM: 1.67 CM (ref 0.6–1.1)
IVRT: 55.19 MSEC
LEFT ATRIUM AREA SYSTOLIC (APICAL 2 CHAMBER): 35.87 CM2
LEFT ATRIUM AREA SYSTOLIC (APICAL 4 CHAMBER): 37.42 CM2
LEFT ATRIUM SIZE: 5.82 CM
LEFT ATRIUM VOLUME INDEX MOD: 67.4 ML/M2
LEFT ATRIUM VOLUME MOD: 161.83 CM3
LEFT INTERNAL DIMENSION IN SYSTOLE: 3.72 CM (ref 2.1–4)
LEFT VENTRICLE DIASTOLIC VOLUME INDEX: 58.21 ML/M2
LEFT VENTRICLE DIASTOLIC VOLUME: 139.71 ML
LEFT VENTRICLE END SYSTOLIC VOLUME APICAL 2 CHAMBER: 157.64 ML
LEFT VENTRICLE END SYSTOLIC VOLUME APICAL 4 CHAMBER: 144.96 ML
LEFT VENTRICLE MASS INDEX: 165 G/M2
LEFT VENTRICLE SYSTOLIC VOLUME INDEX: 24.6 ML/M2
LEFT VENTRICLE SYSTOLIC VOLUME: 58.92 ML
LEFT VENTRICULAR INTERNAL DIMENSION IN DIASTOLE: 5.37 CM (ref 3.5–6)
LEFT VENTRICULAR MASS: 395.44 G
LV LATERAL E/E' RATIO: 18.38 M/S
LV SEPTAL E/E' RATIO: 24.5 M/S
LVED V (TEICH): 139.71 ML
LVES V (TEICH): 58.92 ML
LVOT MG: 2.12 MMHG
LVOT MV: 0.66 CM/S
MV PEAK A VEL: 0.56 M/S
MV PEAK E VEL: 1.47 M/S
MV STENOSIS PRESSURE HALF TIME: 39.68 MS
MV VALVE AREA P 1/2 METHOD: 5.54 CM2
OHS CV RV/LV RATIO: 0.97 CM
PISA MRMAX VEL: 5.49 M/S
PISA TR MAX VEL: 4.22 M/S
PULM VEIN S/D RATIO: 0.85
PV PEAK D VEL: 0.87 M/S
PV PEAK S VEL: 0.74 M/S
RA PRESSURE ESTIMATED: 8 MMHG
RIGHT VENTRICLE DIASTOLIC LENGTH: 7.6 CM
RIGHT VENTRICLE DIASTOLIC MID DIMENSION: 2.3 CM
RIGHT VENTRICULAR END-DIASTOLIC DIMENSION: 5.21 CM
RIGHT VENTRICULAR LENGTH IN DIASTOLE (APICAL 4-CHAMBER VIEW): 7.62 CM
RV MID DIAMA: 2.27 CM
RV TB RVSP: 12 MMHG
RV TISSUE DOPPLER FREE WALL SYSTOLIC VELOCITY 1 (APICAL 4 CHAMBER VIEW): 16.8 CM/S
SINUS: 3.58 CM
STJ: 2.91 CM
TDI LATERAL: 0.08 M/S
TDI SEPTAL: 0.06 M/S
TDI: 0.07 M/S
TR MAX PG: 71 MMHG
TRICUSPID ANNULAR PLANE SYSTOLIC EXCURSION: 2.22 CM
TV REST PULMONARY ARTERY PRESSURE: 79 MMHG
Z-SCORE OF LEFT VENTRICULAR DIMENSION IN END DIASTOLE: -7.11
Z-SCORE OF LEFT VENTRICULAR DIMENSION IN END SYSTOLE: -4.47

## 2024-07-21 LAB
CV PHARM DOSE: 0.4 MG
CV STRESS BASE HR: 81 BPM
DIASTOLIC BLOOD PRESSURE: 94 MMHG
OHS CV CPX 1 MINUTE RECOVERY HEART RATE: 90 BPM
OHS CV CPX 85 PERCENT MAX PREDICTED HEART RATE MALE: 148
OHS CV CPX MAX PREDICTED HEART RATE: 174
OHS CV CPX PATIENT IS FEMALE: 0
OHS CV CPX PATIENT IS MALE: 1
OHS CV CPX PEAK DIASTOLIC BLOOD PRESSURE: 94 MMHG
OHS CV CPX PEAK HEAR RATE: 92 BPM
OHS CV CPX PEAK RATE PRESSURE PRODUCT: NORMAL
OHS CV CPX PEAK SYSTOLIC BLOOD PRESSURE: 168 MMHG
OHS CV CPX PERCENT MAX PREDICTED HEART RATE ACHIEVED: 53
OHS CV CPX RATE PRESSURE PRODUCT PRESENTING: NORMAL
OHS CV PHARM TIME: 1451 MIN
SYSTOLIC BLOOD PRESSURE: 168 MMHG

## 2024-07-30 ENCOUNTER — OFFICE VISIT (OUTPATIENT)
Dept: SLEEP MEDICINE | Facility: CLINIC | Age: 49
End: 2024-07-30
Payer: MEDICARE

## 2024-07-30 VITALS
HEIGHT: 70 IN | HEART RATE: 82 BPM | WEIGHT: 277.75 LBS | SYSTOLIC BLOOD PRESSURE: 159 MMHG | BODY MASS INDEX: 39.76 KG/M2 | DIASTOLIC BLOOD PRESSURE: 84 MMHG

## 2024-07-30 DIAGNOSIS — G47.33 OSA (OBSTRUCTIVE SLEEP APNEA): Primary | ICD-10-CM

## 2024-07-30 PROCEDURE — 1159F MED LIST DOCD IN RCRD: CPT | Mod: CPTII,NTX,S$GLB, | Performed by: NURSE PRACTITIONER

## 2024-07-30 PROCEDURE — 3077F SYST BP >= 140 MM HG: CPT | Mod: CPTII,NTX,S$GLB, | Performed by: NURSE PRACTITIONER

## 2024-07-30 PROCEDURE — 99999 PR PBB SHADOW E&M-EST. PATIENT-LVL III: CPT | Mod: PBBFAC,TXP,, | Performed by: NURSE PRACTITIONER

## 2024-07-30 PROCEDURE — 3066F NEPHROPATHY DOC TX: CPT | Mod: CPTII,NTX,S$GLB, | Performed by: NURSE PRACTITIONER

## 2024-07-30 PROCEDURE — 99214 OFFICE O/P EST MOD 30 MIN: CPT | Mod: NTX,S$GLB,, | Performed by: NURSE PRACTITIONER

## 2024-07-30 PROCEDURE — 3008F BODY MASS INDEX DOCD: CPT | Mod: CPTII,NTX,S$GLB, | Performed by: NURSE PRACTITIONER

## 2024-07-30 PROCEDURE — 3044F HG A1C LEVEL LT 7.0%: CPT | Mod: CPTII,NTX,S$GLB, | Performed by: NURSE PRACTITIONER

## 2024-07-30 PROCEDURE — 3079F DIAST BP 80-89 MM HG: CPT | Mod: CPTII,NTX,S$GLB, | Performed by: NURSE PRACTITIONER

## 2024-07-30 NOTE — PROGRESS NOTES
Cc: CONRAD, new to me      BPAP 21/14 qhs using, can't sleep w/o it. Getting expensive FFM at store, needs rx for insurance processing. Eligible new machine 10/2024/not gotten replacement machine yet.   Not able to remotely view data      8/21/19 SPLIT: .1, mark 66%; CPAP 6-18cwp, initial improvement 16cwp, switched to BiPAP due to need for higher pressures; BiPAP 19/14-21/14, +21/14 sREM; recommended BiPAP 21/14cwp    ASSESSMENT:  CONRAD, very severe. Ongoing adherence with bipap benefits from use. Eligible new machine 10/2024, in interim needs supplies  Medical co-morbidities HTN    PLAN:  Bipap 21/14cm continue DME [facility], rx placed today and also for new machine

## 2024-08-01 ENCOUNTER — OFFICE VISIT (OUTPATIENT)
Dept: INTERNAL MEDICINE | Facility: CLINIC | Age: 49
End: 2024-08-01
Payer: MEDICARE

## 2024-08-01 VITALS
DIASTOLIC BLOOD PRESSURE: 80 MMHG | HEART RATE: 80 BPM | HEIGHT: 73 IN | WEIGHT: 277.13 LBS | BODY MASS INDEX: 36.73 KG/M2 | SYSTOLIC BLOOD PRESSURE: 152 MMHG

## 2024-08-01 DIAGNOSIS — Z99.2 ESRD ON HEMODIALYSIS: ICD-10-CM

## 2024-08-01 DIAGNOSIS — N18.6 TYPE 2 DIABETES MELLITUS WITH CHRONIC KIDNEY DISEASE ON CHRONIC DIALYSIS, WITH LONG-TERM CURRENT USE OF INSULIN: ICD-10-CM

## 2024-08-01 DIAGNOSIS — Z99.2 TYPE 2 DIABETES MELLITUS WITH CHRONIC KIDNEY DISEASE ON CHRONIC DIALYSIS, WITH LONG-TERM CURRENT USE OF INSULIN: ICD-10-CM

## 2024-08-01 DIAGNOSIS — E11.59 HYPERTENSION ASSOCIATED WITH DIABETES: Primary | ICD-10-CM

## 2024-08-01 DIAGNOSIS — I15.2 HYPERTENSION ASSOCIATED WITH DIABETES: Primary | ICD-10-CM

## 2024-08-01 DIAGNOSIS — I15.0 RENOVASCULAR HYPERTENSION: ICD-10-CM

## 2024-08-01 DIAGNOSIS — N18.6 ESRD ON HEMODIALYSIS: ICD-10-CM

## 2024-08-01 DIAGNOSIS — Z79.4 TYPE 2 DIABETES MELLITUS WITH CHRONIC KIDNEY DISEASE ON CHRONIC DIALYSIS, WITH LONG-TERM CURRENT USE OF INSULIN: ICD-10-CM

## 2024-08-01 DIAGNOSIS — E11.22 TYPE 2 DIABETES MELLITUS WITH CHRONIC KIDNEY DISEASE ON CHRONIC DIALYSIS, WITH LONG-TERM CURRENT USE OF INSULIN: ICD-10-CM

## 2024-08-01 DIAGNOSIS — G47.33 OSA (OBSTRUCTIVE SLEEP APNEA): ICD-10-CM

## 2024-08-01 PROCEDURE — 3008F BODY MASS INDEX DOCD: CPT | Mod: CPTII,S$GLB,, | Performed by: INTERNAL MEDICINE

## 2024-08-01 PROCEDURE — 3077F SYST BP >= 140 MM HG: CPT | Mod: CPTII,S$GLB,, | Performed by: INTERNAL MEDICINE

## 2024-08-01 PROCEDURE — 3066F NEPHROPATHY DOC TX: CPT | Mod: CPTII,S$GLB,, | Performed by: INTERNAL MEDICINE

## 2024-08-01 PROCEDURE — 1159F MED LIST DOCD IN RCRD: CPT | Mod: CPTII,S$GLB,, | Performed by: INTERNAL MEDICINE

## 2024-08-01 PROCEDURE — 3079F DIAST BP 80-89 MM HG: CPT | Mod: CPTII,S$GLB,, | Performed by: INTERNAL MEDICINE

## 2024-08-01 PROCEDURE — 99999 PR PBB SHADOW E&M-EST. PATIENT-LVL IV: CPT | Mod: PBBFAC,,, | Performed by: INTERNAL MEDICINE

## 2024-08-01 PROCEDURE — 3044F HG A1C LEVEL LT 7.0%: CPT | Mod: CPTII,S$GLB,, | Performed by: INTERNAL MEDICINE

## 2024-08-01 PROCEDURE — 99214 OFFICE O/P EST MOD 30 MIN: CPT | Mod: S$GLB,,, | Performed by: INTERNAL MEDICINE

## 2024-08-01 PROCEDURE — 1160F RVW MEDS BY RX/DR IN RCRD: CPT | Mod: CPTII,S$GLB,, | Performed by: INTERNAL MEDICINE

## 2024-08-01 RX ORDER — INSULIN GLARGINE 100 [IU]/ML
18 INJECTION, SOLUTION SUBCUTANEOUS 2 TIMES DAILY
Qty: 30 ML | Refills: 3 | Status: SHIPPED | OUTPATIENT
Start: 2024-08-01 | End: 2025-06-30

## 2024-08-01 RX ORDER — SPIRONOLACTONE 25 MG/1
25 TABLET ORAL DAILY
Qty: 90 TABLET | Refills: 3 | Status: SHIPPED | OUTPATIENT
Start: 2024-08-01 | End: 2025-08-01

## 2024-08-01 NOTE — PROGRESS NOTES
Subjective:       Patient ID: Thai Santoyo Jr. is a 48 y.o. male.    Chief Complaint:   Follow-up    HPI - Mr. Santoyo is here for a short turnaround visit because his blood pressure was too high last visit.  At that time, we added hydralazine.  Blood pressure remains high.  Diabetes is overtreated.  We changed his pre meal insulin to a sliding scale, and he has not used it since our last visit.  He has end-stage renal disease and makes very little urine.  Despite that, Lasix is listed on his problem list.  He is undergoing workup to be transplant eligible.    PMH:  ESRD on dialysis  DM2, overtreated right now  HTN, not at goal  SVT x2 in 2018  Gout, quiescent  Class 2 obesity with complications     Meds:  Reviewed and reconciled in EPIC with patient during visit today.    Review of Systems   Constitutional:  Negative for fever.   HENT:  Negative for congestion.    Respiratory:  Negative for shortness of breath.    Cardiovascular:  Negative for chest pain.   Gastrointestinal:  Negative for abdominal pain.   Genitourinary:  Negative for difficulty urinating.   Musculoskeletal:  Negative for arthralgias.   Skin:  Negative for rash.   Neurological:  Negative for headaches.   Psychiatric/Behavioral:  Negative for sleep disturbance.        Objective:      Physical Exam  Constitutional:       General: He is not in acute distress.     Appearance: He is obese. He is not ill-appearing.   HENT:      Head: Normocephalic and atraumatic.   Pulmonary:      Effort: Pulmonary effort is normal.   Neurological:      General: No focal deficit present.      Mental Status: He is alert.   Psychiatric:         Mood and Affect: Mood normal.         Assessment:       1. Hypertension associated with diabetes    2. Type 2 diabetes mellitus with chronic kidney disease on chronic dialysis, with long-term current use of insulin    3. ESRD on hemodialysis    4. CONRAD (obstructive sleep apnea)    5. Renovascular hypertension        Plan:       Thai  was seen today for follow-up.    Diagnoses and all orders for this visit:    Hypertension associated with diabetes - still out of range.  I will stop the furosemide today and start spironolactone, as this should be better for blood pressure control.  -     spironolactone (ALDACTONE) 25 MG tablet; Take 1 tablet (25 mg total) by mouth once daily.    Type 2 diabetes mellitus with chronic kidney disease on chronic dialysis, with long-term current use of insulin - doing well.  Reduce dose of glargine to 18 units twice daily  -     insulin glargine U-100, Lantus, (LANTUS SOLOSTAR U-100 INSULIN) 100 unit/mL (3 mL) InPn pen; Inject 18 Units into the skin 2 (two) times a day.    ESRD on hemodialysis - stable, continue present care.  This is why his blood pressure is up    CONRAD (obstructive sleep apnea) - stable.  Monitor    Renovascular hypertension - out of control.  Plan as above    RTC p.r.n., or in 6 weeks for blood pressure    G Anais Mckee MD MPH  Staff Internist

## 2024-08-06 ENCOUNTER — TELEPHONE (OUTPATIENT)
Dept: TRANSPLANT | Facility: CLINIC | Age: 49
End: 2024-08-06
Payer: MEDICARE

## 2024-08-08 ENCOUNTER — TELEPHONE (OUTPATIENT)
Dept: TRANSPLANT | Facility: CLINIC | Age: 49
End: 2024-08-08
Payer: MEDICARE

## 2024-08-09 ENCOUNTER — COMMITTEE REVIEW (OUTPATIENT)
Dept: TRANSPLANT | Facility: CLINIC | Age: 49
End: 2024-08-09
Payer: MEDICARE

## 2024-08-09 NOTE — COMMITTEE REVIEW
Native Organ Dx: Diabetes Mellitus - Type II      Not approved for LRD/CAD transplant due to obesity/elevated BMI, uncontrolled PA pressures.  Patient will need to go to Cardiology to address Lexiscan results, Heart failure clinic to address PA pressures, Neurology to address white matter findings which commonly represent microvascular ischemic change noted on MRI Brain, and Endocrinology to assess microadenoma, and Notify dialysis clinic for additional fluid removal.  He may be re-referred for kidney transplant evaluation when these have been completed.  He should be counseled to lose weight and find a donor asap.         Note written by Shakira Liz RN    ===============================================  I was present at the meeting and attest to the decision of the committee.    Mita Gilliam  Transplant Nephrology

## 2024-08-09 NOTE — LETTER
August 9, 2024    Thai Santoyo  [address]  [place] [address]    Dear Thai Santoyo:  MRN: [id number]    It is the duty of the [facility] to determine which patients are candidates for a transplant. For this reason, our committee has the difficult task of evaluating patients to determine which ones have the greatest chance of having a successful transplant. We are aware of the magnitude of this responsibility, and we approach it with reverence and humility.    It is with regret I inform you that you are not approved as a transplant candidate due to obesity/elevated BMI and uncontrolled PA pressures.    Based on this review, we have determined that at this time, you are not a candidate for a transplant at [facility] but you may be re-referred after completion of the following: You will need to go to Cardiology clinic to address Lexiscan results, Heart failure clinic to address PA pressures, Neurology  clinic to address findings which commonly represent microvascular ischemic change noted on MRI Brain, Endocrinology clinic  to assess microadenoma, and Notify dialysis clinic for additional fluid removal. You may be re-referred for kidney transplant evaluation when these have been completed. We encourage you to lose weight and find a living donor.  .    The selection committee carefully considers each patient's transplant candidacy and determines whether it is safe to proceed with transplantation on a case-by-case basis using established selection criteria.  At present, the risk of proceeding with an elective transplant surgery has become too high.                                                                               Although the selection committee believes you are not a suitable transplant candidate, you have the option to be evaluated at other transplant centers who may have different selection criteria.  You may request your [facility] records be sent to any center of your  choice by contacting our Medical Records Department at [phone].                                                                               Attached is a letter from the [employer] for Organ Sharing (UNOS).  It describes the services and information offered to patients by UNOS and the Organ Procurement and Transplant Network.    The [facility] sincerely wishes you the best and remains available to answer any questions.  Please do not hesitate to contact our pre-transplant office if we can assist you in any other way.                                                                               Sincerely,      Dinora Barr MD  Medical Director, Kidney & Kidney/Pancreas Transplantation    CC:  Dr. Johny Solomon           [id number]    Encl: UNOS Letter               The Organ Procurement and Transplantation Network   Toll-free patient services line: [phone]  Your resource for organ transplant information      Staffed 8:30 am - 5:00 pm ET Monday - Friday   Leave a message 24/7 to receive a call back    The Organ Procurement and Transplantation Network (OPTN) is the national transplant system. It makes the policies that decide how donated organs are matched to patients waiting for a transplant. The OPTN:    Makes sure donated organs get matched to people on the transplant waiting list  Tells people about the donation and transplant processes  Makes sure that the public knows about the need for more organ and tissue donations    The OPTN has a free patient services line that you can call to:  Get more information about:   o Organ donation and organ transplants   o Donation and transplant policies  Get an information kit with:   o A list of transplant hospitals   o Waiting list information  Talk about any questions you may have about your transplant hospital or organ procurement organization. The staff will do their best to help you or point you to  others who may help.  Find out how you can volunteer with the OPTN and help shape transplant policy    The patient services line number is: 2-401-212-8900    Patient services line staff CANNOT answer questions about your own medical care, including:  Waiting list status  Test results  Medical records  You will need to call your transplant hospital for this information.    The following websites have more information about transplantation and donation:  OPTN: https://optn.transplant.hrsa.gov/  For potential living donors and transplant recipients:   o Living with transplant: https://www.transplantliving.org/   o Living donation process: https://optn.transplant.hrsa.gov/living-donation/     o Financial assistance: https://www.livingdonorassistance.org/  Transplantation data: https://www.srtr.org/  Organ donation: https://www.organdonor.gov/    Volunteer with the OPTN: https://optn.transplant.hrsa.gov/get-involved       Alert-The patient is alert, awake and responds to voice. The patient is oriented to time, place, and person. The triage nurse is able to obtain subjective information.

## 2024-08-09 NOTE — LETTER
This communication is flagged as high priority.    August 9, 2024    Thai Santoyo  [address] [address]          Dear Thai Santoyo:  MRN: [id number]    Your transplant evaluation was reviewed at the [facility] meeting on 8/9/2024.  It is with regret I inform you that your workup is incomplete and we are unable to determine your transplant candidacy at this time due to obesity/elevated BMI, uncontrolled PA pressures.  Patient will need to go to Cardiology to address Lexiscan results, Heart failure clinic to address PA pressures, Neurology clinic to address white matter findings which commonly represent microvascular ischemic change noted on MRI Brain, and Endocrinology to assess microadenoma, and Notify dialysis clinic for additional fluid removal. He may be re-referred for kidney transplant evaluation when these have been completed.  He should be counseled to lose weight and find a donor asap.   .  You will be re-presented to the selection committee once pending studies are completed.  You will be notified of the committees decision once we review the new results.    The [facility] {Organ:[id number]} Transplant Selection Committee carefully considers each patients transplant candidacy using established selection criteria to determine if it is safe to proceed with transplantation for each and every person evaluated.  Although the selection committee believes your workup is incomplete and we are unable to determine your transplant candidacy, you have the right to be evaluated at other transplant centers.  You may request your [facility] records be sent to any center of your choice by contacting our Medical Records Department at [phone].    Attached is a letter from the [employer] Network for Organ Sharing (UNOS).  It describes the services and information offered to patients by UNOS and the Organ Procurement and Transplant Network.    Sincerely,    {Signature:[id number]}    Cc: ***    Encl:  UNOS Letter               The Organ Procurement and Transplantation Network   Toll-free patient services line: [phone]  Your resource for organ transplant information      Staffed 8:30 am - 5:00 pm ET Monday - Friday   Leave a message 24/7 to receive a call back    The Organ Procurement and Transplantation Network (OPTN) is the national transplant system. It makes the policies that decide how donated organs are matched to patients waiting for a transplant. The OPTN:    Makes sure donated organs get matched to people on the transplant waiting list  Tells people about the donation and transplant processes  Makes sure that the public knows about the need for more organ and tissue donations    The OPTN has a free patient services line that you can call to:  Get more information about:   o Organ donation and organ transplants   o Donation and transplant policies  Get an information kit with:   o A list of transplant hospitals   o Waiting list information  Talk about any questions you may have about your transplant hospital or organ procurement organization. The staff will do their best to help you or point you to others who may help.  Find out how you can volunteer with the OPTN and help shape transplant policy    The patient services line number is: [phone]    Patient services line staff CANNOT answer questions about your own medical care, including:  Waiting list status  Test results  Medical records  You will need to call your transplant hospital for this information.    The following websites have more information about transplantation and donation:  OPTN: https://optn.transplant.hrsa.gov/  For potential living donors and transplant recipients:   o Living with transplant: https://www.transplantliving.org/   o Living donation process: https://optn.transplant.hrsa.gov/living-donation/     o Financial assistance: https://www.livingdonorassistance.org/  Transplantation data: https://www.srtr.org/  Organ  donation: https://www.organdonor.gov/    Volunteer with the OPTN: https://optn.transplant.[facility].gov/get-involved

## 2024-08-15 ENCOUNTER — HOSPITAL ENCOUNTER (OUTPATIENT)
Facility: HOSPITAL | Age: 49
Discharge: HOME | End: 2024-08-15
Attending: SURGERY | Admitting: SURGERY
Payer: MEDICARE

## 2024-08-15 VITALS
DIASTOLIC BLOOD PRESSURE: 88 MMHG | BODY MASS INDEX: 39.65 KG/M2 | SYSTOLIC BLOOD PRESSURE: 188 MMHG | HEIGHT: 70 IN | OXYGEN SATURATION: 90 % | HEART RATE: 83 BPM | WEIGHT: 277 LBS | RESPIRATION RATE: 16 BRPM

## 2024-08-15 DIAGNOSIS — Z99.2 DEPENDENCE ON RENAL DIALYSIS: Primary | ICD-10-CM

## 2024-08-15 DIAGNOSIS — T82.590A MECHANICAL COMPLICATION OF ARTERIOVENOUS FISTULA SURGICALLY CREATED, INITIAL ENCOUNTER: ICD-10-CM

## 2024-08-15 LAB
ANION GAP SERPL CALC-SCNC: 10 MMOL/L (ref 8–16)
BASOPHILS # BLD AUTO: 0.05 K/UL (ref 0–0.2)
BASOPHILS NFR BLD: 0.6 % (ref 0–1.9)
BUN SERPL-MCNC: 29 MG/DL (ref 6–20)
CALCIUM SERPL-MCNC: 9.2 MG/DL (ref 8.7–10.5)
CHLORIDE SERPL-SCNC: 94 MMOL/L (ref 95–110)
CO2 SERPL-SCNC: 34 MMOL/L (ref 23–29)
CREAT SERPL-MCNC: 7.6 MG/DL (ref 0.5–1.4)
DIFFERENTIAL METHOD BLD: ABNORMAL
EOSINOPHIL # BLD AUTO: 1.4 K/UL (ref 0–0.5)
EOSINOPHIL NFR BLD: 16.8 % (ref 0–8)
ERYTHROCYTE [DISTWIDTH] IN BLOOD BY AUTOMATED COUNT: 14.7 % (ref 11.5–14.5)
EST. GFR  (NO RACE VARIABLE): 8.2 ML/MIN/1.73 M^2
GLUCOSE SERPL-MCNC: 114 MG/DL (ref 70–110)
HCT VFR BLD AUTO: 29.5 % (ref 40–54)
HGB BLD-MCNC: 9.4 G/DL (ref 14–18)
IMM GRANULOCYTES # BLD AUTO: 0.03 K/UL (ref 0–0.04)
IMM GRANULOCYTES NFR BLD AUTO: 0.4 % (ref 0–0.5)
LYMPHOCYTES # BLD AUTO: 1 K/UL (ref 1–4.8)
LYMPHOCYTES NFR BLD: 11.5 % (ref 18–48)
MCH RBC QN AUTO: 29.4 PG (ref 27–31)
MCHC RBC AUTO-ENTMCNC: 31.9 G/DL (ref 32–36)
MCV RBC AUTO: 92 FL (ref 82–98)
MONOCYTES # BLD AUTO: 1 K/UL (ref 0.3–1)
MONOCYTES NFR BLD: 11.3 % (ref 4–15)
NEUTROPHILS # BLD AUTO: 5 K/UL (ref 1.8–7.7)
NEUTROPHILS NFR BLD: 59.4 % (ref 38–73)
NRBC BLD-RTO: 0 /100 WBC
PLATELET # BLD AUTO: 214 K/UL (ref 150–450)
PMV BLD AUTO: 8.9 FL (ref 9.2–12.9)
POTASSIUM SERPL-SCNC: 3.7 MMOL/L (ref 3.5–5.1)
RBC # BLD AUTO: 3.2 M/UL (ref 4.6–6.2)
SODIUM SERPL-SCNC: 138 MMOL/L (ref 136–145)
WBC # BLD AUTO: 8.41 K/UL (ref 3.9–12.7)

## 2024-08-15 PROCEDURE — 99152 MOD SED SAME PHYS/QHP 5/>YRS: CPT | Performed by: SURGERY

## 2024-08-15 PROCEDURE — C1894 INTRO/SHEATH, NON-LASER: HCPCS | Performed by: SURGERY

## 2024-08-15 PROCEDURE — C2628 CATHETER, OCCLUSION: HCPCS | Performed by: SURGERY

## 2024-08-15 PROCEDURE — 25500020 PHARM REV CODE 255: Performed by: SURGERY

## 2024-08-15 PROCEDURE — C1725 CATH, TRANSLUMIN NON-LASER: HCPCS | Performed by: SURGERY

## 2024-08-15 PROCEDURE — 63600175 PHARM REV CODE 636 W HCPCS: Performed by: SURGERY

## 2024-08-15 PROCEDURE — 36902 INTRO CATH DIALYSIS CIRCUIT: CPT | Performed by: SURGERY

## 2024-08-15 PROCEDURE — 85025 COMPLETE CBC W/AUTO DIFF WBC: CPT | Performed by: SURGERY

## 2024-08-15 PROCEDURE — 99153 MOD SED SAME PHYS/QHP EA: CPT | Performed by: SURGERY

## 2024-08-15 PROCEDURE — C1769 GUIDE WIRE: HCPCS | Performed by: SURGERY

## 2024-08-15 PROCEDURE — 25000003 PHARM REV CODE 250: Performed by: SURGERY

## 2024-08-15 PROCEDURE — 80048 BASIC METABOLIC PNL TOTAL CA: CPT | Performed by: SURGERY

## 2024-08-15 RX ORDER — IODIXANOL 320 MG/ML
INJECTION, SOLUTION INTRAVASCULAR
Status: DISCONTINUED | OUTPATIENT
Start: 2024-08-15 | End: 2024-08-15 | Stop reason: HOSPADM

## 2024-08-15 RX ORDER — CEFAZOLIN SODIUM 1 G/3ML
INJECTION, POWDER, FOR SOLUTION INTRAMUSCULAR; INTRAVENOUS
Status: DISCONTINUED | OUTPATIENT
Start: 2024-08-15 | End: 2024-08-15 | Stop reason: HOSPADM

## 2024-08-15 RX ORDER — MIDAZOLAM HYDROCHLORIDE 1 MG/ML
INJECTION INTRAMUSCULAR; INTRAVENOUS
Status: DISCONTINUED | OUTPATIENT
Start: 2024-08-15 | End: 2024-08-15 | Stop reason: HOSPADM

## 2024-08-15 RX ORDER — CEFAZOLIN SODIUM 2 G/50ML
2 SOLUTION INTRAVENOUS ONCE
Status: DISCONTINUED | OUTPATIENT
Start: 2024-08-15 | End: 2024-08-15 | Stop reason: HOSPADM

## 2024-08-15 RX ORDER — HYDRALAZINE HYDROCHLORIDE 20 MG/ML
INJECTION INTRAMUSCULAR; INTRAVENOUS
Status: DISCONTINUED | OUTPATIENT
Start: 2024-08-15 | End: 2024-08-15 | Stop reason: HOSPADM

## 2024-08-15 RX ORDER — LIDOCAINE HYDROCHLORIDE 10 MG/ML
INJECTION, SOLUTION INFILTRATION; PERINEURAL
Status: DISCONTINUED | OUTPATIENT
Start: 2024-08-15 | End: 2024-08-15 | Stop reason: HOSPADM

## 2024-08-15 RX ORDER — FENTANYL CITRATE 50 UG/ML
INJECTION, SOLUTION INTRAMUSCULAR; INTRAVENOUS
Status: DISCONTINUED | OUTPATIENT
Start: 2024-08-15 | End: 2024-08-15 | Stop reason: HOSPADM

## 2024-08-15 NOTE — Clinical Note
The balloon was inserted into the left AV fistula. Balloon inflated to 10 dipesh then up to 20 dipesh for a total of 30 seconds. Balloon deflated and repositioned. Balloon re inflated to 10 dipesh then up to 20 dipesh and finally 24 dipesh for a total of 25 seconds. Balloon deflated and removed. .

## 2024-08-15 NOTE — Clinical Note
An angiography was performed of the AV fistula via hand injection with 5 mL of contrast Contrast estimated, multiple images taken.

## 2024-08-15 NOTE — Clinical Note
The balloon was inserted into the left AV fistula. Balloon inflated to 10 dipesh then up to 14 dipesh then up to 20 dipesh and then up to 24 dipesh for a total of 60 seconds. Balloon deflated and then removed. .

## 2024-08-15 NOTE — Clinical Note
A dressing was the left AV fistula puncture site. Sutures for closure with aquacell applied as dressing per WHITNEY Davis

## 2024-08-15 NOTE — DISCHARGE SUMMARY
[facility]  Discharge Note  Short Stay    Procedure(s) (LRB):  Fistulogram (Bilateral)  PTA, AV FISTULA (N/A)      OUTCOME: Condition has improved and patient is now ready for discharge.    DISPOSITION: Home or Self Care    FINAL DIAGNOSIS:  <principal problem not specified>    FOLLOWUP: In clinic    DISCHARGE INSTRUCTIONS:    Discharge Procedure Orders   Remove dressing in 24 hours     Activity as tolerated        TIME SPENT ON DISCHARGE: 10 minutes

## 2024-08-15 NOTE — Clinical Note
An angiography was performed of the AV fistula via hand injection with 5 mL of contrast Contrast estimated multiple images taken

## 2024-08-15 NOTE — OP NOTE
[facility]  Vascular Surgery  Operative Note    SUMMARY     Date of Procedure: 8/15/2024     Procedure: Procedure(s) (LRB):  Fistulogram (Bilateral)  PTA, AV FISTULA (N/A)     Surgeons and Role:     * Khoobehi, Ali, MD - Primary    Assisting Surgeon: None    Pre-Operative Diagnosis: * No pre-op diagnosis entered *    Post-Operative Diagnosis: Post-Op Diagnosis Codes:     * Mechanical complication of arteriovenous fistula surgically created, initial encounter [T82.590A]    Anesthesia: RN IV Sedation    Operative Findings (including complications, if any): moderate stenosis avf    Description of Technical Procedures:   Indications, risks, benefits of left arm fistulogram with possible angioplasty were discussed with the patient. Risks discussed included possible bleeding, access failure, infection, cardiac arrest, need for future interventions, and death. Patient was agreeable for the procedure and signed consent.    Under my direct supervision, moderate sedation was administered during the course of the procedure with Versed (1 mg) and Fentanyl (50 mcgs). An independent observer was present throughout the procedure, there was continuous monitoring of cardiac telemetry, hemodynamics and pulse oximetry. I was personally present and spent 30 minutes face to face time during sedation administration.    6F sheath was placed in an antegrade fashion. Fistulogram was performed which demonstrates moderate stenosis in the mid AVF between two large aneurysms of approximately 50-60%. I crossed this tortuous area with difficulty and performed apl with an 8 mm balloon followed by a 10 mm balloon. Completion fistulogram demonstrated slight improvement of the stenosis. No central stenosis was noted.    Ultimately he may benefit from revision of his access.    Sheath was removed and puncture site repaired with 4-0 Vicryl suture. Patient was brought out of the procedure room in stable condition.    Significant Surgical  Tasks Conducted by the Assistant(s), if Applicable: n/a    Estimated Blood Loss (EBL): * No values recorded between 8/15/2024  9:08 AM and 8/15/2024  9:50 AM *           Implants: * No implants in log *    Specimens:   Specimen (24h ago, onward)      None                    Condition: Good    Disposition: PACU - hemodynamically stable.    Attestation: I performed the procedure.

## 2024-09-13 DIAGNOSIS — E11.59 HYPERTENSION ASSOCIATED WITH DIABETES: ICD-10-CM

## 2024-09-13 DIAGNOSIS — I15.2 HYPERTENSION ASSOCIATED WITH DIABETES: ICD-10-CM

## 2024-09-13 DIAGNOSIS — I15.0 RENOVASCULAR HYPERTENSION: ICD-10-CM

## 2024-09-13 NOTE — TELEPHONE ENCOUNTER
Care Due:                  Date            Visit Type   Department     Provider  --------------------------------------------------------------------------------                                EP -                              PRIMARY      [facility] INTERNAL  Last Visit: 08-      CARE ([facility])   MEDICINE       Manjit Mckee  Next Visit: None Scheduled  None         None Found                                                            Last  Test          Frequency    Reason                     Performed    Due Date  --------------------------------------------------------------------------------    HBA1C.......  6 months...  dulaglutide, insulin.....  06- 12-    Uric Acid...  12 months..  allopurinoL..............  Not Found    Overdue    [employer] Embedded Care Due Messages. Reference number: [id number].   9/13/2024 11:19:42 AM CDT

## 2024-09-14 NOTE — TELEPHONE ENCOUNTER
Refill Routing Note   Medication(s) are not appropriate for processing by [facility] for the following reason(s):        Required labs abnormal  Required vitals abnormal    [facility] action(s):  Defer   Requires labs : Yes             Appointments  past 12m or future 3m with PCP    Date Provider   Last Visit   8/1/2024 Manjit Mckee II, MD   Next Visit   Visit date not found Manjit Mkcee II, MD   ED visits in past 90 days: 0        Note composed:10:53 PM 09/13/2024

## 2024-09-16 RX ORDER — OLMESARTAN MEDOXOMIL AND HYDROCHLOROTHIAZIDE 40/25 40; 25 MG/1; MG/1
1 TABLET ORAL DAILY
Qty: 90 TABLET | Refills: 3 | Status: SHIPPED | OUTPATIENT
Start: 2024-09-16

## 2024-09-17 ENCOUNTER — LAB VISIT (OUTPATIENT)
Dept: LAB | Facility: HOSPITAL | Age: 49
End: 2024-09-17
Attending: INTERNAL MEDICINE
Payer: MEDICARE

## 2024-09-17 ENCOUNTER — OFFICE VISIT (OUTPATIENT)
Dept: INTERNAL MEDICINE | Facility: CLINIC | Age: 49
End: 2024-09-17
Payer: MEDICARE

## 2024-09-17 VITALS
SYSTOLIC BLOOD PRESSURE: 136 MMHG | DIASTOLIC BLOOD PRESSURE: 70 MMHG | HEART RATE: 76 BPM | HEIGHT: 72 IN | BODY MASS INDEX: 37.71 KG/M2 | WEIGHT: 278.44 LBS

## 2024-09-17 DIAGNOSIS — I15.2 HYPERTENSION ASSOCIATED WITH DIABETES: ICD-10-CM

## 2024-09-17 DIAGNOSIS — Z79.4 TYPE 2 DIABETES MELLITUS WITH CHRONIC KIDNEY DISEASE ON CHRONIC DIALYSIS, WITH LONG-TERM CURRENT USE OF INSULIN: ICD-10-CM

## 2024-09-17 DIAGNOSIS — N18.6 ESRD ON HEMODIALYSIS: ICD-10-CM

## 2024-09-17 DIAGNOSIS — E11.59 HYPERTENSION ASSOCIATED WITH DIABETES: ICD-10-CM

## 2024-09-17 DIAGNOSIS — Z99.2 ESRD ON HEMODIALYSIS: ICD-10-CM

## 2024-09-17 DIAGNOSIS — E11.22 TYPE 2 DIABETES MELLITUS WITH CHRONIC KIDNEY DISEASE ON CHRONIC DIALYSIS, WITH LONG-TERM CURRENT USE OF INSULIN: Primary | ICD-10-CM

## 2024-09-17 DIAGNOSIS — Z79.4 TYPE 2 DIABETES MELLITUS WITH CHRONIC KIDNEY DISEASE ON CHRONIC DIALYSIS, WITH LONG-TERM CURRENT USE OF INSULIN: Primary | ICD-10-CM

## 2024-09-17 DIAGNOSIS — I46.9 CARDIAC ARREST: ICD-10-CM

## 2024-09-17 DIAGNOSIS — E11.22 TYPE 2 DIABETES MELLITUS WITH CHRONIC KIDNEY DISEASE ON CHRONIC DIALYSIS, WITH LONG-TERM CURRENT USE OF INSULIN: ICD-10-CM

## 2024-09-17 DIAGNOSIS — N18.6 TYPE 2 DIABETES MELLITUS WITH CHRONIC KIDNEY DISEASE ON CHRONIC DIALYSIS, WITH LONG-TERM CURRENT USE OF INSULIN: Primary | ICD-10-CM

## 2024-09-17 DIAGNOSIS — Z99.2 TYPE 2 DIABETES MELLITUS WITH CHRONIC KIDNEY DISEASE ON CHRONIC DIALYSIS, WITH LONG-TERM CURRENT USE OF INSULIN: Primary | ICD-10-CM

## 2024-09-17 DIAGNOSIS — Z99.2 TYPE 2 DIABETES MELLITUS WITH CHRONIC KIDNEY DISEASE ON CHRONIC DIALYSIS, WITH LONG-TERM CURRENT USE OF INSULIN: ICD-10-CM

## 2024-09-17 DIAGNOSIS — G47.33 OSA (OBSTRUCTIVE SLEEP APNEA): ICD-10-CM

## 2024-09-17 DIAGNOSIS — N18.6 TYPE 2 DIABETES MELLITUS WITH CHRONIC KIDNEY DISEASE ON CHRONIC DIALYSIS, WITH LONG-TERM CURRENT USE OF INSULIN: ICD-10-CM

## 2024-09-17 LAB
ESTIMATED AVG GLUCOSE: 123 MG/DL (ref 68–131)
HBA1C MFR BLD: 5.9 % (ref 4–5.6)

## 2024-09-17 PROCEDURE — 99214 OFFICE O/P EST MOD 30 MIN: CPT | Mod: S$GLB,,, | Performed by: INTERNAL MEDICINE

## 2024-09-17 PROCEDURE — 3078F DIAST BP <80 MM HG: CPT | Mod: CPTII,S$GLB,, | Performed by: INTERNAL MEDICINE

## 2024-09-17 PROCEDURE — 3044F HG A1C LEVEL LT 7.0%: CPT | Mod: CPTII,S$GLB,, | Performed by: INTERNAL MEDICINE

## 2024-09-17 PROCEDURE — 1159F MED LIST DOCD IN RCRD: CPT | Mod: CPTII,S$GLB,, | Performed by: INTERNAL MEDICINE

## 2024-09-17 PROCEDURE — 36415 COLL VENOUS BLD VENIPUNCTURE: CPT | Performed by: INTERNAL MEDICINE

## 2024-09-17 PROCEDURE — 3075F SYST BP GE 130 - 139MM HG: CPT | Mod: CPTII,S$GLB,, | Performed by: INTERNAL MEDICINE

## 2024-09-17 PROCEDURE — 1160F RVW MEDS BY RX/DR IN RCRD: CPT | Mod: CPTII,S$GLB,, | Performed by: INTERNAL MEDICINE

## 2024-09-17 PROCEDURE — 3066F NEPHROPATHY DOC TX: CPT | Mod: CPTII,S$GLB,, | Performed by: INTERNAL MEDICINE

## 2024-09-17 PROCEDURE — 83036 HEMOGLOBIN GLYCOSYLATED A1C: CPT | Performed by: INTERNAL MEDICINE

## 2024-09-17 PROCEDURE — 3008F BODY MASS INDEX DOCD: CPT | Mod: CPTII,S$GLB,, | Performed by: INTERNAL MEDICINE

## 2024-09-17 PROCEDURE — 99999 PR PBB SHADOW E&M-EST. PATIENT-LVL IV: CPT | Mod: PBBFAC,,, | Performed by: INTERNAL MEDICINE

## 2024-09-17 RX ORDER — FUROSEMIDE 40 MG/1
40 TABLET ORAL
COMMUNITY
Start: 2024-06-07

## 2024-09-17 RX ORDER — DILTIAZEM HYDROCHLORIDE 240 MG/1
1 CAPSULE, COATED, EXTENDED RELEASE ORAL DAILY
COMMUNITY
Start: 2024-09-08 | End: 2025-09-08

## 2024-09-17 NOTE — PROGRESS NOTES
Subjective:       Patient ID: Thai Santoyo Jr. is a 48 y.o. male.    Chief Complaint:   Follow-up    HPI - Mr. Santoyo was admitted September 5th through 7th for AV fistula revision and removal of an aneurysm.  After the surgery, he suffered a PEA cardiac arrest and was successfully revived after 8 minutes.  Cardiac catheterization done after the cardiac arrest showed nonobstructive coronary artery disease.  He feels well today.  Mild memory deficits noted by his partner.  He has colon cancer screening pending.  With Trulicity, he has lost 22 lb in the last year.  His A1c has been well controlled.  And he is no longer taking either long-acting or sliding scale insulin.  He is not a smoker.    PMH:  PEA cardiac arrest 9/2024  NOBS cad on CC 9/2024  ESRD on dialysis  DM2, overtreated  HTN, at goal  SVT x2 in 2018  Gout, quiescent  Class 2 obesity with complications  Ongoing weight loss with trulicity.     Meds:  Reviewed and reconciled in EPIC with patient during visit today.     Review of Systems   Constitutional:  Negative for fever.   HENT:  Negative for congestion.    Respiratory:  Negative for shortness of breath.    Cardiovascular:  Negative for chest pain.   Gastrointestinal:  Negative for abdominal pain.   Genitourinary:  Negative for difficulty urinating.   Musculoskeletal:  Negative for arthralgias.   Skin:  Negative for rash.   Neurological:  Negative for headaches.   Psychiatric/Behavioral:  Positive for decreased concentration. Negative for sleep disturbance.        Objective:      Physical Exam  Constitutional:       General: He is not in acute distress.     Appearance: He is well-developed. He is not diaphoretic.      Comments: Friendly, well-appearing man   HENT:      Head: Normocephalic and atraumatic.   Cardiovascular:      Rate and Rhythm: Normal rate and regular rhythm.      Heart sounds: Normal heart sounds. No murmur heard.     No friction rub. No gallop.   Pulmonary:      Effort: No respiratory  distress.      Breath sounds: No wheezing or rales.   Chest:      Chest wall: No tenderness.   Skin:     General: Skin is warm.      Findings: No erythema.   Neurological:      Mental Status: He is alert and oriented to person, place, and time.   Psychiatric:         Thought Content: Thought content normal.         Assessment:       1. Type 2 diabetes mellitus with chronic kidney disease on chronic dialysis, with long-term current use of insulin    2. Cardiac arrest    3. ESRD on hemodialysis    4. Hypertension associated with diabetes    5. CONRAD (obstructive sleep apnea)        Plan:       Thai was seen today for follow-up.    Diagnoses and all orders for this visit:    Type 2 diabetes mellitus with chronic kidney disease on chronic dialysis, with long-term current use of insulin - A1c has been below 6.  We will stop prescribing insulin, and measure A1c today.  Continue Trulicity.  -     Hemoglobin A1C; Future    Cardiac arrest - he seems to have recovered.  Encouraged cardiology follow-up.  Monitor    ESRD on hemodialysis - stable on dialysis.  Continue present care    Hypertension associated with diabetes - at goal on my retake.  Continue present care    CONRAD (obstructive sleep apnea) - stable on BiPAP.  Could have contributed to cardiac arrest.  Continue present care    RTC p.r.n., or in 3 months    CHARLES Mckee MD MPH  Staff Internist

## 2024-09-24 ENCOUNTER — TELEPHONE (OUTPATIENT)
Dept: ENDOSCOPY | Facility: HOSPITAL | Age: 49
End: 2024-09-24
Payer: MEDICARE

## 2024-09-24 NOTE — TELEPHONE ENCOUNTER
Called pt to confirm colonoscopy for 10/1/24. Pt stated had cardiac arrest last week when under anesthesia for av fistula repair.pt stated has appointment with cardiology 9/24/24. Pt removed from 10/1/24 schedule. Pt to call back when heart issues are resolved.

## 2024-09-25 ENCOUNTER — TELEPHONE (OUTPATIENT)
Dept: INTERNAL MEDICINE | Facility: CLINIC | Age: 49
End: 2024-09-25
Payer: MEDICARE

## 2024-09-25 DIAGNOSIS — D64.9 ANEMIA, UNSPECIFIED TYPE: ICD-10-CM

## 2024-09-25 DIAGNOSIS — I46.9 CARDIAC ARREST: Primary | ICD-10-CM

## 2024-09-25 NOTE — TELEPHONE ENCOUNTER
----- Message from Cheyenne Hatch sent at 9/24/2024  3:26 PM CDT -----  Contact: [phone]  Patient called, would like to get a recommendation for a cardiology and blood work from being anemic.

## 2024-09-25 NOTE — TELEPHONE ENCOUNTER
Spoke to patient who states he had some labs done at [facility] that showed he is severely anemic. Patient is requesting to repeat labs at [facility]. Patient is also requests referral for Cardiologist. Patient wants a full work up. He has had some episodes of SOB and just being fatigued a lot. Please advise

## 2024-09-26 NOTE — TELEPHONE ENCOUNTER
Please call him back.  I would like him to send me a copy of the labs so that I know what he needs to have repeated.  I ordered blood counts (lab) and a cardiology referral for him.    D

## 2024-09-27 NOTE — TELEPHONE ENCOUNTER
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)                  Daily Treatment Note    Visit:  2     SUBJECTIVE                                                                                                                 \"I remembered to bring my putty today.\"   Pain / Symptoms:  Pain rating (out of 10): Current: 4 (B knees)        OBJECTIVE                                                                                                                          TREATMENT                                                                                                                  Therapeutic Exercise:    1) Patient instructed in and performed BUE scapular strengthening exercises with red theraband while seated including:    Low row  Shoulder flexion  Elbow flexion  Elbow extension     1 set x 12-15 reps, min tactile cueing for positioning and eccentric control    2) Patient instructed in and performed yellow theraputty HEP for BUE /pinch strengthening, including:    Composite flexion  Tip pinch  Three point pinch  Finger extension  Evelia/marble  from putty for pincer grasp  Pinch and pull     1 set x 10 reps each, min Vcing and tactile cueing for correct technique    Skilled input: verbal instruction/cues, tactile instruction/cues, facilitation and posture correction    Writer verbally educated and received verbal consent for hand placement, positioning of patient, and techniques to be performed today from patient     Home Exercise Program/Education Materials: Access Code: [email or web address]  URL: [email or web address]  Date: 05/11/2022  Prepared by: Gauri Singh    Exercises  Seated Shoulder Row with Resistance Anchored at Feet - 1 x daily - 5 x weekly - 3 sets - 10 reps  Seated Shoulder Flexion with Self-Anchored  Spoke to patient advised to bring lab results from [facility] and number given to patient to schedule cards referral    Resistance - 1 x daily - 5 x weekly - 3 sets - 10 reps  Seated Elbow Flexion with Resistance - 1 x daily - 5 x weekly - 3 sets - 10 reps  Seated Elbow Extension with Self-Anchored Resistance - 1 x daily - 5 x weekly - 3 sets - 10 reps  Putty Squeezes - 1 x daily - 5 x weekly - 2 sets - 10 reps  Key Pinch with Putty - 1 x daily - 5 x weekly - 2 sets - 10 reps  Tip Pinch with Putty - 1 x daily - 5 x weekly - 2 sets - 10 reps  Finger Pinch and Pull with Putty - 1 x daily - 5 x weekly - 2 sets - 10 reps  3-Point Pinch with Putty - 1 x daily - 5 x weekly - 2 sets - 10 reps  Finger Exension with Putty - 1 x daily - 5 x weekly - 2 sets - 10 reps  Terrell  from Putty - 1 x daily - 5 x weekly - 2 sets - 10 reps           ASSESSMENT                                                                                                             Patient seen in AM session to address BUE strengthening, B /pinch and intrinsic hand strengthening, and activity tolerance for increased performance and ease with self-care tasks, opening jars/containers, and meal preparation tasks. Patient with good return demonstration of theraband and theraputty HEP, did require min VCing and tactile cueing for eccentric control and positioning. Patient brought in pink theraputty from previous course of theraputty however the resistance was too difficult for patient; patient provided with yellow theraputty this session and upgraded and instructed in HEP. Patient to continue to benefit from skilled OT intervention to maximize BUE strength and activity tolerance for increased participation and performance with I/ADLs; continue POC.     Patient/Caregiver Education:   Results of above outlined education: Demonstrates understanding and Verbalizes understanding    PLAN                                                                                                                           Suggestions for next session as indicated: Progress per plan of  care        GOALS                                                                                                                           Long Term Goals: to be met by end of plan of care  1. Patient will be educated/instructed in HEP for improving BUE function for self-care tasks and perform daily at I level by discharge.   2. Patient will be educated re: adaptive equipment/DME by discharge in order to increase patient's independence with functional transfers, grooming, and feeding tasks.   3. Patient will be educated re: energy conservation techniques and joint protection techniques by discharge in order to increase patient's safety with shower/tub and toilet transfers and self-care tasks.   4. Patient will increase BUE  strength by 2-6# by discharge in order to hold coffee cup and cut food.   5. Patient to report score of 38/40 on Neuro QOL Upper Extremity Function (Fine Motor, ADL)- Short Form to demonstrate improved patient perception of quality of life.          Therapy procedure time and total treatment time can be found documented on the Time Entry flowsheet.

## 2024-12-05 ENCOUNTER — TELEPHONE (OUTPATIENT)
Dept: INTERNAL MEDICINE | Facility: CLINIC | Age: 49
End: 2024-12-05
Payer: MEDICARE

## 2024-12-05 NOTE — TELEPHONE ENCOUNTER
----- Message from Dyana sent at 12/4/2024 12:58 PM CST -----  Contact: Thai,kiley [phone]  Pt's son, Thai Santoyo Jr MRN [id number], is requesting a call to discuss this pt being seen urgently as a new patient. She needs to est care and son states discuss concerns re currentl medications she is on. Please call son b/c Dr Mckee told him that he would accept her a a new patient.

## 2025-01-10 ENCOUNTER — TELEPHONE (OUTPATIENT)
Dept: INTERNAL MEDICINE | Facility: CLINIC | Age: 50
End: 2025-01-10
Payer: MEDICARE

## 2025-01-10 NOTE — TELEPHONE ENCOUNTER
----- Message from Natividad sent at 1/10/2025 10:26 AM CST -----  Contact: [phone]  .[email or web address]     Patient is calling for Medical Advice regarding:pt is requesting a call to talk about a specialist he was refereed to and would like a call please.    Please call and advise.    How long has patient had these symptoms:    Pharmacy name and phone#:    Patient wants a call back or thru [facility]:call back     Comments:    Please advise patient replies from provider may take up to 48 hours.

## 2025-04-01 DIAGNOSIS — Z79.4 TYPE 2 DIABETES MELLITUS WITH CHRONIC KIDNEY DISEASE ON CHRONIC DIALYSIS, WITH LONG-TERM CURRENT USE OF INSULIN: ICD-10-CM

## 2025-04-01 DIAGNOSIS — Z79.4 TYPE 2 DIABETES MELLITUS WITH HYPERGLYCEMIA, WITH LONG-TERM CURRENT USE OF INSULIN: ICD-10-CM

## 2025-04-01 DIAGNOSIS — E11.59 HYPERTENSION ASSOCIATED WITH DIABETES: ICD-10-CM

## 2025-04-01 DIAGNOSIS — I15.0 RENOVASCULAR HYPERTENSION: ICD-10-CM

## 2025-04-01 DIAGNOSIS — N18.6 TYPE 2 DIABETES MELLITUS WITH CHRONIC KIDNEY DISEASE ON CHRONIC DIALYSIS, WITH LONG-TERM CURRENT USE OF INSULIN: ICD-10-CM

## 2025-04-01 DIAGNOSIS — Z99.2 TYPE 2 DIABETES MELLITUS WITH CHRONIC KIDNEY DISEASE ON CHRONIC DIALYSIS, WITH LONG-TERM CURRENT USE OF INSULIN: ICD-10-CM

## 2025-04-01 DIAGNOSIS — E11.65 TYPE 2 DIABETES MELLITUS WITH HYPERGLYCEMIA, WITH LONG-TERM CURRENT USE OF INSULIN: ICD-10-CM

## 2025-04-01 DIAGNOSIS — E11.22 TYPE 2 DIABETES MELLITUS WITH CHRONIC KIDNEY DISEASE ON CHRONIC DIALYSIS, WITH LONG-TERM CURRENT USE OF INSULIN: ICD-10-CM

## 2025-04-01 DIAGNOSIS — I15.2 HYPERTENSION ASSOCIATED WITH DIABETES: ICD-10-CM

## 2025-04-01 RX ORDER — HYDRALAZINE HYDROCHLORIDE 50 MG/1
50 TABLET, FILM COATED ORAL EVERY 8 HOURS
Qty: 270 TABLET | Refills: 1 | Status: SHIPPED | OUTPATIENT
Start: 2025-04-01

## 2025-04-01 RX ORDER — AMLODIPINE BESYLATE 10 MG/1
10 TABLET ORAL DAILY
Qty: 90 TABLET | Refills: 1 | Status: SHIPPED | OUTPATIENT
Start: 2025-04-01

## 2025-04-01 NOTE — TELEPHONE ENCOUNTER
Care Due:                  Date            Visit Type   Department     Provider  --------------------------------------------------------------------------------                                EP -                              PRIMARY      [facility]  Last Visit: 09-      CARE ([facility])   MEDICINE       Manjit Mckee  Next Visit: None Scheduled  None         None Found                                                            Last  Test          Frequency    Reason                     Performed    Due Date  --------------------------------------------------------------------------------    CMP.........  12 months..  allopurinoL, rosuvastatin  06- 06-    HBA1C.......  6 months...  dulaglutide..............  09- 03-    Lipid Panel.  12 months..  rosuvastatin.............  06- 06-    Uric Acid...  12 months..  allopurinoL..............  Not Found    Overdue    [employer] Embedded Care Due Messages. Reference number: [id number].   4/01/2025 11:03:41 AM CDT

## 2025-04-01 NOTE — TELEPHONE ENCOUNTER
Refill Routing Note   Medication(s) are not appropriate for processing by [facility] for the following reason(s):        Drug-drug interaction    Required labs outdated: A1c  Due for refill >6 months ago: Trulicity    [facility] action(s):  Defer     Requires labs : Yes    Medication Therapy Plan: Lab(s) recommended: CMP, A1c, Lipid Panel, Uric Acid    Pharmacist review requested: Yes     Appointments  past 12m or future 3m with PCP    Date Provider   Last Visit   9/17/2024 Manjit Mckee II, MD   Next Visit   Visit date not found Manjit Mckee II, MD   ED visits in past 90 days: 0        Note composed:5:36 PM 04/01/2025

## 2025-04-02 RX ORDER — DULAGLUTIDE 1.5 MG/.5ML
1.5 INJECTION, SOLUTION SUBCUTANEOUS
Qty: 12 PEN | Refills: 3 | Status: SHIPPED | OUTPATIENT
Start: 2025-04-02

## 2025-04-02 NOTE — TELEPHONE ENCOUNTER
Refill Routing Note   Medication(s) are not appropriate for processing by [facility] for the following reason(s):        Required labs outdated - A1c    [facility] action(s):  Defer  Approve   Requires labs : Yes      Medication Therapy Plan: Amlodipine - Overridden by Manjit Mckee II, MD on Sep 17, 2024 9:27 AM    Pharmacist review requested: Yes   Extended chart review required: Yes     Appointments  past 12m or future 3m with PCP    Date Provider   Last Visit   9/17/2024 Manjit Mckee II, MD   Next Visit   Visit date not found Manjit Mckee II, MD   ED visits in past 90 days: 0        Note composed:10:53 PM 04/01/2025

## 2025-04-02 NOTE — TELEPHONE ENCOUNTER
Please CALL Ashanti Natanael.  Ask him if he's taking dulaglutide (trulicity) and if he needs a refill.  The pharmacy sent me a request for refills, but my records show he's got plenty.    Thanks.  D

## 2025-04-02 NOTE — TELEPHONE ENCOUNTER
Left pt a voicemail instructing him to contact the office in regards to a refill request.    Héctor MCMAHON.

## 2025-06-09 ENCOUNTER — TELEPHONE (OUTPATIENT)
Dept: INTERNAL MEDICINE | Facility: CLINIC | Age: 50
End: 2025-06-09
Payer: MEDICARE

## 2025-06-09 DIAGNOSIS — K30 INDIGESTION: ICD-10-CM

## 2025-06-09 DIAGNOSIS — K30 INDIGESTION: Primary | ICD-10-CM

## 2025-06-09 RX ORDER — FAMOTIDINE 20 MG/1
20 TABLET, FILM COATED ORAL 2 TIMES DAILY
Qty: 60 TABLET | Refills: 11 | Status: SHIPPED | OUTPATIENT
Start: 2025-06-09 | End: 2025-06-09 | Stop reason: SDUPTHER

## 2025-06-09 NOTE — TELEPHONE ENCOUNTER
I sent in the prescription.  It's available over the counter without prescription, and his insurance might not cover it.  Please let him know.    D

## 2025-06-09 NOTE — TELEPHONE ENCOUNTER
Patient requesting zantac.I don't see it in his med list to pend. Patient state it hasn't been prescribed in awhile.

## 2025-06-10 RX ORDER — FAMOTIDINE 20 MG/1
20 TABLET, FILM COATED ORAL 2 TIMES DAILY
Qty: 60 TABLET | Refills: 11 | Status: SHIPPED | OUTPATIENT
Start: 2025-06-10 | End: 2026-06-10

## 2025-06-11 ENCOUNTER — TELEPHONE (OUTPATIENT)
Dept: INTERNAL MEDICINE | Facility: CLINIC | Age: 50
End: 2025-06-11
Payer: MEDICARE

## 2025-06-11 NOTE — TELEPHONE ENCOUNTER
Patient is requesting Xanax I do not see the medicine in his chart, patient states he had it 3 years ago and it had a refill. Should I tell the patient he needs to schedule an appointment?

## 2025-06-12 ENCOUNTER — OFFICE VISIT (OUTPATIENT)
Dept: INTERNAL MEDICINE | Facility: CLINIC | Age: 50
End: 2025-06-12
Payer: MEDICARE

## 2025-06-12 ENCOUNTER — LAB VISIT (OUTPATIENT)
Dept: LAB | Facility: HOSPITAL | Age: 50
End: 2025-06-12
Attending: INTERNAL MEDICINE
Payer: MEDICARE

## 2025-06-12 VITALS
DIASTOLIC BLOOD PRESSURE: 70 MMHG | HEIGHT: 72 IN | WEIGHT: 263.25 LBS | SYSTOLIC BLOOD PRESSURE: 138 MMHG | BODY MASS INDEX: 35.66 KG/M2 | HEART RATE: 79 BPM

## 2025-06-12 DIAGNOSIS — E11.22 TYPE 2 DIABETES MELLITUS WITH CHRONIC KIDNEY DISEASE ON CHRONIC DIALYSIS, WITH LONG-TERM CURRENT USE OF INSULIN: Primary | ICD-10-CM

## 2025-06-12 DIAGNOSIS — Z99.2 TYPE 2 DIABETES MELLITUS WITH CHRONIC KIDNEY DISEASE ON CHRONIC DIALYSIS, WITH LONG-TERM CURRENT USE OF INSULIN: Primary | ICD-10-CM

## 2025-06-12 DIAGNOSIS — F33.42 MDD (MAJOR DEPRESSIVE DISORDER), RECURRENT, IN FULL REMISSION: ICD-10-CM

## 2025-06-12 DIAGNOSIS — Z12.11 SCREENING FOR MALIGNANT NEOPLASM OF COLON: ICD-10-CM

## 2025-06-12 DIAGNOSIS — N18.6 ESRD ON HEMODIALYSIS: ICD-10-CM

## 2025-06-12 DIAGNOSIS — N18.6 TYPE 2 DIABETES MELLITUS WITH CHRONIC KIDNEY DISEASE ON CHRONIC DIALYSIS, WITH LONG-TERM CURRENT USE OF INSULIN: Primary | ICD-10-CM

## 2025-06-12 DIAGNOSIS — I13.2 HYPERTENSIVE HEART AND RENAL DISEASE WITH CONGESTIVE HEART FAILURE AND RENAL FAILURE: ICD-10-CM

## 2025-06-12 DIAGNOSIS — E11.59 HYPERTENSION ASSOCIATED WITH DIABETES: ICD-10-CM

## 2025-06-12 DIAGNOSIS — Z99.2 ESRD ON HEMODIALYSIS: ICD-10-CM

## 2025-06-12 DIAGNOSIS — Z99.2 TYPE 2 DIABETES MELLITUS WITH CHRONIC KIDNEY DISEASE ON CHRONIC DIALYSIS, WITH LONG-TERM CURRENT USE OF INSULIN: ICD-10-CM

## 2025-06-12 DIAGNOSIS — F41.9 ANXIETY: ICD-10-CM

## 2025-06-12 DIAGNOSIS — E66.9 OBESITY (BMI 30-39.9): ICD-10-CM

## 2025-06-12 DIAGNOSIS — I15.0 RENOVASCULAR HYPERTENSION: ICD-10-CM

## 2025-06-12 DIAGNOSIS — I15.2 HYPERTENSION ASSOCIATED WITH DIABETES: ICD-10-CM

## 2025-06-12 DIAGNOSIS — E29.1 HYPOGONADISM IN MALE: ICD-10-CM

## 2025-06-12 DIAGNOSIS — Z79.4 TYPE 2 DIABETES MELLITUS WITH CHRONIC KIDNEY DISEASE ON CHRONIC DIALYSIS, WITH LONG-TERM CURRENT USE OF INSULIN: ICD-10-CM

## 2025-06-12 DIAGNOSIS — E11.22 TYPE 2 DIABETES MELLITUS WITH CHRONIC KIDNEY DISEASE ON CHRONIC DIALYSIS, WITH LONG-TERM CURRENT USE OF INSULIN: ICD-10-CM

## 2025-06-12 DIAGNOSIS — Z79.4 TYPE 2 DIABETES MELLITUS WITH CHRONIC KIDNEY DISEASE ON CHRONIC DIALYSIS, WITH LONG-TERM CURRENT USE OF INSULIN: Primary | ICD-10-CM

## 2025-06-12 DIAGNOSIS — N19 HYPERTENSIVE HEART AND RENAL DISEASE WITH CONGESTIVE HEART FAILURE AND RENAL FAILURE: ICD-10-CM

## 2025-06-12 DIAGNOSIS — N18.6 TYPE 2 DIABETES MELLITUS WITH CHRONIC KIDNEY DISEASE ON CHRONIC DIALYSIS, WITH LONG-TERM CURRENT USE OF INSULIN: ICD-10-CM

## 2025-06-12 DIAGNOSIS — G47.00 INSOMNIA, UNSPECIFIED TYPE: ICD-10-CM

## 2025-06-12 LAB
ALBUMIN SERPL BCP-MCNC: 3.8 G/DL (ref 3.5–5.2)
ALP SERPL-CCNC: 65 UNIT/L (ref 40–150)
ALT SERPL W/O P-5'-P-CCNC: 14 UNIT/L (ref 10–44)
ANION GAP (OHS): 11 MMOL/L (ref 8–16)
AST SERPL-CCNC: 21 UNIT/L (ref 11–45)
BILIRUB SERPL-MCNC: 0.5 MG/DL (ref 0.1–1)
BUN SERPL-MCNC: 27 MG/DL (ref 6–20)
CALCIUM SERPL-MCNC: 9.2 MG/DL (ref 8.7–10.5)
CHLORIDE SERPL-SCNC: 95 MMOL/L (ref 95–110)
CHOLEST SERPL-MCNC: 149 MG/DL (ref 120–199)
CHOLEST/HDLC SERPL: 4 {RATIO} (ref 2–5)
CO2 SERPL-SCNC: 33 MMOL/L (ref 23–29)
CREAT SERPL-MCNC: 8.4 MG/DL (ref 0.5–1.4)
EAG (OHS): 137 MG/DL (ref 68–131)
GFR SERPLBLD CREATININE-BSD FMLA CKD-EPI: 7 ML/MIN/1.73/M2
GLUCOSE SERPL-MCNC: 139 MG/DL (ref 70–110)
HBA1C MFR BLD: 6.4 % (ref 4–5.6)
HDLC SERPL-MCNC: 37 MG/DL (ref 40–75)
HDLC SERPL: 24.8 % (ref 20–50)
LDLC SERPL CALC-MCNC: 97.6 MG/DL (ref 63–159)
NONHDLC SERPL-MCNC: 112 MG/DL
POTASSIUM SERPL-SCNC: 4.7 MMOL/L (ref 3.5–5.1)
PROT SERPL-MCNC: 7.7 GM/DL (ref 6–8.4)
SODIUM SERPL-SCNC: 139 MMOL/L (ref 136–145)
TESTOST SERPL-MCNC: 498 NG/DL (ref 304–1227)
TRIGL SERPL-MCNC: 72 MG/DL (ref 30–150)

## 2025-06-12 PROCEDURE — 80053 COMPREHEN METABOLIC PANEL: CPT

## 2025-06-12 PROCEDURE — 80061 LIPID PANEL: CPT

## 2025-06-12 PROCEDURE — 84403 ASSAY OF TOTAL TESTOSTERONE: CPT

## 2025-06-12 PROCEDURE — 36415 COLL VENOUS BLD VENIPUNCTURE: CPT

## 2025-06-12 PROCEDURE — 83036 HEMOGLOBIN GLYCOSYLATED A1C: CPT

## 2025-06-12 PROCEDURE — 99999 PR PBB SHADOW E&M-EST. PATIENT-LVL IV: CPT | Mod: PBBFAC,,, | Performed by: INTERNAL MEDICINE

## 2025-06-12 RX ORDER — SEMAGLUTIDE 0.68 MG/ML
0.5 INJECTION, SOLUTION SUBCUTANEOUS
Qty: 9 ML | Refills: 3 | Status: SHIPPED | OUTPATIENT
Start: 2025-06-12 | End: 2026-06-12

## 2025-06-12 RX ORDER — ALPRAZOLAM 0.25 MG/1
0.25 TABLET ORAL NIGHTLY PRN
Qty: 90 TABLET | Refills: 3 | Status: SHIPPED | OUTPATIENT
Start: 2025-06-12 | End: 2026-06-07

## 2025-06-12 RX ORDER — AMITRIPTYLINE HYDROCHLORIDE 25 MG/1
25 TABLET, FILM COATED ORAL NIGHTLY PRN
Qty: 90 TABLET | Refills: 3 | Status: SHIPPED | OUTPATIENT
Start: 2025-06-12 | End: 2026-06-12

## 2025-06-12 NOTE — PROGRESS NOTES
Subjective:       Patient ID: Thai Santoyo Jr. is a 49 y.o. male.    Chief Complaint:   Annual Exam    HPI - this is a complicated, established diabetic patient of mine who last came for a visit in September 2024.  He feels pretty well today.  He has been taking Trulicity, but his insurance changed and he can no longer afford it.  His weight peaked at 327 lb. in 2019, and it is now 263 - this includes 15 lb since our last visit in September.  He does not drink alcohol.  He has an occasional cigar; maybe 1 every 6 months.  No illicit substance use.  He requested that we measure a testosterone level today because he has been off of his testosterone replacement.  He gets dialysis 3 days per week.  He is nervous about colonoscopy, but he will do the Cologuard.  He no longer takes an SSRI, but he requested a sleep medication and refill on his anxiolytic     PMH:  PEA cardiac arrest 9/2024  NOBS cad on CC 9/2024  ESRD on dialysis  DM2, overtreated  HTN, at goal  SVT x2 in 2018  Gout, quiescent  Class 2 obesity with complications  Ongoing weight loss with trulicity.     Meds:  Reviewed and reconciled in EPIC with patient during visit today.    Review of Systems   Constitutional:  Negative for fever.   HENT:  Negative for congestion.    Respiratory:  Negative for shortness of breath.    Cardiovascular:  Negative for chest pain.   Gastrointestinal:  Negative for abdominal pain.   Genitourinary:  Negative for difficulty urinating.   Musculoskeletal:  Negative for arthralgias.   Skin:  Negative for rash.   Neurological:  Negative for headaches.   Psychiatric/Behavioral:  Negative for sleep disturbance.        Objective:      Physical Exam  Constitutional:       General: He is not in acute distress.     Appearance: He is well-developed. He is obese. He is not diaphoretic.   HENT:      Head: Normocephalic and atraumatic.   Cardiovascular:      Rate and Rhythm: Normal rate and regular rhythm.      Heart sounds: Normal heart  sounds. No murmur heard.     No friction rub. No gallop.   Pulmonary:      Effort: No respiratory distress.      Breath sounds: No wheezing or rales.   Chest:      Chest wall: No tenderness.   Skin:     General: Skin is warm.      Findings: No erythema.   Neurological:      Mental Status: He is alert and oriented to person, place, and time.   Psychiatric:         Thought Content: Thought content normal.         Assessment:       1. Type 2 diabetes mellitus with chronic kidney disease on chronic dialysis, with long-term current use of insulin    2. Renovascular hypertension    3. ESRD on hemodialysis    4. Hypertension associated with diabetes    5. Obesity (BMI 30-39.9)    6. MDD (major depressive disorder), recurrent, in full remission    7. 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 Insomnia, unspecified type    9. Screening for malignant neoplasm of colon    10. Hypogonadism in male    11. Anxiety        Plan:       Thai was seen today for annual exam.    Diagnoses and all orders for this visit:    Type 2 diabetes mellitus with chronic kidney disease on chronic dialysis, with long-term current use of insulin - this has been stable in the past.  He is well overdue for an A1c.  As Trulicity is no longer covered, I will try to get semaglutide for him.  -     Hemoglobin A1C; Future  -     semaglutide (OZEMPIC) 0.25 mg or 0.5 mg (2 mg/3 mL) pen injector; Inject 0.5 mg into the skin every 7 days.    Renovascular hypertension - at goal on multiple medications.  Continue present care    ESRD on hemodialysis - stable.  Monitor    Hypertension associated with diabetes - at goal, continue present care  -     Lipid panel; Future  -     Comprehensive Metabolic Panel; Future    Obesity (BMI 30-39.9) - losing weight.  I gave positive feedback on this.  Adjustments as above    MDD (major depressive disorder), recurrent, in full remission - stable, monitor    Hypertensive heart and  renal disease with congestive heart failure and renal failure - asymptomatic today.  Monitor  -     semaglutide (OZEMPIC) 0.25 mg or 0.5 mg (2 mg/3 mL) pen injector; Inject 0.5 mg into the skin every 7 days.    Insomnia, unspecified type - worsening due to home stressors.  Refilling amitriptyline  -     amitriptyline (ELAVIL) 25 MG tablet; Take 1 tablet (25 mg total) by mouth nightly as needed for Insomnia.    Screening for malignant neoplasm of colon - overdue.  We will start with Cologuard  -     Cologuard Screening (Multitarget Stool DNA); Future  -     Cologuard Screening (Multitarget Stool DNA)    Hypogonadism in male - unstable, off treatment.  We will start with testosterone levels and make a plan from there  -     Testosterone,Total; Future    Anxiety - worsening.  I will give low-dose alprazolam.  I cautioned him that he will metabolize this differently because of his renal disease.  He understands  -     ALPRAZolam (XANAX) 0.25 MG tablet; Take 1 tablet (0.25 mg total) by mouth nightly as needed for Anxiety.    RTC p.r.n., or in 3 months    CHARLES Mckee MD MPH  Staff Internist

## 2025-06-19 ENCOUNTER — TELEPHONE (OUTPATIENT)
Dept: OPHTHALMOLOGY | Facility: CLINIC | Age: 50
End: 2025-06-19
Payer: MEDICARE

## 2025-06-19 NOTE — TELEPHONE ENCOUNTER
Copied from CRM #[id number]. Topic: Appointments - Appointment Scheduling  >> Jun 19, 2025  3:02 PM Karen wrote:  Type:  Sooner Apoointment Request    Caller is requesting a sooner appointment.  Caller declined first available appointment listed below.  Caller will not accept being placed on the waitlist and is requesting a message be sent to doctor.  Name of Caller: pt  When is the first available appointment?n/a  Symptoms:annual  Would the patient rather a call back or a response via [employer]? call  Best Call Back Number:Telephone Information:  Mobile          [phone]      Additional Information: please call to [facility] annual appt

## 2025-06-24 DIAGNOSIS — E29.1 HYPOGONADISM IN MALE: ICD-10-CM

## 2025-06-24 NOTE — TELEPHONE ENCOUNTER
No care due was identified.  [facility] Embedded Care Due Messages. Reference number: [id number].   6/24/2025 3:20:58 PM CDT

## 2025-06-25 RX ORDER — TESTOSTERONE CYPIONATE 1000 MG/10ML
100 INJECTION, SOLUTION INTRAMUSCULAR
Qty: 10 ML | Refills: 2 | Status: SHIPPED | OUTPATIENT
Start: 2025-06-25 | End: 2026-08-19

## 2025-07-01 RX ORDER — ALLOPURINOL 100 MG/1
100 TABLET ORAL DAILY
Qty: 90 TABLET | Refills: 3 | Status: SHIPPED | OUTPATIENT
Start: 2025-07-01

## 2025-07-01 NOTE — TELEPHONE ENCOUNTER
No care due was identified.  [facility] Embedded Care Due Messages. Reference number: [id number].   7/01/2025 7:52:19 AM CDT

## 2025-07-01 NOTE — TELEPHONE ENCOUNTER
Refill Routing Note   Medication(s) are not appropriate for processing by [facility] for the following reason(s):        Required labs outdated  Required labs abnormal    [facility] action(s):  Defer               Appointments  past 12m or future 3m with PCP    Date Provider   Last Visit   6/12/2025 Manjit Mckee II, MD   Next Visit   9/16/2025 Manjit Mckee II, MD   ED visits in past 90 days: 0        Note composed:12:09 PM 07/01/2025

## 2025-08-26 ENCOUNTER — OFFICE VISIT (OUTPATIENT)
Dept: CARDIOLOGY | Facility: CLINIC | Age: 50
End: 2025-08-26
Payer: MEDICARE

## 2025-08-26 VITALS
DIASTOLIC BLOOD PRESSURE: 86 MMHG | WEIGHT: 268.94 LBS | BODY MASS INDEX: 36.43 KG/M2 | HEIGHT: 72 IN | SYSTOLIC BLOOD PRESSURE: 132 MMHG | HEART RATE: 78 BPM

## 2025-08-26 DIAGNOSIS — R42 DIZZINESS: ICD-10-CM

## 2025-08-26 DIAGNOSIS — E11.59 HYPERTENSION ASSOCIATED WITH DIABETES: ICD-10-CM

## 2025-08-26 DIAGNOSIS — G47.33 OSA (OBSTRUCTIVE SLEEP APNEA): ICD-10-CM

## 2025-08-26 DIAGNOSIS — N18.6 ESRD ON HEMODIALYSIS: ICD-10-CM

## 2025-08-26 DIAGNOSIS — E78.5 HYPERLIPIDEMIA, UNSPECIFIED HYPERLIPIDEMIA TYPE: ICD-10-CM

## 2025-08-26 DIAGNOSIS — I95.9 HYPOTENSION, UNSPECIFIED HYPOTENSION TYPE: Primary | ICD-10-CM

## 2025-08-26 DIAGNOSIS — I15.2 HYPERTENSION ASSOCIATED WITH DIABETES: ICD-10-CM

## 2025-08-26 DIAGNOSIS — Z99.2 DEPENDENCE ON RENAL DIALYSIS: ICD-10-CM

## 2025-08-26 DIAGNOSIS — Z99.2 ESRD ON HEMODIALYSIS: ICD-10-CM

## 2025-08-26 PROCEDURE — 1160F RVW MEDS BY RX/DR IN RCRD: CPT | Mod: CPTII,S$GLB,, | Performed by: INTERNAL MEDICINE

## 2025-08-26 PROCEDURE — 3079F DIAST BP 80-89 MM HG: CPT | Mod: CPTII,S$GLB,, | Performed by: INTERNAL MEDICINE

## 2025-08-26 PROCEDURE — 3075F SYST BP GE 130 - 139MM HG: CPT | Mod: CPTII,S$GLB,, | Performed by: INTERNAL MEDICINE

## 2025-08-26 PROCEDURE — 3044F HG A1C LEVEL LT 7.0%: CPT | Mod: CPTII,S$GLB,, | Performed by: INTERNAL MEDICINE

## 2025-08-26 PROCEDURE — 99214 OFFICE O/P EST MOD 30 MIN: CPT | Mod: S$GLB,,, | Performed by: INTERNAL MEDICINE

## 2025-08-26 PROCEDURE — 99999 PR PBB SHADOW E&M-EST. PATIENT-LVL IV: CPT | Mod: PBBFAC,,, | Performed by: INTERNAL MEDICINE

## 2025-08-26 PROCEDURE — 1159F MED LIST DOCD IN RCRD: CPT | Mod: CPTII,S$GLB,, | Performed by: INTERNAL MEDICINE

## 2025-08-26 PROCEDURE — 3008F BODY MASS INDEX DOCD: CPT | Mod: CPTII,S$GLB,, | Performed by: INTERNAL MEDICINE

## 2025-08-29 LAB
OHS QRS DURATION: 98 MS
OHS QTC CALCULATION: 456 MS

## (undated) DEVICE — BLLN MUSTANG 10X60X75

## (undated) DEVICE — GUIDEWIRE ANGLED .035 150CM

## (undated) DEVICE — INTRODUCER VASC 6FX6VM*

## (undated) DEVICE — BLLN MUSTANG 8 X 80 X 75

## (undated) DEVICE — CATH BEREN SOFT VU 5FR 65CM

## (undated) DEVICE — CATH GLIDE ANGLED 5FR 65CM

## (undated) DEVICE — GUIDEWIRE STF .035X150CM ANG

## (undated) DEVICE — KIT MICROINTRODUCE MINI 5X10CM

## (undated) DEVICE — CONTRAST VISIPAQUE 150ML